# Patient Record
Sex: MALE | Race: WHITE | NOT HISPANIC OR LATINO | Employment: OTHER | URBAN - NONMETROPOLITAN AREA
[De-identification: names, ages, dates, MRNs, and addresses within clinical notes are randomized per-mention and may not be internally consistent; named-entity substitution may affect disease eponyms.]

---

## 2017-01-05 ENCOUNTER — APPOINTMENT (OUTPATIENT)
Dept: GENERAL RADIOLOGY | Facility: HOSPITAL | Age: 72
End: 2017-01-05

## 2017-01-05 ENCOUNTER — APPOINTMENT (OUTPATIENT)
Dept: CARDIOLOGY | Facility: HOSPITAL | Age: 72
End: 2017-01-05
Attending: FAMILY MEDICINE

## 2017-01-05 ENCOUNTER — HOSPITAL ENCOUNTER (INPATIENT)
Facility: HOSPITAL | Age: 72
LOS: 1 days | Discharge: LEFT BEFORE DISCHARGE | End: 2017-01-06
Attending: EMERGENCY MEDICINE | Admitting: FAMILY MEDICINE

## 2017-01-05 DIAGNOSIS — J96.02 ACUTE RESPIRATORY FAILURE WITH HYPOXIA AND HYPERCAPNIA (HCC): ICD-10-CM

## 2017-01-05 DIAGNOSIS — I50.9 ACUTE ON CHRONIC CONGESTIVE HEART FAILURE, UNSPECIFIED CONGESTIVE HEART FAILURE TYPE: Primary | ICD-10-CM

## 2017-01-05 DIAGNOSIS — I48.91 ATRIAL FIBRILLATION WITH RAPID VENTRICULAR RESPONSE (HCC): ICD-10-CM

## 2017-01-05 DIAGNOSIS — J96.01 ACUTE RESPIRATORY FAILURE WITH HYPOXIA AND HYPERCAPNIA (HCC): ICD-10-CM

## 2017-01-05 LAB
A-A DO2: 14.6 MMHG (ref 0–300)
A-A DO2: >300 MMHG (ref 0–300)
A-A DO2: >300 MMHG (ref 0–300)
ALBUMIN SERPL-MCNC: 4.4 G/DL (ref 3.4–4.8)
ALBUMIN/GLOB SERPL: 1.3 G/DL (ref 1.5–2.5)
ALP SERPL-CCNC: 102 U/L (ref 46–116)
ALT SERPL W P-5'-P-CCNC: 25 U/L (ref 10–44)
ANION GAP SERPL CALCULATED.3IONS-SCNC: 10.9 MMOL/L (ref 3.6–11.2)
ARTERIAL PATENCY WRIST A: ABNORMAL
ARTERIAL PATENCY WRIST A: ABNORMAL
ARTERIAL PATENCY WRIST A: NORMAL
AST SERPL-CCNC: 25 U/L (ref 10–34)
ATMOSPHERIC PRESS: 723 MMHG
BACTERIA UR QL AUTO: ABNORMAL /HPF
BASE EXCESS BLDA CALC-SCNC: -2.1 MMOL/L
BASE EXCESS BLDA CALC-SCNC: -5.6 MMOL/L
BASE EXCESS BLDA CALC-SCNC: -8.8 MMOL/L
BASOPHILS # BLD AUTO: 0.11 10*3/MM3 (ref 0–0.3)
BASOPHILS NFR BLD AUTO: 0.5 % (ref 0–2)
BDY SITE: ABNORMAL
BDY SITE: ABNORMAL
BDY SITE: NORMAL
BH CV ECHO MEAS - ACS: 1.7 CM
BH CV ECHO MEAS - AO ROOT AREA (BSA CORRECTED): 1.2
BH CV ECHO MEAS - AO ROOT AREA: 6.9 CM^2
BH CV ECHO MEAS - AO ROOT DIAM: 3 CM
BH CV ECHO MEAS - BSA(HAYCOCK): 2.5 M^2
BH CV ECHO MEAS - BSA: 2.4 M^2
BH CV ECHO MEAS - BZI_BMI: 39.3 KILOGRAMS/M^2
BH CV ECHO MEAS - BZI_METRIC_HEIGHT: 177.8 CM
BH CV ECHO MEAS - BZI_METRIC_WEIGHT: 124.3 KG
BH CV ECHO MEAS - CONTRAST EF 4CH: 27.6 ML/M^2
BH CV ECHO MEAS - EDV(CUBED): 319.2 ML
BH CV ECHO MEAS - EDV(MOD-SP4): 163 ML
BH CV ECHO MEAS - EDV(TEICH): 241.9 ML
BH CV ECHO MEAS - EF(CUBED): 32.9 %
BH CV ECHO MEAS - EF(MOD-SP4): 27.6 %
BH CV ECHO MEAS - EF(TEICH): 26.1 %
BH CV ECHO MEAS - ESV(CUBED): 214.1 ML
BH CV ECHO MEAS - ESV(MOD-SP4): 118 ML
BH CV ECHO MEAS - ESV(TEICH): 178.8 ML
BH CV ECHO MEAS - FS: 12.5 %
BH CV ECHO MEAS - IVS/LVPW: 0.8
BH CV ECHO MEAS - IVSD: 0.9 CM
BH CV ECHO MEAS - LA DIMENSION: 4 CM
BH CV ECHO MEAS - LA/AO: 1.3
BH CV ECHO MEAS - LV DIASTOLIC VOL/BSA (35-75): 68.3 ML/M^2
BH CV ECHO MEAS - LV MASS(C)D: 311.9 GRAMS
BH CV ECHO MEAS - LV MASS(C)DI: 130.7 GRAMS/M^2
BH CV ECHO MEAS - LV SYSTOLIC VOL/BSA (12-30): 49.5 ML/M^2
BH CV ECHO MEAS - LVIDD: 6.8 CM
BH CV ECHO MEAS - LVIDS: 6 CM
BH CV ECHO MEAS - LVLD AP4: 8.2 CM
BH CV ECHO MEAS - LVLS AP4: 7.5 CM
BH CV ECHO MEAS - LVOT AREA (M): 2.8 CM^2
BH CV ECHO MEAS - LVOT AREA: 2.7 CM^2
BH CV ECHO MEAS - LVOT DIAM: 1.9 CM
BH CV ECHO MEAS - LVPWD: 1.1 CM
BH CV ECHO MEAS - PA ACC SLOPE: 792.8 CM/SEC^2
BH CV ECHO MEAS - PA ACC TIME: 0.1 SEC
BH CV ECHO MEAS - PA PR(ACCEL): 33.1 MMHG
BH CV ECHO MEAS - SI(CUBED): 44 ML/M^2
BH CV ECHO MEAS - SI(MOD-SP4): 18.9 ML/M^2
BH CV ECHO MEAS - SI(TEICH): 26.5 ML/M^2
BH CV ECHO MEAS - SV(CUBED): 105 ML
BH CV ECHO MEAS - SV(MOD-SP4): 45 ML
BH CV ECHO MEAS - SV(TEICH): 63.1 ML
BILIRUB SERPL-MCNC: 1 MG/DL (ref 0.2–1.8)
BILIRUB UR QL STRIP: NEGATIVE
BNP SERPL-MCNC: 582 PG/ML (ref 0–100)
BODY TEMPERATURE: 98.6 C
BODY TEMPERATURE: 98.6 C
BODY TEMPERATURE: 98.9 C
BUN BLD-MCNC: 14 MG/DL (ref 7–21)
BUN/CREAT SERPL: 14.4 (ref 7–25)
CALCIUM SPEC-SCNC: 9.6 MG/DL (ref 7.7–10)
CHLORIDE SERPL-SCNC: 101 MMOL/L (ref 99–112)
CK MB SERPL-CCNC: 1.95 NG/ML (ref 0–5)
CK MB SERPL-CCNC: 2.05 NG/ML (ref 0–5)
CK MB SERPL-RTO: 4.1 % (ref 0–3)
CK MB SERPL-RTO: 4.1 % (ref 0–3)
CK SERPL-CCNC: 48 U/L (ref 24–204)
CK SERPL-CCNC: 50 U/L (ref 24–204)
CLARITY UR: CLEAR
CO2 SERPL-SCNC: 23.1 MMOL/L (ref 24.3–31.9)
COHGB MFR BLD: 2.4 % (ref 0–5)
COHGB MFR BLD: 3.7 % (ref 0–5)
COHGB MFR BLD: 5 % (ref 0–5)
COLOR UR: YELLOW
CREAT BLD-MCNC: 0.97 MG/DL (ref 0.43–1.29)
D-LACTATE SERPL-SCNC: 2.1 MMOL/L (ref 0.5–2)
D-LACTATE SERPL-SCNC: 4.8 MMOL/L (ref 0.5–2)
DEPRECATED RDW RBC AUTO: 50 FL (ref 37–54)
EOSINOPHIL # BLD AUTO: 0.15 10*3/MM3 (ref 0–0.7)
EOSINOPHIL NFR BLD AUTO: 0.7 % (ref 0–7)
EPAP: 8
EPAP: 8
ERYTHROCYTE [DISTWIDTH] IN BLOOD BY AUTOMATED COUNT: 14.9 % (ref 11.5–14.5)
FLUAV AG NPH QL: NEGATIVE
FLUBV AG NPH QL IA: NEGATIVE
GFR SERPL CREATININE-BSD FRML MDRD: 76 ML/MIN/1.73
GLOBULIN UR ELPH-MCNC: 3.3 GM/DL
GLUCOSE BLD-MCNC: 345 MG/DL (ref 70–110)
GLUCOSE BLDC GLUCOMTR-MCNC: 124 MG/DL (ref 70–130)
GLUCOSE UR STRIP-MCNC: NEGATIVE MG/DL
HCO3 BLDA-SCNC: 19.9 MMOL/L (ref 22–26)
HCO3 BLDA-SCNC: 21.6 MMOL/L (ref 22–26)
HCO3 BLDA-SCNC: 22.4 MMOL/L (ref 22–26)
HCT VFR BLD AUTO: 51.3 % (ref 42–52)
HCT VFR BLD CALC: 46 % (ref 42–52)
HCT VFR BLD CALC: 46 % (ref 42–52)
HCT VFR BLD CALC: 50 % (ref 42–52)
HGB BLD-MCNC: 16.2 G/DL (ref 14–18)
HGB BLDA-MCNC: 15.5 G/DL (ref 12–16)
HGB BLDA-MCNC: 15.8 G/DL (ref 12–16)
HGB BLDA-MCNC: 16.9 G/DL (ref 12–16)
HGB UR QL STRIP.AUTO: ABNORMAL
HOROWITZ INDEX BLD+IHG-RTO: 100 %
HOROWITZ INDEX BLD+IHG-RTO: 100 %
HOROWITZ INDEX BLD+IHG-RTO: 50 %
HYALINE CASTS UR QL AUTO: ABNORMAL /LPF
IMM GRANULOCYTES # BLD: 0.12 10*3/MM3 (ref 0–0.03)
IMM GRANULOCYTES NFR BLD: 0.5 % (ref 0–0.5)
IPAP: 20
IPAP: 20
KETONES UR QL STRIP: NEGATIVE
L PNEUMO1 AG UR QL IA: NEGATIVE
LEUKOCYTE ESTERASE UR QL STRIP.AUTO: NEGATIVE
LV EF 2D ECHO EST: 35 %
LYMPHOCYTES # BLD AUTO: 5.39 10*3/MM3 (ref 1–3)
LYMPHOCYTES NFR BLD AUTO: 23.5 % (ref 16–46)
MAGNESIUM SERPL-MCNC: 1.9 MG/DL (ref 1.7–2.6)
MCH RBC QN AUTO: 30 PG (ref 27–33)
MCHC RBC AUTO-ENTMCNC: 31.6 G/DL (ref 33–37)
MCV RBC AUTO: 95 FL (ref 80–94)
METHGB BLD QL: 0.3 % (ref 0–3)
METHGB BLD QL: 0.4 % (ref 0–3)
METHGB BLD QL: 0.4 % (ref 0–3)
MODALITY: ABNORMAL
MODALITY: ABNORMAL
MODALITY: NORMAL
MONOCYTES # BLD AUTO: 1.08 10*3/MM3 (ref 0.1–0.9)
MONOCYTES NFR BLD AUTO: 4.7 % (ref 0–12)
MYOGLOBIN SERPL-MCNC: 34 NG/ML (ref 0–109)
MYOGLOBIN SERPL-MCNC: 72 NG/ML (ref 0–109)
NEUTROPHILS # BLD AUTO: 16.04 10*3/MM3 (ref 1.4–6.5)
NEUTROPHILS NFR BLD AUTO: 70.1 % (ref 40–75)
NITRITE UR QL STRIP: NEGATIVE
OSMOLALITY SERPL CALC.SUM OF ELEC: 284.3 MOSM/KG (ref 273–305)
OXYHGB MFR BLDV: 87.2 % (ref 85–100)
OXYHGB MFR BLDV: 93.5 % (ref 85–100)
OXYHGB MFR BLDV: 95.6 % (ref 85–100)
PCO2 BLDA: 38.1 MM HG (ref 35–45)
PCO2 BLDA: 39.3 MM HG (ref 35–45)
PCO2 BLDA: 65.1 MM HG (ref 35–45)
PH BLDA: 7.14 PH UNITS (ref 7.35–7.45)
PH BLDA: 7.32 PH UNITS (ref 7.35–7.45)
PH BLDA: 7.39 PH UNITS (ref 7.35–7.45)
PH UR STRIP.AUTO: <=5 [PH] (ref 5–8)
PHOSPHATE SERPL-MCNC: 3.6 MG/DL (ref 2.7–4.5)
PLATELET # BLD AUTO: 338 10*3/MM3 (ref 130–400)
PMV BLD AUTO: 11.1 FL (ref 6–10)
PO2 BLDA: 293.1 MM HG (ref 80–100)
PO2 BLDA: 77.7 MM HG (ref 80–100)
PO2 BLDA: 80.2 MM HG (ref 80–100)
POTASSIUM BLD-SCNC: 3.9 MMOL/L (ref 3.5–5.3)
PROT SERPL-MCNC: 7.7 G/DL (ref 6–8)
PROT UR QL STRIP: ABNORMAL
RBC # BLD AUTO: 5.4 10*6/MM3 (ref 4.7–6.1)
RBC # UR: ABNORMAL /HPF
REF LAB TEST METHOD: ABNORMAL
SAO2 % BLDCOA: 92.2 % (ref 90–100)
SAO2 % BLDCOA: 96.1 % (ref 90–100)
SAO2 % BLDCOA: 99.7 % (ref 90–100)
SET MECH RESP RATE: 20
SET MECH RESP RATE: 20
SODIUM BLD-SCNC: 135 MMOL/L (ref 135–153)
SP GR UR STRIP: 1.01 (ref 1–1.03)
SQUAMOUS #/AREA URNS HPF: ABNORMAL /HPF
TROPONIN I SERPL-MCNC: 0.04 NG/ML
TROPONIN I SERPL-MCNC: 0.09 NG/ML
UROBILINOGEN UR QL STRIP: ABNORMAL
WBC NRBC COR # BLD: 22.89 10*3/MM3 (ref 4.5–12.5)
WBC UR QL AUTO: ABNORMAL /HPF

## 2017-01-05 PROCEDURE — 94799 UNLISTED PULMONARY SVC/PX: CPT

## 2017-01-05 PROCEDURE — 82805 BLOOD GASES W/O2 SATURATION: CPT | Performed by: EMERGENCY MEDICINE

## 2017-01-05 PROCEDURE — 83735 ASSAY OF MAGNESIUM: CPT | Performed by: INTERNAL MEDICINE

## 2017-01-05 PROCEDURE — 71010 XR CHEST 1 VW: CPT | Performed by: RADIOLOGY

## 2017-01-05 PROCEDURE — 94660 CPAP INITIATION&MGMT: CPT

## 2017-01-05 PROCEDURE — 80053 COMPREHEN METABOLIC PANEL: CPT | Performed by: EMERGENCY MEDICINE

## 2017-01-05 PROCEDURE — 83874 ASSAY OF MYOGLOBIN: CPT | Performed by: EMERGENCY MEDICINE

## 2017-01-05 PROCEDURE — 93010 ELECTROCARDIOGRAM REPORT: CPT | Performed by: INTERNAL MEDICINE

## 2017-01-05 PROCEDURE — 81001 URINALYSIS AUTO W/SCOPE: CPT | Performed by: EMERGENCY MEDICINE

## 2017-01-05 PROCEDURE — 83050 HGB METHEMOGLOBIN QUAN: CPT | Performed by: EMERGENCY MEDICINE

## 2017-01-05 PROCEDURE — 83605 ASSAY OF LACTIC ACID: CPT | Performed by: EMERGENCY MEDICINE

## 2017-01-05 PROCEDURE — 82375 ASSAY CARBOXYHB QUANT: CPT | Performed by: EMERGENCY MEDICINE

## 2017-01-05 PROCEDURE — 82962 GLUCOSE BLOOD TEST: CPT

## 2017-01-05 PROCEDURE — 94640 AIRWAY INHALATION TREATMENT: CPT

## 2017-01-05 PROCEDURE — 99222 1ST HOSP IP/OBS MODERATE 55: CPT | Performed by: INTERNAL MEDICINE

## 2017-01-05 PROCEDURE — 25010000002 VANCOMYCIN PER 500 MG

## 2017-01-05 PROCEDURE — 36415 COLL VENOUS BLD VENIPUNCTURE: CPT

## 2017-01-05 PROCEDURE — 93306 TTE W/DOPPLER COMPLETE: CPT

## 2017-01-05 PROCEDURE — 93005 ELECTROCARDIOGRAM TRACING: CPT | Performed by: EMERGENCY MEDICINE

## 2017-01-05 PROCEDURE — 25010000002 CEFTRIAXONE: Performed by: EMERGENCY MEDICINE

## 2017-01-05 PROCEDURE — 87040 BLOOD CULTURE FOR BACTERIA: CPT | Performed by: EMERGENCY MEDICINE

## 2017-01-05 PROCEDURE — 93306 TTE W/DOPPLER COMPLETE: CPT | Performed by: INTERNAL MEDICINE

## 2017-01-05 PROCEDURE — 87449 NOS EACH ORGANISM AG IA: CPT | Performed by: INTERNAL MEDICINE

## 2017-01-05 PROCEDURE — 87804 INFLUENZA ASSAY W/OPTIC: CPT | Performed by: INTERNAL MEDICINE

## 2017-01-05 PROCEDURE — 83880 ASSAY OF NATRIURETIC PEPTIDE: CPT | Performed by: EMERGENCY MEDICINE

## 2017-01-05 PROCEDURE — 36600 WITHDRAWAL OF ARTERIAL BLOOD: CPT | Performed by: EMERGENCY MEDICINE

## 2017-01-05 PROCEDURE — 99285 EMERGENCY DEPT VISIT HI MDM: CPT

## 2017-01-05 PROCEDURE — 25010000002 FUROSEMIDE PER 20 MG

## 2017-01-05 PROCEDURE — 84100 ASSAY OF PHOSPHORUS: CPT | Performed by: INTERNAL MEDICINE

## 2017-01-05 PROCEDURE — 82550 ASSAY OF CK (CPK): CPT | Performed by: EMERGENCY MEDICINE

## 2017-01-05 PROCEDURE — 71010 HC CHEST PA OR AP: CPT

## 2017-01-05 PROCEDURE — 25010000002 FUROSEMIDE PER 20 MG: Performed by: INTERNAL MEDICINE

## 2017-01-05 PROCEDURE — 82553 CREATINE MB FRACTION: CPT | Performed by: EMERGENCY MEDICINE

## 2017-01-05 PROCEDURE — 84484 ASSAY OF TROPONIN QUANT: CPT | Performed by: EMERGENCY MEDICINE

## 2017-01-05 PROCEDURE — 85025 COMPLETE CBC W/AUTO DIFF WBC: CPT | Performed by: EMERGENCY MEDICINE

## 2017-01-05 RX ORDER — LISINOPRIL 10 MG/1
20 TABLET ORAL DAILY
Status: DISCONTINUED | OUTPATIENT
Start: 2017-01-05 | End: 2017-01-06 | Stop reason: HOSPADM

## 2017-01-05 RX ORDER — IPRATROPIUM BROMIDE AND ALBUTEROL SULFATE 2.5; .5 MG/3ML; MG/3ML
3 SOLUTION RESPIRATORY (INHALATION) ONCE
Status: COMPLETED | OUTPATIENT
Start: 2017-01-05 | End: 2017-01-05

## 2017-01-05 RX ORDER — LISINOPRIL 20 MG/1
20 TABLET ORAL DAILY
COMMUNITY
End: 2017-07-10

## 2017-01-05 RX ORDER — POTASSIUM CHLORIDE 20 MEQ/1
20 TABLET, EXTENDED RELEASE ORAL DAILY
Status: DISCONTINUED | OUTPATIENT
Start: 2017-01-06 | End: 2017-01-06 | Stop reason: HOSPADM

## 2017-01-05 RX ORDER — BUMETANIDE 0.25 MG/ML
INJECTION INTRAMUSCULAR; INTRAVENOUS
Status: COMPLETED
Start: 2017-01-05 | End: 2017-01-05

## 2017-01-05 RX ORDER — FUROSEMIDE 20 MG/1
20 TABLET ORAL DAILY
COMMUNITY
End: 2017-03-02 | Stop reason: SDUPTHER

## 2017-01-05 RX ORDER — NITROGLYCERIN 20 MG/100ML
INJECTION INTRAVENOUS
Status: DISPENSED
Start: 2017-01-05 | End: 2017-01-05

## 2017-01-05 RX ORDER — FUROSEMIDE 10 MG/ML
80 INJECTION INTRAMUSCULAR; INTRAVENOUS ONCE
Status: COMPLETED | OUTPATIENT
Start: 2017-01-05 | End: 2017-01-05

## 2017-01-05 RX ORDER — FUROSEMIDE 10 MG/ML
INJECTION INTRAMUSCULAR; INTRAVENOUS
Status: COMPLETED
Start: 2017-01-05 | End: 2017-01-05

## 2017-01-05 RX ORDER — NITROGLYCERIN 20 MG/100ML
5-200 INJECTION INTRAVENOUS
Status: DISCONTINUED | OUTPATIENT
Start: 2017-01-05 | End: 2017-01-05

## 2017-01-05 RX ORDER — LISINOPRIL 10 MG/1
20 TABLET ORAL DAILY
Status: CANCELLED | OUTPATIENT
Start: 2017-01-05

## 2017-01-05 RX ORDER — AMIODARONE HYDROCHLORIDE 200 MG/1
200 TABLET ORAL 3 TIMES DAILY
Status: DISCONTINUED | OUTPATIENT
Start: 2017-01-05 | End: 2017-01-06 | Stop reason: HOSPADM

## 2017-01-05 RX ORDER — FUROSEMIDE 20 MG/1
20 TABLET ORAL DAILY
Status: CANCELLED | OUTPATIENT
Start: 2017-01-06

## 2017-01-05 RX ORDER — FUROSEMIDE 10 MG/ML
40 INJECTION INTRAMUSCULAR; INTRAVENOUS DAILY
Status: DISCONTINUED | OUTPATIENT
Start: 2017-01-05 | End: 2017-01-06 | Stop reason: HOSPADM

## 2017-01-05 RX ORDER — NYSTATIN 100000 U/G
CREAM TOPICAL EVERY 12 HOURS SCHEDULED
Status: DISCONTINUED | OUTPATIENT
Start: 2017-01-05 | End: 2017-01-06 | Stop reason: HOSPADM

## 2017-01-05 RX ORDER — FUROSEMIDE 10 MG/ML
40 INJECTION INTRAMUSCULAR; INTRAVENOUS ONCE
Status: COMPLETED | OUTPATIENT
Start: 2017-01-05 | End: 2017-01-05

## 2017-01-05 RX ORDER — BUMETANIDE 0.25 MG/ML
2 INJECTION INTRAMUSCULAR; INTRAVENOUS ONCE
Status: COMPLETED | OUTPATIENT
Start: 2017-01-05 | End: 2017-01-05

## 2017-01-05 RX ORDER — FUROSEMIDE 20 MG/1
20 TABLET ORAL DAILY
Status: CANCELLED | OUTPATIENT
Start: 2017-01-05

## 2017-01-05 RX ORDER — POTASSIUM CHLORIDE 1.5 G/1.77G
40 POWDER, FOR SOLUTION ORAL ONCE
Status: COMPLETED | OUTPATIENT
Start: 2017-01-05 | End: 2017-01-05

## 2017-01-05 RX ORDER — DOXYCYCLINE 100 MG/1
100 CAPSULE ORAL EVERY 12 HOURS SCHEDULED
Status: DISCONTINUED | OUTPATIENT
Start: 2017-01-05 | End: 2017-01-06 | Stop reason: HOSPADM

## 2017-01-05 RX ADMIN — NYSTATIN: 100000 CREAM TOPICAL at 13:56

## 2017-01-05 RX ADMIN — BUMETANIDE 2 MG: 0.25 INJECTION, SOLUTION INTRAMUSCULAR; INTRAVENOUS at 01:06

## 2017-01-05 RX ADMIN — AMIODARONE HYDROCHLORIDE 200 MG: 200 TABLET ORAL at 21:33

## 2017-01-05 RX ADMIN — BUMETANIDE 2 MG: 0.25 INJECTION INTRAMUSCULAR; INTRAVENOUS at 01:06

## 2017-01-05 RX ADMIN — METOPROLOL TARTRATE 25 MG: 25 TABLET, FILM COATED ORAL at 21:33

## 2017-01-05 RX ADMIN — DOXYCYCLINE 100 MG: 100 CAPSULE ORAL at 14:44

## 2017-01-05 RX ADMIN — FUROSEMIDE 40 MG: 10 INJECTION, SOLUTION INTRAMUSCULAR; INTRAVENOUS at 14:44

## 2017-01-05 RX ADMIN — DOXYCYCLINE 100 MG: 100 CAPSULE ORAL at 21:33

## 2017-01-05 RX ADMIN — RIVAROXABAN 20 MG: 20 TABLET, FILM COATED ORAL at 18:50

## 2017-01-05 RX ADMIN — CEFEPIME HYDROCHLORIDE 1 G: 1 INJECTION, POWDER, FOR SOLUTION INTRAMUSCULAR; INTRAVENOUS at 14:44

## 2017-01-05 RX ADMIN — IPRATROPIUM BROMIDE AND ALBUTEROL SULFATE 3 ML: .5; 3 SOLUTION RESPIRATORY (INHALATION) at 04:57

## 2017-01-05 RX ADMIN — NITROGLYCERIN 5 MCG/MIN: 20 INJECTION INTRAVENOUS at 00:43

## 2017-01-05 RX ADMIN — CEFTRIAXONE 1 G: 1 INJECTION, POWDER, FOR SOLUTION INTRAMUSCULAR; INTRAVENOUS at 05:15

## 2017-01-05 RX ADMIN — VANCOMYCIN HYDROCHLORIDE 2000 MG: 5 INJECTION, POWDER, LYOPHILIZED, FOR SOLUTION INTRAVENOUS at 15:30

## 2017-01-05 RX ADMIN — FUROSEMIDE 80 MG: 10 INJECTION, SOLUTION INTRAMUSCULAR; INTRAVENOUS at 00:40

## 2017-01-05 RX ADMIN — LISINOPRIL 20 MG: 10 TABLET ORAL at 18:50

## 2017-01-05 RX ADMIN — IPRATROPIUM BROMIDE AND ALBUTEROL SULFATE 3 ML: .5; 3 SOLUTION RESPIRATORY (INHALATION) at 00:55

## 2017-01-05 RX ADMIN — DILTIAZEM HYDROCHLORIDE 5 MG/HR: 100 INJECTION, POWDER, LYOPHILIZED, FOR SOLUTION INTRAVENOUS at 00:48

## 2017-01-05 RX ADMIN — DILTIAZEM HYDROCHLORIDE 5 MG/HR: 100 INJECTION, POWDER, LYOPHILIZED, FOR SOLUTION INTRAVENOUS at 13:21

## 2017-01-05 RX ADMIN — DOXYCYCLINE 100 MG: 100 INJECTION, POWDER, LYOPHILIZED, FOR SOLUTION INTRAVENOUS at 05:15

## 2017-01-05 RX ADMIN — NYSTATIN: 100000 CREAM TOPICAL at 21:00

## 2017-01-05 RX ADMIN — POTASSIUM CHLORIDE 40 MEQ: 1.5 POWDER, FOR SOLUTION ORAL at 15:30

## 2017-01-05 RX ADMIN — FUROSEMIDE 80 MG: 10 INJECTION INTRAMUSCULAR; INTRAVENOUS at 00:40

## 2017-01-05 NOTE — ED NOTES
Attempted to give report, receiving nurse verifying that they can take patient with a nitroglycerin drip. States she will call back     Harriet Galvan RN  01/05/17 0828

## 2017-01-05 NOTE — ED PROVIDER NOTES
Subjective   HPI Comments: Patient is a 71-year-old white male who states he has a history of chronic atrial fibrillation and congestive heart failure.  He denies any history of COPD.  He presents here 2 hours after the onset of rapidly progressively worsening shortness of breath.  He denies any chest pain or discomfort.  He had no preceding symptoms... No cough, cold, congestion, fever, chills, etc.  He denies any other associated symptoms or complaints.  Initially he was somewhat mild but it progressed rapidly.    Patient is a 71 y.o. male presenting with shortness of breath.   History provided by:  Patient  Shortness of Breath   Severity:  Severe  Onset quality:  Sudden  Duration:  2 hours  Timing:  Constant  Progression:  Worsening  Chronicity:  Recurrent  Context: not URI    Relieved by:  Nothing  Worsened by:  Nothing  Ineffective treatments:  Sitting up  Associated symptoms: no abdominal pain, no chest pain, no claudication, no cough, no diaphoresis, no fever, no headaches, no hemoptysis, no neck pain, no rash, no sore throat, no sputum production, no syncope, no vomiting and no wheezing    Risk factors: no hx of PE/DVT, no prolonged immobilization and no tobacco use        Review of Systems   Constitutional: Negative for chills, diaphoresis and fever.   HENT: Negative for congestion, sinus pressure and sore throat.    Eyes: Negative for pain.   Respiratory: Positive for shortness of breath. Negative for cough, hemoptysis, sputum production, wheezing and stridor.    Cardiovascular: Negative for chest pain, palpitations, claudication and syncope.   Gastrointestinal: Negative for abdominal distention, abdominal pain, diarrhea, nausea and vomiting.   Endocrine: Negative for polydipsia and polyphagia.   Genitourinary: Negative for difficulty urinating and flank pain.   Musculoskeletal: Negative for neck pain and neck stiffness.   Skin: Negative for color change and rash.   Neurological: Negative for dizziness,  seizures, syncope and headaches.   Psychiatric/Behavioral: Negative for confusion.   All other systems reviewed and are negative.      Past Medical History   Diagnosis Date   • Atrial fibrillation    • Cardiomyopathy    • COPD (chronic obstructive pulmonary disease)    • Hypertension        No Known Allergies    History reviewed. No pertinent past surgical history.    Family History   Problem Relation Age of Onset   • No Known Problems Mother    • No Known Problems Father    • No Known Problems Sister    • No Known Problems Brother    • No Known Problems Son    • No Known Problems Daughter    • No Known Problems Maternal Grandmother    • No Known Problems Maternal Grandfather    • No Known Problems Paternal Grandmother    • No Known Problems Paternal Grandfather    • No Known Problems Cousin    • Rheum arthritis Neg Hx    • Osteoarthritis Neg Hx    • Asthma Neg Hx    • Diabetes Neg Hx    • Heart failure Neg Hx    • Hyperlipidemia Neg Hx    • Hypertension Neg Hx    • Migraines Neg Hx    • Rashes / Skin problems Neg Hx    • Seizures Neg Hx    • Stroke Neg Hx    • Thyroid disease Neg Hx        Social History     Social History   • Marital status:      Spouse name: N/A   • Number of children: N/A   • Years of education: N/A     Social History Main Topics   • Smoking status: Current Every Day Smoker     Packs/day: 1.00     Years: 18.00   • Smokeless tobacco: None   • Alcohol use No   • Drug use: No   • Sexual activity: Defer     Other Topics Concern   • None     Social History Narrative   • None           Objective   Physical Exam   Constitutional: He is oriented to person, place, and time. He appears well-developed and well-nourished.   Patient is in severe respiratory distress.   HENT:   Head: Normocephalic and atraumatic.   Eyes: EOM are normal. Pupils are equal, round, and reactive to light. No scleral icterus.   Neck: Normal range of motion. Neck supple. JVD present. No rigidity. No tracheal deviation  present.   Cardiovascular: Intact distal pulses.    Tachycardic, irregularly irregular   Pulmonary/Chest:   Patient is in severe respiratory distress.  Diminished air movement throughout, bibasilar rales.   Abdominal: Soft. Bowel sounds are normal. There is no tenderness. There is no rebound and no guarding.   Musculoskeletal: Normal range of motion. He exhibits edema. He exhibits no tenderness or deformity.   Neurological: He is alert and oriented to person, place, and time. He has normal strength. No sensory deficit. He exhibits normal muscle tone. Coordination normal. GCS eye subscore is 4. GCS verbal subscore is 5. GCS motor subscore is 6.   Skin: Skin is warm and dry. No cyanosis. No pallor.   Psychiatric: He has a normal mood and affect. His behavior is normal.   Vitals reviewed.      Procedures  EKG shows atrial fibrillation with a rate of 151.  Nonspecific ST T abnormalities.  XR Chest 1 View   ED Interpretation   Diffuse pulmonary vascular congestion consistent with acute   congestive heart failure pending radiologist review.  There is   some density in the periphery of the right mid to lower lung that   I feel is most likely secondary to an overlying soft tissue   shadow rather than acute pulmonary process.               ED Course  ED Course   Comment By Time   Patient thus far as improving throughout his emergency department stay.  He is now warm, pink, dry.  He voices that he is beginning to feel better.  Heart rate currently is 109, pulse ox on BiPAP is 94%.  Blood pressure currently is 95/69; this is on 10 mg per hour of intravenous Cardizem and 10 mics per minute of intravenous nitroglycerin.  He is just now beginning to have some urine output.  He looks much more comfortable now.  I'm endorsing further care of Mr. Kehr over to Dr. Tovar. Abhi Rios MD 01/05 0144                  MDM  Number of Diagnoses or Management Options  Acute on chronic congestive heart failure, unspecified  congestive heart failure type:   Acute respiratory failure with hypoxia and hypercapnia:   Atrial fibrillation with rapid ventricular response:   Critical Care  Total time providing critical care: 30-74 minutes (45)    Patient Progress  Patient progress: (Condition is improved, but serious.)      Final diagnoses:   Acute on chronic congestive heart failure, unspecified congestive heart failure type   Acute respiratory failure with hypoxia and hypercapnia   Atrial fibrillation with rapid ventricular response           Please note that portions of this note were completed with a voice recognition program. Efforts were made to edit the dictations, but occasionally words are mistranscribed.       Abhi Rios MD  01/05/17 0147

## 2017-01-05 NOTE — PROGRESS NOTES
Pharmacokinetics Service Note:    Mr. Kehr has been evaluated for vancomycin dosing to treat his B PNA.  Based on his estimated ClCr of 79 ml/min and demographics, will load with 2 gm followed by 1.5 gm q12hrs to target troughs = 15-20 mg/L.  Will check a trough level at steady state to determine if any adjustments are needed.    Thank you.  Lynne Kirkland, Pharm.D.  1/5/2017  1:27 PM

## 2017-01-05 NOTE — H&P
History and Physical  01/05/17    Chief complaint Dyspnea    Subjective     Patient is a 71 y.o. male presents with increased shortness of breath. WHile in the ER found to be in afib with rvr.  Pt was started on cardizem drip and bipap.  Was given iv lasix due to some volume overload.  After diuresis and better rate control pt improved from respiratory standpoint.  Admit on cardizem will consult  and . ?cardioversion.    Review of Systems   Pertinent items are noted in HPI, all other systems reviewed and negative    History  Past Medical History   Diagnosis Date   • Atrial fibrillation    • Cardiomyopathy    • COPD (chronic obstructive pulmonary disease)    • Hypertension      History reviewed. No pertinent past surgical history.  Family History   Problem Relation Age of Onset   • No Known Problems Mother    • No Known Problems Father    • No Known Problems Sister    • No Known Problems Brother    • No Known Problems Son    • No Known Problems Daughter    • No Known Problems Maternal Grandmother    • No Known Problems Maternal Grandfather    • No Known Problems Paternal Grandmother    • No Known Problems Paternal Grandfather    • No Known Problems Cousin    • Rheum arthritis Neg Hx    • Osteoarthritis Neg Hx    • Asthma Neg Hx    • Diabetes Neg Hx    • Heart failure Neg Hx    • Hyperlipidemia Neg Hx    • Hypertension Neg Hx    • Migraines Neg Hx    • Rashes / Skin problems Neg Hx    • Seizures Neg Hx    • Stroke Neg Hx    • Thyroid disease Neg Hx      Social History   Substance Use Topics   • Smoking status: Current Every Day Smoker     Packs/day: 1.00     Years: 18.00   • Smokeless tobacco: None   • Alcohol use No     Prescriptions Prior to Admission   Medication Sig Dispense Refill Last Dose   • furosemide (LASIX) 20 MG tablet Take 20 mg by mouth Daily.   01/03/2017 at Unknown time   • lisinopril (PRINIVIL,ZESTRIL) 20 MG tablet Take 20 mg by mouth Daily.   01/03/2017 at Unknown time   •  metoprolol tartrate (LOPRESSOR) 25 MG tablet Take 1 tablet by mouth 2 (Two) Times a Day. 60 tablet 3 01/03/2017 at Unknown time   • rivaroxaban (XARELTO) 20 MG tablet Take 20 mg by mouth Daily.   01/03/2017 at 1700      Allergies:  Review of patient's allergies indicates no known allergies.      Objective     Vital Signs  Temp:  [97 °F (36.1 °C)-97.7 °F (36.5 °C)] 97.7 °F (36.5 °C)  Heart Rate:  [] 114  Resp:  [20-26] 20  BP: ()/() 129/86    Physical Exam:   General Appearance alert, appears stated age and cooperative   Head normocephalic, without obvious abnormality and atraumatic   Eyes conjunctivae and sclerae normal, no icterus and PERRLA   Neck no adenopathy, suppple, no thyromegaly, no carotid bruit and no JVD   Lungs clear to auscultation, respirations regular and respirations unlabored   Heart regular rhythm & normal rate, normal S1, S2 and no murmur, no elen   Abdomen normal bowel sounds, no masses, no hepatomegaly, soft non-tender   Extremities moves extremities well, no edema, no cyanosis and no redness    Labs:  Lab Results (last 72 hours)     Procedure Component Value Units Date/Time    Blood Gas, Arterial With Co-Ox [[id number]]  (Abnormal) Collected:  01/05/17 0048    Specimen:  Arterial Blood Updated:  01/05/17 0051     Site Arterial: right radial      Ludwin's Test N/A      pH, Arterial 7.139 (C) pH units      pCO2, Arterial 65.1 (C) mm Hg      pO2, Arterial 77.7 (L) mm Hg      HCO3, Arterial 21.6 (L) mmol/L      Base Excess, Arterial -8.8 mmol/L      O2 Saturation, Arterial 92.2 %      Hemoglobin, Blood Gas 16.9 (H) g/dL      Hematocrit, Blood Gas 50.0 %      Oxyhemoglobin 87.2 %      Methemoglobin 0.4 %      Carboxyhemoglobin 5.0 %      A-a Gradiant >300.0 (H) mmHg      Temperature 98.6 C      Barometric Pressure for Blood Gas 723 mmHg      Modality Non Rebreather      FIO2 100 %     CBC & Differential [[id number]] Collected:  01/05/17 0048    Specimen:  Blood Updated:  01/05/17  0055    Narrative:       The following orders were created for panel order CBC & Differential.  Procedure                               Abnormality         Status                     ---------                               -----------         ------                     CBC Auto Differential[[id number]]         Abnormal            Final result                 Please view results for these tests on the individual orders.    CBC Auto Differential [[id number]]  (Abnormal) Collected:  01/05/17 0048    Specimen:  Blood Updated:  01/05/17 0055     WBC 22.89 (H) 10*3/mm3      RBC 5.40 10*6/mm3      Hemoglobin 16.2 g/dL      Hematocrit 51.3 %      MCV 95.0 (H) fL      MCH 30.0 pg      MCHC 31.6 (L) g/dL      RDW 14.9 (H) %      RDW-SD 50.0 fl      MPV 11.1 (H) fL      Platelets 338 10*3/mm3      Neutrophil % 70.1 %      Lymphocyte % 23.5 %      Monocyte % 4.7 %      Eosinophil % 0.7 %      Basophil % 0.5 %      Immature Grans % 0.5 %      Neutrophils, Absolute 16.04 (H) 10*3/mm3      Lymphocytes, Absolute 5.39 (H) 10*3/mm3      Monocytes, Absolute 1.08 (H) 10*3/mm3      Eosinophils, Absolute 0.15 10*3/mm3      Basophils, Absolute 0.11 10*3/mm3      Immature Grans, Absolute 0.12 (H) 10*3/mm3     CK [[id number]]  (Normal) Collected:  01/05/17 0048    Specimen:  Blood from Arm, Left Updated:  01/05/17 0129     Creatine Kinase 48 U/L     Myoglobin, Serum [[id number]]  (Normal) Collected:  01/05/17 0048    Specimen:  Blood from Arm, Left Updated:  01/05/17 0129     Myoglobin 34.0 ng/mL     CK-MB [[id number]]  (Normal) Collected:  01/05/17 0048    Specimen:  Blood from Arm, Left Updated:  01/05/17 0129     CKMB 1.95 ng/mL     Troponin [[id number]]  (Normal) Collected:  01/05/17 0048    Specimen:  Blood from Arm, Left Updated:  01/05/17 0129     Troponin I 0.036 ng/mL     Narrative:       Ultra Troponin I Reference Range:         <=0.039 ng/mL: Negative    0.04-0.779 ng/mL: Indeterminate Range. Suspicious of MI.  Clinical correlation  required.       >=0.78  ng/mL: Consistent with myocardial injury.  Clinical correlation required.    CK-MB Index [[id number]]  (Abnormal) Collected:  01/05/17 0048    Specimen:  Blood from Arm, Left Updated:  01/05/17 0129     CK-MB Index 4.1 (H) %     BNP [[id number]]  (Abnormal) Collected:  01/05/17 0048    Specimen:  Blood Updated:  01/05/17 0129     .0 (H) pg/mL     Comprehensive Metabolic Panel [[id number]]  (Abnormal) Collected:  01/05/17 0048    Specimen:  Blood from Arm, Left Updated:  01/05/17 0148     Glucose 345 (H) mg/dL      BUN 14 mg/dL      Creatinine 0.97 mg/dL      Sodium 135 mmol/L      Potassium 3.9 mmol/L      Chloride 101 mmol/L      CO2 23.1 (L) mmol/L      Calcium 9.6 mg/dL      Total Protein 7.7 g/dL      Albumin 4.40 g/dL      ALT (SGPT) 25 U/L      AST (SGOT) 25 U/L      Alkaline Phosphatase 102 U/L      Total Bilirubin 1.0 mg/dL      eGFR Non African Amer 76 mL/min/1.73      Globulin 3.3 gm/dL      A/G Ratio 1.3 (L) g/dL      BUN/Creatinine Ratio 14.4      Anion Gap 10.9 mmol/L     Narrative:       The MDRD GFR formula is only valid for adults with stable renal function between ages 18 and 70.    Osmolality, Calculated [[id number]]  (Normal) Collected:  01/05/17 0048    Specimen:  Blood from Arm, Left Updated:  01/05/17 0149     Osmolality Calc 284.3 mOsm/kg     Blood Gas, Arterial With Co-Ox [[id number]]  (Abnormal) Collected:  01/05/17 0157    Specimen:  Arterial Blood Updated:  01/05/17 0201     Site Arterial: left radial      Ludwin's Test N/A      pH, Arterial 7.323 (L) pH units      pCO2, Arterial 39.3 mm Hg      pO2, Arterial 293.1 (H) mm Hg      HCO3, Arterial 19.9 (C) mmol/L      Base Excess, Arterial -5.6 mmol/L      O2 Saturation, Arterial 99.7 %      Hemoglobin, Blood Gas 15.5 g/dL      Hematocrit, Blood Gas 46.0 %      Oxyhemoglobin 95.6 %      Methemoglobin 0.4 %      Carboxyhemoglobin 3.7 %      A-a Gradiant >300.0 (H) mmHg      Temperature 98.6 C      Barometric Pressure for  Blood Gas 723 mmHg      Modality BiPAP      FIO2 100 %      Set Mech Resp Rate 20      IPAP 20      EPAP 8     Lactic Acid, Plasma [[id number]]  (Abnormal) Collected:  01/05/17 0252    Specimen:  Blood from Arm, Left Updated:  01/05/17 0322     Lactate 4.8 (C) mmol/L     Troponin [[id number]]  (Abnormal) Collected:  01/05/17 0253    Specimen:  Blood from Arm, Left Updated:  01/05/17 0335     Troponin I 0.089 (H) ng/mL     Narrative:       Ultra Troponin I Reference Range:         <=0.039 ng/mL: Negative    0.04-0.779 ng/mL: Indeterminate Range. Suspicious of MI.  Clinical correlation required.       >=0.78  ng/mL: Consistent with myocardial injury.  Clinical correlation required.    CK [[id number]]  (Normal) Collected:  01/05/17 0253    Specimen:  Blood from Arm, Left Updated:  01/05/17 0338     Creatine Kinase 50 U/L     Myoglobin, Serum [[id number]]  (Normal) Collected:  01/05/17 0253    Specimen:  Blood from Arm, Left Updated:  01/05/17 0338     Myoglobin 72.0 ng/mL     CK-MB [[id number]]  (Normal) Collected:  01/05/17 0253    Specimen:  Blood from Arm, Left Updated:  01/05/17 0338     CKMB 2.05 ng/mL     CK-MB Index [[id number]]  (Abnormal) Collected:  01/05/17 0253    Specimen:  Blood from Arm, Left Updated:  01/05/17 0338     CK-MB Index 4.1 (H) %     Blood Culture [[id number]] Collected:  01/05/17 0414    Specimen:  Blood from Arm, Left Updated:  01/05/17 0415    Blood Gas, Arterial With Co-Ox [[id number]] Collected:  01/05/17 0514    Specimen:  Arterial Blood Updated:  01/05/17 0524     Site Arterial: left radial      Ludwin's Test N/A      pH, Arterial 7.388 pH units      pCO2, Arterial 38.1 mm Hg      pO2, Arterial 80.2 mm Hg      HCO3, Arterial 22.4 mmol/L      Base Excess, Arterial -2.1 mmol/L      O2 Saturation, Arterial 96.1 %      Hemoglobin, Blood Gas 15.8 g/dL      Hematocrit, Blood Gas 46.0 %      Oxyhemoglobin 93.5 %      Methemoglobin 0.3 %      Carboxyhemoglobin 2.4 %      A-a Gradiant 14.6 mmHg      Temperature  98.9 C      Barometric Pressure for Blood Gas 723 mmHg      Modality BiPAP      FIO2 50 %      Set Mech Resp Rate 20      IPAP 20      EPAP 8     Urinalysis With / Culture If Indicated [[id number]]  (Abnormal) Collected:  01/05/17 0604    Specimen:  Urine from Urine, Catheter Updated:  01/05/17 0639     Color, UA Yellow      Appearance, UA Clear      pH, UA <=5.0      Specific Gravity, UA 1.008      Glucose, UA Negative      Ketones, UA Negative      Bilirubin, UA Negative      Blood, UA Small (1+) (A)      Protein,  mg/dL (2+) (A)      Leuk Esterase, UA Negative      Nitrite, UA Negative      Urobilinogen, UA 0.2 E.U./dL     Urinalysis, Microscopic Only [[id number]]  (Abnormal) Collected:  01/05/17 0604    Specimen:  Urine from Urine, Catheter Updated:  01/05/17 0642     RBC, UA 3-5 (A) /HPF      WBC, UA 0-2 (A) /HPF      Bacteria, UA None Seen /HPF      Squamous Epithelial Cells, UA 0-2 /HPF      Hyaline Casts, UA 3-6 /LPF      Methodology Automated Microscopy     Lactate Acid, Reflex [[id number]]  (Abnormal) Collected:  01/05/17 0713    Specimen:  Blood Updated:  01/05/17 0749     Lactate 2.1 (C) mmol/L     Mycoplasma Pneu. IgG / IgM Antibodies [[id number]] Collected:  01/05/17 1333    Specimen:  Blood Updated:  01/05/17 1337    Magnesium [[id number]]  (Normal) Collected:  01/05/17 1333    Specimen:  Blood Updated:  01/05/17 1410     Magnesium 1.9 mg/dL     Phosphorus [[id number]]  (Normal) Collected:  01/05/17 1333    Specimen:  Blood Updated:  01/05/17 1410     Phosphorus 3.6 mg/dL     Influenza Antigen [[id number]]  (Normal) Collected:  01/05/17 1454    Specimen:  Swab from Nasopharynx Updated:  01/05/17 1520     Influenza A Ag, EIA Negative      Influenza B Ag, EIA Negative     Legionella Antigen, Urine [[id number]] Collected:  01/05/17 1455    Specimen:  Urine from Urine, Clean Catch Updated:  01/05/17 1544    Blood Culture [[id number]]  (Normal) Collected:  01/05/17 0253    Specimen:  Blood from Arm, Left Updated:   01/05/17 1601     Blood Culture No growth at less than 24 hours     POC Glucose Fingerstick [[id number]]  (Normal) Collected:  01/05/17 1632    Specimen:  Blood Updated:  01/05/17 1636     Glucose 124 mg/dL     Narrative:       Meter: [id number] : [id number] Lenore Shanks          Xray:  Imaging Results (last 24 hours)     Procedure Component Value Units Date/Time    XR Chest 1 View [[id number]] Collected:  01/05/17 0741     Updated:  01/05/17 0744    Narrative:       Technique: Frontal view the chest.     COMPARISON:  12/10/2016     INDICATION:     soa      FINDINGS:    Bilateral airspace disease is noted in the appears slightly  worse than on 12/10/2016 study. Cardiomegaly is noted. Prominent  interstitial markings are noted. No definite pleural effusions.        Impression:       Bilateral airspace disease is noted in the appears  slightly worse than on 12/10/2016 study.     This report was finalized on 1/5/2017 7:42 AM by Dr. Serg Ríos MD.             Results Review:    I reviewed the patient's new clinical results.    Assessment/Plan    1.Respiratory Failure due to afib with rvr: admit cardizem iv, bipap for now, diuresis. Pt  With component systolic vs diastolic CHF will get ECHO tx acc ?[facility] for cardioversion.  Continue xarelto  2.HTN: monitor  3.COPD    Active Problems:    Acute on chronic congestive heart failure        Buster Redd MD  01/05/17  4:57 PM

## 2017-01-05 NOTE — PLAN OF CARE
Problem: Patient Care Overview (Adult)  Goal: Plan of Care Review  Outcome: Ongoing (interventions implemented as appropriate)    01/05/17 1414   Coping/Psychosocial Response Interventions   Plan Of Care Reviewed With patient   Patient Care Overview   Progress improving       Goal: Discharge Needs Assessment  Outcome: Ongoing (interventions implemented as appropriate)    01/05/17 1004 01/05/17 1414   Discharge Needs Assessment   Concerns To Be Addressed --  no discharge needs identified   Readmission Within The Last 30 Days --  no previous admission in last 30 days   Current Health   Anticipated Changes Related to Illness --  none   Self-Care   Equipment Currently Used at Home --  respiratory supplies;oxygen   Living Environment   Transportation Available car;family or friend will provide --          Problem: Cardiac: Heart Failure (Adult)  Goal: Signs and Symptoms of Listed Potential Problems Will be Absent or Manageable (Cardiac: Heart Failure)  Outcome: Ongoing (interventions implemented as appropriate)    01/05/17 1414   Cardiac: Heart Failure   Problems Assessed (Heart Failure) all   Problems Present (Heart Failure) dysrhythmia/arrhythmia;respiratory compromise

## 2017-01-05 NOTE — CONSULTS
Chief complaint:  Dyspnea and paroxysmal atrial fibrillation.     History of present illness:  Arsenio is a very interesting 71-year-old gentleman who presents with a history of paroxysmal atrial fibrillation. He initially presented in July 2014 and was scheduled for RAMESH and DC cardioversion when he spontaneously converted back to sinus rhythm. He had been started on sotalol at that time but had QT prolongation. He has since had routine follow-up with cardiology not been compliant with it.  He had been maintained on metoprolol and Xarelto.  He presented to the hospital in December 2016 with recurrent atrial fibrillation and underwent diuresis and rate control with improvement in his symptoms.  He was to undergo further therapy however subsequently signed out AMA.  He states that he did well until the day of admission at which point he developed recurrent symptoms. He presented to the hospital and was noted to be in atrial fibrillation with a rapid ventricular rate. He denies any angina or anginal-like symptoms but again notes that he has significant dyspnea on exertion. He denies any orthopnea. He denies any worsening lower extremity edema. He has had several stress Cardiolite test last year which demonstrated a small fixed defect in the base of the anterior wall.     His 14 point review of systems is negative as was otherwise mentioned in the history of present illness.     Past medical history:  Morbid obesity  Paroxysmal atrial fibrillation  COPD  Hypertension.     Current medications:  IV diltiazem  IV NTG  IV Lasix  Rocephin  Maxioime  Doxy  Vanc  DuoNeb inhaler     He has no known drug allergies.     Social history is notable for a 50-pack-year history of tobacco consumption which is active he does not consume excessive alcohol or use illicit drugs.     Family history is notable for no premature coronary disease. Have current physical examination:     Current physical examination:  Blood pressure is 133/84 with  a heart rate between 80 and 160  In general he is obese gentleman in no apparent distress, alert and oriented ×3.  His HEENT exam reveals his oral mucosa to be normal. He has 12 cm of JVD. There are no carotid bruits noted.  Chest is symmetric  Lungs are clear to auscultation without crackles or wheezes.  Cardiac exam reveals an intact PMI with an irregularly irregular rhythm. He has a normal S1 and S2. He has no S3 or S4.  His abdominal exam reveals a soft belly which is nontender with normal bowel sounds and no hepatosplenomegaly.  His extremities reveal no clubbing or cyanosis or edema  Musculoskeletal exam is normal  Neurologic exam is normal     Current laboratory data:  Blood gas is currently normal  Serial cardiac enzymes are notable for a low CK and a low CK-MB with a gray zone troponin  BNP is elevated at 582  CMP is normal with exception of a glucose of 345  CBC is notable for white count of 22 but otherwise unremarkable  Chest x-ray suggestive of heart failure  EKG reveals atrial fibrillation with rapid ventricular rate.  There are no acute or chronic ischemic changes noted.     Final impression and plan:  Paroxysmal atrial fibrillation:  In regard to his history of atrial fibrillation I will continue him on Xarelto. Clearly he is poorly rate controlled at this time and I will restart his metoprolol and add diltiazem. Given his age and presence of hypertension and diabetes he does have a relatively high BERTRAND score and will require long-term anticoagulation.  I suspect that his heart failure will be better controlled if he maintains normal sinus rhythm.  Given that sotalol increased his QT interval I do not feel that Tikosyn would be a reasonable choice.  Additionally given his presence of congestive heart failure I would be hesitant to start him on Multaq.  Lastly, given his history of poor compliance I do feel that amiodarone would be a reasonable choice.     Acute on chronic diastolic Vs Systolic heart  failure  Most likely this is due to his atrial fibrillation with rapid ventricular rate.  At this point he may have a component of tachycardia mediated cardiomyopathy.  Again, for now I would continue diuretics as is and work on rate control.  Again, converting him to sinus rhythm would likely be beneficial.     Gray zone troponin  This is almost certainly not a specific finding given his complete lack of symptoms and his completely normal ST segments despite a rapid ventricular rate as well as his multiple recent relatively unremarkable stress test.  However, if his LV systolic function is decreased it might be reasonable to consider cardiac catheterization.  This will need to be balanced against his likelihood of complying with her medical regimen.     In short, it is been a pleasure to participate in his care, thank you for allowing me to do so

## 2017-01-05 NOTE — ED NOTES
Spoke with BRITTANY Graham on [address] to inform that the patient is now off of the nitroglycerin drip and vital signs are stable. Pt tolerating it well     Harriet Galvan RN  01/05/17 0967

## 2017-01-05 NOTE — ED NOTES
Spoke with Dr. Hager. Will attempt to wean pt off of ntg drip before sending to the floor due to the floors inability to take patient on the drip. Report Given to BRITTANY Graham on [address].     Harriet Galvan RN  01/05/17 0800

## 2017-01-05 NOTE — Clinical Note
Level of Care: Telemetry [5]   Admitting Physician: LASHAWN CAVAZOS [[id number]   Attending Physician: LASHAWN CAVAZOS [[id number]]   Patient Class: Inpatient [101]

## 2017-01-05 NOTE — CONSULTS
Referring Provider: Dr. Redd  Reason for Consultation: Respiratory failure      Chief complaint Shortness of Breath      History of present illness:  71 year old male presented to Emergency Department with a complaint of shortness of breath.  He reports that was sudden in onset and striated 2 hours before his arrival at the ED.  He denied any chest pain, cough, congestion or fever.  HE has a history of COPD, congestive heart failure and atrial fibrillation as well as advanced cardiomyopathy.    Upon arrival in the ED he was found to be in atrial fibrillation.  Patient is currently on xarelto for his atrial fibrillation.  In the ED an ABG was obtained and revealed  PH 7.139, pCO2 41.7, pO2 48.8 and bicarbonate 22.2.  Patient was placed on BIPAP and started on Cardizem for the atrial fibrillation.  Patient was admitted for further treatment and evaluation.    Pulmonology was consulted for respiratory failure.  Patient is a current everyday smoker.  He admits to smoking a pack per day for 18 years.  Patient is feeling much better since he first arrived in the ED.      Review Of Systems:  Review of Systems   Review of Systems - General ROS: negative for - chills, fatigue or fever  Psychological ROS: negative for - anxiety or depression  Respiratory ROS: positive for - shortness of breath and tachypnea  negative for - cough or hemoptysis  Cardiovascular ROS: no chest pain or dyspnea on exertion  Gastrointestinal ROS: no abdominal pain, change in bowel habits, or black or bloody stools  Neurological ROS: negative  negative for - confusion, dizziness, headaches or numbness/tingling      History  Past Medical History   Diagnosis Date   • Atrial fibrillation    • Cardiomyopathy    • COPD (chronic obstructive pulmonary disease)    • Hypertension    , History reviewed. No pertinent past surgical history., Family History   Problem Relation Age of Onset   • No Known Problems Mother    • No Known Problems Father    • No  Known Problems Sister    • No Known Problems Brother    • No Known Problems Son    • No Known Problems Daughter    • No Known Problems Maternal Grandmother    • No Known Problems Maternal Grandfather    • No Known Problems Paternal Grandmother    • No Known Problems Paternal Grandfather    • No Known Problems Cousin    • Rheum arthritis Neg Hx    • Osteoarthritis Neg Hx    • Asthma Neg Hx    • Diabetes Neg Hx    • Heart failure Neg Hx    • Hyperlipidemia Neg Hx    • Hypertension Neg Hx    • Migraines Neg Hx    • Rashes / Skin problems Neg Hx    • Seizures Neg Hx    • Stroke Neg Hx    • Thyroid disease Neg Hx    , Social History   Substance Use Topics   • Smoking status: Current Every Day Smoker     Packs/day: 1.00     Years: 18.00   • Smokeless tobacco: None   • Alcohol use No   , Prescriptions Prior to Admission   Medication Sig Dispense Refill Last Dose   • furosemide (LASIX) 20 MG tablet Take 20 mg by mouth Daily.   01/03/2017 at Unknown time   • lisinopril (PRINIVIL,ZESTRIL) 20 MG tablet Take 20 mg by mouth Daily.   01/03/2017 at Unknown time   • metoprolol tartrate (LOPRESSOR) 25 MG tablet Take 1 tablet by mouth 2 (Two) Times a Day. 60 tablet 3 01/03/2017 at Unknown time   • rivaroxaban (XARELTO) 20 MG tablet Take 20 mg by mouth Daily.   01/03/2017 at 1700    , Scheduled Meds:    cefepime 1 g Intravenous Q12H   doxycycline 100 mg Oral Q12H   furosemide 40 mg Intravenous Once   nystatin  Topical Q12H   potassium chloride 40 mEq Oral Once   [START ON 1/6/2017] vancomycin 1,500 mg Intravenous Q12H   vancomycin 2,000 mg Intravenous Once   , Continuous Infusions:    diltiaZEM 5 mg/hr Last Rate: 5 mg/hr (01/05/17 1321)   nitroglycerin 5-200 mcg/min Last Rate: Stopped (01/05/17 0829)    and Allergies:  Review of patient's allergies indicates no known allergies.    Objective     Vital Signs   Temp:  [97 °F (36.1 °C)-97.6 °F (36.4 °C)] 97 °F (36.1 °C)  Heart Rate:  [] 100  Resp:  [20-26] 22  BP: ()/()  130/83    Physical Exam:               GENERAL APPEARANCE: Well developed, well nourished, alert and cooperative, and appears to be in no acute distress.    HEAD: normocephalic.    EYES: PERRL, EOMI. Fundi normal, vision is grossly intact.    THROAT: Oral cavity and pharynx normal. No inflammation, swelling, exudate, or lesions.     NECK: Neck supple.     CARDIAC: Normal S1 and S2. No S3, S4 or murmurs. Rhythm is irregularly irregular. There is no peripheral edema, cyanosis or pallor. Extremities are warm and well perfused. Capillary refill is less than 2 seconds. No carotid bruits.    LUNGS: decreased breath sounds.    ABDOMEN: Positive bowel sounds. Soft, nondistended, nontender.     EXTREMITIES: No significant deformity or joint abnormality.  Peripheral pulses intact. No varicosities. 1+ edema in lower extremities.      NEUROLOGICAL: Strength and sensation symmetric and intact throughout.     PSYCHIATRIC: The mental examination revealed the patient was oriented to person, place, and time.                Results Review:    LABS:    Lab Results   Component Value Date    GLUCOSE 345 (H) 01/05/2017    BUN 14 01/05/2017    CREATININE 0.97 01/05/2017    EGFRIFNONA 76 01/05/2017    BCR 14.4 01/05/2017    CO2 23.1 (L) 01/05/2017    CALCIUM 9.6 01/05/2017    ALBUMIN 4.40 01/05/2017    LABIL2 1.3 (L) 01/05/2017    AST 25 01/05/2017    ALT 25 01/05/2017    WBC 22.89 (H) 01/05/2017    HGB 16.2 01/05/2017    HCT 51.3 01/05/2017    MCV 95.0 (H) 01/05/2017     01/05/2017     01/05/2017    K 3.9 01/05/2017     01/05/2017    ANIONGAP 10.9 01/05/2017       Lab Results   Component Value Date    INR 1.24 (H) 12/10/2016    PROTIME 14.0 (H) 12/10/2016                           I reviewed the patient's new clinical results.  I reviewed the patient's new imaging results and agree with the interpretation.      Assessment/Plan      Respiratory failure: Initial BIPAP settings were 20/8 FIO2 50%.  Obtained a second ABG  and decreased FIO2 to 40%.  [place]BC [place] 15651    On last admission chest CT showed Interstitial lung disease.         Obtained CXR today. Started on vancomycin, defepime, doxycycline and steroids.    Ordered lasix 40 mg IV with 40 mEq of potassium PO.    Will obtain a speech and swallow evaluation.    Lactic acid is improving- could be high due to respiratory distress and anaerobic metabolism in muscles    Ordered atypical studies: legionella, mycoplasma, and influenza.    Repeat ABG and lactate level in the morning    Morbidly obese-will do a sleep study on the outpatient basis.    We will start on Solu-Medrol 40 mg IV every 12 hourly- 4 interstitial lung disease.  Patient does not give any positive history of any autoimmune diseases.  Will need to repeat CAT scan in 6 weeks' time.  If still has interstitial changes will need to work up accordingly.    Active Problems:    Acute on chronic congestive heart failure          I discussed the patients findings and my recommendations with patient and nursing staff    Denice Cordero. BRITTANY Qiu  01/05/17  1:36 PM      IHerrera M.D. attest that the above note accurately reflects the work and decisions made  by me.  Patient was seen and evaluated by Dr. Solis, including history of present illness, physical exam, assessment, and treatment plan.  The above note was reviewed and edited by Dr. Solis.   Attestation: Scribed for Dr. Solis by Denice Qiu RN

## 2017-01-05 NOTE — ED NOTES
Attempted to give report, receiving nurse in middle of giving medications and will call right back for report.     Harriet Galvan RN  01/05/17 0800

## 2017-01-06 VITALS
HEIGHT: 70 IN | TEMPERATURE: 97.4 F | RESPIRATION RATE: 20 BRPM | WEIGHT: 274.9 LBS | HEART RATE: 71 BPM | SYSTOLIC BLOOD PRESSURE: 133 MMHG | OXYGEN SATURATION: 93 % | DIASTOLIC BLOOD PRESSURE: 74 MMHG | BODY MASS INDEX: 39.35 KG/M2

## 2017-01-06 LAB
ANION GAP SERPL CALCULATED.3IONS-SCNC: 4 MMOL/L (ref 3.6–11.2)
BASOPHILS # BLD AUTO: 0.03 10*3/MM3 (ref 0–0.3)
BASOPHILS NFR BLD AUTO: 0.3 % (ref 0–2)
BUN BLD-MCNC: 13 MG/DL (ref 7–21)
BUN/CREAT SERPL: 14.3 (ref 7–25)
CALCIUM SPEC-SCNC: 9.3 MG/DL (ref 7.7–10)
CHLORIDE SERPL-SCNC: 103 MMOL/L (ref 99–112)
CO2 SERPL-SCNC: 28 MMOL/L (ref 24.3–31.9)
CREAT BLD-MCNC: 0.91 MG/DL (ref 0.43–1.29)
DEPRECATED RDW RBC AUTO: 49.3 FL (ref 37–54)
EOSINOPHIL # BLD AUTO: 0.1 10*3/MM3 (ref 0–0.7)
EOSINOPHIL NFR BLD AUTO: 0.8 % (ref 0–7)
ERYTHROCYTE [DISTWIDTH] IN BLOOD BY AUTOMATED COUNT: 14.9 % (ref 11.5–14.5)
GFR SERPL CREATININE-BSD FRML MDRD: 82 ML/MIN/1.73
GLUCOSE BLD-MCNC: 126 MG/DL (ref 70–110)
GLUCOSE BLDC GLUCOMTR-MCNC: 116 MG/DL (ref 70–130)
GLUCOSE BLDC GLUCOMTR-MCNC: 125 MG/DL (ref 70–130)
HBA1C MFR BLD: 6.5 % (ref 4.5–5.7)
HCT VFR BLD AUTO: 43.3 % (ref 42–52)
HGB BLD-MCNC: 13.7 G/DL (ref 14–18)
IMM GRANULOCYTES # BLD: 0.03 10*3/MM3 (ref 0–0.03)
IMM GRANULOCYTES NFR BLD: 0.3 % (ref 0–0.5)
LYMPHOCYTES # BLD AUTO: 2.17 10*3/MM3 (ref 1–3)
LYMPHOCYTES NFR BLD AUTO: 18.2 % (ref 16–46)
M PNEUMONIAE IGG ABS: 549 U/ML (ref 0–99)
M PNEUMONIAE IGM ABS: <770 U/ML (ref 0–769)
MCH RBC QN AUTO: 29.8 PG (ref 27–33)
MCHC RBC AUTO-ENTMCNC: 31.6 G/DL (ref 33–37)
MCV RBC AUTO: 94.3 FL (ref 80–94)
MONOCYTES # BLD AUTO: 0.95 10*3/MM3 (ref 0.1–0.9)
MONOCYTES NFR BLD AUTO: 8 % (ref 0–12)
NEUTROPHILS # BLD AUTO: 8.64 10*3/MM3 (ref 1.4–6.5)
NEUTROPHILS NFR BLD AUTO: 72.4 % (ref 40–75)
OSMOLALITY SERPL CALC.SUM OF ELEC: 271.7 MOSM/KG (ref 273–305)
PLATELET # BLD AUTO: 205 10*3/MM3 (ref 130–400)
PMV BLD AUTO: 10.9 FL (ref 6–10)
POTASSIUM BLD-SCNC: 4 MMOL/L (ref 3.5–5.3)
RBC # BLD AUTO: 4.59 10*6/MM3 (ref 4.7–6.1)
SODIUM BLD-SCNC: 135 MMOL/L (ref 135–153)
WBC NRBC COR # BLD: 11.92 10*3/MM3 (ref 4.5–12.5)

## 2017-01-06 PROCEDURE — 25010000002 VANCOMYCIN PER 500 MG

## 2017-01-06 PROCEDURE — 94799 UNLISTED PULMONARY SVC/PX: CPT

## 2017-01-06 PROCEDURE — 25010000002 FUROSEMIDE PER 20 MG: Performed by: INTERNAL MEDICINE

## 2017-01-06 PROCEDURE — 99233 SBSQ HOSP IP/OBS HIGH 50: CPT | Performed by: INTERNAL MEDICINE

## 2017-01-06 PROCEDURE — 85025 COMPLETE CBC W/AUTO DIFF WBC: CPT | Performed by: FAMILY MEDICINE

## 2017-01-06 PROCEDURE — 80048 BASIC METABOLIC PNL TOTAL CA: CPT | Performed by: FAMILY MEDICINE

## 2017-01-06 PROCEDURE — 83036 HEMOGLOBIN GLYCOSYLATED A1C: CPT | Performed by: FAMILY MEDICINE

## 2017-01-06 PROCEDURE — 82962 GLUCOSE BLOOD TEST: CPT

## 2017-01-06 RX ORDER — DEXTROSE MONOHYDRATE 25 G/50ML
25 INJECTION, SOLUTION INTRAVENOUS AS NEEDED
Status: DISCONTINUED | OUTPATIENT
Start: 2017-01-06 | End: 2017-01-06 | Stop reason: HOSPADM

## 2017-01-06 RX ORDER — NICOTINE POLACRILEX 4 MG
15 LOZENGE BUCCAL AS NEEDED
Status: DISCONTINUED | OUTPATIENT
Start: 2017-01-06 | End: 2017-01-06 | Stop reason: HOSPADM

## 2017-01-06 RX ADMIN — POTASSIUM CHLORIDE 20 MEQ: 1500 TABLET, EXTENDED RELEASE ORAL at 09:06

## 2017-01-06 RX ADMIN — DILTIAZEM HYDROCHLORIDE 5 MG/HR: 100 INJECTION, POWDER, LYOPHILIZED, FOR SOLUTION INTRAVENOUS at 07:51

## 2017-01-06 RX ADMIN — AMIODARONE HYDROCHLORIDE 200 MG: 200 TABLET ORAL at 09:06

## 2017-01-06 RX ADMIN — CEFEPIME HYDROCHLORIDE 1 G: 1 INJECTION, POWDER, FOR SOLUTION INTRAMUSCULAR; INTRAVENOUS at 03:33

## 2017-01-06 RX ADMIN — FUROSEMIDE 40 MG: 10 INJECTION, SOLUTION INTRAMUSCULAR; INTRAVENOUS at 09:06

## 2017-01-06 RX ADMIN — LISINOPRIL 20 MG: 10 TABLET ORAL at 09:06

## 2017-01-06 RX ADMIN — DOXYCYCLINE 100 MG: 100 CAPSULE ORAL at 09:06

## 2017-01-06 RX ADMIN — NYSTATIN: 100000 CREAM TOPICAL at 09:23

## 2017-01-06 RX ADMIN — VANCOMYCIN HYDROCHLORIDE 1500 MG: 5 INJECTION, POWDER, LYOPHILIZED, FOR SOLUTION INTRAVENOUS at 06:06

## 2017-01-06 RX ADMIN — METOPROLOL TARTRATE 25 MG: 25 TABLET, FILM COATED ORAL at 09:06

## 2017-01-06 NOTE — PLAN OF CARE
Problem: Patient Care Overview (Adult)  Goal: Plan of Care Review  Outcome: Ongoing (interventions implemented as appropriate)  Goal: Adult Individualization and Mutuality  Outcome: Ongoing (interventions implemented as appropriate)  Goal: Discharge Needs Assessment  Outcome: Ongoing (interventions implemented as appropriate)    Problem: Cardiac: Heart Failure (Adult)  Goal: Signs and Symptoms of Listed Potential Problems Will be Absent or Manageable (Cardiac: Heart Failure)  Outcome: Ongoing (interventions implemented as appropriate)

## 2017-01-06 NOTE — PLAN OF CARE
Problem: NPPV/CPAP (Adult)  Goal: Signs and Symptoms of Listed Potential Problems Will be Absent or Manageable (NPPV/CPAP)  Outcome: Ongoing (interventions implemented as appropriate)    01/06/17 0150   NPPV/CPAP   Problems Assessed (NPPV/CPAP) all   Problems Present (NPPV/CPAP) other (see comments)  (refuses)

## 2017-01-06 NOTE — PLAN OF CARE
Problem: Patient Care Overview (Adult)  Goal: Plan of Care Review  Outcome: Ongoing (interventions implemented as appropriate)  Goal: Adult Individualization and Mutuality  Outcome: Ongoing (interventions implemented as appropriate)    Problem: Cardiac: Heart Failure (Adult)  Goal: Signs and Symptoms of Listed Potential Problems Will be Absent or Manageable (Cardiac: Heart Failure)  Outcome: Ongoing (interventions implemented as appropriate)    Problem: NPPV/CPAP (Adult)  Goal: Signs and Symptoms of Listed Potential Problems Will be Absent or Manageable (NPPV/CPAP)  Outcome: Ongoing (interventions implemented as appropriate)

## 2017-01-06 NOTE — PROGRESS NOTES
LOS: 1 day     Chief Complaint:  Pulmonology is following for Respiratory failure    Subjective     Interval History:   Patient is sitting in bed eating breakfast.   Nasal cannula was noted to be around his chin.  Patient's oxygen saturation on room air was 93%.  Patient did not use BIPAP last night. Patient is in no acute distress.        Review of Systems:   Review of Systems  Review of Systems - General ROS: negative for - chills, fatigue or fever  Psychological ROS: negative for - anxiety or depression  Respiratory ROS: no cough, shortness of breath, or wheezing  Cardiovascular ROS: no chest pain or dyspnea on exertion  Gastrointestinal ROS: no abdominal pain, change in bowel habits, or black or bloody stools  Neurological ROS: no TIA or stroke symptoms  negative for - confusion, dizziness, headaches or numbness/tingling                  Objective     Vital Signs  Temp:  [97.4 °F (36.3 °C)-98.2 °F (36.8 °C)] 97.4 °F (36.3 °C)  Heart Rate:  [] 90  Resp:  [20] 20  BP: (129-153)/(72-86) 133/72  Body mass index is 39.44 kg/(m^2).    Intake/Output Summary (Last 24 hours) at 01/06/17 1023  Last data filed at 01/06/17 0812   Gross per 24 hour   Intake   1560 ml   Output   3750 ml   Net  -2190 ml     I/O this shift:  In: 480 [P.O.:480]  Out: -     Physical Exam:  GENERAL APPEARANCE: Well developed, well nourished, alert and cooperative, and appears to be in no acute distress.    HEAD: normocephalic.    EYES: PERRL    NECK: Neck supple.     CARDIAC: Normal S1 and S2. No S3, S4 or murmurs. Rhythm is regular. There is no peripheral edema, cyanosis or pallor. Extremities are warm and well perfused. Capillary refill is less than 2 seconds. No carotid bruits.    LUNGS: decreased breath sounds    ABDOMEN: Positive bowel sounds. Soft, nondistended, nontender.     EXTREMITIES: No significant deformity or joint abnormality. No edema. Peripheral pulses intact.     NEUROLOGICAL: Strength and sensation symmetric and intact  throughout.     PSYCHIATRIC: The mental examination revealed the patient was oriented to person, place, and time.                 Results Review:                I reviewed the patient's new clinical results.  I reviewed the patient's new imaging results and agree with the interpretation.    Results from last 7 days  Lab Units 01/06/17  0415 01/05/17  0048   WBC 10*3/mm3 11.92 22.89*   HEMOGLOBIN g/dL 13.7* 16.2   PLATELETS 10*3/mm3 205 338       Results from last 7 days  Lab Units 01/06/17  0415 01/05/17  1333 01/05/17  0048   SODIUM mmol/L 135  --  135   POTASSIUM mmol/L 4.0  --  3.9   CHLORIDE mmol/L 103  --  101   TOTAL CO2 mmol/L 28.0  --  23.1*   BUN mg/dL 13  --  14   CREATININE mg/dL 0.91  --  0.97   CALCIUM mg/dL 9.3  --  9.6   GLUCOSE mg/dL 126*  --  345*   MAGNESIUM mg/dL  --  1.9  --      Lab Results   Component Value Date    INR 1.24 (H) 12/10/2016    PROTIME 14.0 (H) 12/10/2016       Results from last 7 days  Lab Units 01/05/17  0048   ALK PHOS U/L 102   BILIRUBIN mg/dL 1.0   ALT (SGPT) U/L 25   AST (SGOT) U/L 25       Results from last 7 days  Lab Units 01/05/17  0514   PH, ARTERIAL pH units 7.388   PO2 ART mm Hg 80.2   PCO2, ARTERIAL mm Hg 38.1   HCO3 ART mmol/L 22.4     Imaging Results (last 24 hours)     ** No results found for the last 24 hours. **             Medication Review:   Scheduled Medications:    amiodarone 200 mg Oral TID   cefepime 1 g Intravenous Q12H   doxycycline 100 mg Oral Q12H   furosemide 40 mg Intravenous Daily   insulin aspart 0-7 Units Subcutaneous 4x Daily AC & at Bedtime   lisinopril 20 mg Oral Daily   metoprolol tartrate 25 mg Oral Q12H   nystatin  Topical Q12H   potassium chloride 20 mEq Oral Daily   rivaroxaban 20 mg Oral Daily With Dinner   vancomycin 1,500 mg Intravenous Q12H     Continuous infusions:    diltiaZEM 5 mg/hr Last Rate: 5 mg/hr (01/06/17 0751)       Assessment/Plan      Respiratory failure:  BIPAP discontinued.  Consulted PT and ordered ambulatory pulse ox.   Ordered lasix 40 mg IV with potassium 40 mEq PO.  Ordered CXR.    On patient's last admission Chest CT showed interstitial lung disease.            Continue Vancomycin, cefepime, doxycycline and steroids.      Speech and swallow evaluation ordered.    Patient had a history of CHF. Reviewed last ECHO.          Patient Active Problem List   Diagnosis Code   • Paroxysmal a-fib- on Xarelto for stroke, sinus rhythm.  I48.0   • Hypertension- controlled.  I10   • Acute on chronic congestive heart failure I50.9         Bedside rounds were completed with RN and RRT, discussed case and plan in great detail.    Discussed the case in detail- explained all the labs results and images. Discussed plan for the upcoming days and answered questions to their satistfaction     Denice Cordero. BRITTANY Qiu  01/06/17  10:23 AM      Herrera DIOP M.D. attest that the above note accurately reflects the work and decisions made  by me.  Patient was seen and evaluated by Dr. Solis, including history of present illness, physical exam, assessment, and treatment plan.  The above note was reviewed and edited by Dr. Solis.     Attestation: Scribed for Dr. Solis, by Denice Qiu RN

## 2017-01-06 NOTE — PROGRESS NOTES
Discharge Planning Assessment   [facility]     Patient Name: Larry Kehr  MRN: [id number]  Today's Date: 1/6/2017    Admit Date: 1/5/2017          Discharge Needs Assessment       01/06/17 1111    Living Environment    Lives With child(taco), adult   Lives at home with his son,Dimas.    Quality Of Family Relationships supportive    Able to Return to Prior Living Arrangements yes    Discharge Needs Assessment    Concerns To Be Addressed no discharge needs identified;denies needs/concerns at this time    Readmission Within The Last 30 Days no previous admission in last 30 days    Community Agency Name(S) [facility] not utilize home health services    Anticipated Changes Related to Illness none    Equipment Currently Used at Home --   Pt reported to  that he does not have oxygen at home, when asked.    Equipment Needed After Discharge none    Discharge Disposition home or self-care            Discharge Plan       01/06/17 1337    Final Note    Final Note Pt signed out AMA and did not qualify for home 02 via room air Sat per RN.  No further intervention needed.      01/06/17 1114    Case Management/Social Work Plan    Plan Pt admitted on 1/5/17 with dx: Ac. on Chr. CHF, pt with AFib w/ RVR, requiring cardizem drip. Pt presented with c/o Increased SOA. He was treated with bipap. Pulm & cardiology consulted for evaluation. Pt undergoing workup and monitoring. Pt lives at home with is son, Dimas, and plans to return back home when stable for dc. He currently denies any anticipated dc needs.    Patient/Family In Agreement With Plan yes        Discharge Placement     No information found                Demographic Summary       01/06/17 1106    Referral Information    Admission Type inpatient    Arrived From home or self-care   Pt admitted on 1/5/17 with dx: Ac on Chronic CHF. Pt presented with c/o increased SOA. Pt found in Afib with RVR & treated with cardizem drip. Pt undergoing evaluation and workup.    Referral Source admission  list    Reason For Consult discharge planning    Record Reviewed medical record    Primary Care Physician Information    Name Dr. Lowery -PCP            Functional Status       01/06/17 1108    Functional Status Current    Current Functional Level Comment up with assist    Functional Status Prior    Prior Functional Level Comment Pt reports he has been indep at home.    IADL    IADL Comments Reports indep at home, requiring no assistance, prior to onset of symptoms    Activity Tolerance    Current Activity Limitations other (see comments)   act. limited during onset of symptoms d/t SOA    Usual Activity Tolerance good    Current Activity Tolerance fair    Cognitive/Perceptual/Developmental    Current Mental Status/Cognitive Functioning no deficits noted    Recent Changes in Mental Status/Cognitive Functioning no changes    Employment/Financial    Financial Concerns none   Pt has Medicare and denies any issues with med cost or meeting copays. He utilizes [facility] for prescriptions.            Psychosocial     None            Abuse/Neglect     None            Legal       01/06/17 1121    Legal    Legal Comments No POA or AD            Substance Abuse     None            Patient Forms     None          Sarita Ventura

## 2017-01-06 NOTE — PROGRESS NOTES
Discharge Planning Assessment   [place]     Patient Name: Larry Kehr  MRN: [id number]  Today's Date: 1/6/2017    Admit Date: 1/5/2017          Discharge Needs Assessment       01/06/17 1111    Living Environment    Lives With child(taco), adult   Lives at home with his son,Dimas.    Quality Of Family Relationships supportive    Able to Return to Prior Living Arrangements yes    Discharge Needs Assessment    Concerns To Be Addressed no discharge needs identified;denies needs/concerns at this time    Readmission Within The Last 30 Days no previous admission in last 30 days    Community Agency Name(S) [facility] not utilize home health services    Anticipated Changes Related to Illness none    Equipment Currently Used at Home --   Pt reported to  that he does not have oxygen at home, when asked.    Equipment Needed After Discharge none    Discharge Disposition home or self-care            Discharge Plan       01/06/17 1114    Case Management/Social Work Plan    Plan Pt admitted on 1/5/17 with dx: Ac. on Chr. CHF, pt with AFib w/ RVR, requiring cardizem drip. Pt presented with c/o Increased SOA. He was treated with bipap. Pulm & cardiology consulted for evaluation. Pt undergoing workup and monitoring. Pt lives at home with is son, Dimas, and plans to return back home when stable for dc. He currently denies any anticipated dc needs.    Patient/Family In Agreement With Plan yes        Discharge Placement     No information found                Demographic Summary       01/06/17 1106    Referral Information    Admission Type inpatient    Arrived From home or self-care   Pt admitted on 1/5/17 with dx: Ac on Chronic CHF. Pt presented with c/o increased SOA. Pt found in Afib with RVR & treated with cardizem drip. Pt undergoing evaluation and workup.    Referral Source admission list    Reason For Consult discharge planning    Record Reviewed medical record    Primary Care Physician Information    Name Dr. Lowery -PCP             Functional Status       01/06/17 1108    Functional Status Current    Current Functional Level Comment up with assist    Functional Status Prior    Prior Functional Level Comment Pt reports he has been indep at home.    IADL    IADL Comments Reports indep at home, requiring no assistance, prior to onset of symptoms    Activity Tolerance    Current Activity Limitations other (see comments)   act. limited during onset of symptoms d/t SOA    Usual Activity Tolerance good    Current Activity Tolerance fair    Cognitive/Perceptual/Developmental    Current Mental Status/Cognitive Functioning no deficits noted    Recent Changes in Mental Status/Cognitive Functioning no changes    Employment/Financial    Financial Concerns none   Pt has Medicare and denies any issues with med cost or meeting copays. He utilizes [facility] for prescriptions.            Psychosocial     None            Abuse/Neglect     None            Legal       01/06/17 1121    Legal    Legal Comments No POA or AD            Substance Abuse     None            Patient Forms     None          Ellen Santamaria RN

## 2017-01-06 NOTE — PROGRESS NOTES
Progress Note   01/06/17      Subjective     Interval History:     Complaints: Much improved this am. No distress. Diureses 3750 out. No chest pain, few palpations.        Objective     Intake & Output (last day)       01/05 0701 - 01/06 0700    P.O. 480    IV Piggyback 600    Total Intake(mL/kg) 1080 (8.7)    Urine (mL/kg/hr) 3750 (1.3)    Total Output 3750    Net -2670               Vital Signs  Temp:  [97 °F (36.1 °C)-98.2 °F (36.8 °C)] 98.2 °F (36.8 °C)  Heart Rate:  [] 100  Resp:  [20-25] 20  BP: (113-153)/() 131/81    Physical Exam:   General Appearance alert, appears stated age and cooperative   Head normocephalic, without obvious abnormality and atraumatic   Eyes conjunctivae and sclerae normal, no icterus and PERRLA   Neck no adenopathy, suppple, no thyromegaly, no carotid bruit and no JVD   Lungs clear to auscultation, respirations regular and respirations unlabored   Heart irregular irregular rhythm rate 100   Abdomen normal bowel sounds   Extremities no edema    Labs:  Lab Results (last 72 hours)     Procedure Component Value Units Date/Time    Blood Gas, Arterial With Co-Ox [[id number]]  (Abnormal) Collected:  01/05/17 0048    Specimen:  Arterial Blood Updated:  01/05/17 0051     Site Arterial: right radial      Ludwin's Test N/A      pH, Arterial 7.139 (C) pH units      pCO2, Arterial 65.1 (C) mm Hg      pO2, Arterial 77.7 (L) mm Hg      HCO3, Arterial 21.6 (L) mmol/L      Base Excess, Arterial -8.8 mmol/L      O2 Saturation, Arterial 92.2 %      Hemoglobin, Blood Gas 16.9 (H) g/dL      Hematocrit, Blood Gas 50.0 %      Oxyhemoglobin 87.2 %      Methemoglobin 0.4 %      Carboxyhemoglobin 5.0 %      A-a Gradiant >300.0 (H) mmHg      Temperature 98.6 C      Barometric Pressure for Blood Gas 723 mmHg      Modality Non Rebreather      FIO2 100 %     CBC & Differential [[id number]] Collected:  01/05/17 0048    Specimen:  Blood Updated:  01/05/17 0055    Narrative:       The following orders were  created for panel order CBC & Differential.  Procedure                               Abnormality         Status                     ---------                               -----------         ------                     CBC Auto Differential[[id number]]         Abnormal            Final result                 Please view results for these tests on the individual orders.    CBC Auto Differential [[id number]]  (Abnormal) Collected:  01/05/17 0048    Specimen:  Blood Updated:  01/05/17 0055     WBC 22.89 (H) 10*3/mm3      RBC 5.40 10*6/mm3      Hemoglobin 16.2 g/dL      Hematocrit 51.3 %      MCV 95.0 (H) fL      MCH 30.0 pg      MCHC 31.6 (L) g/dL      RDW 14.9 (H) %      RDW-SD 50.0 fl      MPV 11.1 (H) fL      Platelets 338 10*3/mm3      Neutrophil % 70.1 %      Lymphocyte % 23.5 %      Monocyte % 4.7 %      Eosinophil % 0.7 %      Basophil % 0.5 %      Immature Grans % 0.5 %      Neutrophils, Absolute 16.04 (H) 10*3/mm3      Lymphocytes, Absolute 5.39 (H) 10*3/mm3      Monocytes, Absolute 1.08 (H) 10*3/mm3      Eosinophils, Absolute 0.15 10*3/mm3      Basophils, Absolute 0.11 10*3/mm3      Immature Grans, Absolute 0.12 (H) 10*3/mm3     CK [[id number]]  (Normal) Collected:  01/05/17 0048    Specimen:  Blood from Arm, Left Updated:  01/05/17 0129     Creatine Kinase 48 U/L     Myoglobin, Serum [[id number]]  (Normal) Collected:  01/05/17 0048    Specimen:  Blood from Arm, Left Updated:  01/05/17 0129     Myoglobin 34.0 ng/mL     CK-MB [[id number]]  (Normal) Collected:  01/05/17 0048    Specimen:  Blood from Arm, Left Updated:  01/05/17 0129     CKMB 1.95 ng/mL     Troponin [[id number]]  (Normal) Collected:  01/05/17 0048    Specimen:  Blood from Arm, Left Updated:  01/05/17 0129     Troponin I 0.036 ng/mL     Narrative:       Ultra Troponin I Reference Range:         <=0.039 ng/mL: Negative    0.04-0.779 ng/mL: Indeterminate Range. Suspicious of MI.  Clinical correlation required.       >=0.78  ng/mL: Consistent with myocardial  injury.  Clinical correlation required.    CK-MB Index [[id number]]  (Abnormal) Collected:  01/05/17 0048    Specimen:  Blood from Arm, Left Updated:  01/05/17 0129     CK-MB Index 4.1 (H) %     BNP [[id number]]  (Abnormal) Collected:  01/05/17 0048    Specimen:  Blood Updated:  01/05/17 0129     .0 (H) pg/mL     Comprehensive Metabolic Panel [[id number]]  (Abnormal) Collected:  01/05/17 0048    Specimen:  Blood from Arm, Left Updated:  01/05/17 0148     Glucose 345 (H) mg/dL      BUN 14 mg/dL      Creatinine 0.97 mg/dL      Sodium 135 mmol/L      Potassium 3.9 mmol/L      Chloride 101 mmol/L      CO2 23.1 (L) mmol/L      Calcium 9.6 mg/dL      Total Protein 7.7 g/dL      Albumin 4.40 g/dL      ALT (SGPT) 25 U/L      AST (SGOT) 25 U/L      Alkaline Phosphatase 102 U/L      Total Bilirubin 1.0 mg/dL      eGFR Non African Amer 76 mL/min/1.73      Globulin 3.3 gm/dL      A/G Ratio 1.3 (L) g/dL      BUN/Creatinine Ratio 14.4      Anion Gap 10.9 mmol/L     Narrative:       The MDRD GFR formula is only valid for adults with stable renal function between ages 18 and 70.    Osmolality, Calculated [[id number]]  (Normal) Collected:  01/05/17 0048    Specimen:  Blood from Arm, Left Updated:  01/05/17 0149     Osmolality Calc 284.3 mOsm/kg     Blood Gas, Arterial With Co-Ox [[id number]]  (Abnormal) Collected:  01/05/17 0157    Specimen:  Arterial Blood Updated:  01/05/17 0201     Site Arterial: left radial      Ludwin's Test N/A      pH, Arterial 7.323 (L) pH units      pCO2, Arterial 39.3 mm Hg      pO2, Arterial 293.1 (H) mm Hg      HCO3, Arterial 19.9 (C) mmol/L      Base Excess, Arterial -5.6 mmol/L      O2 Saturation, Arterial 99.7 %      Hemoglobin, Blood Gas 15.5 g/dL      Hematocrit, Blood Gas 46.0 %      Oxyhemoglobin 95.6 %      Methemoglobin 0.4 %      Carboxyhemoglobin 3.7 %      A-a Gradiant >300.0 (H) mmHg      Temperature 98.6 C      Barometric Pressure for Blood Gas 723 mmHg      Modality BiPAP      FIO2 100 %       Set [facility] Resp Rate 20      IPAP 20      EPAP 8     Lactic Acid, Plasma [[id number]]  (Abnormal) Collected:  01/05/17 0252    Specimen:  Blood from Arm, Left Updated:  01/05/17 0322     Lactate 4.8 (C) mmol/L     Troponin [[id number]]  (Abnormal) Collected:  01/05/17 0253    Specimen:  Blood from Arm, Left Updated:  01/05/17 0335     Troponin I 0.089 (H) ng/mL     Narrative:       Ultra Troponin I Reference Range:         <=0.039 ng/mL: Negative    0.04-0.779 ng/mL: Indeterminate Range. Suspicious of MI.  Clinical correlation required.       >=0.78  ng/mL: Consistent with myocardial injury.  Clinical correlation required.    CK [[id number]]  (Normal) Collected:  01/05/17 0253    Specimen:  Blood from Arm, Left Updated:  01/05/17 0338     Creatine Kinase 50 U/L     Myoglobin, Serum [[id number]]  (Normal) Collected:  01/05/17 0253    Specimen:  Blood from Arm, Left Updated:  01/05/17 0338     Myoglobin 72.0 ng/mL     CK-MB [[id number]]  (Normal) Collected:  01/05/17 0253    Specimen:  Blood from Arm, Left Updated:  01/05/17 0338     CKMB 2.05 ng/mL     CK-MB Index [[id number]]  (Abnormal) Collected:  01/05/17 0253    Specimen:  Blood from Arm, Left Updated:  01/05/17 0338     CK-MB Index 4.1 (H) %     Blood Gas, Arterial With Co-Ox [[id number]] Collected:  01/05/17 0514    Specimen:  Arterial Blood Updated:  01/05/17 0524     Site Arterial: left radial      Ludwin's Test N/A      pH, Arterial 7.388 pH units      pCO2, Arterial 38.1 mm Hg      pO2, Arterial 80.2 mm Hg      HCO3, Arterial 22.4 mmol/L      Base Excess, Arterial -2.1 mmol/L      O2 Saturation, Arterial 96.1 %      Hemoglobin, Blood Gas 15.8 g/dL      Hematocrit, Blood Gas 46.0 %      Oxyhemoglobin 93.5 %      Methemoglobin 0.3 %      Carboxyhemoglobin 2.4 %      A-a Gradiant 14.6 mmHg      Temperature 98.9 C      Barometric Pressure for Blood Gas 723 mmHg      Modality BiPAP      FIO2 50 %      Set Mech Resp Rate 20      IPAP 20      EPAP 8     Urinalysis With / Culture  If Indicated [[id number]]  (Abnormal) Collected:  01/05/17 0604    Specimen:  Urine from Urine, Catheter Updated:  01/05/17 0639     Color, UA Yellow      Appearance, UA Clear      pH, UA <=5.0      Specific Gravity, UA 1.008      Glucose, UA Negative      Ketones, UA Negative      Bilirubin, UA Negative      Blood, UA Small (1+) (A)      Protein,  mg/dL (2+) (A)      Leuk Esterase, UA Negative      Nitrite, UA Negative      Urobilinogen, UA 0.2 E.U./dL     Urinalysis, Microscopic Only [[id number]]  (Abnormal) Collected:  01/05/17 0604    Specimen:  Urine from Urine, Catheter Updated:  01/05/17 0642     RBC, UA 3-5 (A) /HPF      WBC, UA 0-2 (A) /HPF      Bacteria, UA None Seen /HPF      Squamous Epithelial Cells, UA 0-2 /HPF      Hyaline Casts, UA 3-6 /LPF      Methodology Automated Microscopy     Lactate Acid, Reflex [[id number]]  (Abnormal) Collected:  01/05/17 0713    Specimen:  Blood Updated:  01/05/17 0749     Lactate 2.1 (C) mmol/L     Mycoplasma Pneu. IgG / IgM Antibodies [[id number]] Collected:  01/05/17 1333    Specimen:  Blood Updated:  01/05/17 1337    Magnesium [[id number]]  (Normal) Collected:  01/05/17 1333    Specimen:  Blood Updated:  01/05/17 1410     Magnesium 1.9 mg/dL     Phosphorus [[id number]]  (Normal) Collected:  01/05/17 1333    Specimen:  Blood Updated:  01/05/17 1410     Phosphorus 3.6 mg/dL     Influenza Antigen [[id number]]  (Normal) Collected:  01/05/17 1454    Specimen:  Swab from Nasopharynx Updated:  01/05/17 1520     Influenza A Ag, EIA Negative      Influenza B Ag, EIA Negative     POC Glucose Fingerstick [[id number]]  (Normal) Collected:  01/05/17 1632    Specimen:  Blood Updated:  01/05/17 1636     Glucose 124 mg/dL     Narrative:       Meter: [id number] : [id number] Lenore Shanks    Legionella Antigen, Urine [[id number]]  (Normal) Collected:  01/05/17 1455    Specimen:  Urine from Urine, Clean Catch Updated:  01/05/17 6022     LEGIONELLA ANTIGEN, URINE Negative     Narrative:          Presumptive negative for L. pneumophilia serogroup 1 antigen, suggesting no recent or current infection.    Blood Culture [[id number]]  (Normal) Collected:  01/05/17 0253    Specimen:  Blood from Arm, Left Updated:  01/06/17 0401     Blood Culture No growth at 24 hours     CBC & Differential [[id number]] Collected:  01/06/17 0415    Specimen:  Blood Updated:  01/06/17 0447    Narrative:       The following orders were created for panel order CBC & Differential.  Procedure                               Abnormality         Status                     ---------                               -----------         ------                     CBC Auto Differential[[id number]]         Abnormal            Final result                 Please view results for these tests on the individual orders.    CBC Auto Differential [[id number]]  (Abnormal) Collected:  01/06/17 0415    Specimen:  Blood Updated:  01/06/17 0447     WBC 11.92 10*3/mm3      RBC 4.59 (L) 10*6/mm3      Hemoglobin 13.7 (L) g/dL      Hematocrit 43.3 %      MCV 94.3 (H) fL      MCH 29.8 pg      MCHC 31.6 (L) g/dL      RDW 14.9 (H) %      RDW-SD 49.3 fl      MPV 10.9 (H) fL      Platelets 205 10*3/mm3      Neutrophil % 72.4 %      Lymphocyte % 18.2 %      Monocyte % 8.0 %      Eosinophil % 0.8 %      Basophil % 0.3 %      Immature Grans % 0.3 %      Neutrophils, Absolute 8.64 (H) 10*3/mm3      Lymphocytes, Absolute 2.17 10*3/mm3      Monocytes, Absolute 0.95 (H) 10*3/mm3      Eosinophils, Absolute 0.10 10*3/mm3      Basophils, Absolute 0.03 10*3/mm3      Immature Grans, Absolute 0.03 10*3/mm3     Blood Culture [[id number]]  (Normal) Collected:  01/05/17 0414    Specimen:  Blood from Arm, Left Updated:  01/06/17 0501     Blood Culture No growth at 24 hours     Basic Metabolic Panel [[id number]]  (Abnormal) Collected:  01/06/17 0415    Specimen:  Blood Updated:  01/06/17 0520     Glucose 126 (H) mg/dL      BUN 13 mg/dL      Creatinine 0.91 mg/dL      Sodium 135 mmol/L       Potassium 4.0 mmol/L      Chloride 103 mmol/L      CO2 28.0 mmol/L      Calcium 9.3 mg/dL      eGFR Non African Amer 82 mL/min/1.73      BUN/Creatinine Ratio 14.3      Anion Gap 4.0 mmol/L     Narrative:       The MDRD GFR formula is only valid for adults with stable renal function between ages 18 and 70.    Osmolality, Calculated [[id number]]  (Abnormal) Collected:  01/06/17 0415    Specimen:  Blood Updated:  01/06/17 0520     Osmolality Calc 271.7 (L) mOsm/kg           Xray:  Imaging Results (last 24 hours)     Procedure Component Value Units Date/Time    XR Chest 1 View [[id number]] Collected:  01/05/17 0741     Updated:  01/05/17 0744    Narrative:       Technique: Frontal view the chest.     COMPARISON:  12/10/2016     INDICATION:     soa      FINDINGS:    Bilateral airspace disease is noted in the appears slightly  worse than on 12/10/2016 study. Cardiomegaly is noted. Prominent  interstitial markings are noted. No definite pleural effusions.        Impression:       Bilateral airspace disease is noted in the appears  slightly worse than on 12/10/2016 study.     This report was finalized on 1/5/2017 7:42 AM by Dr. Serg Ríos MD.                Results Review:     I reviewed the patient's new clinical results.    Medication Review:   Hospital Medications (active)       Dose Frequency Start End    amiodarone (PACERONE) tablet 200 mg 200 mg 3 Times Daily 1/5/2017     Sig - Route: Take 1 tablet by mouth 3 (Three) Times a Day. - Oral    cefepime (MAXIPIME) 1 g/100 mL 0.9% NS IVPB (mbp) 1 g Every 12 Hours 1/5/2017     Sig - Route: Infuse 100 mL into a venous catheter Every 12 (Twelve) Hours. - Intravenous    diltiaZEM (CARDIZEM) 100 mg/100 mL NS infusion (ADV) 5 mg/hr Continuous 1/5/2017     Sig - Route: Infuse 5 mg/hr into a venous catheter Continuous. - Intravenous    doxycycline (MONODOX) capsule 100 mg 100 mg Every 12 Hours Scheduled 1/5/2017     Sig - Route: Take 1 capsule by mouth Every 12 (Twelve) Hours. -  Oral    doxycycline (VIBRAMYCIN) 100 mg/100 mL 0.9% NS  mg Once 1/5/2017 1/5/2017    Sig - Route: Infuse 100 mL into a venous catheter 1 (One) Time. - Intravenous    furosemide (LASIX) injection 40 mg 40 mg Once 1/5/2017 1/5/2017    Sig - Route: Infuse 4 mL into a venous catheter 1 (One) Time. - Intravenous    furosemide (LASIX) injection 40 mg 40 mg Daily 1/5/2017     Sig - Route: Infuse 4 mL into a venous catheter Daily. - Intravenous    lisinopril (PRINIVIL,ZESTRIL) tablet 20 mg 20 mg Daily 1/5/2017     Sig - Route: Take 2 tablets by mouth Daily. - Oral    metoprolol tartrate (LOPRESSOR) tablet 25 mg 25 mg Every 12 Hours Scheduled 1/5/2017     Sig - Route: Take 1 tablet by mouth Every 12 (Twelve) Hours. - Oral    nystatin (MYCOSTATIN) 896122 UNIT/GM cream  Every 12 Hours Scheduled 1/5/2017     Sig - Route: Apply  topically Every 12 (Twelve) Hours. - Topical    potassium chloride (K-DUR,KLOR-CON) CR tablet 20 mEq 20 mEq Daily 1/6/2017     Sig - Route: Take 1 tablet by mouth Daily. - Oral    potassium chloride (KLOR-CON) packet 40 mEq 40 mEq Once 1/5/2017 1/5/2017    Sig - Route: Take 40 mEq by mouth 1 (One) Time. - Oral    rivaroxaban (XARELTO) tablet 20 mg 20 mg Daily With Dinner 1/5/2017     Sig - Route: Take 1 tablet by mouth Daily With Dinner. - Oral    vancomycin (VANCOCIN) 1,500 mg in sodium chloride 0.9 % 500 mL IVPB 1,500 mg Every 12 Hours 1/6/2017     Sig - Route: Infuse 1,500 mg into a venous catheter Every 12 (Twelve) Hours. - Intravenous    vancomycin (VANCOCIN) 2,000 mg in sodium chloride 0.9 % 500 mL IVPB 2,000 mg Once 1/5/2017 1/5/2017    Sig - Route: Infuse 2,000 mg into a venous catheter 1 (One) Time. - Intravenous    nitroglycerin infusion 200 mcg/mL (Discontinued) 5-200 mcg/min Titrated 1/5/2017 1/5/2017    Sig - Route: Infuse 5-200 mcg/min into a venous catheter Dose Adjusted By Provider As Needed. - Intravenous    Pharmacy to dose vancomycin (Discontinued)  Continuous PRN 1/5/2017  1/5/2017    Sig - Route: Continuous As Needed for consult. - Does not apply    vancomycin (VANCOCIN) 750 mg in sodium chloride 0.9 % 250 mL IVPB (Discontinued) 750 mg Once 1/5/2017 1/5/2017    Sig - Route: Infuse 750 mg into a venous catheter 1 (One) Time. - Intravenous    Reason for Discontinue: Alternate therapy          Assessment/Plan    1.Paroxysmal A.fib with systolic CHF(EF 35%) causing Respiratory Failure: multifactorial. Improved with diuresis.  ON cardizem for rate control.  ?Cardioversion. ECHO noted  2.Intersitial lung disease:  following. Placed on iv solumedrol, monitor fluid balance. Antibiotics per   3.HTN: continue meds and monitor  4.Hyperglycemia:  yesterday, will get a1c monitor.  Currently on solumedrol. SSI with ac hs accuchecks    Active Problems:    Acute on chronic congestive heart failure      Buster Redd MD  01/06/17  6:34 AM

## 2017-01-09 ENCOUNTER — TELEPHONE (OUTPATIENT)
Dept: CARDIOLOGY | Facility: CLINIC | Age: 72
End: 2017-01-09

## 2017-01-09 NOTE — TELEPHONE ENCOUNTER
Patient called just needing confirmation on his medications he is taking.  He was recently in the hospital x 2  and wanted to know if we had this information.  I went over his meds with him and answered his questions he had regarding the change in his meds and symptoms spending about 10 minutes via phone with many concerns he had.   I advised him if he had symptoms such as SOB or wt gain of 3lbs or more in a day to go seek medical attention immediately.   Pt voiced understanding. Follow up appt in Feb.

## 2017-01-10 LAB
BACTERIA SPEC AEROBE CULT: NORMAL
BACTERIA SPEC AEROBE CULT: NORMAL

## 2017-01-26 NOTE — DISCHARGE SUMMARY
Date of Discharge:  1/6/2017    Discharge Diagnosis:   1.Acute respiratory failure due to afib with rvr   2.congestive Heart Failure due to #1  3.COPD  4.HTN    Presenting Problem/History of Present Illness  Acute on chronic congestive heart failure, unspecified congestive heart failure type [I50.9]  Acute on chronic congestive heart failure, unspecified congestive heart failure type [I50.9]       Hospital Course  Patient is a 71 y.o. male presented with respiratory failure due to afib with rvr, which resulted in systolic CHF.  Pt admitted on bipap with iv diuresis. Cardiology was consulted to aid in converting him to Sinus rhythm.  Pt improved from respiratory standpoint with diuresis, however he became insistent on leaving AMA.  Despite inadequate workup and management pt subsequently signed out AMA.    Procedures Performed         Consults:   Consults     Date and Time Order Name Status Description    1/5/2017 1114 Inpatient Consult to Pulmonology Completed     1/5/2017 1114 Inpatient Consult to Cardiology Completed     12/10/2016 0849 Inpatient Consult to Cardiology Completed           Vital Signs    Pulse ox 94 % RA    Physical Exam:   General Appearance alert, appears stated age and cooperative   Lungs clear to auscultation and respirations regular   Heart irr irr rhy with tachycardia   Abdomen normal bowel sounds, no masses, soft non-tender   Extremities moves extremities well, no edema and no cyanosis    Labs:  Lab Results (last 72 hours)     Procedure Component Value Units Date/Time    Blood Gas, Arterial With Co-Ox [[id number]]  (Abnormal) Collected:  01/05/17 0048    Specimen:  Arterial Blood Updated:  01/05/17 0051     Site Arterial: right radial      Ludwin's Test N/A      pH, Arterial 7.139 (C) pH units      pCO2, Arterial 65.1 (C) mm Hg      pO2, Arterial 77.7 (L) mm Hg      HCO3, Arterial 21.6 (L) mmol/L      Base Excess, Arterial -8.8 mmol/L      O2 Saturation, Arterial 92.2 %      Hemoglobin,  Blood Gas 16.9 (H) g/dL      Hematocrit, Blood Gas 50.0 %      Oxyhemoglobin 87.2 %      Methemoglobin 0.4 %      Carboxyhemoglobin 5.0 %      A-a Gradiant >300.0 (H) mmHg      Temperature 98.6 C      Barometric Pressure for Blood Gas 723 mmHg      Modality Non Rebreather      FIO2 100 %     CBC & Differential [[id number]] Collected:  01/05/17 0048    Specimen:  Blood Updated:  01/05/17 0055    Narrative:       The following orders were created for panel order CBC & Differential.  Procedure                               Abnormality         Status                     ---------                               -----------         ------                     CBC Auto Differential[[id number]]         Abnormal            Final result                 Please view results for these tests on the individual orders.    CBC Auto Differential [[id number]]  (Abnormal) Collected:  01/05/17 0048    Specimen:  Blood Updated:  01/05/17 0055     WBC 22.89 (H) 10*3/mm3      RBC 5.40 10*6/mm3      Hemoglobin 16.2 g/dL      Hematocrit 51.3 %      MCV 95.0 (H) fL      MCH 30.0 pg      MCHC 31.6 (L) g/dL      RDW 14.9 (H) %      RDW-SD 50.0 fl      MPV 11.1 (H) fL      Platelets 338 10*3/mm3      Neutrophil % 70.1 %      Lymphocyte % 23.5 %      Monocyte % 4.7 %      Eosinophil % 0.7 %      Basophil % 0.5 %      Immature Grans % 0.5 %      Neutrophils, Absolute 16.04 (H) 10*3/mm3      Lymphocytes, Absolute 5.39 (H) 10*3/mm3      Monocytes, Absolute 1.08 (H) 10*3/mm3      Eosinophils, Absolute 0.15 10*3/mm3      Basophils, Absolute 0.11 10*3/mm3      Immature Grans, Absolute 0.12 (H) 10*3/mm3     CK [[id number]]  (Normal) Collected:  01/05/17 0048    Specimen:  Blood from Arm, Left Updated:  01/05/17 0129     Creatine Kinase 48 U/L     Myoglobin, Serum [[id number]]  (Normal) Collected:  01/05/17 0048    Specimen:  Blood from Arm, Left Updated:  01/05/17 0129     Myoglobin 34.0 ng/mL     CK-MB [[id number]]  (Normal) Collected:  01/05/17 0048    Specimen:   Blood from Arm, Left Updated:  01/05/17 0129     CKMB 1.95 ng/mL     Troponin [[id number]]  (Normal) Collected:  01/05/17 0048    Specimen:  Blood from Arm, Left Updated:  01/05/17 0129     Troponin I 0.036 ng/mL     Narrative:       Ultra Troponin I Reference Range:         <=0.039 ng/mL: Negative    0.04-0.779 ng/mL: Indeterminate Range. Suspicious of MI.  Clinical correlation required.       >=0.78  ng/mL: Consistent with myocardial injury.  Clinical correlation required.    CK-MB Index [[id number]]  (Abnormal) Collected:  01/05/17 0048    Specimen:  Blood from Arm, Left Updated:  01/05/17 0129     CK-MB Index 4.1 (H) %     BNP [[id number]]  (Abnormal) Collected:  01/05/17 0048    Specimen:  Blood Updated:  01/05/17 0129     .0 (H) pg/mL     Comprehensive Metabolic Panel [[id number]]  (Abnormal) Collected:  01/05/17 0048    Specimen:  Blood from Arm, Left Updated:  01/05/17 0148     Glucose 345 (H) mg/dL      BUN 14 mg/dL      Creatinine 0.97 mg/dL      Sodium 135 mmol/L      Potassium 3.9 mmol/L      Chloride 101 mmol/L      CO2 23.1 (L) mmol/L      Calcium 9.6 mg/dL      Total Protein 7.7 g/dL      Albumin 4.40 g/dL      ALT (SGPT) 25 U/L      AST (SGOT) 25 U/L      Alkaline Phosphatase 102 U/L      Total Bilirubin 1.0 mg/dL      eGFR Non African Amer 76 mL/min/1.73      Globulin 3.3 gm/dL      A/G Ratio 1.3 (L) g/dL      BUN/Creatinine Ratio 14.4      Anion Gap 10.9 mmol/L     Narrative:       The MDRD GFR formula is only valid for adults with stable renal function between ages 18 and 70.    Osmolality, Calculated [[id number]]  (Normal) Collected:  01/05/17 0048    Specimen:  Blood from Arm, Left Updated:  01/05/17 0149     Osmolality Calc 284.3 mOsm/kg     Blood Gas, Arterial With Co-Ox [[id number]]  (Abnormal) Collected:  01/05/17 0157    Specimen:  Arterial Blood Updated:  01/05/17 0201     Site Arterial: left radial      Ludwin's Test N/A      pH, Arterial 7.323 (L) pH units      pCO2, Arterial 39.3 mm Hg       pO2, Arterial 293.1 (H) mm Hg      HCO3, Arterial 19.9 (C) mmol/L      Base Excess, Arterial -5.6 mmol/L      O2 Saturation, Arterial 99.7 %      Hemoglobin, Blood Gas 15.5 g/dL      Hematocrit, Blood Gas 46.0 %      Oxyhemoglobin 95.6 %      Methemoglobin 0.4 %      Carboxyhemoglobin 3.7 %      A-a Gradiant >300.0 (H) mmHg      Temperature 98.6 C      Barometric Pressure for Blood Gas 723 mmHg      Modality BiPAP      FIO2 100 %      Set [facility] Rate 20      IPAP 20      EPAP 8     Lactic Acid, Plasma [[id number]]  (Abnormal) Collected:  01/05/17 0252    Specimen:  Blood from Arm, Left Updated:  01/05/17 0322     Lactate 4.8 (C) mmol/L     Troponin [[id number]]  (Abnormal) Collected:  01/05/17 0253    Specimen:  Blood from Arm, Left Updated:  01/05/17 0335     Troponin I 0.089 (H) ng/mL     Narrative:       Ultra Troponin I Reference Range:         <=0.039 ng/mL: Negative    0.04-0.779 ng/mL: Indeterminate Range. Suspicious of MI.  Clinical correlation required.       >=0.78  ng/mL: Consistent with myocardial injury.  Clinical correlation required.    CK [[id number]]  (Normal) Collected:  01/05/17 0253    Specimen:  Blood from Arm, Left Updated:  01/05/17 0338     Creatine Kinase 50 U/L     Myoglobin, Serum [[id number]]  (Normal) Collected:  01/05/17 0253    Specimen:  Blood from Arm, Left Updated:  01/05/17 0338     Myoglobin 72.0 ng/mL     CK-MB [[id number]]  (Normal) Collected:  01/05/17 0253    Specimen:  Blood from Arm, Left Updated:  01/05/17 0338     CKMB 2.05 ng/mL     CK-MB Index [[id number]]  (Abnormal) Collected:  01/05/17 0253    Specimen:  Blood from Arm, Left Updated:  01/05/17 0338     CK-MB Index 4.1 (H) %     Blood Gas, Arterial With Co-Ox [[id number]] Collected:  01/05/17 0514    Specimen:  Arterial Blood Updated:  01/05/17 0524     Site Arterial: left radial      Ludwin's Test N/A      pH, Arterial 7.388 pH units      pCO2, Arterial 38.1 mm Hg      pO2, Arterial 80.2 mm Hg      HCO3, Arterial 22.4 mmol/L       Base Excess, Arterial -2.1 mmol/L      O2 Saturation, Arterial 96.1 %      Hemoglobin, Blood Gas 15.8 g/dL      Hematocrit, Blood Gas 46.0 %      Oxyhemoglobin 93.5 %      Methemoglobin 0.3 %      Carboxyhemoglobin 2.4 %      A-a Gradiant 14.6 mmHg      Temperature 98.9 C      Barometric Pressure for Blood Gas 723 mmHg      Modality BiPAP      FIO2 50 %      Set Mech Resp Rate 20      IPAP 20      EPAP 8     Urinalysis With / Culture If Indicated [[id number]]  (Abnormal) Collected:  01/05/17 0604    Specimen:  Urine from Urine, Catheter Updated:  01/05/17 0639     Color, UA Yellow      Appearance, UA Clear      pH, UA <=5.0      Specific Gravity, UA 1.008      Glucose, UA Negative      Ketones, UA Negative      Bilirubin, UA Negative      Blood, UA Small (1+) (A)      Protein,  mg/dL (2+) (A)      Leuk Esterase, UA Negative      Nitrite, UA Negative      Urobilinogen, UA 0.2 E.U./dL     Urinalysis, Microscopic Only [[id number]]  (Abnormal) Collected:  01/05/17 0604    Specimen:  Urine from Urine, Catheter Updated:  01/05/17 0642     RBC, UA 3-5 (A) /HPF      WBC, UA 0-2 (A) /HPF      Bacteria, UA None Seen /HPF      Squamous Epithelial Cells, UA 0-2 /HPF      Hyaline Casts, UA 3-6 /LPF      Methodology Automated Microscopy     Lactate Acid, Reflex [[id number]]  (Abnormal) Collected:  01/05/17 0713    Specimen:  Blood Updated:  01/05/17 0749     Lactate 2.1 (C) mmol/L     Magnesium [[id number]]  (Normal) Collected:  01/05/17 1333    Specimen:  Blood Updated:  01/05/17 1410     Magnesium 1.9 mg/dL     Phosphorus [[id number]]  (Normal) Collected:  01/05/17 1333    Specimen:  Blood Updated:  01/05/17 1410     Phosphorus 3.6 mg/dL     Influenza Antigen [[id number]]  (Normal) Collected:  01/05/17 1454    Specimen:  Swab from Nasopharynx Updated:  01/05/17 1520     Influenza A Ag, EIA Negative      Influenza B Ag, EIA Negative     POC Glucose Fingerstick [[id number]]  (Normal) Collected:  01/05/17 1632    Specimen:  Blood Updated:   01/05/17 1636     Glucose 124 mg/dL     Narrative:       Meter: [id number] : [id number] Lenore Shanks    Legionella Antigen, Urine [[id number]]  (Normal) Collected:  01/05/17 1455    Specimen:  Urine from Urine, Clean Catch Updated:  01/05/17 1852     LEGIONELLA ANTIGEN, URINE Negative     Narrative:         Presumptive negative for L. pneumophilia serogroup 1 antigen, suggesting no recent or current infection.    CBC & Differential [[id number]] Collected:  01/06/17 0415    Specimen:  Blood Updated:  01/06/17 0447    Narrative:       The following orders were created for panel order CBC & Differential.  Procedure                               Abnormality         Status                     ---------                               -----------         ------                     CBC Auto Differential[[id number]]         Abnormal            Final result                 Please view results for these tests on the individual orders.    CBC Auto Differential [[id number]]  (Abnormal) Collected:  01/06/17 0415    Specimen:  Blood Updated:  01/06/17 0447     WBC 11.92 10*3/mm3      RBC 4.59 (L) 10*6/mm3      Hemoglobin 13.7 (L) g/dL      Hematocrit 43.3 %      MCV 94.3 (H) fL      MCH 29.8 pg      MCHC 31.6 (L) g/dL      RDW 14.9 (H) %      RDW-SD 49.3 fl      MPV 10.9 (H) fL      Platelets 205 10*3/mm3      Neutrophil % 72.4 %      Lymphocyte % 18.2 %      Monocyte % 8.0 %      Eosinophil % 0.8 %      Basophil % 0.3 %      Immature Grans % 0.3 %      Neutrophils, Absolute 8.64 (H) 10*3/mm3      Lymphocytes, Absolute 2.17 10*3/mm3      Monocytes, Absolute 0.95 (H) 10*3/mm3      Eosinophils, Absolute 0.10 10*3/mm3      Basophils, Absolute 0.03 10*3/mm3      Immature Grans, Absolute 0.03 10*3/mm3     Basic Metabolic Panel [[id number]]  (Abnormal) Collected:  01/06/17 0415    Specimen:  Blood Updated:  01/06/17 0520     Glucose 126 (H) mg/dL      BUN 13 mg/dL      Creatinine 0.91 mg/dL      Sodium 135 mmol/L      Potassium 4.0 mmol/L       Chloride 103 mmol/L      CO2 28.0 mmol/L      Calcium 9.3 mg/dL      eGFR Non African Amer 82 mL/min/1.73      BUN/Creatinine Ratio 14.3      Anion Gap 4.0 mmol/L     Narrative:       The MDRD GFR formula is only valid for adults with stable renal function between ages 18 and 70.    Osmolality, Calculated [[id number]]  (Abnormal) Collected:  01/06/17 0415    Specimen:  Blood Updated:  01/06/17 0520     Osmolality Calc 271.7 (L) mOsm/kg     POC Glucose Fingerstick [[id number]]  (Normal) Collected:  01/06/17 0745    Specimen:  Blood Updated:  01/06/17 0748     Glucose 116 mg/dL     Narrative:       Meter: [id number] : [id number] [place]    Hemoglobin A1c [[id number]]  (Abnormal) Collected:  01/06/17 0415    Specimen:  Blood Updated:  01/06/17 0806     Hemoglobin A1C 6.50 (H) %     POC Glucose Fingerstick [[id number]]  (Normal) Collected:  01/06/17 1104    Specimen:  Blood Updated:  01/06/17 1111     Glucose 125 mg/dL     Narrative:       Meter: [id number] : [id number] erick chahal          Xray:  Imaging Results (last 24 hours)     ** No results found for the last 24 hours. **          Discharge Disposition  Left AMA    Discharge Medications   Kehr, Larry   Home Medication Instructions JANETH:[id number]    Printed on:01/26/17 6793   Medication Information                      furosemide (LASIX) 20 MG tablet  Take 20 mg by mouth Daily.             lisinopril (PRINIVIL,ZESTRIL) 20 MG tablet  Take 20 mg by mouth Daily.             metoprolol tartrate (LOPRESSOR) 25 MG tablet  Take 1 tablet by mouth 2 (Two) Times a Day.             rivaroxaban (XARELTO) 20 MG tablet  Take 20 mg by mouth Daily.                 Discharge Diet:   reg  Condition on Discharge:  Not stable    Activity at Discharge:   Left AMA    Follow-up Appointments  Future Appointments  Date Time Provider Department Center   2/15/2017 3:20 PM Hemal Mckinney MD [facility] None     Dr.Kelvin Redd one week    Buster Redd MD

## 2017-03-02 ENCOUNTER — TELEPHONE (OUTPATIENT)
Dept: CARDIOLOGY | Facility: CLINIC | Age: 72
End: 2017-03-02

## 2017-03-02 ENCOUNTER — OFFICE VISIT (OUTPATIENT)
Dept: CARDIOLOGY | Facility: CLINIC | Age: 72
End: 2017-03-02

## 2017-03-02 VITALS
BODY MASS INDEX: 40.09 KG/M2 | HEIGHT: 70 IN | SYSTOLIC BLOOD PRESSURE: 127 MMHG | RESPIRATION RATE: 16 BRPM | WEIGHT: 280 LBS | HEART RATE: 115 BPM | DIASTOLIC BLOOD PRESSURE: 93 MMHG

## 2017-03-02 DIAGNOSIS — Z79.899 DRUG THERAPY: ICD-10-CM

## 2017-03-02 DIAGNOSIS — I50.9 ACUTE ON CHRONIC CONGESTIVE HEART FAILURE, UNSPECIFIED CONGESTIVE HEART FAILURE TYPE: ICD-10-CM

## 2017-03-02 DIAGNOSIS — I10 ESSENTIAL HYPERTENSION: ICD-10-CM

## 2017-03-02 DIAGNOSIS — I48.0 PAROXYSMAL A-FIB (HCC): Primary | ICD-10-CM

## 2017-03-02 PROCEDURE — 99213 OFFICE O/P EST LOW 20 MIN: CPT | Performed by: INTERNAL MEDICINE

## 2017-03-02 PROCEDURE — 93000 ELECTROCARDIOGRAM COMPLETE: CPT | Performed by: INTERNAL MEDICINE

## 2017-03-02 RX ORDER — METOPROLOL SUCCINATE 50 MG/1
50 TABLET, EXTENDED RELEASE ORAL DAILY
Qty: 30 TABLET | Refills: 4 | Status: SHIPPED | OUTPATIENT
Start: 2017-03-02 | End: 2017-04-10

## 2017-03-02 RX ORDER — SPIRONOLACTONE 25 MG/1
25 TABLET ORAL DAILY
Qty: 30 TABLET | Refills: 4 | Status: SHIPPED | OUTPATIENT
Start: 2017-03-02 | End: 2017-10-24 | Stop reason: SDUPTHER

## 2017-03-02 RX ORDER — FUROSEMIDE 40 MG/1
40 TABLET ORAL DAILY
Qty: 30 TABLET | Refills: 4 | Status: SHIPPED | OUTPATIENT
Start: 2017-03-02 | End: 2017-08-04 | Stop reason: DRUGHIGH

## 2017-03-02 NOTE — PROGRESS NOTES
Buster Redd MD  Larry Kehr  1945 03/02/2017    Patient Active Problem List   Diagnosis   • Paroxysmal a-fib-, maintaining sinus rhythm patient is on on Xarelto for stroke prevention.   • Hypertension- controlled.    • Chronic systolic heart failure       Dear Buster Redd MD:    Subjective     Larry Kehr is a 71 y.o. male with the problems as listed above, presents for follow up status post recent hospital visit for CHF.      History of Present Illness: Mr.Kehr was recently admitted in January with acute exacerbation of chronic systolic heart failure with atrial fibrillation with rapid ventricular rate.  This has resolved.  He is here today for cardiology follow-up Patient states he has been breathing better since recent [facility] hospital visit for CHF. No orthopnea reported.   He denies chest pain, dizziness or syncope.        No Known Allergies:      Current Outpatient Prescriptions:   •  furosemide (LASIX) 40 MG tablet, Take 1 tablet by mouth Daily., Disp: 30 tablet, Rfl: 4  •  lisinopril (PRINIVIL,ZESTRIL) 20 MG tablet, Take 20 mg by mouth Daily., Disp: , Rfl:   •  rivaroxaban (XARELTO) 20 MG tablet, Take 20 mg by mouth Daily., Disp: , Rfl:   •  metoprolol succinate XL (TOPROL-XL) 50 MG 24 hr tablet, Take 1 tablet by mouth Daily., Disp: 30 tablet, Rfl: 4  •  spironolactone (ALDACTONE) 25 MG tablet, Take 1 tablet by mouth Daily., Disp: 30 tablet, Rfl: 4      The following portions of the patient's history were reviewed and updated as appropriate: allergies, current medications, past family history, past medical history, past social history, past surgical history and problem list.    Social History   Substance Use Topics   • Smoking status: Current Every Day Smoker     Packs/day: 1.00     Years: 18.00   • Smokeless tobacco: Never Used   • Alcohol use No       Review of Systems   Constitution: Positive for chills. Negative for fever.   HENT: Negative for headaches, nosebleeds and sore throat.   "  Cardiovascular: Positive for palpitations.   Respiratory: Positive for cough. Negative for hemoptysis and wheezing.    Skin: Positive for dry skin.   Gastrointestinal: Positive for constipation. Negative for abdominal pain, hematemesis, hematochezia, melena, nausea and vomiting.   Genitourinary: Negative for dysuria and hematuria.       Objective   Vitals:    03/02/17 1051   BP: 127/93   Pulse: 115   Resp: 16   Weight: 280 lb (127 kg)   Height: 70\" (177.8 cm)     Body mass index is 40.18 kg/(m^2).        Physical Exam   Constitutional: He is oriented to person, place, and time. He appears well-developed and well-nourished.   HENT:   Mouth/Throat: Oropharynx is clear and moist.   Eyes: EOM are normal. Pupils are equal, round, and reactive to light.   Neck: Neck supple. No JVD present. No tracheal deviation present. No thyromegaly present.   Cardiovascular: Normal rate, S1 normal and S2 normal.  An irregular rhythm present. Exam reveals no gallop and no friction rub.    No murmur heard.  Pulmonary/Chest: Effort normal and breath sounds normal.   Abdominal: Soft. Bowel sounds are normal. He exhibits no mass. There is no tenderness.   Obese   Musculoskeletal: Normal range of motion. Edema: 2+ bilateral leg edema.   Lymphadenopathy:     He has no cervical adenopathy.   Neurological: He is alert and oriented to person, place, and time.   Skin: Skin is warm and dry. No rash noted.   Psychiatric: He has a normal mood and affect.       Lab Results   Component Value Date     01/06/2017    K 4.0 01/06/2017     01/06/2017    CO2 28.0 01/06/2017    BUN 13 01/06/2017    CREATININE 0.91 01/06/2017    GLUCOSE 126 (H) 01/06/2017    CALCIUM 9.3 01/06/2017    AST 25 01/05/2017    ALT 25 01/05/2017    ALKPHOS 102 01/05/2017    LABIL2 1.3 (L) 01/05/2017     Lab Results   Component Value Date    CKTOTAL 50 01/05/2017     Lab Results   Component Value Date    WBC 11.92 01/06/2017    HGB 13.7 (L) 01/06/2017    HCT 43.3 " 01/06/2017     01/06/2017     Lab Results   Component Value Date    INR 1.24 (H) 12/10/2016     Lab Results   Component Value Date    MG 1.9 01/05/2017     Lab Results   Component Value Date    TSH 1.571 07/20/2014      Lab Results   Component Value Date    .0 (H) 01/05/2017     Echo   Lab Results   Component Value Date    ECHOEFEST 35 01/05/2017   · Left ventricular wall segments contract abnormally. Refer to wall scoring diagram for more information.  · Left ventricular function is moderately decreased. Estimated EF = 35%.  · Left atrial cavity size is mildly dilated.  · There are no hemodynamically significant valvular lesions          ECG 12 Lead  Date/Time: 3/2/2017 10:58 AM  Performed by: HEMAL MICHAUD  Authorized by: HEMAL MICHAUD   Rhythm: atrial fibrillation  BPM: 114  Comments: QTc 460            Assessment/Plan      1. Paroxysmal a-fib- on Xarelto for stroke, sinus rhythm.    continued Xarelto    2. Acute on chronic congestive heart failure, unspecified congestive heart failure type  metoprolol succinate XL (TOPROL-XL) 50 MG 24 hr tablet    furosemide (LASIX) 40 MG tablet    spironolactone (ALDACTONE) 25 MG tablet   3. Essential hypertension            Recommendations:    Change Metoprolol Tartrate to Metoprolol Succinate 50 mg po daily.  Increase Lasix to 40 mg po daily.  Add Spironolactone 25 mg po daily.  Check BMP in 1 and 2 weeks.  Find cost of Entresto for pt. and will try to switch him heart from the lisinopril to this if affordable.  Advised pt on salt restricted diet..     Return in about 3 weeks (around 3/23/2017).    As always, I appreciate very much the opportunity to participate in the cardiovascular care of your patients.      With Best Regards,    Hemal Michaud MD, [facility]    Dragon disclaimer:  Much of this encounter note is an electronic transcription/translation of spoken language to printed text. The electronic translation of spoken language may permit erroneous, or at  times, nonsensical words or phrases to be inadvertently transcribed; Although I have reviewed the note for such errors, some may still exist.

## 2017-03-02 NOTE — TELEPHONE ENCOUNTER
Called pharmacy back and they stated that Arsenio didn't have any insurance for his medicine just discount cards.       QUAN Jensen MA                   [facility] pharmacy in [place] is calling you back.         ----- Message from Hemal Mckinney MD sent at 3/2/2017 11:25 AM EST -----  Please find cost of Entresto for pt.

## 2017-03-03 ENCOUNTER — TELEPHONE (OUTPATIENT)
Dept: CARDIOLOGY | Facility: CLINIC | Age: 72
End: 2017-03-03

## 2017-03-03 NOTE — TELEPHONE ENCOUNTER
Called patient to advise him that the pharmacy does not have his insurance on file and we can not find the cost the Entresto until he takes his insurance down to [facility] pharmacy.

## 2017-03-06 ENCOUNTER — TELEPHONE (OUTPATIENT)
Dept: CARDIOLOGY | Facility: CLINIC | Age: 72
End: 2017-03-06

## 2017-03-06 NOTE — TELEPHONE ENCOUNTER
Patient states that the Metoprolol is giving him major side effects on him.   N&V, very woozy, dizzy, staggery,.    Took only 1/2 the tablet yesterday and today.  Is feeling better.  Can he stay on this dose. ?  Pulse is 57.  B/p is  113/71.

## 2017-03-06 NOTE — TELEPHONE ENCOUNTER
Mirna SHULTZ [facility]        Phone Number: [phone]                     SOME OF HIS MEDICATIONS WAS CHANGED AND HE SAYS HE IS HAVING TROUBLE WITH THE METOPROLOL ER 50MG. HE TOOK 50MG FOR TWO DAYS. HE SAYS HE CUT THE PILL IN HALF AND HAS BEEN TAKING IT THAT WAY FOR TWO DAYS AND HAS BEEN DOING GOOD TAKING HALF OF THE PILL. HE WAS NEEDING TO TALK TO SOMEBODY ABOUT HIS MEDICATION. HE ALSO HAS QUESTIONS ABOUT HIS LABS HE HAS TO HAVE DONE.

## 2017-03-07 ENCOUNTER — TELEPHONE (OUTPATIENT)
Dept: CARDIOLOGY | Facility: CLINIC | Age: 72
End: 2017-03-07

## 2017-03-07 NOTE — TELEPHONE ENCOUNTER
Yes, ok to stay on the Metoprolol succinate 25mg QD instead of the 50mg if he is doing better on the lower dose. Let us know if he has any further problems.

## 2017-03-07 NOTE — TELEPHONE ENCOUNTER
Called patient to let him know he could stay on the Metoprolol 25mg daily as he can tolerate this better than the 50mg.  His B/P is good at this time.

## 2017-03-22 ENCOUNTER — TELEPHONE (OUTPATIENT)
Dept: CARDIOLOGY | Facility: CLINIC | Age: 72
End: 2017-03-22

## 2017-03-22 NOTE — TELEPHONE ENCOUNTER
Spoke with the patient and the pharmacy, Patient does not have Rx coverage. I spoke with the drug reps they said we could start him on samples and then get him on the assistance 's program. Let me know the instructions on how you want to go about this so I can get him started on the Entresto.

## 2017-03-23 NOTE — TELEPHONE ENCOUNTER
Patient on Lisinopril 20mg so will need to start on the medium dose Entresto. Stop lisinopril for 36 hours before starting Entresto. Then start Entresto 49/51mg PO BID. BMP in 1 week.

## 2017-03-24 NOTE — TELEPHONE ENCOUNTER
Patient was advised and agreeable to start this new medication. I advised him to come by the office on Monday I would give him samples and explain everything to him and give him instructions.

## 2017-03-27 NOTE — TELEPHONE ENCOUNTER
Patient came to the office today 03/27/17 9:00am wrote the instructions out for him pulled out the lisinopril for him to stop. I gave him 3 boxes of samples of Entresto 49/51 mg. Patient was agreeable

## 2017-04-10 ENCOUNTER — LAB (OUTPATIENT)
Dept: LAB | Facility: HOSPITAL | Age: 72
End: 2017-04-10
Attending: INTERNAL MEDICINE

## 2017-04-10 ENCOUNTER — OFFICE VISIT (OUTPATIENT)
Dept: CARDIOLOGY | Facility: CLINIC | Age: 72
End: 2017-04-10

## 2017-04-10 VITALS
WEIGHT: 266 LBS | HEART RATE: 94 BPM | BODY MASS INDEX: 38.08 KG/M2 | RESPIRATION RATE: 16 BRPM | HEIGHT: 70 IN | DIASTOLIC BLOOD PRESSURE: 64 MMHG | SYSTOLIC BLOOD PRESSURE: 98 MMHG

## 2017-04-10 DIAGNOSIS — E66.01 MORBID OBESITY, UNSPECIFIED OBESITY TYPE (HCC): ICD-10-CM

## 2017-04-10 DIAGNOSIS — Z79.899 DRUG THERAPY: ICD-10-CM

## 2017-04-10 DIAGNOSIS — Z72.0 TOBACCO USE: ICD-10-CM

## 2017-04-10 DIAGNOSIS — I48.0 PAROXYSMAL A-FIB (HCC): Primary | ICD-10-CM

## 2017-04-10 DIAGNOSIS — I50.9 ACUTE ON CHRONIC CONGESTIVE HEART FAILURE, UNSPECIFIED CONGESTIVE HEART FAILURE TYPE: ICD-10-CM

## 2017-04-10 DIAGNOSIS — I10 ESSENTIAL HYPERTENSION: ICD-10-CM

## 2017-04-10 LAB
ANION GAP SERPL CALCULATED.3IONS-SCNC: 5.5 MMOL/L (ref 3.6–11.2)
BUN BLD-MCNC: 14 MG/DL (ref 7–21)
BUN/CREAT SERPL: 17.9 (ref 7–25)
CALCIUM SPEC-SCNC: 9.6 MG/DL (ref 7.7–10)
CHLORIDE SERPL-SCNC: 105 MMOL/L (ref 99–112)
CO2 SERPL-SCNC: 26.5 MMOL/L (ref 24.3–31.9)
CREAT BLD-MCNC: 0.78 MG/DL (ref 0.43–1.29)
GFR SERPL CREATININE-BSD FRML MDRD: 98 ML/MIN/1.73
GLUCOSE BLD-MCNC: 84 MG/DL (ref 70–110)
OSMOLALITY SERPL CALC.SUM OF ELEC: 273.5 MOSM/KG (ref 273–305)
POTASSIUM BLD-SCNC: 4 MMOL/L (ref 3.5–5.3)
SODIUM BLD-SCNC: 137 MMOL/L (ref 135–153)

## 2017-04-10 PROCEDURE — 80048 BASIC METABOLIC PNL TOTAL CA: CPT

## 2017-04-10 PROCEDURE — 99213 OFFICE O/P EST LOW 20 MIN: CPT | Performed by: INTERNAL MEDICINE

## 2017-04-10 PROCEDURE — 36415 COLL VENOUS BLD VENIPUNCTURE: CPT

## 2017-04-10 NOTE — PROGRESS NOTES
"Larry Kehr  1945  04/10/2017   Ref:No referring provider defined for this encounter.  Pcp: Buster Redd MD  [address] [place], [place] [id number]    Follow up for:  Chief Complaint   Patient presents with   • Atrial Fibrillation     follow up, 1 mo        Patient Active Problem List   Diagnosis   • Paroxysmal a-fib- on Xarelto for stroke, sinus rhythm.    • Hypertension- controlled.    • Acute on chronic congestive heart failure   • Tobacco use   • Morbid obesity       HPI     Larry Kehr is a 70 yo male who presents to our office today for a follow-up of medication change. Patient notes with starting the new medication \" Entresto 49/51 mg\" he has noticed his breathing has gotten somewhat better. Patient notes he walks 2 city blocks without shortness of breath. Patient denies chest pain, edema, palpitations, dizziness and no syncope. He is smoking 5-6 cigs a day. His energy has improved.     Review of Systems   Constitution: Positive for chills. Negative for fever.   HENT: Negative for headaches, nosebleeds and sore throat.    Respiratory: Positive for cough. Negative for hemoptysis and wheezing.    Hematologic/Lymphatic: Bruises/bleeds easily.   Gastrointestinal: Negative for abdominal pain, hematemesis, hematochezia, melena, nausea and vomiting.   Genitourinary: Negative for dysuria and hematuria.   Neurological: Positive for dizziness.         Current Outpatient Prescriptions:   •  furosemide (LASIX) 40 MG tablet, Take 1 tablet by mouth Daily. (Patient taking differently: Take 20 mg by mouth Daily.), Disp: 30 tablet, Rfl: 4  •  metoprolol tartrate (LOPRESSOR) 25 MG tablet, Take 25 mg by mouth 2 (Two) Times a Day., Disp: , Rfl:   •  rivaroxaban (XARELTO) 20 MG tablet, Take 20 mg by mouth Daily., Disp: , Rfl:   •  sacubitril-valsartan (ENTRESTO) 49-51 MG tablet, Take 1 tablet by mouth 2 (Two) Times a Day., Disp: , Rfl:   •  spironolactone (ALDACTONE) 25 MG tablet, Take 1 tablet by mouth Daily., Disp: 30 tablet, " "Rfl: 4  •  lisinopril (PRINIVIL,ZESTRIL) 20 MG tablet, Take 20 mg by mouth Daily., Disp: , Rfl:      No Known Allergies    BP 98/64  Pulse 94  Resp 16  Ht 70\" (177.8 cm)  Wt 266 lb (121 kg)  BMI 38.17 kg/m2       Physical Exam   Constitutional: He is oriented to person, place, and time. He appears well-developed and well-nourished. No distress.   Morbid obesity    Neck: No JVD present. No tracheal deviation present.   Cardiovascular: Normal rate, regular rhythm, normal heart sounds and intact distal pulses.  Exam reveals no gallop and no friction rub.    No murmur heard.  Pulses:       Carotid pulses are 2+ on the right side, and 2+ on the left side.       Dorsalis pedis pulses are 2+ on the right side, and 2+ on the left side.        Posterior tibial pulses are 2+ on the right side, and 2+ on the left side.   Pulmonary/Chest: No respiratory distress. He has decreased breath sounds (Midly decreased breath sounds. ). He has no wheezes. He has no rales. He exhibits no tenderness.   Abdominal: Soft. Bowel sounds are normal. He exhibits no distension and no mass. There is no tenderness. There is no rebound and no guarding.   Morbid obesity    Musculoskeletal: He exhibits no edema or tenderness.   Neurological: He is alert and oriented to person, place, and time. He has normal reflexes. He displays normal reflexes. No cranial nerve deficit. He exhibits normal muscle tone. Coordination normal.   Skin: Skin is warm and dry. No rash noted. He is not diaphoretic. No erythema. No pallor.   Psychiatric: He has a normal mood and affect. His behavior is normal. Judgment and thought content normal.   :    Lab Review:    Procedures    Lab Results   Component Value Date     04/10/2017    K 4.0 04/10/2017     04/10/2017    CO2 26.5 04/10/2017    BUN 14 04/10/2017    CREATININE 0.78 04/10/2017    GLUCOSE 84 04/10/2017    CALCIUM 9.6 04/10/2017    AST 25 01/05/2017    ALT 25 01/05/2017    ALKPHOS 102 01/05/2017    " LABIL2 1.3 (L) 01/05/2017     Lab Results   Component Value Date    CKTOTAL 50 01/05/2017     Lab Results   Component Value Date    WBC 11.92 01/06/2017    HGB 13.7 (L) 01/06/2017    HCT 43.3 01/06/2017     01/06/2017     Lab Results   Component Value Date    INR 1.24 (H) 12/10/2016     Lab Results   Component Value Date    MG 1.9 01/05/2017     Lab Results   Component Value Date    TSH 1.571 07/20/2014      Lab Results   Component Value Date    .0 (H) 01/05/2017       Chemistry        Component Value Date/Time     04/10/2017 1411    K 4.0 04/10/2017 1411     04/10/2017 1411    CO2 26.5 04/10/2017 1411    BUN 14 04/10/2017 1411    CREATININE 0.78 04/10/2017 1411        Component Value Date/Time    CALCIUM 9.6 04/10/2017 1411    ALKPHOS 102 01/05/2017 0048    AST 25 01/05/2017 0048    ALT 25 01/05/2017 0048    BILITOT 1.0 01/05/2017 0048            Impression:   Diagnosis Plan   1. Paroxysmal a-fib- on Xarelto for stroke, sinus rhythm.   Basic Metabolic Panel   2. Essential hypertension  Basic Metabolic Panel   3. Acute on chronic congestive heart failure, EF = 35%  proBNP    Basic Metabolic Panel   4. Tobacco use     5. Morbid obesity, unspecified obesity type         Plan:    1. BNP  2. BMP  3. Follow up in 2 months.    Orders Placed This Encounter   Procedures   • proBNP     Standing Status:   Future     Standing Expiration Date:   4/10/2018   • Basic Metabolic Panel     Standing Status:   Future     Standing Expiration Date:   4/10/2018         Return in about 2 months (around 6/10/2017) for Recheck, Or sooner if needed.     This document signed by Edenilson Moore MD April 10, 2017 5:29 PM     Scribed for Edenilson Moore MD by Denice Penny CMA. 4/10/2017  5:29 PM    I, Edenilson Moore MD, personally performed the services described in this documentation as scribed by the above named individual in my presence, and it is both accurate and complete.  4/10/2017  5:29 PM

## 2017-04-11 ENCOUNTER — TELEPHONE (OUTPATIENT)
Dept: CARDIOLOGY | Facility: CLINIC | Age: 72
End: 2017-04-11

## 2017-04-11 NOTE — TELEPHONE ENCOUNTER
----- Message from Shoshana Paulino sent at 4/11/2017 12:45 PM EDT -----  PLEASE CALL PATIENT WITH RESULTS OF RECENT BLOODWORK.

## 2017-04-12 NOTE — TELEPHONE ENCOUNTER
BMP is completely normal. For some reason, the lab didn't do his BNP and it's too late to add it on. At last visit, stated his breathing had actually improved. As long as he's not having increasing SOA or edema, can cancel the order for now as I don't want to send him back and get stuck again if we don't have to.

## 2017-04-12 NOTE — TELEPHONE ENCOUNTER
----- Message from Vianey Davis sent at 4/12/2017 10:14 AM EDT -----  Contact: [phone]   Pt wants to know if his lab results are back in from his lab work on Monday. i told him someone would give him a call and let him know.

## 2017-04-12 NOTE — TELEPHONE ENCOUNTER
Called pt to let him know lab orders nl.  Denied SOA, no other symptoms. Will cancel other order.

## 2017-04-20 ENCOUNTER — LAB (OUTPATIENT)
Dept: LAB | Facility: HOSPITAL | Age: 72
End: 2017-04-20
Attending: INTERNAL MEDICINE

## 2017-04-20 DIAGNOSIS — I48.0 PAROXYSMAL A-FIB (HCC): ICD-10-CM

## 2017-04-20 DIAGNOSIS — I50.9 ACUTE ON CHRONIC CONGESTIVE HEART FAILURE, UNSPECIFIED CONGESTIVE HEART FAILURE TYPE: ICD-10-CM

## 2017-04-20 DIAGNOSIS — I10 ESSENTIAL HYPERTENSION: ICD-10-CM

## 2017-04-20 LAB
ANION GAP SERPL CALCULATED.3IONS-SCNC: 6.8 MMOL/L (ref 3.6–11.2)
BUN BLD-MCNC: 11 MG/DL (ref 7–21)
BUN/CREAT SERPL: 11.8 (ref 7–25)
CALCIUM SPEC-SCNC: 9.7 MG/DL (ref 7.7–10)
CHLORIDE SERPL-SCNC: 103 MMOL/L (ref 99–112)
CO2 SERPL-SCNC: 29.2 MMOL/L (ref 24.3–31.9)
CREAT BLD-MCNC: 0.93 MG/DL (ref 0.43–1.29)
GFR SERPL CREATININE-BSD FRML MDRD: 80 ML/MIN/1.73
GLUCOSE BLD-MCNC: 193 MG/DL (ref 70–110)
OSMOLALITY SERPL CALC.SUM OF ELEC: 282.2 MOSM/KG (ref 273–305)
POTASSIUM BLD-SCNC: 3.9 MMOL/L (ref 3.5–5.3)
SODIUM BLD-SCNC: 139 MMOL/L (ref 135–153)

## 2017-04-20 PROCEDURE — 36415 COLL VENOUS BLD VENIPUNCTURE: CPT

## 2017-04-20 PROCEDURE — 80048 BASIC METABOLIC PNL TOTAL CA: CPT

## 2017-04-20 PROCEDURE — 83880 ASSAY OF NATRIURETIC PEPTIDE: CPT

## 2017-04-21 LAB — NT-PROBNP SERPL-MCNC: 1317 PG/ML (ref 0–376)

## 2017-07-10 ENCOUNTER — OFFICE VISIT (OUTPATIENT)
Dept: CARDIOLOGY | Facility: CLINIC | Age: 72
End: 2017-07-10

## 2017-07-10 VITALS
BODY MASS INDEX: 38.08 KG/M2 | RESPIRATION RATE: 20 BRPM | OXYGEN SATURATION: 95 % | WEIGHT: 266 LBS | SYSTOLIC BLOOD PRESSURE: 105 MMHG | HEIGHT: 70 IN | DIASTOLIC BLOOD PRESSURE: 73 MMHG | HEART RATE: 76 BPM

## 2017-07-10 DIAGNOSIS — E66.01 MORBID OBESITY, UNSPECIFIED OBESITY TYPE (HCC): ICD-10-CM

## 2017-07-10 DIAGNOSIS — I10 ESSENTIAL HYPERTENSION: ICD-10-CM

## 2017-07-10 DIAGNOSIS — I42.8 NONISCHEMIC CARDIOMYOPATHY (HCC): ICD-10-CM

## 2017-07-10 DIAGNOSIS — I48.0 PAROXYSMAL A-FIB (HCC): Primary | ICD-10-CM

## 2017-07-10 DIAGNOSIS — I50.22 CHRONIC SYSTOLIC HEART FAILURE (HCC): ICD-10-CM

## 2017-07-10 PROCEDURE — 93000 ELECTROCARDIOGRAM COMPLETE: CPT | Performed by: INTERNAL MEDICINE

## 2017-07-10 PROCEDURE — 99214 OFFICE O/P EST MOD 30 MIN: CPT | Performed by: INTERNAL MEDICINE

## 2017-07-10 NOTE — PROGRESS NOTES
Buster Redd MD  Larry Kehr  1945  07/10/2017    Patient Active Problem List   Diagnosis   • Paroxysmal a-fib- on Xarelto for stroke, sinus rhythm.    • Hypertension- controlled.    • Tobacco use   • Morbid obesity   • Nonischemic cardiomyopathy   • Chronic systolic heart failure       Dear Buster Redd MD:    Subjective     Larry Kehr is a 71 y.o. male with the problems as listed above, presentsFor follow-up of right fibrillation and artery myopathy with systolic congestive heart failure.      History of Present Illness:Mr.Kehr is a pleasant 68-year-old  male with history of paroxysmal/chronic atrial fibrillation and previous history of systolic congestive heart failure, here for a cardiology follow-up.  He denies any complaints of tightness of breath, palpitations, dizziness or syncope.  He denies any bleeding problems from Xarelto.  Overall he's been doing well.  His breathing has gotten better over the last few months.        No Known Allergies:      Current Outpatient Prescriptions:   •  furosemide (LASIX) 40 MG tablet, Take 1 tablet by mouth Daily. (Patient taking differently: Take 20 mg by mouth Daily.), Disp: 30 tablet, Rfl: 4  •  metoprolol tartrate (LOPRESSOR) 25 MG tablet, Take 1 tablet by mouth 2 (Two) Times a Day., Disp: 60 tablet, Rfl: 5  •  rivaroxaban (XARELTO) 20 MG tablet, Take 20 mg by mouth Daily., Disp: , Rfl:   •  sacubitril-valsartan (ENTRESTO) 49-51 MG tablet, Take 1 tablet by mouth 2 (Two) Times a Day., Disp: , Rfl:   •  spironolactone (ALDACTONE) 25 MG tablet, Take 1 tablet by mouth Daily., Disp: 30 tablet, Rfl: 4      The following portions of the patient's history were reviewed and updated as appropriate: allergies, current medications, past family history, past medical history, past social history, past surgical history and problem list.    Social History   Substance Use Topics   • Smoking status: Current Every Day Smoker     Packs/day: 1.00     Years: 18.00   •  "Smokeless tobacco: Never Used   • Alcohol use No       Review of Systems   Constitution: Negative for chills and fever.   HENT: Negative for headaches, nosebleeds and sore throat.    Eyes:        Wears glasses   Cardiovascular: Negative for chest pain, dyspnea on exertion, irregular heartbeat, leg swelling, near-syncope, orthopnea, palpitations and syncope.   Respiratory: Negative for cough, hemoptysis and wheezing.    Gastrointestinal: Negative for abdominal pain, hematemesis, hematochezia, melena, nausea and vomiting.   Genitourinary: Negative for dysuria and hematuria.       Objective   Vitals:    07/10/17 1037   BP: 105/73   BP Location: Right arm   Patient Position: Sitting   Cuff Size: Adult   Pulse: 76   Resp: 20   SpO2: 95%   Weight: 266 lb (121 kg)   Height: 70\" (177.8 cm)     Body mass index is 38.17 kg/(m^2).        Physical Exam   Constitutional: He is oriented to person, place, and time. He appears well-developed and well-nourished.   HENT:   Head: Normocephalic.   Eyes: Conjunctivae and EOM are normal.   Neck: Normal range of motion. Neck supple. No JVD present. No tracheal deviation present. No thyromegaly present.   Cardiovascular: Normal rate and regular rhythm.  Exam reveals no gallop and no friction rub.    No murmur heard.  Pulmonary/Chest: Breath sounds normal. No respiratory distress. He has no wheezes. He has no rales.   Abdominal: Soft. Bowel sounds are normal. He exhibits no mass. There is no tenderness.   Musculoskeletal: He exhibits edema (1+ edema on both legs.).   Neurological: He is alert and oriented to person, place, and time. No cranial nerve deficit.   Skin: Skin is warm and dry.   Psychiatric: He has a normal mood and affect.       Lab Results   Component Value Date     04/20/2017    K 3.9 04/20/2017     04/20/2017    CO2 29.2 04/20/2017    BUN 11 04/20/2017    CREATININE 0.93 04/20/2017    GLUCOSE 193 (H) 04/20/2017    CALCIUM 9.7 04/20/2017    AST 25 01/05/2017    ALT " 25 01/05/2017    ALKPHOS 102 01/05/2017    LABIL2 1.3 (L) 01/05/2017     Lab Results   Component Value Date    CKTOTAL 50 01/05/2017     Lab Results   Component Value Date    WBC 11.92 01/06/2017    HGB 13.7 (L) 01/06/2017    HCT 43.3 01/06/2017     01/06/2017     Lab Results   Component Value Date    INR 1.24 (H) 12/10/2016     Lab Results   Component Value Date    MG 1.9 01/05/2017     Lab Results   Component Value Date    TSH 1.571 07/20/2014      Lab Results   Component Value Date    .0 (H) 01/05/2017     Echo   Lab Results   Component Value Date    ECHOEFEST 35 01/05/2017   · Left ventricular wall segments contract abnormally. Refer to wall scoring diagram for more information.  · Left ventricular function is moderately decreased. Estimated EF = 35%.  · Left atrial cavity size is mildly dilated.  · There are no hemodynamically significant valvular lesions          ECG 12 Lead  Performed by: HEMAL MICHAUD  Authorized by: HEMAL MICHAUD   Rhythm: atrial fibrillation  Comments: Nonspecific ST-T wave changes noted.            Assessment/Plan    Diagnosis Plan   1. Paroxysmal a-fib- on Xarelto for stroke, sinus rhythm.      2. Essential hypertension     3. Morbid obesity, unspecified obesity type     4. Chronic systolic heart failure     5. Nonischemic cardiomyopathy            Recommendations:    1. Continue with metoprolol and Entresto as well as spironolactone.    2. Continue with Xarelto  3. check a BMP and an echo Doppler study to reevaluate the LV systolic function.  4. Follow-up in 4-5 months.       Return in about 5 months (around 12/10/2017).    As always, I appreciate very much the opportunity to participate in the cardiovascular care of your patients.      With Best Regards,    Hemal Michaud MD, [facility]    Dragon disclaimer:  Much of this encounter note is an electronic transcription/translation of spoken language to printed text. The electronic translation of spoken language may permit  erroneous, or at times, nonsensical words or phrases to be inadvertently transcribed; Although I have reviewed the note for such errors, some may still exist.

## 2017-07-13 ENCOUNTER — HOSPITAL ENCOUNTER (OUTPATIENT)
Dept: CARDIOLOGY | Facility: HOSPITAL | Age: 72
Discharge: HOME | End: 2017-07-13
Attending: INTERNAL MEDICINE | Admitting: INTERNAL MEDICINE

## 2017-07-13 DIAGNOSIS — I42.8 NONISCHEMIC CARDIOMYOPATHY (HCC): ICD-10-CM

## 2017-07-13 DIAGNOSIS — I50.22 CHRONIC SYSTOLIC HEART FAILURE (HCC): ICD-10-CM

## 2017-07-13 PROCEDURE — 93306 TTE W/DOPPLER COMPLETE: CPT | Performed by: INTERNAL MEDICINE

## 2017-07-13 PROCEDURE — 93306 TTE W/DOPPLER COMPLETE: CPT

## 2017-07-18 LAB
BH CV ECHO MEAS - ACS: 1.5 CM
BH CV ECHO MEAS - AO MAX PG (FULL): 5.5 MMHG
BH CV ECHO MEAS - AO MAX PG: 8.4 MMHG
BH CV ECHO MEAS - AO MEAN PG (FULL): 2.8 MMHG
BH CV ECHO MEAS - AO MEAN PG: 4.3 MMHG
BH CV ECHO MEAS - AO ROOT AREA (BSA CORRECTED): 1.3
BH CV ECHO MEAS - AO ROOT AREA: 7.8 CM^2
BH CV ECHO MEAS - AO ROOT DIAM: 3.2 CM
BH CV ECHO MEAS - AO V2 MAX: 145 CM/SEC
BH CV ECHO MEAS - AO V2 MEAN: 98.4 CM/SEC
BH CV ECHO MEAS - AO V2 VTI: 25.7 CM
BH CV ECHO MEAS - ASC AORTA: 3.3 CM
BH CV ECHO MEAS - BSA(HAYCOCK): 2.5 M^2
BH CV ECHO MEAS - BSA: 2.4 M^2
BH CV ECHO MEAS - BZI_BMI: 38.2 KILOGRAMS/M^2
BH CV ECHO MEAS - BZI_METRIC_HEIGHT: 178 CM
BH CV ECHO MEAS - BZI_METRIC_WEIGHT: 121 KG
BH CV ECHO MEAS - EDV(CUBED): 73.1 ML
BH CV ECHO MEAS - EDV(MOD-SP4): 109 ML
BH CV ECHO MEAS - EDV(TEICH): 77.7 ML
BH CV ECHO MEAS - EF(CUBED): 15.5 %
BH CV ECHO MEAS - EF(TEICH): 12.5 %
BH CV ECHO MEAS - ESV(CUBED): 61.7 ML
BH CV ECHO MEAS - ESV(TEICH): 68 ML
BH CV ECHO MEAS - FS: 5.5 %
BH CV ECHO MEAS - IVS/LVPW: 1.1
BH CV ECHO MEAS - IVSD: 1.2 CM
BH CV ECHO MEAS - LA DIMENSION: 3.4 CM
BH CV ECHO MEAS - LA/AO: 1.1
BH CV ECHO MEAS - LV DIASTOLIC VOL/BSA (35-75): 46.2 ML/M^2
BH CV ECHO MEAS - LV MASS(C)D: 168.1 GRAMS
BH CV ECHO MEAS - LV MASS(C)DI: 71.2 GRAMS/M^2
BH CV ECHO MEAS - LV MAX PG: 2.9 MMHG
BH CV ECHO MEAS - LV MEAN PG: 1.5 MMHG
BH CV ECHO MEAS - LV V1 MAX: 85.2 CM/SEC
BH CV ECHO MEAS - LV V1 MEAN: 55.9 CM/SEC
BH CV ECHO MEAS - LV V1 VTI: 15.6 CM
BH CV ECHO MEAS - LVIDD: 4.2 CM
BH CV ECHO MEAS - LVIDS: 4 CM
BH CV ECHO MEAS - LVPWD: 1.1 CM
BH CV ECHO MEAS - MV DEC TIME: 0.26 SEC
BH CV ECHO MEAS - MV E MAX VEL: 93 CM/SEC
BH CV ECHO MEAS - MV MAX PG: 3.6 MMHG
BH CV ECHO MEAS - MV MEAN PG: 1.5 MMHG
BH CV ECHO MEAS - MV V2 MAX: 93.6 CM/SEC
BH CV ECHO MEAS - MV V2 MEAN: 54.6 CM/SEC
BH CV ECHO MEAS - MV V2 VTI: 17.2 CM
BH CV ECHO MEAS - PA MAX PG: 4.8 MMHG
BH CV ECHO MEAS - PA MEAN PG: 2.5 MMHG
BH CV ECHO MEAS - PA V2 MAX: 109 CM/SEC
BH CV ECHO MEAS - PA V2 MEAN: 75 CM/SEC
BH CV ECHO MEAS - PA V2 VTI: 24 CM
BH CV ECHO MEAS - PI END-D VEL: 128.2 CM/SEC
BH CV ECHO MEAS - RAP SYSTOLE: 10 MMHG
BH CV ECHO MEAS - RVDD: 3.2 CM
BH CV ECHO MEAS - RVSP: 26 MMHG
BH CV ECHO MEAS - SI(AO): 85.4 ML/M^2
BH CV ECHO MEAS - SI(CUBED): 4.8 ML/M^2
BH CV ECHO MEAS - SI(TEICH): 4.1 ML/M^2
BH CV ECHO MEAS - SV(AO): 201.5 ML
BH CV ECHO MEAS - SV(CUBED): 11.4 ML
BH CV ECHO MEAS - SV(TEICH): 9.7 ML
BH CV ECHO MEAS - TR MAX VEL: 200.2 CM/SEC

## 2017-08-04 ENCOUNTER — TELEPHONE (OUTPATIENT)
Dept: CARDIOLOGY | Facility: CLINIC | Age: 72
End: 2017-08-04

## 2017-08-04 RX ORDER — FUROSEMIDE 20 MG/1
20 TABLET ORAL DAILY
Qty: 30 TABLET | Refills: 4 | Status: SHIPPED | OUTPATIENT
Start: 2017-08-04 | End: 2017-08-07 | Stop reason: SDUPTHER

## 2017-08-04 NOTE — TELEPHONE ENCOUNTER
----- Message from Mirna Starr sent at 8/4/2017  9:40 AM EDT -----  Regarding: MEDICATION REFILL  Contact: [phone]  PATIENT IS OUT OF REFILLS    FUROSEMIDE 20MG

## 2017-08-07 RX ORDER — FUROSEMIDE 20 MG/1
20 TABLET ORAL DAILY
Qty: 30 TABLET | Refills: 4 | Status: SHIPPED | OUTPATIENT
Start: 2017-08-07 | End: 2017-11-30 | Stop reason: SDUPTHER

## 2017-08-22 NOTE — TELEPHONE ENCOUNTER
Pt called requesting RFs on Xarelto 20 mg daily.  Next follow up is 12/2017.  Per Dr. Mckinney's last office note on 7/10/17, pt to continue Xarelto.  Will send in RFs to last until next appt in 12/17.

## 2017-10-24 ENCOUNTER — TELEPHONE (OUTPATIENT)
Dept: CARDIOLOGY | Facility: CLINIC | Age: 72
End: 2017-10-24

## 2017-10-24 DIAGNOSIS — I50.9 ACUTE ON CHRONIC CONGESTIVE HEART FAILURE, UNSPECIFIED CONGESTIVE HEART FAILURE TYPE: ICD-10-CM

## 2017-10-24 RX ORDER — SPIRONOLACTONE 25 MG/1
25 TABLET ORAL DAILY
Qty: 30 TABLET | Refills: 3 | Status: SHIPPED | OUTPATIENT
Start: 2017-10-24 | End: 2018-02-19 | Stop reason: SDUPTHER

## 2017-10-24 NOTE — TELEPHONE ENCOUNTER
Pt called and said he was out of spronolactone 25mg. He no longer uses [facility] he uses the prescription shop in [place].

## 2017-10-24 NOTE — TELEPHONE ENCOUNTER
Per Dr. Mckinney's last office note, pt to continue spironolactone.  Will send refills into [facility].

## 2017-11-08 ENCOUNTER — TELEPHONE (OUTPATIENT)
Dept: CARDIOLOGY | Facility: CLINIC | Age: 72
End: 2017-11-08

## 2017-11-08 NOTE — TELEPHONE ENCOUNTER
Pt called and stated that the company he is going to be going through to get his Entresto ([employer] assistance program)  stated they are waiting on paper from us so they can get him his medicine. He gave there phone number [phone] and fax [phone].     I called the company and they stated the letter we received was a letter they sent to patient for him to call them. We received it so we could put it in his records. She did state the was needing clarification on the instructions for his Entresto. I advised her that I can take care of that over the phone. I spoke with Cari and advised them that he is to take Entresto 49-51 mg BID #60 and she stated they usually give there patients a year supply. I advised her that would be fine.     I called Arsenio and advised him that I got what we needed to do took care of and now they needed him to call them. He stated he would call them.

## 2017-11-30 RX ORDER — FUROSEMIDE 20 MG/1
20 TABLET ORAL DAILY
Qty: 30 TABLET | Refills: 2 | Status: SHIPPED | OUTPATIENT
Start: 2017-11-30 | End: 2018-02-28 | Stop reason: SDUPTHER

## 2017-12-21 ENCOUNTER — OFFICE VISIT (OUTPATIENT)
Dept: CARDIOLOGY | Facility: CLINIC | Age: 72
End: 2017-12-21

## 2017-12-21 VITALS
WEIGHT: 285 LBS | DIASTOLIC BLOOD PRESSURE: 69 MMHG | SYSTOLIC BLOOD PRESSURE: 91 MMHG | BODY MASS INDEX: 40.8 KG/M2 | HEART RATE: 79 BPM | HEIGHT: 70 IN

## 2017-12-21 DIAGNOSIS — I50.22 CHRONIC SYSTOLIC HEART FAILURE (HCC): ICD-10-CM

## 2017-12-21 DIAGNOSIS — I48.0 PAROXYSMAL A-FIB (HCC): ICD-10-CM

## 2017-12-21 DIAGNOSIS — Z72.0 TOBACCO USE: ICD-10-CM

## 2017-12-21 DIAGNOSIS — E66.01 MORBID OBESITY (HCC): ICD-10-CM

## 2017-12-21 DIAGNOSIS — I10 ESSENTIAL HYPERTENSION: Primary | ICD-10-CM

## 2017-12-21 PROCEDURE — 99214 OFFICE O/P EST MOD 30 MIN: CPT | Performed by: INTERNAL MEDICINE

## 2017-12-21 RX ORDER — CARVEDILOL 6.25 MG/1
6.25 TABLET ORAL 2 TIMES DAILY WITH MEALS
Qty: 60 TABLET | Refills: 3 | Status: SHIPPED | OUTPATIENT
Start: 2017-12-21 | End: 2018-03-29 | Stop reason: SDUPTHER

## 2017-12-21 NOTE — PROGRESS NOTES
Buster Redd MD  Larry Kehr  1945 12/21/2017    Patient Active Problem List   Diagnosis   • Paroxysmal a-fib- on Xarelto for stroke, sinus rhythm.    • Hypertension- controlled.    • Tobacco use, states that he has cut back to 3-4 cigarettes a day.   • Morbid obesity   • Nonischemic cardiomyopathy , appears to be compensated.   • Chronic systolic heart failure       Dear Buster Redd MD:    Subjective     Larry Kehr is a 72 y.o. male with the problems as listed above, presents      History of Present Illness:Mr.Kehr is a pleasant 42-year-old  male with history of nonischemic dilated cardiomyopathy with systolic heart failure and history of paroxysmal atrial fibrillation, is here for cardiology follow-up.  Says he's been breathing better the last few months.  Denies any bleeding problems with Xarelto.  His recent echo Doppler study in July 2017 revealed improvement in his LV systolic function with LV ejection fraction improved from 35% before to 45% now.      No Known Allergies:      Current Outpatient Prescriptions:   •  furosemide (LASIX) 20 MG tablet, Take 1 tablet by mouth Daily., Disp: 30 tablet, Rfl: 2  •  rivaroxaban (XARELTO) 20 MG tablet, Take 1 tablet by mouth Daily With Dinner., Disp: 30 tablet, Rfl: 5  •  sacubitril-valsartan (ENTRESTO) 49-51 MG tablet, Take 1 tablet by mouth 2 (Two) Times a Day., Disp: 60 tablet, Rfl: 0  •  spironolactone (ALDACTONE) 25 MG tablet, Take 1 tablet by mouth Daily., Disp: 30 tablet, Rfl: 3  •  carvedilol (COREG) 6.25 MG tablet, Take 1 tablet by mouth 2 (Two) Times a Day With Meals., Disp: 60 tablet, Rfl: 3      The following portions of the patient's history were reviewed and updated as appropriate: allergies, current medications, past family history, past medical history, past social history, past surgical history and problem list.    Social History   Substance Use Topics   • Smoking status: Current Every Day Smoker     Packs/day: 1.00     Years: 18.00  "  • Smokeless tobacco: Never Used   • Alcohol use No       Review of Systems   Constitution: Negative for chills and fever.   HENT: Negative for nosebleeds and sore throat.    Cardiovascular: Negative for chest pain, leg swelling and palpitations.   Respiratory: Negative for cough, hemoptysis, shortness of breath and wheezing.    Gastrointestinal: Negative for abdominal pain, hematemesis, hematochezia, melena, nausea and vomiting.   Genitourinary: Negative for dysuria and hematuria.   Neurological: Positive for dizziness (sometimes). Negative for headaches.       Objective   Vitals:    12/21/17 1306 12/21/17 1314   BP: 98/70 91/69   BP Location: Right arm Left arm   Patient Position: Sitting Sitting   Cuff Size: Adult Adult   Pulse: 74 79   Weight: 129 kg (285 lb)    Height: 177.8 cm (70\")      Body mass index is 40.89 kg/(m^2).    Vitals      12/21/17 7/10/17 4/10/17   BP 91/69 105/73 98/64   Heart Rate 79 76 94   Resp  20 16   SpO2  95 %    Weight 129 kg (285 lb) 121 kg (266 lb) 121 kg (266 lb)   Height 177.8 cm (70\") 177.8 cm (70\") 177.8 cm (70\")   BMI (Calculated) 40.9 38.2 38.2   BSA (Calculated - sq m) 2.43 sq meters 2.36 sq meters 2.36 sq meters         Physical Exam   Constitutional: He is oriented to person, place, and time. He appears well-developed and well-nourished.   HENT:   Head: Normocephalic.   Eyes: Conjunctivae and EOM are normal.   Neck: Normal range of motion. Neck supple. No JVD present. No tracheal deviation present. No thyromegaly present.   Cardiovascular: Normal rate and regular rhythm.  Exam reveals no gallop and no friction rub.    No murmur heard.  Pulmonary/Chest: Breath sounds normal. No respiratory distress. He has no wheezes. He has no rales.   Abdominal: Soft. Bowel sounds are normal. He exhibits no mass. There is no tenderness.   Musculoskeletal: He exhibits edema (  Mild edema on both legs.).   Neurological: He is alert and oriented to person, place, and time. No cranial nerve " deficit.   Skin: Skin is warm and dry.   Psychiatric: He has a normal mood and affect.       Lab Results   Component Value Date     2017    K 3.9 2017     2017    CO2 29.2 2017    BUN 11 2017    CREATININE 0.93 2017    GLUCOSE 193 (H) 2017    CALCIUM 9.7 2017    AST 25 2017    ALT 25 2017    ALKPHOS 102 2017    LABIL2 1.3 (L) 2017     Lab Results   Component Value Date    CKTOTAL 50 2017     Lab Results   Component Value Date    WBC 11.92 2017    HGB 13.7 (L) 2017    HCT 43.3 2017     2017     Lab Results   Component Value Date    INR 1.24 (H) 12/10/2016     Lab Results   Component Value Date    MG 1.9 2017     Lab Results   Component Value Date    TSH 1.571 2014      Lab Results   Component Value Date    .0 (H) 2017     Echo   Larry Kehr   Echocardiogram   Reading physician: Gamal Gutierrez MD Ordering physician: Buster Redd MD Study date: 17   Patient Information   Interpretation Summary   · Left ventricular wall segments contract abnormally. Refer to wall scoring diagram for more information.  · Left ventricular function is moderately decreased. Estimated EF = 35%.  · Left atrial cavity size is mildly dilated.  · There are no hemodynamically significant valvular lesions       Larry Kehr   Echocardiogram     Reading physician: Hemal Mckinney MD Ordering physician: Hemal Mckinney MD Study date: 17     Patient Name MRN Sex  (Age)   Arsenio Kehr [id number] Male 1945 (72 y.o.)   Interpretation Summary   · Normal left ventricular cavity size and wall thickness noted. Global left ventricular wall motion appears abnormal.  · Estimated EF appears to be in the range of 41 - 45%  · The aortic valve is structurally normal. No aortic valve regurgitation is present. No aortic valve stenosis is present.  · The mitral valve is grossly normal in structure. Mild  mitral valve regurgitation is present. No significant mitral valve stenosis is present.  · The tricuspid valve is normal. No tricuspid valve stenosis is present. No tricuspid valve regurgitation is present. Estimated right ventricular systolic pressure from tricuspid regurgitation is normal (<35 mmHg).  · There is no evidence of pericardial effusion.             Procedures    Assessment/Plan    Diagnosis Plan   1. Essential hypertension     2. Chronic systolic heart failure     3. Paroxysmal a-fib- on Xarelto for stroke, sinus rhythm.      4. Morbid obesity     5. Tobacco use, states that he has cut back to 3-4 cigarettes a day.            Recommendations:    1. Continue with an bina, spironolactone and furosemide at the current doses.  2. We'll change the metoprolol to carvedilol 6.25 mg by mouth twice a day for his cardiomyopathy.  3. I've instructed him to cut back on candy or any sugary stuff and to cut back on carbohydrates such as rice, bread, pasta and potatoes to help decrease weight.  4. Regular walking as tolerated.       Return in about 3 months (around 3/21/2018).    As always, I appreciate very much the opportunity to participate in the cardiovascular care of your patients.      With Best Regards,    Hemal Mckinney MD, FACC

## 2018-02-19 RX ORDER — SPIRONOLACTONE 25 MG/1
25 TABLET ORAL DAILY
Qty: 30 TABLET | Refills: 1 | Status: SHIPPED | OUTPATIENT
Start: 2018-02-19 | End: 2018-05-21 | Stop reason: SDUPTHER

## 2018-02-28 RX ORDER — FUROSEMIDE 20 MG/1
20 TABLET ORAL DAILY
Qty: 30 TABLET | Refills: 2 | Status: SHIPPED | OUTPATIENT
Start: 2018-02-28 | End: 2018-03-28 | Stop reason: SDUPTHER

## 2018-03-29 ENCOUNTER — OFFICE VISIT (OUTPATIENT)
Dept: CARDIOLOGY | Facility: CLINIC | Age: 73
End: 2018-03-29

## 2018-03-29 VITALS
WEIGHT: 290 LBS | RESPIRATION RATE: 16 BRPM | BODY MASS INDEX: 41.52 KG/M2 | HEART RATE: 82 BPM | SYSTOLIC BLOOD PRESSURE: 97 MMHG | HEIGHT: 70 IN | DIASTOLIC BLOOD PRESSURE: 66 MMHG

## 2018-03-29 DIAGNOSIS — R94.31 PROLONGED Q-T INTERVAL ON ECG: ICD-10-CM

## 2018-03-29 DIAGNOSIS — Z79.01 LONG TERM CURRENT USE OF ANTICOAGULANT: ICD-10-CM

## 2018-03-29 DIAGNOSIS — I10 ESSENTIAL HYPERTENSION: ICD-10-CM

## 2018-03-29 DIAGNOSIS — I50.22 CHRONIC SYSTOLIC HEART FAILURE (HCC): ICD-10-CM

## 2018-03-29 DIAGNOSIS — I48.19 PERSISTENT ATRIAL FIBRILLATION (HCC): ICD-10-CM

## 2018-03-29 DIAGNOSIS — I42.8 NONISCHEMIC CARDIOMYOPATHY (HCC): Primary | ICD-10-CM

## 2018-03-29 PROCEDURE — 93000 ELECTROCARDIOGRAM COMPLETE: CPT | Performed by: PHYSICIAN ASSISTANT

## 2018-03-29 PROCEDURE — 99406 BEHAV CHNG SMOKING 3-10 MIN: CPT | Performed by: PHYSICIAN ASSISTANT

## 2018-03-29 PROCEDURE — 99214 OFFICE O/P EST MOD 30 MIN: CPT | Performed by: PHYSICIAN ASSISTANT

## 2018-03-29 RX ORDER — CARVEDILOL 6.25 MG/1
6.25 TABLET ORAL 2 TIMES DAILY WITH MEALS
Qty: 60 TABLET | Refills: 5 | Status: SHIPPED | OUTPATIENT
Start: 2018-03-29 | End: 2018-10-24 | Stop reason: SDUPTHER

## 2018-03-29 RX ORDER — FUROSEMIDE 20 MG/1
20 TABLET ORAL DAILY
Qty: 30 TABLET | Refills: 5 | Status: SHIPPED | OUTPATIENT
Start: 2018-03-29 | End: 2018-12-10 | Stop reason: SDUPTHER

## 2018-03-29 NOTE — PROGRESS NOTES
Buster Redd MD  Larry Kehr  1945 03/29/2018    Patient Active Problem List   Diagnosis   • Paroxysmal a-fib- on Xarelto for stroke, sinus rhythm.    • Hypertension- controlled.    • Tobacco use, states that he has cut back to 3-4 cigarettes a day.   • Morbid obesity   • Nonischemic cardiomyopathy , appears to be compensated.   • Chronic systolic heart failure     Dear Buster Redd MD:    Chief Complaint   Patient presents with   • Follow-up     3 mos   • Other     meds, presented   • Atrial Fibrillation   • Cardiomyopathy     Subjective     Larry Kehr is a 72 y.o. male with a past medical history significant for nonischemic/dilated cardiomyopathy with chronic systolic heart failure, paroxysmal atrial fibrillation with chronic anticoagulation with Xarelto, and hypertension.  He presents to the office today for follow-up visit. He has been doing well without any complaints of shortness of breath, chest pains, or pedal edema. He denies any bleeding issues with his Xarelto.  He feels like his heart rate stays pretty well-controlled on current doses of medications. His blood pressure generally runs low, but he has no dizziness or excessive fatigue.  He continues to smoke cigarettes, but states he is working on cutting back.      Current Outpatient Prescriptions:   •  carvedilol (COREG) 6.25 MG tablet, Take 1 tablet by mouth 2 (Two) Times a Day With Meals., Disp: 60 tablet, Rfl: 5  •  furosemide (LASIX) 20 MG tablet, Take 1 tablet by mouth Daily., Disp: 30 tablet, Rfl: 5  •  rivaroxaban (XARELTO) 20 MG tablet, Take 1 tablet by mouth Daily With Dinner., Disp: 30 tablet, Rfl: 5  •  sacubitril-valsartan (ENTRESTO) 49-51 MG tablet, Take 1 tablet by mouth 2 (Two) Times a Day., Disp: 60 tablet, Rfl: 0  •  spironolactone (ALDACTONE) 25 MG tablet, Take 1 tablet by mouth Daily., Disp: 30 tablet, Rfl: 1    The following portions of the patient's history were reviewed and updated as appropriate: allergies, current  "medications, past family history, past medical history, past social history, past surgical history and problem list.    Social History     Social History   • Marital status:      Spouse name: N/A   • Number of children: N/A   • Years of education: N/A     Occupational History   • Not on file.     Social History Main Topics   • Smoking status: Current Every Day Smoker     Packs/day: 1.00     Years: 18.00     Types: Cigarettes   • Smokeless tobacco: Never Used   • Alcohol use No   • Drug use: No   • Sexual activity: Defer     Other Topics Concern   • Not on file     Social History Narrative   • No narrative on file     Review of Systems   Constitution: Negative for weakness and malaise/fatigue.   Cardiovascular: Negative for chest pain, dyspnea on exertion, leg swelling, near-syncope and syncope.   Hematologic/Lymphatic: Negative for bleeding problem. Bruises/bleeds easily.   Neurological: Negative for dizziness.     Objective   Blood pressure 97/66, pulse 82, resp. rate 16, height 177.8 cm (70\"), weight 132 kg (290 lb).  Body mass index is 41.61 kg/m².    Physical Exam   Constitutional: He is oriented to person, place, and time. He appears well-developed and well-nourished. No distress.   HENT:   Head: Normocephalic and atraumatic.   Eyes: Conjunctivae are normal. Right eye exhibits no discharge. Left eye exhibits no discharge.   Neck: Normal range of motion. Neck supple. Carotid bruit is not present.   Cardiovascular: Normal rate and normal heart sounds.  Exam reveals no gallop and no friction rub.    No murmur heard.  Irregularly irregular.    Pulmonary/Chest: Effort normal and breath sounds normal. No respiratory distress. He has no wheezes. He has no rales. He exhibits no tenderness.   Musculoskeletal: Normal range of motion. He exhibits no edema.   Neurological: He is alert and oriented to person, place, and time.   Skin: Skin is warm and dry. No rash noted. He is not diaphoretic. No erythema. No pallor. "   Psychiatric: He has a normal mood and affect. His behavior is normal.   Nursing note and vitals reviewed.      ECG 12 Lead  Date/Time: 3/29/2018 2:23 PM  Performed by: MIKO FRAIRE  Authorized by: MIKO FRAIRE   Comparison: compared with previous ECG   Similar to previous ECG  Rhythm: atrial fibrillation  Rate: normal  BPM: 100  T flattening: I and aVL  QRS axis: normal  Other findings: prolonged QTc interval  Clinical impression: non-specific ECG  Comments: Atrial fibrillation at a rate of 100 bpm with chronic nonspecific ST/T-wave changes with no significant change compared to previous EKG other than a prolonged QTc at 473.          Transthoracic Echocardiogram 07/13/17  · Normal left ventricular cavity size and wall thickness noted. Global left ventricular wall motion appears abnormal.  · Estimated EF appears to be in the range of 41 - 45%  · The aortic valve is structurally normal. No aortic valve regurgitation is present. No aortic valve stenosis is present.  · The mitral valve is grossly normal in structure. Mild mitral valve regurgitation is present. No significant mitral valve stenosis is present.  · The tricuspid valve is normal. No tricuspid valve stenosis is present. No tricuspid valve regurgitation is present. Estimated right ventricular systolic pressure from tricuspid regurgitation is normal (<35 mmHg).  · There is no evidence of pericardial effusion.  Assessment:        Diagnosis Plan   1. Nonischemic cardiomyopathy  Basic Metabolic Panel    Magnesium    ECG 12 Lead    With ejection fraction improved at 41-45% in July 2017.   2. Chronic systolic heart failure  Basic Metabolic Panel    Magnesium    ECG 12 Lead   3. Essential hypertension  Basic Metabolic Panel    Magnesium    ECG 12 Lead   4. Persistent atrial fibrillation      Possibly permanent.    5. Long term current use of anticoagulant     6. Prolonged Q-T interval on ECG      QTc 473ms.        Plan:       1. Discussed the  patient's BMI with him. BMI is above normal parameters. Follow-up plan includes:  educational material.  I advised the patient of the risks in continuing to use tobacco, and I provided this patient with smoking cessation educational materials.During this visit, I spent > 3-10 minutes counseling the patient regarding smoking cessation.  We'll check a basic metabolic panel and magnesium level secondary to prolonged QTc.  Avoid QT prolonging drugs.  I have instructed him that if he becomes dizzy with his low blood pressure, he can take the furosemide every other day.   Avoid sodium diet.  Follow-up in 3-4 months or sooner if needed.  At that time, we'll consider repeating his transthoracic echocardiogram.      No Follow-up on file.    I appreciate the opportunity to participate in this patient's cardiovascular care.    Best Regards,    Chikis Dickinson PA-C

## 2018-03-29 NOTE — PATIENT INSTRUCTIONS
BMI for Adults  Body mass index (BMI) is a number that is calculated from a person's weight and height. In most adults, the number is used to find how much of an adult's weight is made up of fat. BMI is not as accurate as a direct measure of body fat.  HOW IS BMI CALCULATED?  BMI is calculated by dividing weight in kilograms by height in meters squared. It can also be calculated by dividing weight in pounds by height in inches squared, then multiplying the resulting number by 703. Charts are available to help you find your BMI quickly and easily without doing this calculation.   HOW IS BMI INTERPRETED?  Health care professionals use BMI charts to identify whether an adult is underweight, at a normal weight, or overweight based on the following guidelines:  · Underweight: BMI less than 18.5.  · Normal weight: BMI between 18.5 and 24.9.  · Overweight: BMI between 25 and 29.9.  · Obese: BMI of 30 and above.  BMI is usually interpreted the same for males and females.  Weight includes both fat and muscle, so someone with a muscular build, such as an athlete, may have a BMI that is higher than 24.9. In cases like these, BMI may not accurately depict body fat. To determine if excess body fat is the cause of a BMI of 25 or higher, further assessments may need to be done by a health care provider.  WHY IS BMI A USEFUL TOOL?  BMI is used to identify a possible weight problem that may be related to a medical problem or may increase the risk for medical problems. BMI can also be used to promote changes to reach a healthy weight.     This information is not intended to replace advice given to you by your health care provider. Make sure you discuss any questions you have with your health care provider.     Document Released: 08/29/2005 Document Revised: 01/08/2016 Document Reviewed: 05/15/2015  [employer] Interactive Patient Education ©2017 [employer]       Calorie Counting for Weight Loss  Calories are energy you get from the  things you eat and drink. Your body uses this energy to keep you going throughout the day. The number of calories you eat affects your weight. When you eat more calories than your body needs, your body stores the extra calories as fat. When you eat fewer calories than your body needs, your body burns fat to get the energy it needs.  Calorie counting means keeping track of how many calories you eat and drink each day. If you make sure to eat fewer calories than your body needs, you should lose weight. In order for calorie counting to work, you will need to eat the number of calories that are right for you in a day to lose a healthy amount of weight per week. A healthy amount of weight to lose per week is usually 1-2 lb (0.5-0.9 kg). A dietitian can determine how many calories you need in a day and give you suggestions on how to reach your calorie goal.   WHAT IS MY MY PLAN?  My goal is to have __________ calories per day.   If I have this many calories per day, I should lose around __________ pounds per week.  WHAT DO I NEED TO KNOW ABOUT CALORIE COUNTING?  In order to meet your daily calorie goal, you will need to:  · Find out how many calories are in each food you would like to eat. Try to do this before you eat.  · Decide how much of the food you can eat.  · Write down what you ate and how many calories it had. Doing this is called keeping a food log.  WHERE DO I FIND CALORIE INFORMATION?  The number of calories in a food can be found on a Nutrition Facts label. Note that all the information on a label is based on a specific serving of the food. If a food does not have a Nutrition Facts label, try to look up the calories online or ask your dietitian for help.  HOW DO I DECIDE HOW MUCH TO EAT?  To decide how much of the food you can eat, you will need to consider both the number of calories in one serving and the size of one serving. This information can be found on the Nutrition Facts label. If a food does not  have a Nutrition Facts label, look up the information online or ask your dietitian for help.  Remember that calories are listed per serving. If you choose to have more than one serving of a food, you will have to multiply the calories per serving by the amount of servings you plan to eat. For example, the label on a package of bread might say that a serving size is 1 slice and that there are 90 calories in a serving. If you eat 1 slice, you will have eaten 90 calories. If you eat 2 slices, you will have eaten 180 calories.  HOW DO I KEEP A FOOD LOG?  After each meal, record the following information in your food log:  · What you ate.  · How much of it you ate.  · How many calories it had.  · Then, add up your calories.  Keep your food log near you, such as in a small notebook in your pocket. Another option is to use a mobile pastora or website. Some programs will calculate calories for you and show you how many calories you have left each time you add an item to the log.  WHAT ARE SOME CALORIE COUNTING TIPS?  · Use your calories on foods and drinks that will fill you up and not leave you hungry. Some examples of this include foods like nuts and nut butters, vegetables, lean proteins, and high-fiber foods (more than 5 g fiber per serving).  · Eat nutritious foods and avoid empty calories. Empty calories are calories you get from foods or beverages that do not have many nutrients, such as candy and soda. It is better to have a nutritious high-calorie food (such as an avocado) than a food with few nutrients (such as a bag of chips).  · Know how many calories are in the foods you eat most often. This way, you do not have to look up how many calories they have each time you eat them.  · Look out for foods that may seem like low-calorie foods but are really high-calorie foods, such as baked goods, soda, and fat-free candy.  · Pay attention to calories in drinks. Drinks such as sodas, specialty coffee drinks, alcohol, and  juices have a lot of calories yet do not fill you up. Choose low-calorie drinks like water and diet drinks.  · Focus your calorie counting efforts on higher calorie items. Logging the calories in a garden salad that contains only vegetables is less important than calculating the calories in a milk shake.  · Find a way of tracking calories that works for you. Get creative. Most people who are successful find ways to keep track of how much they eat in a day, even if they do not count every calorie.  WHAT ARE SOME PORTION CONTROL TIPS?  · Know how many calories are in a serving. This will help you know how many servings of a certain food you can have.  · Use a measuring cup to measure serving sizes. This is helpful when you start out. With time, you will be able to estimate serving sizes for some foods.  · Take some time to put servings of different foods on your favorite plates, bowls, and cups so you know what a serving looks like.  · Try not to eat straight from a bag or box. Doing this can lead to overeating. Put the amount you would like to eat in a cup or on a plate to make sure you are eating the right portion.  · Use smaller plates, glasses, and bowls to prevent overeating. This is a quick and easy way to practice portion control. If your plate is smaller, less food can fit on it.  · Try not to multitask while eating, such as watching TV or using your computer. If it is time to eat, sit down at a table and enjoy your food. Doing this will help you to start recognizing when you are full. It will also make you more aware of what and how much you are eating.  HOW CAN I CALORIE COUNT WHEN EATING OUT?  · Ask for smaller portion sizes or child-sized portions.  · Consider sharing an entree and sides instead of getting your own entree.  · If you get your own entree, eat only half. Ask for a box at the beginning of your meal and put the rest of your entree in it so you are not tempted to eat it.  · Look for the calories  "on the menu. If calories are listed, choose the lower calorie options.  · Choose dishes that include vegetables, fruits, whole grains, low-fat dairy products, and lean protein. Focusing on smart food choices from each of the 5 food groups can help you stay on track at restaurants.  · Choose items that are boiled, broiled, grilled, or steamed.  · Choose water, milk, unsweetened iced tea, or other drinks without added sugars. If you want an alcoholic beverage, choose a lower calorie option. For example, a regular re can have up to 700 calories and a glass of wine has around 150.  · Stay away from items that are buttered, battered, fried, or served with cream sauce. Items labeled \"crispy\" are usually fried, unless stated otherwise.  · Ask for dressings, sauces, and syrups on the side. These are usually very high in calories, so do not eat much of them.  · Watch out for salads. Many people think salads are a healthy option, but this is often not the case. Many salads come with huang, fried chicken, lots of cheese, fried chips, and dressing. All of these items have a lot of calories. If you want a salad, choose a garden salad and ask for grilled meats or steak. Ask for the dressing on the side, or ask for olive oil and vinegar or lemon to use as dressing.  · Estimate how many servings of a food you are given. For example, a serving of cooked rice is ½ cup or about the size of half a tennis ball or one cupcake wrapper. Knowing serving sizes will help you be aware of how much food you are eating at restaurants. The list below tells you how big or small some common portion sizes are based on everyday objects.  ¨ 1 oz--4 stacked dice.  ¨ 3 oz--1 deck of cards.  ¨ 1 tsp--1 dice.  ¨ 1 Tbsp--½ a Ping-Pong ball.  ¨ 2 Tbsp--1 Ping-Pong ball.  ¨ ½ cup--1 tennis ball or 1 cupcake wrapper.  ¨ 1 cup--1 baseball.     This information is not intended to replace advice given to you by your health care provider. Make sure you " discuss any questions you have with your health care provider.     Document Released: 2006 Document Revised: 2016 Document Reviewed: 10/23/2014  [employer] Interactive Patient Education © [employer]       Smoking Hazards    Smoking cigarettes is extremely bad for your health. Tobacco smoke has over 200 known poisons in it. It contains the poisonous gases nitrogen oxide and carbon monoxide. There are over 60 chemicals in tobacco smoke that cause cancer. Some of the chemicals found in cigarette smoke include:   · Cyanide.    · Benzene.    · Formaldehyde.    · Methanol (wood alcohol).    · Acetylene (fuel used in welding torches).    · Ammonia.    Even smoking lightly shortens your life expectancy by several years. You can greatly reduce the risk of medical problems for you and your family by stopping now. Smoking is the most preventable cause of death and disease in our society. Within days of quitting smoking, your circulation improves, you decrease the risk of having a heart attack, and your lung capacity improves. There may be some increased phlegm in the first few days after quitting, and it may take months for your lungs to clear up completely. Quitting for 10 years reduces your risk of developing lung cancer to almost that of a nonsmoker.   WHAT ARE THE RISKS OF SMOKING?  Cigarette smokers have an increased risk of many serious medical problems, including:  · Lung cancer.    · Lung disease (such as pneumonia, bronchitis, and emphysema).    · Heart attack and chest pain due to the heart not getting enough oxygen (angina).    · Heart disease and peripheral blood vessel disease.    · Hypertension.    · Stroke.    · Oral cancer (cancer of the lip, mouth, or voice box).    · Bladder cancer.    · Pancreatic cancer.    · Cervical cancer.    · Pregnancy complications, including premature birth.    · Stillbirths and smaller  babies, birth defects, and genetic damage to sperm.    · Early menopause.     · Lower estrogen level for women.    · Infertility.    · Facial wrinkles.    · Blindness.    · Increased risk of broken bones (fractures).    · Senile dementia.    · Stomach ulcers and internal bleeding.    · Delayed wound healing and increased risk of complications during surgery.  Because of secondhand smoke exposure, children of smokers have an increased risk of the following:   · Sudden infant death syndrome (SIDS).    · Respiratory infections.    · Lung cancer.    · Heart disease.    · Ear infections.    WHY IS SMOKING ADDICTIVE?  Nicotine is the chemical agent in tobacco that is capable of causing addiction or dependence. When you smoke and inhale, nicotine is absorbed rapidly into the bloodstream through your lungs. Both inhaled and noninhaled nicotine may be addictive.   WHAT ARE THE BENEFITS OF QUITTING?   There are many health benefits to quitting smoking. Some are:   · The likelihood of developing cancer and heart disease decreases. Health improvements are seen almost immediately.    · Blood pressure, pulse rate, and breathing patterns start returning to normal soon after quitting.    · People who quit may see an improvement in their overall quality of life.    HOW DO YOU QUIT SMOKING?  Smoking is an addiction with both physical and psychological effects, and longtime habits can be hard to change. Your health care provider can recommend:  · Programs and community resources, which may include group support, education, or therapy.  · Replacement products, such as patches, gum, and nasal sprays. Use these products only as directed. Do not replace cigarette smoking with electronic cigarettes (commonly called e-cigarettes). The safety of e-cigarettes is unknown, and some may contain harmful chemicals.  FOR MORE INFORMATION  · American Lung Association: www.lung.org  · American Cancer Society: www.cancer.org     This information is not intended to replace advice given to you by your health care provider.  Make sure you discuss any questions you have with your health care provider.     Document Released: 01/25/2006 Document Revised: 04/10/2017 Document Reviewed: 06/09/2014  [employer] Interactive Patient Education ©2017 [employer]           Steps to Quit Smoking     Smoking tobacco can be harmful to your health and can affect almost every organ in your body. Smoking puts you, and those around you, at risk for developing many serious chronic diseases. Quitting smoking is difficult, but it is one of the best things that you can do for your health. It is never too late to quit.  WHAT ARE THE BENEFITS OF QUITTING SMOKING?  When you quit smoking, you lower your risk of developing serious diseases and conditions, such as:  · Lung cancer or lung disease, such as COPD.  · Heart disease.  · Stroke.  · Heart attack.  · Infertility.  · Osteoporosis and bone fractures.  Additionally, symptoms such as coughing, wheezing, and shortness of breath may get better when you quit. You may also find that you get sick less often because your body is stronger at fighting off colds and infections. If you are pregnant, quitting smoking can help to reduce your chances of having a baby of low birth weight.  HOW DO I GET READY TO QUIT?  When you decide to quit smoking, create a plan to make sure that you are successful. Before you quit:  · Pick a date to quit. Set a date within the next two weeks to give you time to prepare.  · Write down the reasons why you are quitting. Keep this list in places where you will see it often, such as on your bathroom mirror or in your car or wallet.  · Identify the people, places, things, and activities that make you want to smoke (triggers) and avoid them. Make sure to take these actions:  ¨ Throw away all cigarettes at home, at work, and in your car.  ¨ Throw away smoking accessories, such as ashtrays and lighters.  ¨ Clean your car and make sure to empty the ashtray.  ¨ Clean your home, including curtains and  "carpets.  · Tell your family, friends, and coworkers that you are quitting. Support from your loved ones can make quitting easier.  · Talk with your health care provider about your options for quitting smoking.  · Find out what treatment options are covered by your health insurance.  WHAT STRATEGIES CAN I USE TO QUIT SMOKING?   Talk with your healthcare provider about different strategies to quit smoking. Some strategies include:  · Quitting smoking altogether instead of gradually lessening how much you smoke over a period of time. Research shows that quitting \"cold turkey\" is more successful than gradually quitting.  · Attending in-person counseling to help you build problem-solving skills. You are more likely to have success in quitting if you attend several counseling sessions. Even short sessions of 10 minutes can be effective.  · Finding resources and support systems that can help you to quit smoking and remain smoke-free after you quit. These resources are most helpful when you use them often. They can include:  ¨ Online chats with a counselor.  ¨ Telephone quitlines.  ¨ Printed self-help materials.  ¨ Support groups or group counseling.  ¨ Text messaging programs.  ¨ Mobile phone applications.  · Taking medicines to help you quit smoking. (If you are pregnant or breastfeeding, talk with your health care provider first.) Some medicines contain nicotine and some do not. Both types of medicines help with cravings, but the medicines that include nicotine help to relieve withdrawal symptoms. Your health care provider may recommend:  ¨ Nicotine patches, gum, or lozenges.  ¨ Nicotine inhalers or sprays.  ¨ Non-nicotine medicine that is taken by mouth.  Talk with your health care provider about combining strategies, such as taking medicines while you are also receiving in-person counseling. Using these two strategies together makes you more likely to succeed in quitting than if you used either strategy on its " own.  If you are pregnant or breastfeeding, talk with your health care provider about finding counseling or other support strategies to quit smoking. Do not take medicine to help you quit smoking unless told to do so by your health care provider.  WHAT THINGS CAN I DO TO MAKE IT EASIER TO QUIT?  Quitting smoking might feel overwhelming at first, but there is a lot that you can do to make it easier. Take these important actions:  · Reach out to your family and friends and ask that they support and encourage you during this time. Call telephone quitlines, reach out to support groups, or work with a counselor for support.  · Ask people who smoke to avoid smoking around you.  · Avoid places that trigger you to smoke, such as bars, parties, or smoke-break areas at work.  · Spend time around people who do not smoke.  · Lessen stress in your life, because stress can be a smoking trigger for some people. To lessen stress, try:  ¨ Exercising regularly.  ¨ Deep-breathing exercises.  ¨ Yoga.  ¨ Meditating.  ¨ Performing a body scan. This involves closing your eyes, scanning your body from head to toe, and noticing which parts of your body are particularly tense. Purposefully relax the muscles in those areas.  · Download or purchase mobile phone or tablet apps (applications) that can help you stick to your quit plan by providing reminders, tips, and encouragement. There are many free apps, such as QuitGuide from the CDC (Centers for Disease Control and Prevention). You can find other support for quitting smoking (smoking cessation) through smokefree.gov and other websites.  HOW WILL I FEEL WHEN I QUIT SMOKING?  Within the first 24 hours of quitting smoking, you may start to feel some withdrawal symptoms. These symptoms are usually most noticeable 2-3 days after quitting, but they usually do not last beyond 2-3 weeks. Changes or symptoms that you might experience include:  · Mood swings.  · Restlessness, anxiety, or  irritation.  · Difficulty concentrating.  · Dizziness.  · Strong cravings for sugary foods in addition to nicotine.  · Mild weight gain.  · Constipation.  · Nausea.  · Coughing or a sore throat.  · Changes in how your medicines work in your body.  · A depressed mood.  · Difficulty sleeping (insomnia).  After the first 2-3 weeks of quitting, you may start to notice more positive results, such as:  · Improved sense of smell and taste.  · Decreased coughing and sore throat.  · Slower heart rate.  · Lower blood pressure.  · Clearer skin.  · The ability to breathe more easily.  · Fewer sick days.  Quitting smoking is very challenging for most people. Do not get discouraged if you are not successful the first time. Some people need to make many attempts to quit before they achieve long-term success. Do your best to stick to your quit plan, and talk with your health care provider if you have any questions or concerns.     This information is not intended to replace advice given to you by your health care provider. Make sure you discuss any questions you have with your health care provider.     Document Released: 12/12/2002 Document Revised: 05/03/2016 Document Reviewed: 05/03/2016  [employer] Interactive Patient Education ©2017 [employer]

## 2018-03-29 NOTE — PROGRESS NOTES
"Buster Redd MD  Larry Kehr  1945 03/29/2018    Patient Active Problem List   Diagnosis   • Paroxysmal a-fib- on Xarelto for stroke, sinus rhythm.    • Hypertension- controlled.    • Tobacco use, states that he has cut back to 3-4 cigarettes a day.   • Morbid obesity   • Nonischemic cardiomyopathy , appears to be compensated.   • Chronic systolic heart failure       Dear Buster Redd MD:    Subjective     Larry Kehr is a 72 y.o. male with the problems as listed above, presents      History of Present Illness:        No Known Allergies:      Current Outpatient Prescriptions:   •  carvedilol (COREG) 6.25 MG tablet, Take 1 tablet by mouth 2 (Two) Times a Day With Meals., Disp: 60 tablet, Rfl: 3  •  furosemide (LASIX) 20 MG tablet, TAKE 1 TABLET BY MOUTH DAILY., Disp: 30 tablet, Rfl: 0  •  rivaroxaban (XARELTO) 20 MG tablet, Take 1 tablet by mouth Daily With Dinner., Disp: 30 tablet, Rfl: 5  •  sacubitril-valsartan (ENTRESTO) 49-51 MG tablet, Take 1 tablet by mouth 2 (Two) Times a Day., Disp: 60 tablet, Rfl: 0  •  spironolactone (ALDACTONE) 25 MG tablet, Take 1 tablet by mouth Daily., Disp: 30 tablet, Rfl: 1      The following portions of the patient's history were reviewed and updated as appropriate: allergies, current medications, past family history, past medical history, past social history, past surgical history and problem list.    Social History   Substance Use Topics   • Smoking status: Current Every Day Smoker     Packs/day: 1.00     Years: 18.00     Types: Cigarettes   • Smokeless tobacco: Never Used   • Alcohol use No       Review of Systems   Cardiovascular: Negative for chest pain, leg swelling and palpitations.   Respiratory: Negative for shortness of breath.        Objective   Vitals:    03/29/18 1347   Resp: 16   Weight: 132 kg (290 lb)   Height: 177.8 cm (70\")     Body mass index is 41.61 kg/m².        Physical Exam    Lab Results   Component Value Date     04/20/2017    K 3.9 " 04/20/2017     04/20/2017    CO2 29.2 04/20/2017    BUN 11 04/20/2017    CREATININE 0.93 04/20/2017    GLUCOSE 193 (H) 04/20/2017    CALCIUM 9.7 04/20/2017    AST 25 01/05/2017    ALT 25 01/05/2017    ALKPHOS 102 01/05/2017    LABIL2 1.3 (L) 01/05/2017     Lab Results   Component Value Date    CKTOTAL 50 01/05/2017     Lab Results   Component Value Date    WBC 11.92 01/06/2017    HGB 13.7 (L) 01/06/2017    HCT 43.3 01/06/2017     01/06/2017     Lab Results   Component Value Date    INR 1.24 (H) 12/10/2016     Lab Results   Component Value Date    MG 1.9 01/05/2017     Lab Results   Component Value Date    TSH 1.571 07/20/2014      Lab Results   Component Value Date    .0 (H) 01/05/2017     Echo   Lab Results   Component Value Date    ECHOEFEST 35 01/05/2017       ECG 12 Lead  Date/Time: 3/29/2018 1:50 PM  Performed by: HEMAL MICHAUD  Authorized by: HEMAL MICHAUD               Assessment/Plan   No diagnosis found.             Recommendations:       No Follow-up on file.    As always, I appreciate very much the opportunity to participate in the cardiovascular care of your patients.      With Best Regards,    Hemal Michaud MD, [facility]    Dragon disclaimer:  Much of this encounter note is an electronic transcription/translation of spoken language to printed text. The electronic translation of spoken language may permit erroneous, or at times, nonsensical words or phrases to be inadvertently transcribed; Although I have reviewed the note for such errors, some may still exist.

## 2018-05-07 ENCOUNTER — TELEPHONE (OUTPATIENT)
Dept: CARDIOLOGY | Facility: CLINIC | Age: 73
End: 2018-05-07

## 2018-05-21 RX ORDER — SPIRONOLACTONE 25 MG/1
TABLET ORAL
Qty: 30 TABLET | Refills: 0 | Status: SHIPPED | OUTPATIENT
Start: 2018-05-21 | End: 2018-07-19 | Stop reason: SDUPTHER

## 2018-06-07 ENCOUNTER — TELEPHONE (OUTPATIENT)
Dept: CARDIOLOGY | Facility: CLINIC | Age: 73
End: 2018-06-07

## 2018-06-07 NOTE — TELEPHONE ENCOUNTER
Pt called and states he has 2 refills left of his Xarelto.  He is not sure if there is paperwork that needs filled out for the pt assistance program again (yet) or not.  He is asking for Denice to call him regarding when the paperwork will need to be filled out again.

## 2018-06-08 NOTE — TELEPHONE ENCOUNTER
Spoke with patient advised him that when he is down to his last refill he will need to contact our office and we will send him in some more refills as we always do.   Patient expressed verbal understanding and was agreeable.

## 2018-06-28 ENCOUNTER — OFFICE VISIT (OUTPATIENT)
Dept: CARDIOLOGY | Facility: CLINIC | Age: 73
End: 2018-06-28

## 2018-06-28 VITALS
SYSTOLIC BLOOD PRESSURE: 90 MMHG | RESPIRATION RATE: 16 BRPM | HEART RATE: 83 BPM | HEIGHT: 70 IN | DIASTOLIC BLOOD PRESSURE: 66 MMHG | WEIGHT: 285 LBS | BODY MASS INDEX: 40.8 KG/M2

## 2018-06-28 DIAGNOSIS — E66.01 MORBID OBESITY (HCC): ICD-10-CM

## 2018-06-28 DIAGNOSIS — Z72.0 TOBACCO USE: ICD-10-CM

## 2018-06-28 DIAGNOSIS — I42.8 NONISCHEMIC CARDIOMYOPATHY (HCC): ICD-10-CM

## 2018-06-28 DIAGNOSIS — I50.22 CHRONIC SYSTOLIC HEART FAILURE (HCC): ICD-10-CM

## 2018-06-28 DIAGNOSIS — I48.0 PAROXYSMAL A-FIB (HCC): Primary | ICD-10-CM

## 2018-06-28 DIAGNOSIS — I10 ESSENTIAL HYPERTENSION: ICD-10-CM

## 2018-06-28 PROCEDURE — 99214 OFFICE O/P EST MOD 30 MIN: CPT | Performed by: NURSE PRACTITIONER

## 2018-06-28 NOTE — PROGRESS NOTES
Buster Redd MD  Larry Kehr  1945 06/28/2018    Patient Active Problem List   Diagnosis   • Paroxysmal a-fib- on Xarelto for stroke, sinus rhythm.    • Hypertension- controlled.    • Tobacco use, states that he has cut back to 3-4 cigarettes a day.   • Morbid obesity   • Nonischemic cardiomyopathy , appears to be compensated.   • Chronic systolic heart failure       Dear Buster Redd MD:    Subjective     Chief Complaint   Patient presents with   • Follow-up     3 mos           History of Present Illness:    Larry Kehr is a 72 y.o. male presents today For routine cardiology follow-up.  He has history of non-ischemic dilated cardiomyopathy with chronic systolic heart failure.  He also has a history of paroxysmal atrial fibrillation.  He is on Xarelto for stroke prevention.  Also has a history of hypertension.  He reports he has been doing well.  He denies chest pain, shortness of breath, peripheral edema, and PND.  He denies palpitations and dizziness.  His blood pressure is low in the office today.  He does monitor often at home and reports most readings are 90s systolic.  However, reports he feels fine and tolerates this blood pressure well.  He is a current every day smoker and has reduced smoking to half pack per day.          No Known Allergies:      Current Outpatient Prescriptions:   •  carvedilol (COREG) 6.25 MG tablet, Take 1 tablet by mouth 2 (Two) Times a Day With Meals., Disp: 60 tablet, Rfl: 5  •  furosemide (LASIX) 20 MG tablet, Take 1 tablet by mouth Daily., Disp: 30 tablet, Rfl: 5  •  rivaroxaban (XARELTO) 20 MG tablet, Take 1 tablet by mouth Daily With Dinner., Disp: 30 tablet, Rfl: 6  •  sacubitril-valsartan (ENTRESTO) 49-51 MG tablet, Take 1 tablet by mouth 2 (Two) Times a Day., Disp: 60 tablet, Rfl: 0  •  spironolactone (ALDACTONE) 25 MG tablet, TAKE ONE TABLET BY MOUTH EVERY DAY, Disp: 30 tablet, Rfl: 0      The following portions of the patient's history were reviewed and updated  "as appropriate: allergies, current medications, past family history, past medical history, past social history, past surgical history and problem list.    Social History   Substance Use Topics   • Smoking status: Current Every Day Smoker     Packs/day: 1.00     Years: 18.00     Types: Cigarettes   • Smokeless tobacco: Never Used   • Alcohol use No       Review of Systems   Constitution: Negative for chills and fever.   HENT: Negative for nosebleeds and sore throat.    Cardiovascular: Negative for chest pain, claudication, dyspnea on exertion, irregular heartbeat, leg swelling, near-syncope, orthopnea, palpitations and syncope.   Respiratory: Negative for cough, shortness of breath and wheezing.    Gastrointestinal: Negative for abdominal pain, melena, nausea and vomiting.   Genitourinary: Negative for dysuria and hematuria.   Neurological: Negative for dizziness and headaches.       Objective   Vitals:    06/28/18 1433 06/28/18 1437   BP: (!) 88/58 90/66   Pulse: 90 83   Resp: 16    Weight: 129 kg (285 lb)    Height: 177.8 cm (70\")      Body mass index is 40.89 kg/m².        Physical Exam   Constitutional: He is oriented to person, place, and time. He appears well-developed and well-nourished.   HENT:   Head: Normocephalic and atraumatic.   Cardiovascular: Normal rate and normal heart sounds.    No murmur heard.  Irregularly irregular rhythm   Pulmonary/Chest: Effort normal and breath sounds normal.   Abdominal: Soft. Bowel sounds are normal.   Musculoskeletal: Normal range of motion.   Neurological: He is alert and oriented to person, place, and time.   Skin: Skin is warm and dry.   Psychiatric: He has a normal mood and affect. His behavior is normal.           Procedures      Assessment/Plan    Diagnosis Plan   1. Paroxysmal a-fib- on Xarelto for stroke, sinus rhythm.   Adult Transthoracic Echo Complete W/ Cont if Necessary Per Protocol   2. Essential hypertension  Adult Transthoracic Echo Complete W/ Cont if " Necessary Per Protocol   3. Nonischemic cardiomyopathy , appears to be compensated.  Adult Transthoracic Echo Complete W/ Cont if Necessary Per Protocol   4. Morbid obesity     5. Tobacco use, states that he has cut back to 3-4 cigarettes a day.     6. Chronic systolic heart failure  Adult Transthoracic Echo Complete W/ Cont if Necessary Per Protocol                Recommendations:    1. Will repeat echocardiogram due to history of nonischemic cardiomyopathy    2. Continue carvedilol, Aldactone, Entresto, and furosemide. If his blood pressure runs any lower at home or if he is symptomatic, I have asked him to call the office so we can make medication changes    3. Continue Xarelto    4. I have advised him to stop smoking.    5. Follow up in 4 months and PRN.          Patient's Body mass index is 40.89 kg/m². BMI is above normal parameters. Recommendations include: educational material.         Return in about 4 months (around 10/28/2018) for Recheck.    As always, I appreciate very much the opportunity to participate in the cardiovascular care of your patients.      With Best Regards,    IDA Poole

## 2018-06-29 ENCOUNTER — LAB (OUTPATIENT)
Dept: LAB | Facility: HOSPITAL | Age: 73
End: 2018-06-29

## 2018-06-29 ENCOUNTER — HOSPITAL ENCOUNTER (OUTPATIENT)
Dept: CARDIOLOGY | Facility: HOSPITAL | Age: 73
Discharge: HOME | End: 2018-06-29
Admitting: NURSE PRACTITIONER

## 2018-06-29 DIAGNOSIS — I50.22 CHRONIC SYSTOLIC HEART FAILURE (HCC): ICD-10-CM

## 2018-06-29 DIAGNOSIS — I10 ESSENTIAL HYPERTENSION: ICD-10-CM

## 2018-06-29 DIAGNOSIS — I48.0 PAROXYSMAL A-FIB (HCC): ICD-10-CM

## 2018-06-29 DIAGNOSIS — I42.8 NONISCHEMIC CARDIOMYOPATHY (HCC): ICD-10-CM

## 2018-06-29 LAB
ANION GAP SERPL CALCULATED.3IONS-SCNC: 4.1 MMOL/L (ref 3.6–11.2)
BUN BLD-MCNC: 14 MG/DL (ref 7–21)
BUN/CREAT SERPL: 12 (ref 7–25)
CALCIUM SPEC-SCNC: 9.1 MG/DL (ref 7.7–10)
CHLORIDE SERPL-SCNC: 111 MMOL/L (ref 99–112)
CO2 SERPL-SCNC: 23.9 MMOL/L (ref 24.3–31.9)
CREAT BLD-MCNC: 1.17 MG/DL (ref 0.43–1.29)
GFR SERPL CREATININE-BSD FRML MDRD: 61 ML/MIN/1.73
GLUCOSE BLD-MCNC: 114 MG/DL (ref 70–110)
MAGNESIUM SERPL-MCNC: 2.3 MG/DL (ref 1.7–2.6)
OSMOLALITY SERPL CALC.SUM OF ELEC: 278.9 MOSM/KG (ref 273–305)
POTASSIUM BLD-SCNC: 4.6 MMOL/L (ref 3.5–5.3)
SODIUM BLD-SCNC: 139 MMOL/L (ref 135–153)

## 2018-06-29 PROCEDURE — 93306 TTE W/DOPPLER COMPLETE: CPT

## 2018-06-29 PROCEDURE — 93306 TTE W/DOPPLER COMPLETE: CPT | Performed by: INTERNAL MEDICINE

## 2018-06-29 PROCEDURE — 80048 BASIC METABOLIC PNL TOTAL CA: CPT

## 2018-06-29 PROCEDURE — 83735 ASSAY OF MAGNESIUM: CPT

## 2018-06-29 PROCEDURE — 36415 COLL VENOUS BLD VENIPUNCTURE: CPT

## 2018-07-01 LAB
BH CV ECHO MEAS - % IVS THICK: 11.7 %
BH CV ECHO MEAS - % LVPW THICK: 30.4 %
BH CV ECHO MEAS - ACS: 1.8 CM
BH CV ECHO MEAS - AO MAX PG: 8.5 MMHG
BH CV ECHO MEAS - AO MEAN PG: 4 MMHG
BH CV ECHO MEAS - AO ROOT AREA (BSA CORRECTED): 1.3
BH CV ECHO MEAS - AO ROOT AREA: 8.2 CM^2
BH CV ECHO MEAS - AO ROOT DIAM: 3.2 CM
BH CV ECHO MEAS - AO V2 MAX: 146 CM/SEC
BH CV ECHO MEAS - AO V2 MEAN: 94.4 CM/SEC
BH CV ECHO MEAS - AO V2 VTI: 27.6 CM
BH CV ECHO MEAS - BSA(HAYCOCK): 2.6 M^2
BH CV ECHO MEAS - BSA: 2.4 M^2
BH CV ECHO MEAS - BZI_BMI: 40.9 KILOGRAMS/M^2
BH CV ECHO MEAS - BZI_METRIC_HEIGHT: 177.8 CM
BH CV ECHO MEAS - BZI_METRIC_WEIGHT: 129.3 KG
BH CV ECHO MEAS - CONTRAST EF 4CH: 59.4 ML/M^2
BH CV ECHO MEAS - EDV(CUBED): 115.8 ML
BH CV ECHO MEAS - EDV(MOD-SP4): 64 ML
BH CV ECHO MEAS - EDV(TEICH): 111.5 ML
BH CV ECHO MEAS - EF(CUBED): 47.1 %
BH CV ECHO MEAS - EF(MOD-SP4): 59.4 %
BH CV ECHO MEAS - EF(TEICH): 39.3 %
BH CV ECHO MEAS - ESV(CUBED): 61.3 ML
BH CV ECHO MEAS - ESV(MOD-SP4): 26 ML
BH CV ECHO MEAS - ESV(TEICH): 67.7 ML
BH CV ECHO MEAS - FS: 19.1 %
BH CV ECHO MEAS - IVS/LVPW: 1.1
BH CV ECHO MEAS - IVSD: 1.2 CM
BH CV ECHO MEAS - IVSS: 1.3 CM
BH CV ECHO MEAS - LA DIMENSION: 2.9 CM
BH CV ECHO MEAS - LA/AO: 0.91
BH CV ECHO MEAS - LV DIASTOLIC VOL/BSA (35-75): 26.4 ML/M^2
BH CV ECHO MEAS - LV MASS(C)D: 200.2 GRAMS
BH CV ECHO MEAS - LV MASS(C)DI: 82.5 GRAMS/M^2
BH CV ECHO MEAS - LV MASS(C)S: 189.6 GRAMS
BH CV ECHO MEAS - LV MASS(C)SI: 78.1 GRAMS/M^2
BH CV ECHO MEAS - LV SYSTOLIC VOL/BSA (12-30): 10.7 ML/M^2
BH CV ECHO MEAS - LVIDD: 4.9 CM
BH CV ECHO MEAS - LVIDS: 3.9 CM
BH CV ECHO MEAS - LVLD AP4: 6.8 CM
BH CV ECHO MEAS - LVLS AP4: 6.3 CM
BH CV ECHO MEAS - LVOT AREA (M): 4.2 CM^2
BH CV ECHO MEAS - LVOT AREA: 4 CM^2
BH CV ECHO MEAS - LVOT DIAM: 2.3 CM
BH CV ECHO MEAS - LVPWD: 1 CM
BH CV ECHO MEAS - LVPWS: 1.4 CM
BH CV ECHO MEAS - MV E MAX VEL: 122.8 CM/SEC
BH CV ECHO MEAS - PA ACC SLOPE: 2227 CM/SEC^2
BH CV ECHO MEAS - PA ACC TIME: 0.05 SEC
BH CV ECHO MEAS - PA PR(ACCEL): 56.8 MMHG
BH CV ECHO MEAS - RAP SYSTOLE: 10 MMHG
BH CV ECHO MEAS - RVSP: 29.3 MMHG
BH CV ECHO MEAS - SI(AO): 93.5 ML/M^2
BH CV ECHO MEAS - SI(CUBED): 22.5 ML/M^2
BH CV ECHO MEAS - SI(MOD-SP4): 15.7 ML/M^2
BH CV ECHO MEAS - SI(TEICH): 18.1 ML/M^2
BH CV ECHO MEAS - SV(AO): 227 ML
BH CV ECHO MEAS - SV(CUBED): 54.5 ML
BH CV ECHO MEAS - SV(MOD-SP4): 38 ML
BH CV ECHO MEAS - SV(TEICH): 43.8 ML
BH CV ECHO MEAS - TR MAX VEL: 219.6 CM/SEC
MAXIMAL PREDICTED HEART RATE: 146 BPM
STRESS TARGET HR: 124 BPM

## 2018-07-09 ENCOUNTER — TELEPHONE (OUTPATIENT)
Dept: CARDIOLOGY | Facility: CLINIC | Age: 73
End: 2018-07-09

## 2018-07-09 ENCOUNTER — EPISODE CHANGES (OUTPATIENT)
Dept: CASE MANAGEMENT | Facility: OTHER | Age: 73
End: 2018-07-09

## 2018-07-09 NOTE — TELEPHONE ENCOUNTER
----- Message from KASH Harris sent at 7/6/2018  9:38 PM EDT -----  Renal function has decreased some compared to 1 year ago. Would increase water intake by about 2-3 glasses a day and recheck in BMP 2 weeks.

## 2018-07-11 NOTE — TELEPHONE ENCOUNTER
Left a VM for pt to return call regarding message per Chikis Dickinson PA-C.  He needs to repeat labs .

## 2018-07-19 RX ORDER — SPIRONOLACTONE 25 MG/1
25 TABLET ORAL DAILY
Qty: 30 TABLET | Refills: 4 | Status: SHIPPED | OUTPATIENT
Start: 2018-07-19 | End: 2018-12-18 | Stop reason: SDUPTHER

## 2018-10-24 RX ORDER — CARVEDILOL 6.25 MG/1
6.25 TABLET ORAL 2 TIMES DAILY WITH MEALS
Qty: 60 TABLET | Refills: 3 | Status: SHIPPED | OUTPATIENT
Start: 2018-10-24 | End: 2019-02-25 | Stop reason: SDUPTHER

## 2018-11-01 ENCOUNTER — OFFICE VISIT (OUTPATIENT)
Dept: CARDIOLOGY | Facility: CLINIC | Age: 73
End: 2018-11-01

## 2018-11-01 VITALS
HEART RATE: 88 BPM | BODY MASS INDEX: 39.86 KG/M2 | HEIGHT: 70 IN | SYSTOLIC BLOOD PRESSURE: 96 MMHG | DIASTOLIC BLOOD PRESSURE: 67 MMHG | OXYGEN SATURATION: 95 % | WEIGHT: 278.4 LBS

## 2018-11-01 DIAGNOSIS — I48.0 PAROXYSMAL A-FIB (HCC): ICD-10-CM

## 2018-11-01 DIAGNOSIS — I10 ESSENTIAL HYPERTENSION: ICD-10-CM

## 2018-11-01 DIAGNOSIS — I42.8 NONISCHEMIC CARDIOMYOPATHY (HCC): Primary | ICD-10-CM

## 2018-11-01 DIAGNOSIS — I50.22 CHRONIC SYSTOLIC HEART FAILURE (HCC): ICD-10-CM

## 2018-11-01 DIAGNOSIS — Z72.0 TOBACCO USE: ICD-10-CM

## 2018-11-01 PROCEDURE — 99214 OFFICE O/P EST MOD 30 MIN: CPT | Performed by: PHYSICIAN ASSISTANT

## 2018-11-01 PROCEDURE — 93000 ELECTROCARDIOGRAM COMPLETE: CPT | Performed by: PHYSICIAN ASSISTANT

## 2018-11-01 NOTE — PATIENT INSTRUCTIONS
BMI for Adults  Body mass index (BMI) is a number that is calculated from a person's weight and height. In most adults, the number is used to find how much of an adult's weight is made up of fat. BMI is not as accurate as a direct measure of body fat.  HOW IS BMI CALCULATED?  BMI is calculated by dividing weight in kilograms by height in meters squared. It can also be calculated by dividing weight in pounds by height in inches squared, then multiplying the resulting number by 703. Charts are available to help you find your BMI quickly and easily without doing this calculation.   HOW IS BMI INTERPRETED?  Health care professionals use BMI charts to identify whether an adult is underweight, at a normal weight, or overweight based on the following guidelines:  · Underweight: BMI less than 18.5.  · Normal weight: BMI between 18.5 and 24.9.  · Overweight: BMI between 25 and 29.9.  · Obese: BMI of 30 and above.  BMI is usually interpreted the same for males and females.  Weight includes both fat and muscle, so someone with a muscular build, such as an athlete, may have a BMI that is higher than 24.9. In cases like these, BMI may not accurately depict body fat. To determine if excess body fat is the cause of a BMI of 25 or higher, further assessments may need to be done by a health care provider.  WHY IS BMI A USEFUL TOOL?  BMI is used to identify a possible weight problem that may be related to a medical problem or may increase the risk for medical problems. BMI can also be used to promote changes to reach a healthy weight.     This information is not intended to replace advice given to you by your health care provider. Make sure you discuss any questions you have with your health care provider.     Document Released: 08/29/2005 Document Revised: 01/08/2016 Document Reviewed: 05/15/2015  [employer] Interactive Patient Education ©2017 [employer]       Calorie Counting for Weight Loss  Calories are energy you get from the  things you eat and drink. Your body uses this energy to keep you going throughout the day. The number of calories you eat affects your weight. When you eat more calories than your body needs, your body stores the extra calories as fat. When you eat fewer calories than your body needs, your body burns fat to get the energy it needs.  Calorie counting means keeping track of how many calories you eat and drink each day. If you make sure to eat fewer calories than your body needs, you should lose weight. In order for calorie counting to work, you will need to eat the number of calories that are right for you in a day to lose a healthy amount of weight per week. A healthy amount of weight to lose per week is usually 1-2 lb (0.5-0.9 kg). A dietitian can determine how many calories you need in a day and give you suggestions on how to reach your calorie goal.   WHAT IS MY MY PLAN?  My goal is to have __________ calories per day.   If I have this many calories per day, I should lose around __________ pounds per week.  WHAT DO I NEED TO KNOW ABOUT CALORIE COUNTING?  In order to meet your daily calorie goal, you will need to:  · Find out how many calories are in each food you would like to eat. Try to do this before you eat.  · Decide how much of the food you can eat.  · Write down what you ate and how many calories it had. Doing this is called keeping a food log.  WHERE DO I FIND CALORIE INFORMATION?  The number of calories in a food can be found on a Nutrition Facts label. Note that all the information on a label is based on a specific serving of the food. If a food does not have a Nutrition Facts label, try to look up the calories online or ask your dietitian for help.  HOW DO I DECIDE HOW MUCH TO EAT?  To decide how much of the food you can eat, you will need to consider both the number of calories in one serving and the size of one serving. This information can be found on the Nutrition Facts label. If a food does not  have a Nutrition Facts label, look up the information online or ask your dietitian for help.  Remember that calories are listed per serving. If you choose to have more than one serving of a food, you will have to multiply the calories per serving by the amount of servings you plan to eat. For example, the label on a package of bread might say that a serving size is 1 slice and that there are 90 calories in a serving. If you eat 1 slice, you will have eaten 90 calories. If you eat 2 slices, you will have eaten 180 calories.  HOW DO I KEEP A FOOD LOG?  After each meal, record the following information in your food log:  · What you ate.  · How much of it you ate.  · How many calories it had.  · Then, add up your calories.  Keep your food log near you, such as in a small notebook in your pocket. Another option is to use a mobile pastora or website. Some programs will calculate calories for you and show you how many calories you have left each time you add an item to the log.  WHAT ARE SOME CALORIE COUNTING TIPS?  · Use your calories on foods and drinks that will fill you up and not leave you hungry. Some examples of this include foods like nuts and nut butters, vegetables, lean proteins, and high-fiber foods (more than 5 g fiber per serving).  · Eat nutritious foods and avoid empty calories. Empty calories are calories you get from foods or beverages that do not have many nutrients, such as candy and soda. It is better to have a nutritious high-calorie food (such as an avocado) than a food with few nutrients (such as a bag of chips).  · Know how many calories are in the foods you eat most often. This way, you do not have to look up how many calories they have each time you eat them.  · Look out for foods that may seem like low-calorie foods but are really high-calorie foods, such as baked goods, soda, and fat-free candy.  · Pay attention to calories in drinks. Drinks such as sodas, specialty coffee drinks, alcohol, and  juices have a lot of calories yet do not fill you up. Choose low-calorie drinks like water and diet drinks.  · Focus your calorie counting efforts on higher calorie items. Logging the calories in a garden salad that contains only vegetables is less important than calculating the calories in a milk shake.  · Find a way of tracking calories that works for you. Get creative. Most people who are successful find ways to keep track of how much they eat in a day, even if they do not count every calorie.  WHAT ARE SOME PORTION CONTROL TIPS?  · Know how many calories are in a serving. This will help you know how many servings of a certain food you can have.  · Use a measuring cup to measure serving sizes. This is helpful when you start out. With time, you will be able to estimate serving sizes for some foods.  · Take some time to put servings of different foods on your favorite plates, bowls, and cups so you know what a serving looks like.  · Try not to eat straight from a bag or box. Doing this can lead to overeating. Put the amount you would like to eat in a cup or on a plate to make sure you are eating the right portion.  · Use smaller plates, glasses, and bowls to prevent overeating. This is a quick and easy way to practice portion control. If your plate is smaller, less food can fit on it.  · Try not to multitask while eating, such as watching TV or using your computer. If it is time to eat, sit down at a table and enjoy your food. Doing this will help you to start recognizing when you are full. It will also make you more aware of what and how much you are eating.  HOW CAN I CALORIE COUNT WHEN EATING OUT?  · Ask for smaller portion sizes or child-sized portions.  · Consider sharing an entree and sides instead of getting your own entree.  · If you get your own entree, eat only half. Ask for a box at the beginning of your meal and put the rest of your entree in it so you are not tempted to eat it.  · Look for the calories  "on the menu. If calories are listed, choose the lower calorie options.  · Choose dishes that include vegetables, fruits, whole grains, low-fat dairy products, and lean protein. Focusing on smart food choices from each of the 5 food groups can help you stay on track at restaurants.  · Choose items that are boiled, broiled, grilled, or steamed.  · Choose water, milk, unsweetened iced tea, or other drinks without added sugars. If you want an alcoholic beverage, choose a lower calorie option. For example, a regular re can have up to 700 calories and a glass of wine has around 150.  · Stay away from items that are buttered, battered, fried, or served with cream sauce. Items labeled \"crispy\" are usually fried, unless stated otherwise.  · Ask for dressings, sauces, and syrups on the side. These are usually very high in calories, so do not eat much of them.  · Watch out for salads. Many people think salads are a healthy option, but this is often not the case. Many salads come with huang, fried chicken, lots of cheese, fried chips, and dressing. All of these items have a lot of calories. If you want a salad, choose a garden salad and ask for grilled meats or steak. Ask for the dressing on the side, or ask for olive oil and vinegar or lemon to use as dressing.  · Estimate how many servings of a food you are given. For example, a serving of cooked rice is ½ cup or about the size of half a tennis ball or one cupcake wrapper. Knowing serving sizes will help you be aware of how much food you are eating at restaurants. The list below tells you how big or small some common portion sizes are based on everyday objects.  ¨ 1 oz--4 stacked dice.  ¨ 3 oz--1 deck of cards.  ¨ 1 tsp--1 dice.  ¨ 1 Tbsp--½ a Ping-Pong ball.  ¨ 2 Tbsp--1 Ping-Pong ball.  ¨ ½ cup--1 tennis ball or 1 cupcake wrapper.  ¨ 1 cup--1 baseball.     This information is not intended to replace advice given to you by your health care provider. Make sure you " discuss any questions you have with your health care provider.     Document Released: 12/18/2006 Document Revised: 01/08/2016 Document Reviewed: 10/23/2014  [employer] Interactive Patient Education ©2017 [employer]

## 2018-11-01 NOTE — PROGRESS NOTES
Buster Redd MD  Larry Kehr  1945 11/01/2018    Patient Active Problem List   Diagnosis   • Paroxysmal a-fib- on Xarelto for stroke, sinus rhythm.    • Hypertension- controlled.    • Tobacco use, states that he has cut back to 3-4 cigarettes a day.   • Morbid obesity (CMS/HCC)   • Nonischemic cardiomyopathy , appears to be compensated.   • Chronic systolic heart failure (CMS/HCC)       Dear Buster Redd MD:    Subjective       History of Present Illness:    Chief Complaint   Patient presents with   • Follow-up   • Med Management   • Results     Echo       Larry Kehr is a pleasant 73 y.o. male with a past medical history significant for dilated cardiomyopathy with chronic systolic heart failure, paroxysmal atrial fibrillation anticoagulated with Xarelto, and essential hypertension.  Patient comes in today for routine cardiology follow-up.    Patient's transthoracic echocardiogram revealed ejection fraction of 56-60% with no significant valvular disease.  She is a significant improvement from transthoracic echocardiogram performed on 1/5/2017 which showed an ejection fraction of 35%.    Patient states that he has been doing well.  He denies any chest pain, shortness of breath, weakness, fatigue, dizziness, or syncope.  Patient states he has been noted to little bit more around the house without getting short of breath or weak.  When I informed the patient that his pump function has significantly improved he was very delighted and happy.    No Known Allergies:      Current Outpatient Prescriptions:   •  carvedilol (COREG) 6.25 MG tablet, TAKE 1 TABLET BY MOUTH 2 (TWO) TIMES A DAY WITH MEALS., Disp: 60 tablet, Rfl: 3  •  furosemide (LASIX) 20 MG tablet, Take 1 tablet by mouth Daily., Disp: 30 tablet, Rfl: 5  •  rivaroxaban (XARELTO) 20 MG tablet, Take 1 tablet by mouth Daily With Dinner., Disp: 30 tablet, Rfl: 6  •  sacubitril-valsartan (ENTRESTO) 49-51 MG tablet, Take 1 tablet by mouth 2 (Two) Times a  "Day., Disp: 60 tablet, Rfl: 0  •  spironolactone (ALDACTONE) 25 MG tablet, Take 1 tablet by mouth Daily., Disp: 30 tablet, Rfl: 4      The following portions of the patient's history were reviewed and updated as appropriate: allergies, current medications, past family history, past medical history, past social history, past surgical history and problem list.    Social History   Substance Use Topics   • Smoking status: Current Every Day Smoker     Packs/day: 1.00     Years: 18.00     Types: Cigarettes   • Smokeless tobacco: Never Used   • Alcohol use No       Review of Systems   Constitution: Negative for weakness and malaise/fatigue.   Cardiovascular: Negative for chest pain, dyspnea on exertion and irregular heartbeat.   Respiratory: Negative for cough and shortness of breath.    Hematologic/Lymphatic: Negative for bleeding problem. Does not bruise/bleed easily.   Gastrointestinal: Negative for nausea and vomiting.       Objective   Vitals:    11/01/18 1341   SpO2: 95%   Weight: 126 kg (278 lb 6.4 oz)   Height: 177.8 cm (70\")     Body mass index is 39.95 kg/m².        Physical Exam   Constitutional: He is oriented to person, place, and time. He appears well-developed and well-nourished. No distress.   HENT:   Head: Normocephalic and atraumatic.   Cardiovascular: Normal rate, regular rhythm, normal heart sounds and intact distal pulses.    Pulmonary/Chest: Effort normal and breath sounds normal. No respiratory distress.   Musculoskeletal: He exhibits no edema.   Neurological: He is alert and oriented to person, place, and time.   Skin: He is not diaphoretic.       Lab Results   Component Value Date     06/29/2018    K 4.6 06/29/2018     06/29/2018    CO2 23.9 (L) 06/29/2018    BUN 14 06/29/2018    CREATININE 1.17 06/29/2018    GLUCOSE 114 (H) 06/29/2018    CALCIUM 9.1 06/29/2018    AST 25 01/05/2017    ALT 25 01/05/2017    ALKPHOS 102 01/05/2017     Lab Results   Component Value Date    CKTOTAL 50 " 01/05/2017     Lab Results   Component Value Date    WBC 11.92 01/06/2017    HGB 13.7 (L) 01/06/2017    HCT 43.3 01/06/2017     01/06/2017     Lab Results   Component Value Date    INR 1.24 (H) 12/10/2016     Lab Results   Component Value Date    MG 2.3 06/29/2018     Lab Results   Component Value Date    TSH 1.571 07/20/2014      Lab Results   Component Value Date    .0 (H) 01/05/2017       During this visit the following were done:  Labs Reviewed [x]    Labs Ordered []    Radiology Reports Reviewed [x]    Radiology Ordered []    PCP Records Reviewed []    Referring Provider Records Reviewed []    ER Records Reviewed []    Hospital Records Reviewed []    History Obtained From Family []    Radiology Images Reviewed []    Other Reviewed []    Records Requested []         ECG 12 Lead  Date/Time: 11/1/2018 1:39 PM  Performed by: KANIKA BARRERA  Authorized by: KANIKA BARRERA                 Assessment/Plan    Diagnosis Plan   1. Nonischemic cardiomyopathy , appears to be compensated.     2. Paroxysmal a-fib- on Xarelto for stroke, sinus rhythm.      3. Essential hypertension     4. Chronic systolic heart failure (CMS/[facility])     5. Tobacco use, states that he has cut back to 3-4 cigarettes a day.                  Recommendations:  1. I reviewed the patient's transthoracic echocardiogram with him.  2. Continue carvedilol, Lasix, Xarelto, Entresto, and spironolactone.  3. I request most recent lipid panel from PCP.  4. Follow-up in 3 months.     Patient's Body mass index is 39.95 kg/m². BMI is above normal parameters. Recommendations include: educational material.       No Follow-up on file.    As always, I appreciate very much the opportunity to participate in the cardiovascular care of your patients.      With Best Regards,    MALI Heath disclaimer:  Much of this encounter note is an electronic transcription/translation of spoken language to printed text. The electronic translation of  spoken language may permit erroneous, or at times, nonsensical words or phrases to be inadvertently transcribed; Although I have reviewed the note for such errors, some may still exist.

## 2018-11-02 ENCOUNTER — TELEPHONE (OUTPATIENT)
Dept: CARDIOLOGY | Facility: CLINIC | Age: 73
End: 2018-11-02

## 2018-11-02 DIAGNOSIS — Z72.0 TOBACCO USE: ICD-10-CM

## 2018-11-02 DIAGNOSIS — I42.8 NONISCHEMIC CARDIOMYOPATHY (HCC): Primary | ICD-10-CM

## 2018-11-02 DIAGNOSIS — I10 ESSENTIAL HYPERTENSION: ICD-10-CM

## 2018-11-02 NOTE — TELEPHONE ENCOUNTER
Called PCP and they stated that they don't have any recent labs on him since 2015.    ----- Message from Hemant Law PA-C sent at 11/1/2018  2:35 PM EDT -----  Can we request most recent lipid panel from pcp?

## 2018-12-10 RX ORDER — FUROSEMIDE 20 MG/1
20 TABLET ORAL DAILY
Qty: 30 TABLET | Refills: 3 | Status: SHIPPED | OUTPATIENT
Start: 2018-12-10 | End: 2019-04-08 | Stop reason: SDUPTHER

## 2018-12-19 RX ORDER — SPIRONOLACTONE 25 MG/1
25 TABLET ORAL DAILY
Qty: 30 TABLET | Refills: 4 | Status: SHIPPED | OUTPATIENT
Start: 2018-12-19 | End: 2019-05-16 | Stop reason: SDUPTHER

## 2019-02-07 ENCOUNTER — OFFICE VISIT (OUTPATIENT)
Dept: CARDIOLOGY | Facility: CLINIC | Age: 74
End: 2019-02-07

## 2019-02-07 VITALS
SYSTOLIC BLOOD PRESSURE: 82 MMHG | WEIGHT: 283 LBS | DIASTOLIC BLOOD PRESSURE: 58 MMHG | HEART RATE: 71 BPM | HEIGHT: 70 IN | OXYGEN SATURATION: 96 % | BODY MASS INDEX: 40.52 KG/M2 | RESPIRATION RATE: 16 BRPM

## 2019-02-07 DIAGNOSIS — I50.22 CHRONIC SYSTOLIC HEART FAILURE (HCC): Primary | ICD-10-CM

## 2019-02-07 DIAGNOSIS — I48.0 PAROXYSMAL A-FIB (HCC): ICD-10-CM

## 2019-02-07 DIAGNOSIS — I42.8 NONISCHEMIC CARDIOMYOPATHY (HCC): ICD-10-CM

## 2019-02-07 PROCEDURE — 99214 OFFICE O/P EST MOD 30 MIN: CPT | Performed by: PHYSICIAN ASSISTANT

## 2019-02-07 NOTE — PROGRESS NOTES
Buster Redd MD  Larry Kehr  1945 02/07/2019    Patient Active Problem List   Diagnosis   • Paroxysmal a-fib- on Xarelto for stroke, sinus rhythm.    • Hypertension- controlled.    • Tobacco use, states that he has cut back to 3-4 cigarettes a day.   • Morbid obesity (CMS/HCC)   • Nonischemic cardiomyopathy , appears to be compensated.   • Chronic systolic heart failure (CMS/HCC)       Dear Buster Redd MD:    Subjective     History of Present Illness:    Chief Complaint   Patient presents with   • Follow-up     3 mos   • Med Management     list provided     Larry Kehr is a pleasant 73 y.o. male with a past medical history significant for dilated cardiomyopathy with chronic systolic heart failure for which is ejection fraction has improved to 55-60%, paroxysmal atrial fibrillation anticoagulated with Xarelto, and essential hypertension.  Patient comes in today for routine cardiology follow-up.    Patient states he has been doing well.  He denies any symptoms such as chest pain, shortness of breath, palpitations, weakness, fatigue, dizziness, or syncope.  He states his blood pressure has been running low when he checks it however he denies any problems with blood pressure while its been this low.  He denies any bleeding problems.      No Known Allergies:      Current Outpatient Medications:   •  carvedilol (COREG) 6.25 MG tablet, TAKE 1 TABLET BY MOUTH 2 (TWO) TIMES A DAY WITH MEALS., Disp: 60 tablet, Rfl: 3  •  furosemide (LASIX) 20 MG tablet, TAKE 1 TABLET BY MOUTH DAILY., Disp: 30 tablet, Rfl: 3  •  rivaroxaban (XARELTO) 20 MG tablet, Take 1 tablet by mouth Daily With Dinner., Disp: 30 tablet, Rfl: 6  •  spironolactone (ALDACTONE) 25 MG tablet, TAKE 1 TABLET BY MOUTH DAILY., Disp: 30 tablet, Rfl: 4  •  sacubitril-valsartan (ENTRESTO) 24-26 MG tablet, Take 1 tablet by mouth 2 (Two) Times a Day., Disp: 180 tablet, Rfl: 3      The following portions of the patient's history were reviewed and updated as  "appropriate: allergies, current medications, past family history, past medical history, past social history, past surgical history and problem list.    Social History     Tobacco Use   • Smoking status: Current Every Day Smoker     Packs/day: 1.00     Years: 18.00     Pack years: 18.00     Types: Cigarettes   • Smokeless tobacco: Never Used   Substance Use Topics   • Alcohol use: No   • Drug use: No       Review of Systems   Constitution: Negative for weakness and malaise/fatigue.   Cardiovascular: Negative for chest pain, dyspnea on exertion, irregular heartbeat, leg swelling and palpitations.   Respiratory: Negative for cough and shortness of breath.    Hematologic/Lymphatic: Negative for bleeding problem. Does not bruise/bleed easily.   Gastrointestinal: Negative for nausea and vomiting.       Objective   Vitals:    02/07/19 1303   BP: (!) 82/58   Pulse: 71   Resp: 16   SpO2: 96%   Weight: 128 kg (283 lb)   Height: 177.8 cm (70\")     Body mass index is 40.61 kg/m².        Physical Exam   Constitutional: He is oriented to person, place, and time. He appears well-developed and well-nourished. No distress.   HENT:   Head: Normocephalic and atraumatic.   Cardiovascular: Normal rate, regular rhythm, normal heart sounds and intact distal pulses.   Pulmonary/Chest: Effort normal and breath sounds normal. No respiratory distress.   Musculoskeletal: He exhibits no edema.   Neurological: He is alert and oriented to person, place, and time.   Skin: He is not diaphoretic.       Lab Results   Component Value Date     06/29/2018    K 4.6 06/29/2018     06/29/2018    CO2 23.9 (L) 06/29/2018    BUN 14 06/29/2018    CREATININE 1.17 06/29/2018    GLUCOSE 114 (H) 06/29/2018    CALCIUM 9.1 06/29/2018    AST 25 01/05/2017    ALT 25 01/05/2017    ALKPHOS 102 01/05/2017     Lab Results   Component Value Date    CKTOTAL 50 01/05/2017     Lab Results   Component Value Date    WBC 11.92 01/06/2017    HGB 13.7 (L) 01/06/2017    " HCT 43.3 01/06/2017     01/06/2017     Lab Results   Component Value Date    INR 1.24 (H) 12/10/2016     Lab Results   Component Value Date    MG 2.3 06/29/2018     Lab Results   Component Value Date    TSH 1.571 07/20/2014      Lab Results   Component Value Date    .0 (H) 01/05/2017       During this visit the following were done:  Labs Reviewed [x]    Labs Ordered []    Radiology Reports Reviewed [x]    Radiology Ordered []    PCP Records Reviewed []    Referring Provider Records Reviewed []    ER Records Reviewed []    Hospital Records Reviewed []    History Obtained From Family []    Radiology Images Reviewed []    Other Reviewed []    Records Requested []       Procedures      Assessment/Plan    Diagnosis Plan   1. Chronic systolic heart failure (CMS/HCC)  Basic Metabolic Panel   2. Nonischemic cardiomyopathy , appears to be compensated.     3. Paroxysmal a-fib- on Xarelto for stroke, sinus rhythm.             Recommendations:  1. Will decrease patient's Entresto to 24-26 dosing since he is also on Xarelto for atrial fibrillation and with a systolic blood pressure and 80s he is high risk for falling.  2. Will check a BMP in 1 week.  3. Continue carvedilol, Xarelto, and spironolactone.  4. Also advised him to make his Lasix only as needed for weight gain, pedal edema, or increased shortness of breath.  5. Follow-up in 6 months.    Return in about 6 months (around 8/7/2019).    As always, I appreciate very much the opportunity to participate in the cardiovascular care of your patients.      With Best Regards,    MALI Heath disclaimer:  Much of this encounter note is an electronic transcription/translation of spoken language to printed text. The electronic translation of spoken language may permit erroneous, or at times, nonsensical words or phrases to be inadvertently transcribed; Although I have reviewed the note for such errors, some may still exist.

## 2019-02-08 ENCOUNTER — LAB (OUTPATIENT)
Dept: LAB | Facility: HOSPITAL | Age: 74
End: 2019-02-08

## 2019-02-08 DIAGNOSIS — I50.22 CHRONIC SYSTOLIC HEART FAILURE (HCC): ICD-10-CM

## 2019-02-08 LAB
ANION GAP SERPL CALCULATED.3IONS-SCNC: 2.9 MMOL/L (ref 3.6–11.2)
BUN BLD-MCNC: 14 MG/DL (ref 7–21)
BUN/CREAT SERPL: 13.1 (ref 7–25)
CALCIUM SPEC-SCNC: 9.2 MG/DL (ref 7.7–10)
CHLORIDE SERPL-SCNC: 108 MMOL/L (ref 99–112)
CO2 SERPL-SCNC: 25.1 MMOL/L (ref 24.3–31.9)
CREAT BLD-MCNC: 1.07 MG/DL (ref 0.43–1.29)
GFR SERPL CREATININE-BSD FRML MDRD: 67 ML/MIN/1.73
GLUCOSE BLD-MCNC: 145 MG/DL (ref 70–110)
OSMOLALITY SERPL CALC.SUM OF ELEC: 275 MOSM/KG (ref 273–305)
POTASSIUM BLD-SCNC: 4.2 MMOL/L (ref 3.5–5.3)
SODIUM BLD-SCNC: 136 MMOL/L (ref 135–153)

## 2019-02-08 PROCEDURE — 80048 BASIC METABOLIC PNL TOTAL CA: CPT

## 2019-02-08 PROCEDURE — 36415 COLL VENOUS BLD VENIPUNCTURE: CPT

## 2019-02-12 ENCOUNTER — TELEPHONE (OUTPATIENT)
Dept: CARDIOLOGY | Facility: CLINIC | Age: 74
End: 2019-02-12

## 2019-02-14 NOTE — TELEPHONE ENCOUNTER
I called Ellen they will need a new script sent over via fax. I have filed this out and placed on Dr. Mckinney's desk for him to sign. It will need to be faxed to [phone].

## 2019-02-15 ENCOUNTER — DOCUMENTATION (OUTPATIENT)
Dept: CARDIOLOGY | Facility: CLINIC | Age: 74
End: 2019-02-15

## 2019-02-25 RX ORDER — CARVEDILOL 6.25 MG/1
6.25 TABLET ORAL 2 TIMES DAILY WITH MEALS
Qty: 60 TABLET | Refills: 3 | Status: SHIPPED | OUTPATIENT
Start: 2019-02-25 | End: 2019-07-02 | Stop reason: SDUPTHER

## 2019-03-05 ENCOUNTER — TELEPHONE (OUTPATIENT)
Dept: CARDIOLOGY | Facility: CLINIC | Age: 74
End: 2019-03-05

## 2019-03-05 NOTE — TELEPHONE ENCOUNTER
Called Arsenio I got his [facility] patient assistance foundation paper work that I need him to fill out.

## 2019-04-08 RX ORDER — FUROSEMIDE 20 MG/1
TABLET ORAL
Qty: 30 TABLET | Refills: 3 | Status: SHIPPED | OUTPATIENT
Start: 2019-04-08 | End: 2019-12-11 | Stop reason: SDUPTHER

## 2019-04-26 ENCOUNTER — TELEPHONE (OUTPATIENT)
Dept: CARDIOLOGY | Facility: CLINIC | Age: 74
End: 2019-04-26

## 2019-04-26 NOTE — TELEPHONE ENCOUNTER
Called patient he is on the pt assistance foundation for his xarelto so im not for sure what this is meaning.

## 2019-04-29 NOTE — TELEPHONE ENCOUNTER
Called to check on status of pt's application w/[employer] pt assistance program [phone].  They needed to know if pt files income taxes yearly. Per pt no, he does not. They recorded this w/pt's application and sent it through.  Will take 5-7 business days for processing. When determination is made, pt will receive a letter via mail and us a letter via fax.  Pt is aware we have him some sample bottles of Xarelto 20 mg he may take while the application is processing.      Xarelto 20 mg po qd. x3 bottles with #7 tabs (21 tabs total). Lot:[id number]  Exp: 3/21

## 2019-05-16 RX ORDER — SPIRONOLACTONE 25 MG/1
25 TABLET ORAL DAILY
Qty: 30 TABLET | Refills: 4 | Status: SHIPPED | OUTPATIENT
Start: 2019-05-16 | End: 2019-09-30 | Stop reason: SDUPTHER

## 2019-06-17 RX ORDER — RIVAROXABAN 20 MG/1
TABLET, FILM COATED ORAL
Qty: 30 TABLET | Refills: 3 | Status: SHIPPED | OUTPATIENT
Start: 2019-06-17 | End: 2019-12-03 | Stop reason: SDUPTHER

## 2019-07-02 RX ORDER — CARVEDILOL 6.25 MG/1
6.25 TABLET ORAL 2 TIMES DAILY WITH MEALS
Qty: 60 TABLET | Refills: 1 | Status: SHIPPED | OUTPATIENT
Start: 2019-07-02 | End: 2019-07-08 | Stop reason: SDUPTHER

## 2019-07-08 ENCOUNTER — TELEPHONE (OUTPATIENT)
Dept: CARDIOLOGY | Facility: CLINIC | Age: 74
End: 2019-07-08

## 2019-07-08 RX ORDER — CARVEDILOL 6.25 MG/1
6.25 TABLET ORAL 2 TIMES DAILY WITH MEALS
Qty: 60 TABLET | Refills: 1 | Status: SHIPPED | OUTPATIENT
Start: 2019-07-08 | End: 2019-10-07 | Stop reason: SDUPTHER

## 2019-07-08 NOTE — TELEPHONE ENCOUNTER
Patient wants to know if you have heard anything about his blood thinner Xalrto is his insurance going to cover it please him back and let him know.

## 2019-07-08 NOTE — TELEPHONE ENCOUNTER
Spoke with patient advised him that I have sent in his paper work I would call and check on the status of it and let him know something when I find out.

## 2019-08-01 ENCOUNTER — OFFICE VISIT (OUTPATIENT)
Dept: CARDIOLOGY | Facility: CLINIC | Age: 74
End: 2019-08-01

## 2019-08-01 VITALS
HEIGHT: 70 IN | WEIGHT: 281 LBS | HEART RATE: 66 BPM | OXYGEN SATURATION: 96 % | DIASTOLIC BLOOD PRESSURE: 58 MMHG | BODY MASS INDEX: 40.23 KG/M2 | SYSTOLIC BLOOD PRESSURE: 98 MMHG

## 2019-08-01 DIAGNOSIS — I42.8 NONISCHEMIC CARDIOMYOPATHY (HCC): ICD-10-CM

## 2019-08-01 DIAGNOSIS — I48.0 PAROXYSMAL A-FIB (HCC): ICD-10-CM

## 2019-08-01 DIAGNOSIS — I50.22 CHRONIC SYSTOLIC HEART FAILURE (HCC): Primary | ICD-10-CM

## 2019-08-01 PROCEDURE — 93000 ELECTROCARDIOGRAM COMPLETE: CPT | Performed by: PHYSICIAN ASSISTANT

## 2019-08-01 PROCEDURE — 99213 OFFICE O/P EST LOW 20 MIN: CPT | Performed by: PHYSICIAN ASSISTANT

## 2019-08-01 NOTE — PROGRESS NOTES
Buster Redd MD  Larry Kehr  1945 08/01/2019    Patient Active Problem List   Diagnosis   • Paroxysmal a-fib- on Xarelto for stroke, sinus rhythm.    • Hypertension- controlled.    • Tobacco use, states that he has cut back to 3-4 cigarettes a day.   • Morbid obesity (CMS/HCC)   • Nonischemic cardiomyopathy , appears to be compensated.   • Chronic systolic heart failure (CMS/HCC)       Dear Buster Redd MD:    Subjective     History of Present Illness:    Chief Complaint   Patient presents with   • chronic systolic heart failure     Follow up   • Atrial Fibrillation   • Med Management     Verbal list       Larry Kehr is a pleasant 74 y.o. male with a past medical history significant for dilated cardiomyopathy with chronic systolic heart failure for which is ejection fraction has improved to 55-60%, paroxysmal atrial fibrillation anticoagulated with Xarelto, and essential hypertension.  Patient comes in today for routine cardiology follow-up.     Patient reports he is doing well.  He denies any bleeding issues with Xarelto.Patient denies any chest pain, shortness of breath, palpitations, dizziness, syncope or near syncope.     No Known Allergies:      Current Outpatient Medications:   •  carvedilol (COREG) 6.25 MG tablet, Take 1 tablet by mouth 2 (Two) Times a Day With Meals., Disp: 60 tablet, Rfl: 1  •  furosemide (LASIX) 20 MG tablet, TAKE ONE TABLET BY MOUTH EVERY DAY, Disp: 30 tablet, Rfl: 3  •  sacubitril-valsartan (ENTRESTO) 24-26 MG tablet, Take 1 tablet by mouth 2 (Two) Times a Day., Disp: 180 tablet, Rfl: 3  •  spironolactone (ALDACTONE) 25 MG tablet, TAKE 1 TABLET BY MOUTH DAILY., Disp: 30 tablet, Rfl: 4  •  XARELTO 20 MG tablet, TAKE 1 TABLET BY MOUTH DAILY WITH DINNER., Disp: 30 tablet, Rfl: 3    The following portions of the patient's history were reviewed and updated as appropriate: allergies, current medications, past family history, past medical history, past social history, past  "surgical history and problem list.    Social History     Tobacco Use   • Smoking status: Current Every Day Smoker     Packs/day: 1.00     Years: 18.00     Pack years: 18.00     Types: Cigarettes   • Smokeless tobacco: Never Used   Substance Use Topics   • Alcohol use: No   • Drug use: No       Review of Systems   Constitution: Negative for weakness and malaise/fatigue.   Cardiovascular: Negative for chest pain, dyspnea on exertion and irregular heartbeat.   Respiratory: Negative for cough and shortness of breath.    Hematologic/Lymphatic: Negative for bleeding problem. Does not bruise/bleed easily.   Gastrointestinal: Negative for nausea and vomiting.       Objective   Vitals:    08/01/19 1347   BP: 98/58   BP Location: Left arm   Patient Position: Sitting   Cuff Size: Large Adult   Pulse: 66   SpO2: 96%   Weight: 127 kg (281 lb)   Height: 177.8 cm (70\")     Body mass index is 40.32 kg/m².    Physical Exam   Constitutional: He is oriented to person, place, and time. He appears well-developed and well-nourished. No distress.   HENT:   Head: Normocephalic and atraumatic.   Cardiovascular: Normal rate, regular rhythm and normal heart sounds.   Pulmonary/Chest: Effort normal and breath sounds normal. No respiratory distress.   Musculoskeletal: He exhibits no edema.   Neurological: He is alert and oriented to person, place, and time.   Skin: He is not diaphoretic.       Lab Results   Component Value Date     02/08/2019    K 4.2 02/08/2019     02/08/2019    CO2 25.1 02/08/2019    BUN 14 02/08/2019    CREATININE 1.07 02/08/2019    GLUCOSE 145 (H) 02/08/2019    CALCIUM 9.2 02/08/2019    AST 25 01/05/2017    ALT 25 01/05/2017    ALKPHOS 102 01/05/2017     Lab Results   Component Value Date    CKTOTAL 50 01/05/2017     Lab Results   Component Value Date    WBC 11.92 01/06/2017    HGB 13.7 (L) 01/06/2017    HCT 43.3 01/06/2017     01/06/2017     Lab Results   Component Value Date    INR 1.24 (H) 12/10/2016 "     Lab Results   Component Value Date    MG 2.3 06/29/2018     Lab Results   Component Value Date    TSH 1.571 07/20/2014      Lab Results   Component Value Date    .0 (H) 01/05/2017       During this visit the following were done:  Labs Reviewed [x]    Labs Ordered []    Radiology Reports Reviewed [x]    Radiology Ordered []    PCP Records Reviewed []    Referring Provider Records Reviewed []    ER Records Reviewed []    Hospital Records Reviewed []    History Obtained From Family []    Radiology Images Reviewed []    Other Reviewed []    Records Requested []         ECG 12 Lead  Date/Time: 8/1/2019 1:58 PM  Performed by: Hemant Law PA-C  Authorized by: Hemant Law PA-C   Comparison: compared with previous ECG   Similar to previous ECG  Rhythm: atrial fibrillation  Other findings: poor R wave progression    Clinical impression: abnormal EKG            Assessment/Plan    Diagnosis Plan   1. Chronic systolic heart failure (CMS/HCC)     2. Paroxysmal a-fib- on Xarelto for stroke, sinus rhythm.      3. Nonischemic cardiomyopathy , appears to be compensated.              Recommendations:  1. Patient is doing well from cardiac standpoint is asymptomatic with good blood pressure control and has no bleeding issues with Xarelto.  He does report he has been trying to lose some weight. I educated him on a low carbohydrate diet and regular aerobic exercise such as walking even just a couple minutes a day.  He reports he is going to be trying to implement this in his daily life styles.    Return in about 6 months (around 2/1/2020).    As always, I appreciate very much the opportunity to participate in the cardiovascular care of your patients.      With Best Regards,    Hemant Law PA-C

## 2019-10-01 RX ORDER — SPIRONOLACTONE 25 MG/1
25 TABLET ORAL DAILY
Qty: 30 TABLET | Refills: 4 | Status: SHIPPED | OUTPATIENT
Start: 2019-10-01 | End: 2020-01-28

## 2019-10-08 RX ORDER — CARVEDILOL 6.25 MG/1
TABLET ORAL
Qty: 60 TABLET | Refills: 4 | Status: SHIPPED | OUTPATIENT
Start: 2019-10-08 | End: 2020-04-13

## 2019-11-26 ENCOUNTER — TELEPHONE (OUTPATIENT)
Dept: CARDIOLOGY | Facility: CLINIC | Age: 74
End: 2019-11-26

## 2019-12-09 ENCOUNTER — TELEPHONE (OUTPATIENT)
Dept: CARDIOLOGY | Facility: CLINIC | Age: 74
End: 2019-12-09

## 2019-12-09 NOTE — TELEPHONE ENCOUNTER
Pt requested to speak with Denice.  Pt also requested samples of Xarelto and says he would like to ask Denice about the status of his pt assistance application.  Also, pt requesting RF for Xarelto to pharmacy.

## 2019-12-11 RX ORDER — FUROSEMIDE 20 MG/1
20 TABLET ORAL DAILY
Qty: 30 TABLET | Refills: 3 | Status: ON HOLD | OUTPATIENT
Start: 2019-12-11 | End: 2020-06-14

## 2019-12-11 RX ORDER — FUROSEMIDE 20 MG/1
TABLET ORAL
Qty: 30 TABLET | Refills: 3 | COMMUNITY
Start: 2019-12-11 | End: 2019-12-11

## 2019-12-13 ENCOUNTER — DOCUMENTATION (OUTPATIENT)
Dept: CARDIOLOGY | Facility: CLINIC | Age: 74
End: 2019-12-13

## 2019-12-26 ENCOUNTER — DOCUMENTATION (OUTPATIENT)
Dept: CARDIOLOGY | Facility: CLINIC | Age: 74
End: 2019-12-26

## 2020-01-28 RX ORDER — SPIRONOLACTONE 25 MG/1
25 TABLET ORAL DAILY
Qty: 30 TABLET | Refills: 0 | Status: SHIPPED | OUTPATIENT
Start: 2020-01-28 | End: 2020-03-26

## 2020-02-07 ENCOUNTER — OFFICE VISIT (OUTPATIENT)
Dept: CARDIOLOGY | Facility: CLINIC | Age: 75
End: 2020-02-07

## 2020-02-07 VITALS
SYSTOLIC BLOOD PRESSURE: 90 MMHG | DIASTOLIC BLOOD PRESSURE: 53 MMHG | OXYGEN SATURATION: 99 % | BODY MASS INDEX: 40.14 KG/M2 | HEIGHT: 70 IN | HEART RATE: 98 BPM | WEIGHT: 280.4 LBS

## 2020-02-07 DIAGNOSIS — I42.8 NONISCHEMIC CARDIOMYOPATHY (HCC): ICD-10-CM

## 2020-02-07 DIAGNOSIS — I48.20 CHRONIC ATRIAL FIBRILLATION (HCC): ICD-10-CM

## 2020-02-07 DIAGNOSIS — I10 ESSENTIAL HYPERTENSION: Primary | ICD-10-CM

## 2020-02-07 PROCEDURE — 93000 ELECTROCARDIOGRAM COMPLETE: CPT | Performed by: PHYSICIAN ASSISTANT

## 2020-02-07 PROCEDURE — 99214 OFFICE O/P EST MOD 30 MIN: CPT | Performed by: PHYSICIAN ASSISTANT

## 2020-02-07 NOTE — PROGRESS NOTES
Buster Redd MD  Larry Kehr  1945 02/07/2020    Patient Active Problem List   Diagnosis   • Chronic atrial fibrillation   • Hypertension- controlled.    • Tobacco use, states that he has cut back to 3-4 cigarettes a day.   • Morbid obesity (CMS/HCC)   • Nonischemic cardiomyopathy , appears to be compensated.   • Chronic systolic heart failure (CMS/HCC)       Dear Buster Redd MD:    Subjective     History of Present Illness:    Chief Complaint   Patient presents with   • Follow-up   • Med Management     med list   • Palpitations       Larry Kehr is a pleasant 74 y.o. male with a past medical history significant for dilated cardiomyopathy with chronic systolic heart failure for which is ejection fraction has improved to 55-60%, paroxysmal atrial fibrillation anticoagulated with Xarelto, and essential hypertension.  Patient comes in today for routine cardiology follow-up.     Patient reports he has been doing well and has no complaints with the exception of frequent urination throughout the night.  Does report he is able to go to the bathroom several times an hour throughout the night and is hoping that we can adjust his medications to help decrease this.  Otherwise he does deny any chest pains, shortness of breath, orthopnea, worsening pedal edema, or PND.  He also denies any problems with his chronic atrial fibrillation such as rapid heart rates or problems with the Xarelto such as blood in the stool urine or sputum.    No Known Allergies:      Current Outpatient Medications:   •  carvedilol (COREG) 6.25 MG tablet, TAKE ONE TABLET BY MOUTH TWO TIMES A DAY WITH MEALS, Disp: 60 tablet, Rfl: 4  •  furosemide (LASIX) 20 MG tablet, Take 1 tablet by mouth Daily., Disp: 30 tablet, Rfl: 3  •  rivaroxaban (XARELTO) 20 MG tablet, Take 1 tablet by mouth Daily With Dinner., Disp: 30 tablet, Rfl: 5  •  sacubitril-valsartan (ENTRESTO) 24-26 MG tablet, Take 1 tablet by mouth 2 (Two) Times a Day., Disp: 28 tablet,  "Rfl: 0  •  spironolactone (ALDACTONE) 25 MG tablet, TAKE 1 TABLET BY MOUTH DAILY., Disp: 30 tablet, Rfl: 0    The following portions of the patient's history were reviewed and updated as appropriate: allergies, current medications, past family history, past medical history, past social history, past surgical history and problem list.    Social History     Tobacco Use   • Smoking status: Current Every Day Smoker     Packs/day: 1.00     Years: 18.00     Pack years: 18.00     Types: Cigarettes   • Smokeless tobacco: Never Used   Substance Use Topics   • Alcohol use: No   • Drug use: No       Review of Systems   Constitution: Negative for malaise/fatigue.   Cardiovascular: Negative for chest pain, dyspnea on exertion and irregular heartbeat.   Respiratory: Negative for cough and shortness of breath.    Hematologic/Lymphatic: Negative for bleeding problem. Does not bruise/bleed easily.   Gastrointestinal: Negative for nausea and vomiting.   Neurological: Negative for weakness.       Objective   Vitals:    02/07/20 1103   BP: 90/53   BP Location: Right arm   Patient Position: Sitting   Cuff Size: Adult   Pulse: 98   SpO2: 99%   Weight: 127 kg (280 lb 6.4 oz)   Height: 177.8 cm (70\")     Body mass index is 40.23 kg/m².    Physical Exam   Constitutional: He is oriented to person, place, and time. He appears well-developed and well-nourished. No distress.   HENT:   Head: Normocephalic and atraumatic.   Cardiovascular: Normal rate and normal heart sounds. An irregularly irregular rhythm present.   Pulmonary/Chest: Effort normal and breath sounds normal. No respiratory distress.   Musculoskeletal: He exhibits edema ( 1+ pedal edema bilaterally).   Neurological: He is alert and oriented to person, place, and time.   Skin: He is not diaphoretic.     Lab Results   Component Value Date     02/08/2019    K 4.2 02/08/2019     02/08/2019    CO2 25.1 02/08/2019    BUN 14 02/08/2019    CREATININE 1.07 02/08/2019    GLUCOSE " 145 (H) 02/08/2019    CALCIUM 9.2 02/08/2019    AST 25 01/05/2017    ALT 25 01/05/2017    ALKPHOS 102 01/05/2017     Lab Results   Component Value Date    CKTOTAL 50 01/05/2017     Lab Results   Component Value Date    WBC 11.92 01/06/2017    HGB 13.7 (L) 01/06/2017    HCT 43.3 01/06/2017     01/06/2017     Lab Results   Component Value Date    INR 1.24 (H) 12/10/2016     Lab Results   Component Value Date    MG 2.3 06/29/2018     Lab Results   Component Value Date    TSH 1.571 07/20/2014      Lab Results   Component Value Date    .0 (H) 01/05/2017       During this visit the following were done:  Labs Reviewed [x]    Labs Ordered []    Radiology Reports Reviewed [x]    Radiology Ordered []    PCP Records Reviewed []    Referring Provider Records Reviewed []    ER Records Reviewed []    Hospital Records Reviewed []    History Obtained From Family []    Radiology Images Reviewed []    Other Reviewed []    Records Requested []         ECG 12 Lead  Date/Time: 2/7/2020 11:01 AM  Performed by: Hemant Law PA-C  Authorized by: Hemant Law PA-C   Comparison: compared with previous ECG   Rhythm: atrial fibrillation  Conduction: conduction normal    Clinical impression: abnormal EKG            Assessment/Plan    Diagnosis Plan   1. Essential hypertension  Lipid Panel   2. Nonischemic cardiomyopathy , appears to be compensated.     3. Chronic atrial fibrillation  Basic Metabolic Panel            Recommendations:  1. Overall patient does seem stable from cardiac standpoint.  Since he does seem clinically compensated and is urinating frequently and would like for this to be cut down I will make his Lasix as needed for increased shortness of breath, pedal edema, or weight gain greater than 3 to 4 pounds in 24 hours.  I am also going to be ordering a BMP and lipid panel since this has not been done in over a year.  2. Continue Xarelto carvedilol, Entresto, and spironolactone.    Return in about 6 months  (around 8/7/2020).    As always, I appreciate very much the opportunity to participate in the cardiovascular care of your patients.      With Best Regards,    Hemant Law PA-C

## 2020-02-21 ENCOUNTER — LAB (OUTPATIENT)
Dept: LAB | Facility: HOSPITAL | Age: 75
End: 2020-02-21

## 2020-02-21 DIAGNOSIS — I10 ESSENTIAL HYPERTENSION: ICD-10-CM

## 2020-02-21 DIAGNOSIS — I48.20 CHRONIC ATRIAL FIBRILLATION (HCC): ICD-10-CM

## 2020-02-21 LAB
ANION GAP SERPL CALCULATED.3IONS-SCNC: 11.8 MMOL/L (ref 5–15)
BUN BLD-MCNC: 19 MG/DL (ref 8–23)
BUN/CREAT SERPL: 15.6 (ref 7–25)
CALCIUM SPEC-SCNC: 9.4 MG/DL (ref 8.6–10.5)
CHLORIDE SERPL-SCNC: 101 MMOL/L (ref 98–107)
CHOLEST SERPL-MCNC: 152 MG/DL (ref 0–200)
CO2 SERPL-SCNC: 24.2 MMOL/L (ref 22–29)
CREAT BLD-MCNC: 1.22 MG/DL (ref 0.76–1.27)
GFR SERPL CREATININE-BSD FRML MDRD: 58 ML/MIN/1.73
GLUCOSE BLD-MCNC: 132 MG/DL (ref 65–99)
HDLC SERPL-MCNC: 38 MG/DL (ref 40–60)
LDLC SERPL CALC-MCNC: 93 MG/DL (ref 0–100)
LDLC/HDLC SERPL: 2.44 {RATIO}
POTASSIUM BLD-SCNC: 4.7 MMOL/L (ref 3.5–5.2)
SODIUM BLD-SCNC: 137 MMOL/L (ref 136–145)
TRIGL SERPL-MCNC: 107 MG/DL (ref 0–150)
VLDLC SERPL-MCNC: 21.4 MG/DL (ref 5–40)

## 2020-02-21 PROCEDURE — 36415 COLL VENOUS BLD VENIPUNCTURE: CPT

## 2020-02-21 PROCEDURE — 80048 BASIC METABOLIC PNL TOTAL CA: CPT

## 2020-02-21 PROCEDURE — 80061 LIPID PANEL: CPT

## 2020-02-25 ENCOUNTER — TELEPHONE (OUTPATIENT)
Dept: CARDIOLOGY | Facility: CLINIC | Age: 75
End: 2020-02-25

## 2020-02-25 NOTE — TELEPHONE ENCOUNTER
----- Message from Hemant Law PA-C sent at 2/25/2020  8:05 AM EST -----  Cholesterol looks good. Renal function good too

## 2020-02-28 ENCOUNTER — TELEPHONE (OUTPATIENT)
Dept: CARDIOLOGY | Facility: CLINIC | Age: 75
End: 2020-02-28

## 2020-03-26 RX ORDER — SPIRONOLACTONE 25 MG/1
25 TABLET ORAL DAILY
Qty: 30 TABLET | Refills: 0 | Status: SHIPPED | OUTPATIENT
Start: 2020-03-26 | End: 2020-04-28

## 2020-04-13 RX ORDER — CARVEDILOL 6.25 MG/1
6.25 TABLET ORAL 2 TIMES DAILY WITH MEALS
Qty: 180 TABLET | Refills: 0 | Status: ON HOLD | OUTPATIENT
Start: 2020-04-13 | End: 2020-06-14

## 2020-04-28 RX ORDER — SPIRONOLACTONE 25 MG/1
25 TABLET ORAL DAILY
Qty: 90 TABLET | Refills: 0 | Status: SHIPPED | OUTPATIENT
Start: 2020-04-28 | End: 2020-11-13 | Stop reason: ALTCHOICE

## 2020-06-09 ENCOUNTER — TELEPHONE (OUTPATIENT)
Dept: CARDIOLOGY | Facility: CLINIC | Age: 75
End: 2020-06-09

## 2020-06-09 NOTE — TELEPHONE ENCOUNTER
Parent @ bedside spending night. Pt appears weak/ fatigued. A&O x4. On R/A and satting 90's. Denies SOB. Dry cough noted. Afebrile but reports chills. IS provided- pulls only 750 cc- weak effort- will need reinforcement teaching. Reports generalized body aches- medicated w/ PRN's. Reports nausea but no emesis- medicated w/ + results on reasessment. Denies any CP/Pressure/Palpitations/ dizziness/Lightheadedness. POC discussed r/t IV abx/ steroids/use of IS/ telemetry/trending Hg. Questions and concerns addressed- family and pt verbalized an understanding- all needs met @ this time. Hourly and PRN rounding in place.   Patient says he has been drinking at least three glasses of water a day. He says he has been having to go to the restroom a lot. He says in the mornings he wakes up and is genaro going on his self. He wants to know if there could be a reason for that cardiac related? He wants to know if it could be because of the water pill he is taking? Can someone check on this and let patient know something asap? Patient says he has also been putting icy hot on his hip and wants to know if this could have anything to do with it as well?     Thanks!

## 2020-06-11 NOTE — TELEPHONE ENCOUNTER
The lasix can for sure be making it worse but he likely also has prostate issues that need checked out. Tell him he can stop the lasix and take it only as needed for increased shortness of breath or weight gain >3 lbs in a day, or worsening pedal edma.

## 2020-06-12 ENCOUNTER — TELEPHONE (OUTPATIENT)
Dept: CARDIOLOGY | Facility: CLINIC | Age: 75
End: 2020-06-12

## 2020-06-14 ENCOUNTER — APPOINTMENT (OUTPATIENT)
Dept: MRI IMAGING | Facility: HOSPITAL | Age: 75
End: 2020-06-14

## 2020-06-14 ENCOUNTER — APPOINTMENT (OUTPATIENT)
Dept: CT IMAGING | Facility: HOSPITAL | Age: 75
End: 2020-06-14

## 2020-06-14 ENCOUNTER — HOSPITAL ENCOUNTER (INPATIENT)
Facility: HOSPITAL | Age: 75
LOS: 2 days | Discharge: HOME HEALTH | End: 2020-06-16
Attending: FAMILY MEDICINE | Admitting: HOSPITALIST

## 2020-06-14 ENCOUNTER — APPOINTMENT (OUTPATIENT)
Dept: GENERAL RADIOLOGY | Facility: HOSPITAL | Age: 75
End: 2020-06-14

## 2020-06-14 DIAGNOSIS — I48.91 ATRIAL FIBRILLATION WITH RAPID VENTRICULAR RESPONSE (HCC): ICD-10-CM

## 2020-06-14 DIAGNOSIS — Z97.8 FOLEY CATHETER IN PLACE: ICD-10-CM

## 2020-06-14 DIAGNOSIS — N13.9 OBSTRUCTIVE UROPATHY: ICD-10-CM

## 2020-06-14 DIAGNOSIS — R33.9 URINARY RETENTION: ICD-10-CM

## 2020-06-14 DIAGNOSIS — N13.30 HYDRONEPHROSIS, UNSPECIFIED HYDRONEPHROSIS TYPE: ICD-10-CM

## 2020-06-14 DIAGNOSIS — N17.9 ACUTE RENAL FAILURE, UNSPECIFIED ACUTE RENAL FAILURE TYPE (HCC): Primary | ICD-10-CM

## 2020-06-14 PROBLEM — I10 HYPERTENSIVE DISORDER: Status: ACTIVE | Noted: 2017-06-07

## 2020-06-14 PROBLEM — J44.9 CHRONIC OBSTRUCTIVE LUNG DISEASE (HCC): Status: ACTIVE | Noted: 2017-06-07

## 2020-06-14 LAB
ALBUMIN SERPL-MCNC: 3.38 G/DL (ref 3.5–5.2)
ALBUMIN/GLOB SERPL: 1 G/DL
ALP SERPL-CCNC: 91 U/L (ref 39–117)
ALT SERPL W P-5'-P-CCNC: 16 U/L (ref 1–41)
ANION GAP SERPL CALCULATED.3IONS-SCNC: 11.5 MMOL/L (ref 5–15)
ANION GAP SERPL CALCULATED.3IONS-SCNC: 9.9 MMOL/L (ref 5–15)
AST SERPL-CCNC: 8 U/L (ref 1–40)
BACTERIA UR QL AUTO: ABNORMAL /HPF
BASOPHILS # BLD AUTO: 0.11 10*3/MM3 (ref 0–0.2)
BASOPHILS NFR BLD AUTO: 0.8 % (ref 0–1.5)
BILIRUB SERPL-MCNC: 0.3 MG/DL (ref 0.2–1.2)
BILIRUB UR QL STRIP: NEGATIVE
BUN BLD-MCNC: 62 MG/DL (ref 8–23)
BUN BLD-MCNC: 67 MG/DL (ref 8–23)
BUN/CREAT SERPL: 15.5 (ref 7–25)
BUN/CREAT SERPL: 16.6 (ref 7–25)
CALCIUM SPEC-SCNC: 9.4 MG/DL (ref 8.6–10.5)
CALCIUM SPEC-SCNC: 9.6 MG/DL (ref 8.6–10.5)
CHLORIDE SERPL-SCNC: 109 MMOL/L (ref 98–107)
CHLORIDE SERPL-SCNC: 114 MMOL/L (ref 98–107)
CK SERPL-CCNC: 20 U/L (ref 20–200)
CLARITY UR: ABNORMAL
CO2 SERPL-SCNC: 15.5 MMOL/L (ref 22–29)
CO2 SERPL-SCNC: 16.1 MMOL/L (ref 22–29)
COLOR UR: ABNORMAL
CREAT BLD-MCNC: 3.74 MG/DL (ref 0.76–1.27)
CREAT BLD-MCNC: 4.32 MG/DL (ref 0.76–1.27)
CRP SERPL-MCNC: 9.67 MG/DL (ref 0–0.5)
D-LACTATE SERPL-SCNC: 0.6 MMOL/L (ref 0.5–2)
DEPRECATED RDW RBC AUTO: 57.1 FL (ref 37–54)
EOSINOPHIL # BLD AUTO: 0.23 10*3/MM3 (ref 0–0.4)
EOSINOPHIL NFR BLD AUTO: 1.8 % (ref 0.3–6.2)
ERYTHROCYTE [DISTWIDTH] IN BLOOD BY AUTOMATED COUNT: 18.8 % (ref 12.3–15.4)
GFR SERPL CREATININE-BSD FRML MDRD: 13 ML/MIN/1.73
GFR SERPL CREATININE-BSD FRML MDRD: 16 ML/MIN/1.73
GFR SERPL CREATININE-BSD FRML MDRD: ABNORMAL ML/MIN/{1.73_M2}
GLOBULIN UR ELPH-MCNC: 3.3 GM/DL
GLUCOSE BLD-MCNC: 86 MG/DL (ref 65–99)
GLUCOSE BLD-MCNC: 99 MG/DL (ref 65–99)
GLUCOSE UR STRIP-MCNC: NEGATIVE MG/DL
HCT VFR BLD AUTO: 37.4 % (ref 37.5–51)
HGB BLD-MCNC: 11.3 G/DL (ref 13–17.7)
HGB UR QL STRIP.AUTO: ABNORMAL
HYALINE CASTS UR QL AUTO: ABNORMAL /LPF
IMM GRANULOCYTES # BLD AUTO: 0.07 10*3/MM3 (ref 0–0.05)
IMM GRANULOCYTES NFR BLD AUTO: 0.5 % (ref 0–0.5)
KETONES UR QL STRIP: NEGATIVE
LEUKOCYTE ESTERASE UR QL STRIP.AUTO: ABNORMAL
LYMPHOCYTES # BLD AUTO: 0.97 10*3/MM3 (ref 0.7–3.1)
LYMPHOCYTES NFR BLD AUTO: 7.4 % (ref 19.6–45.3)
MAGNESIUM SERPL-MCNC: 2.3 MG/DL (ref 1.6–2.4)
MCH RBC QN AUTO: 25.4 PG (ref 26.6–33)
MCHC RBC AUTO-ENTMCNC: 30.2 G/DL (ref 31.5–35.7)
MCV RBC AUTO: 84 FL (ref 79–97)
MONOCYTES # BLD AUTO: 0.81 10*3/MM3 (ref 0.1–0.9)
MONOCYTES NFR BLD AUTO: 6.2 % (ref 5–12)
NEUTROPHILS # BLD AUTO: 10.88 10*3/MM3 (ref 1.7–7)
NEUTROPHILS NFR BLD AUTO: 83.3 % (ref 42.7–76)
NITRITE UR QL STRIP: NEGATIVE
NRBC BLD AUTO-RTO: 0 /100 WBC (ref 0–0.2)
NT-PROBNP SERPL-MCNC: 2167 PG/ML (ref 5–1800)
PH UR STRIP.AUTO: 5.5 [PH] (ref 5–8)
PLATELET # BLD AUTO: 297 10*3/MM3 (ref 140–450)
PMV BLD AUTO: 10.4 FL (ref 6–12)
POTASSIUM BLD-SCNC: 5.3 MMOL/L (ref 3.5–5.2)
POTASSIUM BLD-SCNC: 5.4 MMOL/L (ref 3.5–5.2)
POTASSIUM BLD-SCNC: 6.3 MMOL/L (ref 3.5–5.2)
PROT SERPL-MCNC: 6.7 G/DL (ref 6–8.5)
PROT UR QL STRIP: ABNORMAL
RBC # BLD AUTO: 4.45 10*6/MM3 (ref 4.14–5.8)
RBC # UR: ABNORMAL /HPF
REF LAB TEST METHOD: ABNORMAL
SODIUM BLD-SCNC: 136 MMOL/L (ref 136–145)
SODIUM BLD-SCNC: 140 MMOL/L (ref 136–145)
SP GR UR STRIP: 1.01 (ref 1–1.03)
SQUAMOUS #/AREA URNS HPF: ABNORMAL /HPF
TROPONIN T SERPL-MCNC: <0.01 NG/ML (ref 0–0.03)
TROPONIN T SERPL-MCNC: <0.01 NG/ML (ref 0–0.03)
TSH SERPL DL<=0.05 MIU/L-ACNC: 2.25 UIU/ML (ref 0.27–4.2)
UROBILINOGEN UR QL STRIP: ABNORMAL
WBC NRBC COR # BLD: 13.07 10*3/MM3 (ref 3.4–10.8)
WBC UR QL AUTO: ABNORMAL /HPF

## 2020-06-14 PROCEDURE — 84132 ASSAY OF SERUM POTASSIUM: CPT | Performed by: NURSE PRACTITIONER

## 2020-06-14 PROCEDURE — 83735 ASSAY OF MAGNESIUM: CPT | Performed by: FAMILY MEDICINE

## 2020-06-14 PROCEDURE — 87086 URINE CULTURE/COLONY COUNT: CPT | Performed by: NURSE PRACTITIONER

## 2020-06-14 PROCEDURE — 81001 URINALYSIS AUTO W/SCOPE: CPT | Performed by: FAMILY MEDICINE

## 2020-06-14 PROCEDURE — 87076 CULTURE ANAEROBE IDENT EACH: CPT | Performed by: FAMILY MEDICINE

## 2020-06-14 PROCEDURE — 94640 AIRWAY INHALATION TREATMENT: CPT

## 2020-06-14 PROCEDURE — 94799 UNLISTED PULMONARY SVC/PX: CPT

## 2020-06-14 PROCEDURE — 82550 ASSAY OF CK (CPK): CPT | Performed by: FAMILY MEDICINE

## 2020-06-14 PROCEDURE — 63710000001 INSULIN REGULAR HUMAN PER 5 UNITS: Performed by: FAMILY MEDICINE

## 2020-06-14 PROCEDURE — 83605 ASSAY OF LACTIC ACID: CPT | Performed by: FAMILY MEDICINE

## 2020-06-14 PROCEDURE — 99223 1ST HOSP IP/OBS HIGH 75: CPT | Performed by: NURSE PRACTITIONER

## 2020-06-14 PROCEDURE — 74176 CT ABD & PELVIS W/O CONTRAST: CPT

## 2020-06-14 PROCEDURE — 25010000002 CALCIUM GLUCONATE-NACL 1-0.675 GM/50ML-% SOLUTION: Performed by: FAMILY MEDICINE

## 2020-06-14 PROCEDURE — 70450 CT HEAD/BRAIN W/O DYE: CPT

## 2020-06-14 PROCEDURE — 99285 EMERGENCY DEPT VISIT HI MDM: CPT

## 2020-06-14 PROCEDURE — 71045 X-RAY EXAM CHEST 1 VIEW: CPT

## 2020-06-14 PROCEDURE — 83880 ASSAY OF NATRIURETIC PEPTIDE: CPT | Performed by: FAMILY MEDICINE

## 2020-06-14 PROCEDURE — 87040 BLOOD CULTURE FOR BACTERIA: CPT | Performed by: FAMILY MEDICINE

## 2020-06-14 PROCEDURE — 71250 CT THORAX DX C-: CPT

## 2020-06-14 PROCEDURE — 93005 ELECTROCARDIOGRAM TRACING: CPT | Performed by: FAMILY MEDICINE

## 2020-06-14 PROCEDURE — 80053 COMPREHEN METABOLIC PANEL: CPT | Performed by: FAMILY MEDICINE

## 2020-06-14 PROCEDURE — 86140 C-REACTIVE PROTEIN: CPT | Performed by: FAMILY MEDICINE

## 2020-06-14 PROCEDURE — 72131 CT LUMBAR SPINE W/O DYE: CPT

## 2020-06-14 PROCEDURE — 51702 INSERT TEMP BLADDER CATH: CPT

## 2020-06-14 PROCEDURE — 85025 COMPLETE CBC W/AUTO DIFF WBC: CPT | Performed by: FAMILY MEDICINE

## 2020-06-14 PROCEDURE — 72148 MRI LUMBAR SPINE W/O DYE: CPT

## 2020-06-14 PROCEDURE — 25010000002 CEFTRIAXONE PER 250 MG: Performed by: NURSE PRACTITIONER

## 2020-06-14 PROCEDURE — 84443 ASSAY THYROID STIM HORMONE: CPT | Performed by: FAMILY MEDICINE

## 2020-06-14 PROCEDURE — 84484 ASSAY OF TROPONIN QUANT: CPT | Performed by: FAMILY MEDICINE

## 2020-06-14 RX ORDER — DEXTROSE MONOHYDRATE 25 G/50ML
25 INJECTION, SOLUTION INTRAVENOUS ONCE
Status: COMPLETED | OUTPATIENT
Start: 2020-06-14 | End: 2020-06-14

## 2020-06-14 RX ORDER — CALCIUM GLUCONATE 20 MG/ML
1 INJECTION, SOLUTION INTRAVENOUS ONCE
Status: COMPLETED | OUTPATIENT
Start: 2020-06-14 | End: 2020-06-14

## 2020-06-14 RX ORDER — SODIUM CHLORIDE 0.9 % (FLUSH) 0.9 %
10 SYRINGE (ML) INJECTION AS NEEDED
Status: DISCONTINUED | OUTPATIENT
Start: 2020-06-14 | End: 2020-06-16 | Stop reason: HOSPADM

## 2020-06-14 RX ORDER — SPIRONOLACTONE 25 MG/1
25 TABLET ORAL DAILY
Status: CANCELLED | OUTPATIENT
Start: 2020-06-15

## 2020-06-14 RX ORDER — HYDROCODONE BITARTRATE AND ACETAMINOPHEN 10; 325 MG/1; MG/1
1 TABLET ORAL ONCE
Status: COMPLETED | OUTPATIENT
Start: 2020-06-14 | End: 2020-06-14

## 2020-06-14 RX ORDER — ALBUTEROL SULFATE 2.5 MG/3ML
2.5 SOLUTION RESPIRATORY (INHALATION) ONCE
Status: COMPLETED | OUTPATIENT
Start: 2020-06-14 | End: 2020-06-14

## 2020-06-14 RX ORDER — SODIUM CHLORIDE 9 MG/ML
75 INJECTION, SOLUTION INTRAVENOUS CONTINUOUS
Status: DISCONTINUED | OUTPATIENT
Start: 2020-06-14 | End: 2020-06-16 | Stop reason: HOSPADM

## 2020-06-14 RX ORDER — DILTIAZEM HYDROCHLORIDE 5 MG/ML
5 INJECTION INTRAVENOUS ONCE
Status: COMPLETED | OUTPATIENT
Start: 2020-06-14 | End: 2020-06-14

## 2020-06-14 RX ORDER — LACTULOSE 10 G/15ML
10 SOLUTION ORAL ONCE
Status: COMPLETED | OUTPATIENT
Start: 2020-06-15 | End: 2020-06-15

## 2020-06-14 RX ORDER — ACETAMINOPHEN 325 MG/1
650 TABLET ORAL ONCE
Status: COMPLETED | OUTPATIENT
Start: 2020-06-14 | End: 2020-06-15

## 2020-06-14 RX ORDER — SODIUM POLYSTYRENE SULFONATE 4.1 MEQ/G
15 POWDER, FOR SUSPENSION ORAL; RECTAL ONCE
Status: COMPLETED | OUTPATIENT
Start: 2020-06-15 | End: 2020-06-15

## 2020-06-14 RX ORDER — NITROGLYCERIN 0.4 MG/1
0.4 TABLET SUBLINGUAL
Status: DISCONTINUED | OUTPATIENT
Start: 2020-06-14 | End: 2020-06-16 | Stop reason: HOSPADM

## 2020-06-14 RX ORDER — SODIUM POLYSTYRENE SULFONATE 4.1 MEQ/G
30 POWDER, FOR SUSPENSION ORAL; RECTAL ONCE
Status: COMPLETED | OUTPATIENT
Start: 2020-06-14 | End: 2020-06-14

## 2020-06-14 RX ORDER — DILTIAZEM HYDROCHLORIDE 5 MG/ML
10 INJECTION INTRAVENOUS ONCE
Status: DISCONTINUED | OUTPATIENT
Start: 2020-06-14 | End: 2020-06-16 | Stop reason: HOSPADM

## 2020-06-14 RX ADMIN — HUMAN INSULIN 10 UNITS: 100 INJECTION, SOLUTION SUBCUTANEOUS at 16:06

## 2020-06-14 RX ADMIN — DILTIAZEM HYDROCHLORIDE 5 MG: 5 INJECTION INTRAVENOUS at 15:41

## 2020-06-14 RX ADMIN — CALCIUM GLUCONATE 1 G: 20 INJECTION, SOLUTION INTRAVENOUS at 16:07

## 2020-06-14 RX ADMIN — DEXTROSE MONOHYDRATE 25 G: 25 INJECTION, SOLUTION INTRAVENOUS at 16:06

## 2020-06-14 RX ADMIN — SODIUM CHLORIDE 2 G: 900 INJECTION INTRAVENOUS at 23:43

## 2020-06-14 RX ADMIN — ALBUTEROL SULFATE 2.5 MG: 2.5 SOLUTION RESPIRATORY (INHALATION) at 15:55

## 2020-06-14 RX ADMIN — NYSTATIN AND TRIAMCINOLONE ACETONIDE 1 APPLICATION: 100000; 1 CREAM TOPICAL at 18:01

## 2020-06-14 RX ADMIN — SODIUM CHLORIDE 100 ML/HR: 9 INJECTION, SOLUTION INTRAVENOUS at 19:20

## 2020-06-14 RX ADMIN — HYDROCODONE BITARTRATE AND ACETAMINOPHEN 1 TABLET: 10; 325 TABLET ORAL at 15:41

## 2020-06-14 RX ADMIN — SODIUM POLYSTYRENE SULFONATE 30 G: 1 POWDER ORAL; RECTAL at 16:06

## 2020-06-14 NOTE — H&P
[facility] Medicine Services  History & Physical    Patient Identification:  Name:  Larry Kehr  Age:  74 y.o.  Sex:  male  :  1945  MRN:  [id number]   Visit Number:  [id number]  Primary Care Physician:  Buster Redd MD    I have seen the patient in conjunction with IDA Isaac and I agree with the following statements:    Subjective     Chief complaint: Weakness    History of presenting illness:      Larry Kehr is a 74 y.o. male with past medical history significant for Atrial fibrillation, cardiomyopathy, CHF, COPD, tobacco abuse, morbid obesity, and hypertension.     Mr. Kehr presented to [facility] emergency department on 2020 with complaints of generalized weakness which has been present for the last 2 to 3 weeks.  Along with generalized weakness he also complains of decreased appetite and urinary incontinence.  He reports that he has had regular bowel movements and denies any bowel incontinence.  He reports that he has been having back pain intermittently for approximately 1 year.  He reports that he was seen 1 to 2 weeks ago in and urgent care clinic and was given a steroid injection to ease back and right leg pain.  Mr. Kehr reports that he has since been experiencing difficulty starting and stopping the flow of urine, dribbling urine post urination and has also been experiencing nocturnal enuresis.  He denies noting any foul odor of his urine, hematuria or dysuria.  He also denies any fever, recent infection, recent antibiotic use, cough, shortness of breath, chest pain, increased edema, abdominal pain, difficulty ambulating, dizziness, headaches, lightheadedness, seizures or syncopal episodes.  He does report smoking approximately 6 to 8 cigarettes/day and denies any alcohol or drug use.    Upon arrival to the ED, vital signs were temperature 98, pulse 105, respirations 18, blood pressure 110/80 and oxygen saturation 99% on room air.  Troponin  T <0.010, proBNP 2167.0.  Glucose 99, sodium 136, potassium 6.3, CO2 15.5, chloride 109, creatinine 4.32, BUN 67, EGFR 13, TSH 2.25, C-RP 9.67, lactate 0.6, magnesium 2.3, WBC 13.07, hemoglobin 11.3, hematocrit 37.4.  Urinalysis shows negative glucose, negative ketones, large blood, negative nitrite, large leukocytes, WBC too numerous to count 2+ bacteria.  Blood and urine cultures pending.  CT of the head without contrast shows [place] changes without acute abnormality, per radiology.  CT of the lumbar spine without contrast shows generally mild degenerative disc and facet disease as described above with multilevel spondylosis secondary to combination of disc bulging and a congenitally narrow canal with no acute findings in the spine, per radiology.  CT of the abdomen pelvis without contrast shows marked distention of the bladder noted with bilateral hydronephrosis consistent with urinary retention.  Also noted is cholelithiasis.  4.6 cm meter abdominal aortic aneurysm, per radiology.  CT of the chest without contrast shows negative CT of the chest, per radiology.  ---------------------------------------------------------------------------------------------------------------------   Review of Systems   Constitutional: Negative for activity change, appetite change, chills, fatigue and fever.   HENT: Negative for congestion, sore throat and trouble swallowing.    Eyes: Negative for photophobia and visual disturbance.   Respiratory: Negative for cough, chest tightness and shortness of breath.    Cardiovascular: Positive for leg swelling (@ baseline and denies exacerbation). Negative for chest pain.   Gastrointestinal: Negative for abdominal distention, abdominal pain, diarrhea, nausea and vomiting.   Endocrine: Negative for polydipsia, polyphagia and polyuria.   Genitourinary: Positive for decreased urine volume, difficulty urinating and penile pain. Negative for discharge, dysuria, flank pain, frequency, hematuria,  testicular pain and urgency.   Musculoskeletal: Positive for back pain. Negative for gait problem.   Skin: Positive for rash (Patient reports excoriation around patsy-area ). Negative for color change, pallor and wound.   Allergic/Immunologic: Negative for immunocompromised state.   Neurological: Negative for dizziness, seizures, syncope, weakness, light-headedness and headaches.   Psychiatric/Behavioral: Negative for agitation, confusion, hallucinations and sleep disturbance.      ---------------------------------------------------------------------------------------------------------------------   Past Medical History:   Diagnosis Date   • Atrial fibrillation (CMS/[facility])    • Cardiomyopathy (CMS/[facility])    • CHF (congestive heart failure) (CMS/[facility])    • COPD (chronic obstructive pulmonary disease) (CMS/[facility])    • Hypertension      No past surgical history on file.  Family History   Problem Relation Age of Onset   • No Known Problems Mother    • Heart disease Father    • No Known Problems Sister    • No Known Problems Brother    • No Known Problems Son    • No Known Problems Daughter    • No Known Problems Maternal Grandmother    • No Known Problems Maternal Grandfather    • No Known Problems Paternal Grandmother    • No Known Problems Paternal Grandfather    • No Known Problems Cousin    • Rheum arthritis Neg Hx    • Osteoarthritis Neg Hx    • Asthma Neg Hx    • Diabetes Neg Hx    • Heart failure Neg Hx    • Hyperlipidemia Neg Hx    • Hypertension Neg Hx    • Migraines Neg Hx    • Rashes / Skin problems Neg Hx    • Seizures Neg Hx    • Stroke Neg Hx    • Thyroid disease Neg Hx      Social History     Socioeconomic History   • Marital status:      Spouse name: Not on file   • Number of children: Not on file   • Years of education: Not on file   • Highest education level: Not on file   Tobacco Use   • Smoking status: Current Every Day Smoker     Packs/day: 1.00     Years: 18.00     Pack years: 18.00     Types:  Cigarettes   • Smokeless tobacco: Never Used   Substance and Sexual Activity   • Alcohol use: No   • Drug use: No   • Sexual activity: Defer     ---------------------------------------------------------------------------------------------------------------------   Allergies:  Patient has no known allergies.  ---------------------------------------------------------------------------------------------------------------------   Home medications:    Medications below are reported home medications pulling from within the system; at this time, these medications have not been reconciled unless otherwise specified and are in the verification process for further verifcation as current home medications.    (Not in a hospital admission)    Hospital Scheduled Meds:    dilTIAZem 10 mg Intravenous Once   nystatin-triamcinolone  Topical Q12H       sodium chloride 100 mL/hr       Current listed hospital scheduled medications may not yet reflect those currently placed in orders that are signed and held awaiting patient's arrival to floor.   ---------------------------------------------------------------------------------------------------------------------     Objective     Vital Signs:  Temp:  [98 °F (36.7 °C)] 98 °F (36.7 °C)  Heart Rate:  [] 110  Resp:  [18-24] 20  BP: (110-139)/(63-86) 111/78      06/14/20  1421   Weight: 127 kg (280 lb)     Body mass index is 40.18 kg/m².  ---------------------------------------------------------------------------------------------------------------------   Physical Exam   Constitutional: He is oriented to person, place, and time and well-developed, well-nourished, and in no distress. No distress.   HENT:   Head: Normocephalic and atraumatic.   Eyes: Pupils are equal, round, and reactive to light. EOM are normal.   Neck: Normal range of motion. No thyromegaly present.   Cardiovascular: An irregularly irregular rhythm present. Tachycardia present.   Pulmonary/Chest: Effort normal and  breath sounds normal. No respiratory distress. He has no wheezes.   Abdominal: Bowel sounds are normal. He exhibits distension. There is no tenderness.   Musculoskeletal: Normal range of motion. He exhibits edema (2+ edema noted to bilateral lower extremities).   Neurological: He is alert and oriented to person, place, and time.   Skin: Skin is warm. Rash noted. He is not diaphoretic. There is erythema.   Penile head is erythematous and edematous.    Psychiatric: Mood, memory, affect and judgment normal.     ---------------------------------------------------------------------------------------------------------------------  EKG:      ---------------------------------------------------------------------------------------------------------------------   Results from last 7 days   Lab Units 06/14/20  1444 06/14/20  1436   CRP mg/dL  --  9.67*   LACTATE mmol/L 0.6  --    WBC 10*3/mm3  --  13.07*   HEMOGLOBIN g/dL  --  11.3*   HEMATOCRIT %  --  37.4*   MCV fL  --  84.0   MCHC g/dL  --  30.2*   PLATELETS 10*3/mm3  --  297         Results from last 7 days   Lab Units 06/14/20  1436   SODIUM mmol/L 136   POTASSIUM mmol/L 6.3*   MAGNESIUM mg/dL 2.3   CHLORIDE mmol/L 109*   CO2 mmol/L 15.5*   BUN mg/dL 67*   CREATININE mg/dL 4.32*   EGFR IF NONAFRICN AM mL/min/1.73 13*   CALCIUM mg/dL 9.4   GLUCOSE mg/dL 99   ALBUMIN g/dL 3.38*   BILIRUBIN mg/dL 0.3   ALK PHOS U/L 91   AST (SGOT) U/L 8   ALT (SGPT) U/L 16   Estimated Creatinine Clearance: 19.5 mL/min (A) (by C-G formula based on SCr of 4.32 mg/dL (H)).  No results found for: AMMONIA  Results from last 7 days   Lab Units 06/14/20  1631 06/14/20  1436   CK TOTAL U/L  --  20   TROPONIN T ng/mL <0.010 <0.010     Results from last 7 days   Lab Units 06/14/20  1436   PROBNP pg/mL 2,167.0*     Lab Results   Component Value Date    HGBA1C 6.50 (H) 01/06/2017     Lab Results   Component Value Date    TSH 2.250 06/14/2020     No results found for: PREGTESTUR, PREGSERUM, HCG,  HCGQUANT  Pain Management Panel     There is no flowsheet data to display.            ---------------------------------------------------------------------------------------------------------------------  Imaging Results (Last 7 Days)     Procedure Component Value Units Date/Time    MRI Lumbar Spine Without Contrast [[id number]] Collected:  06/14/20 1807     Updated:  06/14/20 1809    Narrative:       MRI Spine Lumbar WO    INDICATION:    Lumbar pain radiating to the right leg with urinary incontinence    TECHNIQUE:   MRI of the lumbar spine without IV contrast.    COMPARISON:    None available.    FINDINGS:  Alignment is satisfactory. The lumbar discs show mild degenerative change without significant bulging or herniation. Mild concentric disc bulging is seen all levels. Posterior facet hypertrophy is noted to a moderate degree at L1-2 and L2-3 and to a mild  degree at L3-4. The lower lumbar facets are unremarkable.    The conus is normal. There is no evidence of marrow edema or paraspinous mass. There is an infrarenal abdominal aortic aneurysm that was described on the CT report.      Impression:       Mild degenerative disc disease with mild to moderate multilevel facet arthropathy. No evidence of lesion or compression of the cauda equina.    Signer Name: Merlin Ceballos MD   Signed: 6/14/2020 6:07 PM   Workstation Name: [id number]    [facility]    CT Abdomen Pelvis Without Contrast [[id number]] Collected:  06/14/20 1528     Updated:  06/14/20 1530    Narrative:       CT Abdomen Pelvis WO    INDICATION:   Back pain with urinary incontinence beginning 2-3 weeks ago    TECHNIQUE:   CT of the abdomen and pelvis without IV contrast. Coronal and sagittal reconstructions were obtained.  Radiation dose reduction techniques included automated exposure control or exposure modulation based on body size. Count of known CT and cardiac nuc  med studies performed in previous 12 months: 0.     COMPARISON:    None available.    FINDINGS:  Abdomen: The liver, spleen and pancreas are normal in size. Multiple small gallstones are seen in the gallbladder. No adrenal masses are noted. Both kidneys show severe hydronephrosis. There is perinephric edema. The ureters are dilated down to the  bladder which is markedly dilated. No retroperitoneal adenopathy. No distended bowel loops. There is an infrarenal abdominal aortic aneurysm. It measures 4.3 x 4.6 cm in transverse diameter and extends over a length of approximately 5 cm.    Pelvis: Markedly distended bladder with a smooth contour. No evidence of adenopathy, mass or fluid collection. Normal appendix.      Impression:       Marked distention of the bladder is noted with bilateral hydronephrosis consistent with urinary retention. Also noted is cholelithiasis. 4.6 cm abdominal aortic aneurysm.          Signer Name: Merlin Ceballos MD   Signed: 6/14/2020 3:28 PM   Workstation Name: [facility]    [facility]    CT Chest Without Contrast [[id number]] Collected:  06/14/20 1526     Updated:  06/14/20 1528    Narrative:       CT Chest WO    INDICATION:   Weakness and back pain beginning 2-3 weeks ago    TECHNIQUE:   CT of the thorax without IV contrast. Coronal and sagittal reconstructions were obtained.  Radiation dose reduction techniques included automated exposure control or exposure modulation based on body size. Count of known CT and cardiac nuc med studies  performed in previous 12 months: 0.     COMPARISON:   None available.    FINDINGS:  Chest images at mediastinal window show no adenopathy or effusions.    Chest images at lung window show both lungs to be fully expanded and clear.      Impression:       Negative CT of the chest.    Signer Name: Merlin Ceballos MD   Signed: 6/14/2020 3:26 PM   Workstation [facility]: [facility]    [facility]    CT Lumbar Spine Without Contrast [[id number]] Collected:  06/14/20 1524     Updated:   06/14/20 1526    Narrative:       CT Spine Lumbar WO    INDICATION:    Lumbar pain radiating to the right leg with urinary incontinence beginning 2-3 weeks ago    TECHNIQUE:   CT of the lumbar spine without IV contrast. Coronal and sagittal reconstructions were obtained.  Radiation dose reduction techniques included automated exposure control or exposure modulation based on body size. Count of known CT and cardiac nuc med  studies performed in previous 12 months: 0.     COMPARISON:    None available.    FINDINGS:  Alignment is satisfactory. Disc space heights are preserved.    At L2-3 there is concentric disc bulging with moderately severe central stenosis and mild facet hypertrophy. L3-4 there is moderate central stenosis from concentric disc bulging. L4-5 there is moderate central stenosis from concentric disc bulge.    No fractures or destructive bone lesions are seen. The facets show no erosions. No pars defects are noted.      Impression:       Generally mild degenerative disc and facet disease as described above with multilevel spondylosis secondary to combination of disc bulging and a congenitally narrow canal with no acute findings in the spine.    Signer Name: Merlin Ceballos MD   Signed: 6/14/2020 3:24 PM   Workstation Name: [id number]    [facility]    CT Head Without Contrast [[id number]] Collected:  06/14/20 1521     Updated:  06/14/20 1523    Narrative:       CT Head WO    HISTORY:   Weakness on arrival    TECHNIQUE:   Axial unenhanced head CT. Radiation dose reduction techniques included automated exposure control or exposure modulation based on body size. Count of known CT and cardiac nuc med studies performed in previous 12 months: 0.     Time of scan: 3:13 PM    COMPARISON:   None.    FINDINGS:   No intracranial hemorrhage, mass, or infarct. No hydrocephalus or extra-axial fluid collection. There are senescent changes, including volume loss and nonspecific white matter change,  but no acute abnormality is seen. The skull base, calvarium, and  extracranial soft tissues are normal.      Impression:       Senescent changes without acute abnormality.          Signer Name: Merlin Ceballos MD   Signed: 6/14/2020 3:21 PM   Workstation Name: [id number]    [facility]    XR Chest 1 View [[id number]] Resulted:  06/14/20 1518     Updated:  06/14/20 1519          Cultures:  Blood and urine cultures pending.     Last echocardiogram:  Results for orders placed during the hospital encounter of 06/29/18   Adult Transthoracic Echo Complete W/ Cont if Necessary Per Protocol    Narrative · Normal left ventricular cavity size and wall thickness noted. All left   ventricular wall segments contract normally.  · Estimated EF appears to be in the range of 56 - 60%.  · The aortic valve is structurally normal. No aortic valve regurgitation   is present. No aortic valve stenosis is present.  · The mitral valve is normal in structure. Mild mitral valve regurgitation   is present. No significant mitral valve stenosis is present.  · The tricuspid valve is normal. No tricuspid valve stenosis is present.   No tricuspid valve regurgitation is present.  · There is no evidence of pericardial effusion.  · Comments: LV systolic function is improved significantly since 1/5/2017   with LV ejection fraction improved from 35% then to about 55-60% now.        CHADS-VASc Risk Assessment            4       Total Score        1 CHF    1 Hypertension    2 Age >/= 75        Criteria that do not apply:    DM    PRIOR STROKE/TIA/THROMBO    Vascular Disease    Age 65-74    Sex: Female        I have personally reviewed the radiology images and read the final radiology report.  ---------------------------------------------------------------------------------------------------------------------  Assessment / Plan     Active Hospital Problems    Diagnosis POA   • Obstructive uropathy [N13.9] Yes        ASSESSMENT/PLAN:  · Sepsis present on admission and likely 2/2 UTI: Patient presents with tachycardia and tachypnea.  C-RP 9.67, WBC 13.07 and lactate 0.6.  Urinalysis indicative of UTI with large leukocytes, WBC too numerous to count 2+ bacteria.  Blood and urine cultures pending.  CT of the chest unremarkable.  CT of the abdomen pelvis without contrast shows hydronephrosis, per radiology. IV Rocephin ordered. Will continue to monitor with AM CBC, CMP and C-RP.     · UTI with hydronephrosis: Continue to treat as outlined above. Daily weights and strict I's and O's ordered. CT abdomen notes distention of the bladder with bilateral hydronephrosis consistent with urinary retention. Inpatient urology consult placed.     · Phimosis: Patient presents with complaints of painful and erythematous penile head.  Patient appears to have phimosis.  Inpatient urology consult placed.  Mycolog topical ordered.    · Acute Renal Failure: Creatinine 4.32, BUN 67 and eGFR 13. Baseline creatinine appears to be around 0.9-1.2. Continuous IVF ordered. Inpatient nephrology consult placed.     · Hyperkalemia: Potassium 6.3 Oral Kayexalate and IV Regular insulin given in ED. Patient has not produced a bowel movement. Repeat potassium level ordered. AM CMP ordered. Continuous cardiac monitoring ordered.      · Hx of Atrial Fibrillation: Patient presents with a history of atrial fibrillation.  EKG shows A. fib with RVR rate 101.  IV Cardizem given in ED.  Continuous cardiac monitoring ordered.  We will continue home Xarelto. [id number] at least 4.     · Systolic Heart Failure: Patient presents with a history of systolic heart failure.  Most recent echocardiogram 6/29/2018 shows EF 56 to 60%.  Transthoracic echocardiogram ordered in a.m.  Patient appears euvolemic on examination.  proBNP 2167.0.  Will repeat proBNP in a.m.  Daily weights and strict I's and O's ordered.    · Nonischemic Cardiomyopathy: Appears to be compensated.   Transthoracic echocardiogram ordered in a.m.    · COPD without acute exacerbation: Supplemental oxygen ordered to be titrated for saturations greater than 90%.  Encourage incentive spirometer. Continuous cardiac and pulse ox monitoring ordered.    · Hypertension: Continue to monitor vital signs per protocol.  We will continue home antihypertensive regimen, with holding parameters, once available from pharmacy.    · Degenerative disk disease: Patient presented to emergency department with complaints of low back pain as well as pain radiating down the right leg.  CT of the lumbar spine shows mild degenerative disc and facet disease with multilevel spondylosis secondary to combination of disc bulging and a congenitally narrow canal with no acute findings in the spine, per radiology.  Supportive care provided.    · Tobacco Abuse: Patient reports smoking approximately half pack cigarettes per day.  Nicotine patch ordered.    ----------  -DVT prophylaxis: Home Xarelto   -Diet: Regular cardiac renal  -Activity: Up with assistance  -Expected length of stay: INPATIENT status due to the need for care which can only be reasonably provided in an hospital setting such as aggressive/expedited ancillary services and/or consultation services, the necessity for IV medications, close physician monitoring and/or the possible need for procedures.  In such, I feel patient’s risk for adverse outcomes and need for care warrant INPATIENT evaluation and predict the patient’s care encounter to likely last beyond 2 midnights.    Patient is high risk due to sepsis secondary to UTI with hydronephrosis.     Ghada Sim, APRN  06/14/20  18:57

## 2020-06-14 NOTE — ED PROVIDER NOTES
Subjective   Patient is a 74-year-old male who presents emergency department for generalized weakness is been going on for the past 2 to 3 weeks.  The patient also complains of some low back pain with right-sided leg pain.  He states that he went to a [facility] center a few days ago received a steroid injection for his back and leg pain.  He mentions that he has had some urinary incontinence over the past few days.  He states that he lives at home with his son and does not walk around much.  He has a history of atrial fibrillation and is on Xarelto.  He also has a history of cardiomyopathy and CHF.  The patient states that he has had decreased appetite for the past 2 to 3 weeks.  And he states that his back pain is been going on for about 2 or 3 weeks as well.  He has had normal bowel movements and his last bowel movement was this morning.  He states that he has not had any bowel incontinence.  The patient denies any recent illness including any fever or chills.  He has not had any coughing or shortness of breath.  He denies any additional symptoms at this time.  The patient is a slightly poor historian.          Review of Systems   Constitutional: Positive for activity change, appetite change and fatigue. Negative for chills, diaphoresis and fever.   HENT: Negative for congestion, sinus pressure, sinus pain, sneezing, sore throat and tinnitus.    Eyes: Negative for discharge and itching.   Respiratory: Negative for apnea, cough, choking, chest tightness and shortness of breath.    Cardiovascular: Negative for chest pain, palpitations and leg swelling.   Gastrointestinal: Negative for abdominal distention, abdominal pain, diarrhea, nausea and vomiting.   Genitourinary: Negative for difficulty urinating and dysuria.        Urinary incontinence   Musculoskeletal: Negative for arthralgias and back pain.   Neurological: Negative for dizziness and light-headedness.   Psychiatric/Behavioral: Negative for agitation,  behavioral problems and confusion.       Past Medical History:   Diagnosis Date   • Atrial fibrillation (CMS/HCC)    • Cardiomyopathy (CMS/HCC)    • CHF (congestive heart failure) (CMS/HCC)    • COPD (chronic obstructive pulmonary disease) (CMS/HCC)    • Hypertension        No Known Allergies    No past surgical history on file.    Family History   Problem Relation Age of Onset   • No Known Problems Mother    • Heart disease Father    • No Known Problems Sister    • No Known Problems Brother    • No Known Problems Son    • No Known Problems Daughter    • No Known Problems Maternal Grandmother    • No Known Problems Maternal Grandfather    • No Known Problems Paternal Grandmother    • No Known Problems Paternal Grandfather    • No Known Problems Cousin    • Rheum arthritis Neg Hx    • Osteoarthritis Neg Hx    • Asthma Neg Hx    • Diabetes Neg Hx    • Heart failure Neg Hx    • Hyperlipidemia Neg Hx    • Hypertension Neg Hx    • Migraines Neg Hx    • Rashes / Skin problems Neg Hx    • Seizures Neg Hx    • Stroke Neg Hx    • Thyroid disease Neg Hx        Social History     Socioeconomic History   • Marital status:      Spouse name: Not on file   • Number of children: Not on file   • Years of education: Not on file   • Highest education level: Not on file   Tobacco Use   • Smoking status: Current Every Day Smoker     Packs/day: 1.00     Years: 59.00     Pack years: 59.00     Types: Cigarettes   • Smokeless tobacco: Never Used   Substance and Sexual Activity   • Alcohol use: No   • Drug use: No   • Sexual activity: Defer           Objective   Physical Exam   Constitutional: He is oriented to person, place, and time. He appears well-developed and well-nourished. No distress.   Patient is morbidly obese patient is generally unkempt   HENT:   Head: Normocephalic and atraumatic.   Right Ear: External ear normal.   Left Ear: External ear normal.   Nose: Nose normal.   Mouth/Throat: Oropharynx is clear and moist. No  oropharyngeal exudate.   Eyes: Pupils are equal, round, and reactive to light. Conjunctivae and EOM are normal. Right eye exhibits no discharge. Left eye exhibits no discharge. No scleral icterus.   Neck: Normal range of motion. Neck supple. No JVD present. No tracheal deviation present. No thyromegaly present.   Cardiovascular: Exam reveals no gallop and no friction rub.   No murmur heard.  Tachycardia with irregularly irregular rhythm   Pulmonary/Chest: Effort normal and breath sounds normal. No stridor. No respiratory distress. He has no wheezes. He has no rales.   Decreased breath sounds secondary to obesity   Abdominal: Soft. Bowel sounds are normal. He exhibits distension. He exhibits no mass. There is no tenderness. There is no guarding.   Genitourinary:   Genitourinary Comments: Rectal exam is normal with normal rectal tone.   Musculoskeletal: He exhibits edema. He exhibits no tenderness or deformity.   Lymphadenopathy:     He has no cervical adenopathy.   Neurological: He is alert and oriented to person, place, and time. No cranial nerve deficit. Coordination normal.   Skin: Skin is warm. Capillary refill takes less than 2 seconds. No rash noted. He is not diaphoretic. No erythema. No pallor.   Candidal infection of bilateral groin region, and sacral region   Psychiatric: He has a normal mood and affect. His behavior is normal.   Nursing note and vitals reviewed.      Procedures           ED Course  ED Course as of Jun 22 2138   [place] Jun 14, 2020   1608 Dr. Rios placed sommer cath at bedside 2500 cc urine drained    [EG]   1824 Patient now having BRB from catheter    [EG]   1828 Spoke to Dr. Rosales who recommends 100cc per hour normal saline and repeat BMP    [EG]      ED Course User Index  [EG] Brenda Rangel,                                            MDM  Number of Diagnoses or Management Options  Acute renal failure, unspecified acute renal failure type (CMS/HCC): new and requires workup  Atrial  fibrillation with rapid ventricular response (CMS/HCC): new and requires workup  Hydronephrosis, unspecified hydronephrosis type: new and requires workup  Urinary retention: new and requires workup     Amount and/or Complexity of Data Reviewed  Clinical lab tests: ordered and reviewed  Tests in the radiology section of CPT®: ordered and reviewed  Tests in the medicine section of CPT®: ordered and reviewed  Discuss the patient with other providers: yes  Independent visualization of images, tracings, or specimens: yes    Risk of Complications, Morbidity, and/or Mortality  Presenting problems: high  Diagnostic procedures: high  Management options: high    Patient Progress  Patient progress: stable      Final diagnoses:   Acute renal failure, unspecified acute renal failure type (CMS/HCC)   Hydronephrosis, unspecified hydronephrosis type   Urinary retention   Atrial fibrillation with rapid ventricular response (CMS/HCC)            Brenda Rangel DO  06/14/20 1821       Brenda Rangel DO  06/22/20 2132

## 2020-06-14 NOTE — ED NOTES
Patient was trying to provide a urine sample at this time. He was unable too earlier.      Rohini Paulino  06/14/20 1525

## 2020-06-14 NOTE — ED NOTES
Kerline Sim; patients daughter [phone] to be contacted for anything.      Khushbu Sim RN  06/14/20 5030

## 2020-06-15 ENCOUNTER — APPOINTMENT (OUTPATIENT)
Dept: CARDIOLOGY | Facility: HOSPITAL | Age: 75
End: 2020-06-15

## 2020-06-15 ENCOUNTER — APPOINTMENT (OUTPATIENT)
Dept: ULTRASOUND IMAGING | Facility: HOSPITAL | Age: 75
End: 2020-06-15

## 2020-06-15 LAB
ALBUMIN SERPL-MCNC: 2.61 G/DL (ref 3.5–5.2)
ALBUMIN/GLOB SERPL: 0.7 G/DL
ALP SERPL-CCNC: 77 U/L (ref 39–117)
ALT SERPL W P-5'-P-CCNC: 12 U/L (ref 1–41)
ANION GAP SERPL CALCULATED.3IONS-SCNC: 10.7 MMOL/L (ref 5–15)
AST SERPL-CCNC: 8 U/L (ref 1–40)
BACTERIA SPEC AEROBE CULT: NO GROWTH
BH CV ECHO MEAS - % IVS THICK: 10.3 %
BH CV ECHO MEAS - % LVPW THICK: 23 %
BH CV ECHO MEAS - ACS: 1.9 CM
BH CV ECHO MEAS - AO MAX PG: 14.1 MMHG
BH CV ECHO MEAS - AO MEAN PG: 6 MMHG
BH CV ECHO MEAS - AO ROOT AREA (BSA CORRECTED): 1.6
BH CV ECHO MEAS - AO ROOT AREA: 10.8 CM^2
BH CV ECHO MEAS - AO ROOT DIAM: 3.7 CM
BH CV ECHO MEAS - AO V2 MAX: 188 CM/SEC
BH CV ECHO MEAS - AO V2 MEAN: 108 CM/SEC
BH CV ECHO MEAS - AO V2 VTI: 29.9 CM
BH CV ECHO MEAS - BSA(HAYCOCK): 2.5 M^2
BH CV ECHO MEAS - BSA: 2.4 M^2
BH CV ECHO MEAS - BZI_BMI: 39 KILOGRAMS/M^2
BH CV ECHO MEAS - BZI_METRIC_HEIGHT: 177.8 CM
BH CV ECHO MEAS - BZI_METRIC_WEIGHT: 123.4 KG
BH CV ECHO MEAS - EDV(CUBED): 136.2 ML
BH CV ECHO MEAS - EDV(MOD-SP4): 52.3 ML
BH CV ECHO MEAS - EDV(TEICH): 126.4 ML
BH CV ECHO MEAS - EF(CUBED): 52.7 %
BH CV ECHO MEAS - EF(MOD-SP4): 33.3 %
BH CV ECHO MEAS - EF(TEICH): 44.3 %
BH CV ECHO MEAS - ESV(CUBED): 64.5 ML
BH CV ECHO MEAS - ESV(MOD-SP4): 34.9 ML
BH CV ECHO MEAS - ESV(TEICH): 70.4 ML
BH CV ECHO MEAS - FS: 22.1 %
BH CV ECHO MEAS - IVS/LVPW: 0.98
BH CV ECHO MEAS - IVSD: 1.2 CM
BH CV ECHO MEAS - IVSS: 1.3 CM
BH CV ECHO MEAS - LA DIMENSION: 3.7 CM
BH CV ECHO MEAS - LA/AO: 1
BH CV ECHO MEAS - LV DIASTOLIC VOL/BSA (35-75): 22 ML/M^2
BH CV ECHO MEAS - LV MASS(C)D: 239.7 GRAMS
BH CV ECHO MEAS - LV MASS(C)DI: 100.8 GRAMS/M^2
BH CV ECHO MEAS - LV MASS(C)S: 205.1 GRAMS
BH CV ECHO MEAS - LV MASS(C)SI: 86.2 GRAMS/M^2
BH CV ECHO MEAS - LV SYSTOLIC VOL/BSA (12-30): 14.7 ML/M^2
BH CV ECHO MEAS - LVIDD: 5.1 CM
BH CV ECHO MEAS - LVIDS: 4 CM
BH CV ECHO MEAS - LVLD AP4: 6.3 CM
BH CV ECHO MEAS - LVLS AP4: 6 CM
BH CV ECHO MEAS - LVOT AREA (M): 5.3 CM^2
BH CV ECHO MEAS - LVOT AREA: 5.3 CM^2
BH CV ECHO MEAS - LVOT DIAM: 2.6 CM
BH CV ECHO MEAS - LVPWD: 1.2 CM
BH CV ECHO MEAS - LVPWS: 1.5 CM
BH CV ECHO MEAS - MV E MAX VEL: 136 CM/SEC
BH CV ECHO MEAS - PA ACC TIME: 0.13 SEC
BH CV ECHO MEAS - PA PR(ACCEL): 20.5 MMHG
BH CV ECHO MEAS - SI(AO): 135.2 ML/M^2
BH CV ECHO MEAS - SI(CUBED): 30.1 ML/M^2
BH CV ECHO MEAS - SI(MOD-SP4): 7.3 ML/M^2
BH CV ECHO MEAS - SI(TEICH): 23.5 ML/M^2
BH CV ECHO MEAS - SV(AO): 321.5 ML
BH CV ECHO MEAS - SV(CUBED): 71.7 ML
BH CV ECHO MEAS - SV(MOD-SP4): 17.4 ML
BH CV ECHO MEAS - SV(TEICH): 55.9 ML
BILIRUB SERPL-MCNC: 0.2 MG/DL (ref 0.2–1.2)
BUN BLD-MCNC: 44 MG/DL (ref 8–23)
BUN/CREAT SERPL: 17.7 (ref 7–25)
CALCIUM SPEC-SCNC: 9.2 MG/DL (ref 8.6–10.5)
CHLORIDE SERPL-SCNC: 111 MMOL/L (ref 98–107)
CO2 SERPL-SCNC: 16.3 MMOL/L (ref 22–29)
CREAT BLD-MCNC: 2.49 MG/DL (ref 0.76–1.27)
DEPRECATED RDW RBC AUTO: 57.8 FL (ref 37–54)
ERYTHROCYTE [DISTWIDTH] IN BLOOD BY AUTOMATED COUNT: 18.8 % (ref 12.3–15.4)
GFR SERPL CREATININE-BSD FRML MDRD: 25 ML/MIN/1.73
GLOBULIN UR ELPH-MCNC: 3.9 GM/DL
GLUCOSE BLD-MCNC: 91 MG/DL (ref 65–99)
HCT VFR BLD AUTO: 36.3 % (ref 37.5–51)
HGB BLD-MCNC: 10.8 G/DL (ref 13–17.7)
MAXIMAL PREDICTED HEART RATE: 144 BPM
MCH RBC QN AUTO: 25.2 PG (ref 26.6–33)
MCHC RBC AUTO-ENTMCNC: 29.8 G/DL (ref 31.5–35.7)
MCV RBC AUTO: 84.6 FL (ref 79–97)
PLATELET # BLD AUTO: 208 10*3/MM3 (ref 140–450)
PMV BLD AUTO: 10.2 FL (ref 6–12)
POTASSIUM BLD-SCNC: 4.8 MMOL/L (ref 3.5–5.2)
PROT SERPL-MCNC: 6.5 G/DL (ref 6–8.5)
PSA SERPL-MCNC: 19.1 NG/ML (ref 0–4)
RBC # BLD AUTO: 4.29 10*6/MM3 (ref 4.14–5.8)
SODIUM BLD-SCNC: 138 MMOL/L (ref 136–145)
STRESS TARGET HR: 122 BPM
WBC NRBC COR # BLD: 7.92 10*3/MM3 (ref 3.4–10.8)

## 2020-06-15 PROCEDURE — 93923 UPR/LXTR ART STDY 3+ LVLS: CPT

## 2020-06-15 PROCEDURE — 93306 TTE W/DOPPLER COMPLETE: CPT

## 2020-06-15 PROCEDURE — 99222 1ST HOSP IP/OBS MODERATE 55: CPT | Performed by: UROLOGY

## 2020-06-15 PROCEDURE — 93971 EXTREMITY STUDY: CPT

## 2020-06-15 PROCEDURE — 94799 UNLISTED PULMONARY SVC/PX: CPT

## 2020-06-15 PROCEDURE — 25010000002 CEFTRIAXONE PER 250 MG: Performed by: NURSE PRACTITIONER

## 2020-06-15 PROCEDURE — 93971 EXTREMITY STUDY: CPT | Performed by: RADIOLOGY

## 2020-06-15 PROCEDURE — 93923 UPR/LXTR ART STDY 3+ LVLS: CPT | Performed by: RADIOLOGY

## 2020-06-15 PROCEDURE — 85027 COMPLETE CBC AUTOMATED: CPT | Performed by: NURSE PRACTITIONER

## 2020-06-15 PROCEDURE — 84153 ASSAY OF PSA TOTAL: CPT | Performed by: UROLOGY

## 2020-06-15 PROCEDURE — 80053 COMPREHEN METABOLIC PANEL: CPT | Performed by: NURSE PRACTITIONER

## 2020-06-15 PROCEDURE — 99233 SBSQ HOSP IP/OBS HIGH 50: CPT | Performed by: HOSPITALIST

## 2020-06-15 PROCEDURE — 93306 TTE W/DOPPLER COMPLETE: CPT | Performed by: INTERNAL MEDICINE

## 2020-06-15 RX ORDER — SODIUM CHLORIDE 0.9 % (FLUSH) 0.9 %
10 SYRINGE (ML) INJECTION AS NEEDED
Status: DISCONTINUED | OUTPATIENT
Start: 2020-06-15 | End: 2020-06-16 | Stop reason: HOSPADM

## 2020-06-15 RX ORDER — TAMSULOSIN HYDROCHLORIDE 0.4 MG/1
0.4 CAPSULE ORAL DAILY
Status: DISCONTINUED | OUTPATIENT
Start: 2020-06-15 | End: 2020-06-16 | Stop reason: HOSPADM

## 2020-06-15 RX ORDER — SODIUM CHLORIDE 0.9 % (FLUSH) 0.9 %
10 SYRINGE (ML) INJECTION EVERY 12 HOURS SCHEDULED
Status: DISCONTINUED | OUTPATIENT
Start: 2020-06-15 | End: 2020-06-16 | Stop reason: HOSPADM

## 2020-06-15 RX ADMIN — SODIUM POLYSTYRENE SULFONATE 15 G: 1 POWDER ORAL; RECTAL at 00:34

## 2020-06-15 RX ADMIN — SODIUM CHLORIDE 2 G: 900 INJECTION INTRAVENOUS at 19:56

## 2020-06-15 RX ADMIN — NYSTATIN AND TRIAMCINOLONE ACETONIDE: 100000; 1 CREAM TOPICAL at 19:56

## 2020-06-15 RX ADMIN — SODIUM CHLORIDE 100 ML/HR: 9 INJECTION, SOLUTION INTRAVENOUS at 07:19

## 2020-06-15 RX ADMIN — METOPROLOL TARTRATE 25 MG: 25 TABLET, FILM COATED ORAL at 17:33

## 2020-06-15 RX ADMIN — NYSTATIN AND TRIAMCINOLONE ACETONIDE: 100000; 1 CREAM TOPICAL at 03:43

## 2020-06-15 RX ADMIN — SODIUM CHLORIDE 100 ML/HR: 9 INJECTION, SOLUTION INTRAVENOUS at 18:30

## 2020-06-15 RX ADMIN — TAMSULOSIN HYDROCHLORIDE 0.4 MG: 0.4 CAPSULE ORAL at 12:27

## 2020-06-15 RX ADMIN — NYSTATIN AND TRIAMCINOLONE ACETONIDE: 100000; 1 CREAM TOPICAL at 07:21

## 2020-06-15 RX ADMIN — METOPROLOL TARTRATE 25 MG: 25 TABLET, FILM COATED ORAL at 19:55

## 2020-06-15 RX ADMIN — LACTULOSE 10 G: 10 SOLUTION ORAL at 00:05

## 2020-06-15 RX ADMIN — ACETAMINOPHEN 650 MG: 325 TABLET ORAL at 00:05

## 2020-06-15 NOTE — PLAN OF CARE
"Pt currently describes pain in heis rt calf as like it is a \"knot\". Arterial ultrasound ordered to rule out dvt. Monitor urine for blood. Treat pt pain. Initiate plan of care.   "

## 2020-06-15 NOTE — CONSULTS
Name:  Larry Kehr  :  1945    DATE OF ADMISSION  2020    DATE OF CONSULT  6/15/2020     REFERRING PHYSICIAN  * No referring provider recorded for this case *    PRIMARY CARE PHYSICIAN  Buster Redd MD    REASON FOR CONSULT  Urinary retention    CHIEF COMPLAINT  Chief Complaint   Patient presents with   • Weakness - Generalized       HISTORY OF PRESENT ILLNESS:   Larry Kehr is a 74 y.o. male who presented to [facility] emergency department on 2020 with complaints of generalized weakness which has been present for the last 2 to 3 weeks.  Along with generalized weakness he also complains of decreased appetite and urinary incontinence.  He reports that he has had regular bowel movements and denies any bowel incontinence.  He reports that he has been having back pain intermittently for approximately 1 year.  He reports that he was seen 1 to 2 weeks ago in and urgent care clinic and was given a steroid injection to ease back and right leg pain.  Mr. Kehr reports that he has since been experiencing difficulty starting and stopping the flow of urine, dribbling urine post urination and has also been experiencing nocturnal enuresis.  He denies noting any foul odor of his urine, hematuria or dysuria.  He also denies any fever, recent infection, recent antibiotic use, cough, shortness of breath, chest pain, increased edema, abdominal pain, difficulty ambulating, dizziness, headaches, lightheadedness, seizures or syncopal episodes.  He does report smoking approximately 6 to 8 cigarettes/day and denies any alcohol or drug use.     Upon arrival to the ED, vital signs were temperature 98, pulse 105, respirations 18, blood pressure 110/80 and oxygen saturation 99% on room air.  Troponin T <0.010, proBNP 2167.0.  Glucose 99, sodium 136, potassium 6.3, CO2 15.5, chloride 109, creatinine 4.32, BUN 67, EGFR 13, TSH 2.25, C-RP 9.67, lactate 0.6, magnesium 2.3, WBC 13.07, hemoglobin 11.3, hematocrit  37.4.  Urinalysis shows negative glucose, negative ketones, large blood, negative nitrite, large leukocytes, WBC too numerous to count 2+ bacteria.  Blood and urine cultures pending.  CT of the head without contrast shows [place] changes without acute abnormality, per radiology.  CT of the lumbar spine without contrast shows generally mild degenerative disc and facet disease as described above with multilevel spondylosis secondary to combination of disc bulging and a congenitally narrow canal with no acute findings in the spine, per radiology.  CT of the abdomen pelvis without contrast shows marked distention of the bladder noted with bilateral hydronephrosis consistent with urinary retention.  Also noted is cholelithiasis.  4.6 cm meter abdominal aortic aneurysm, per radiology.  CT of the chest without contrast shows negative CT of the chest, per radiology.  Urology is being consulted because of the radiographic finding of bilateral hydronephrosis and urinary retention.  Patient has not seen a urologist in the past.  He has no prior history of voiding dysfunction.  But given the significant upper tract deterioration a catheter is indicated.  I would leave the catheter in and I will see him in the office for appropriate lower tract investigation thank you very much for the consult.      The above depicted image shows a massively dilated bladder bilateral hydronephrosis, and ectatic aortic aneurysm and small prostate.  Currently the catheters to gravity drainage  ---------------------------------------------------------------------------------------------------------------------       I saw Larry Kehr in their hospital room this morning.    PAST MEDICAL HISTORY  Past Medical History:   Diagnosis Date   • Atrial fibrillation (CMS/HCC)    • Cardiomyopathy (CMS/HCC)    • CHF (congestive heart failure) (CMS/HCC)    • COPD (chronic obstructive pulmonary disease) (CMS/HCC)    • Hypertension        PAST SURGICAL  HISTORY  No past surgical history on file.    SOCIAL HISTORY  Social History     Socioeconomic History   • Marital status:      Spouse name: Not on file   • Number of children: Not on file   • Years of education: Not on file   • Highest education level: Not on file   Tobacco Use   • Smoking status: Current Every Day Smoker     Packs/day: 1.00     Years: 59.00     Pack years: 59.00     Types: Cigarettes   • Smokeless tobacco: Never Used   Substance and Sexual Activity   • Alcohol use: No   • Drug use: No   • Sexual activity: Defer       FAMILY HISTORY  Family History   Problem Relation Age of Onset   • No Known Problems Mother    • Heart disease Father    • No Known Problems Sister    • No Known Problems Brother    • No Known Problems Son    • No Known Problems Daughter    • No Known Problems Maternal Grandmother    • No Known Problems Maternal Grandfather    • No Known Problems Paternal Grandmother    • No Known Problems Paternal Grandfather    • No Known Problems Cousin    • Rheum arthritis Neg Hx    • Osteoarthritis Neg Hx    • Asthma Neg Hx    • Diabetes Neg Hx    • Heart failure Neg Hx    • Hyperlipidemia Neg Hx    • Hypertension Neg Hx    • Migraines Neg Hx    • Rashes / Skin problems Neg Hx    • Seizures Neg Hx    • Stroke Neg Hx    • Thyroid disease Neg Hx        ALLERGIES  No Known Allergies    INPATIENT MEDICATIONS  Current Facility-Administered Medications   Medication Dose Route Frequency Provider Last Rate Last Dose   • cefTRIAXone (ROCEPHIN) 2 g/100 mL 0.9% NS VTB (MARIANO)  2 g Intravenous Q24H Ghada Sim APRN   2 g at 06/14/20 2343   • dilTIAZem (CARDIZEM) injection 10 mg  10 mg Intravenous Once Ghada Sim APRN   Stopped at 06/14/20 1921   • nicotine (NICODERM CQ) 7 MG/24HR patch 1 patch  1 patch Transdermal Q24H Ghada Sim APRN       • nitroglycerin (NITROSTAT) SL tablet 0.4 mg  0.4 mg Sublingual Q5 Min PRN Ghada Sim APRN       • nystatin-triamcinolone (MYCOLOG II)  "071265-1.1 UNIT/GM-% cream   Topical Q12H Lazaro Trujillo MD       • sodium chloride 0.9 % flush 10 mL  10 mL Intravenous PRN Lazaro Trujillo MD       • sodium chloride 0.9 % flush 10 mL  10 mL Intravenous Q12H Lazaro Trujillo MD       • sodium chloride 0.9 % flush 10 mL  10 mL Intravenous PRN Lazaro Trujillo MD       • sodium chloride 0.9 % infusion  100 mL/hr Intravenous Continuous Lazaro Trujillo  mL/hr at 06/15/20 0719 100 mL/hr at 06/15/20 0719       REVIEW OF SYSTEMS  CONSTITUTIONAL:  No fever, chills, night sweats or fatigue.  EYES:  No blurry vision, diplopia or other vision changes.  ENT:  No hearing loss, nosebleeds or sore throat.  CARDIOVASCULAR:  No palpitations, arrhythmia, syncopal episodes or edema.  PULMONARY:  No hemoptysis, wheezing, chronic cough or shortness of breath.  GASTROINTESTINAL:  No nausea or vomiting.  No constipation or diarrhea.  No abdominal pain.  GENITOURINARY:  No hematuria, kidney stones or frequent urination.  MUSCULOSKELETAL:  No joint or back pains.  INTEGUMENTARY: No rashes or pruritus.  ENDOCRINE:  No excessive thirst or hot flashes.  HEMATOLOGIC:  No history of free bleeding, spontaneous bleeding or clotting.  IMMUNOLOGIC:  No allergies or frequent infections.  NEUROLOGIC: No numbness, tingling, seizures or weakness.  PSYCHIATRIC:  No anxiety or depression.    PHYSICAL EXAMINATION    /67 (BP Location: Right arm, Patient Position: Lying)   Pulse 93   Temp 97.5 °F (36.4 °C) (Oral)   Resp 18   Ht 177.8 cm (70\")   Wt 124 kg (272 lb 11.2 oz)   SpO2 94%   BMI 39.13 kg/m²     GENERAL:  A well-developed, well-nourished, white male in no acute distress.  HEENT:  Pupils equally round and reactive to light.  Extraocular muscles intact.  CARDIOVASCULAR:  Regular rate and rhythm.  No murmurs, gallops or rubs.  LUNGS:  Clear to auscultation bilaterally.  ABDOMEN:  Soft, nontender, nondistended with positive bowel sounds.  EXTREMITIES:  No clubbing, cyanosis or edema " bilaterally.  SKIN:  No rashes or petechiae.  NEURO:  Cranial nerves grossly intact.  No focal deficits.  PSYCH:  Alert and oriented x3.    LABORATORY     WBC   Date Value Ref Range Status   06/15/2020 7.92 3.40 - 10.80 10*3/mm3 Final     RBC   Date Value Ref Range Status   06/15/2020 4.29 4.14 - 5.80 10*6/mm3 Final     Hemoglobin   Date Value Ref Range Status   06/15/2020 10.8 (L) 13.0 - 17.7 g/dL Final     Hematocrit   Date Value Ref Range Status   06/15/2020 36.3 (L) 37.5 - 51.0 % Final     MCV   Date Value Ref Range Status   06/15/2020 84.6 79.0 - 97.0 fL Final     MCH   Date Value Ref Range Status   06/15/2020 25.2 (L) 26.6 - 33.0 pg Final     MCHC   Date Value Ref Range Status   06/15/2020 29.8 (L) 31.5 - 35.7 g/dL Final     RDW   Date Value Ref Range Status   06/15/2020 18.8 (H) 12.3 - 15.4 % Final     RDW-SD   Date Value Ref Range Status   06/15/2020 57.8 (H) 37.0 - 54.0 fl Final     MPV   Date Value Ref Range Status   06/15/2020 10.2 6.0 - 12.0 fL Final     Platelets   Date Value Ref Range Status   06/15/2020 208 140 - 450 10*3/mm3 Final     Neutrophil %   Date Value Ref Range Status   06/14/2020 83.3 (H) 42.7 - 76.0 % Final     Lymphocyte %   Date Value Ref Range Status   06/14/2020 7.4 (L) 19.6 - 45.3 % Final     Monocyte %   Date Value Ref Range Status   06/14/2020 6.2 5.0 - 12.0 % Final     Eosinophil %   Date Value Ref Range Status   06/14/2020 1.8 0.3 - 6.2 % Final     Basophil %   Date Value Ref Range Status   06/14/2020 0.8 0.0 - 1.5 % Final     Immature Grans %   Date Value Ref Range Status   06/14/2020 0.5 0.0 - 0.5 % Final     Neutrophils, Absolute   Date Value Ref Range Status   06/14/2020 10.88 (H) 1.70 - 7.00 10*3/mm3 Final     Lymphocytes, Absolute   Date Value Ref Range Status   06/14/2020 0.97 0.70 - 3.10 10*3/mm3 Final     Monocytes, Absolute   Date Value Ref Range Status   06/14/2020 0.81 0.10 - 0.90 10*3/mm3 Final     Eosinophils, Absolute   Date Value Ref Range Status   06/14/2020 0.23  0.00 - 0.40 10*3/mm3 Final     Basophils, Absolute   Date Value Ref Range Status   06/14/2020 0.11 0.00 - 0.20 10*3/mm3 Final     Immature Grans, Absolute   Date Value Ref Range Status   06/14/2020 0.07 (H) 0.00 - 0.05 10*3/mm3 Final     nRBC   Date Value Ref Range Status   06/14/2020 0.0 0.0 - 0.2 /100 WBC Final       Glucose   Date Value Ref Range Status   06/15/2020 91 65 - 99 mg/dL Final     Sodium   Date Value Ref Range Status   06/15/2020 138 136 - 145 mmol/L Final     Potassium   Date Value Ref Range Status   06/15/2020 4.8 3.5 - 5.2 mmol/L Final     CO2   Date Value Ref Range Status   06/15/2020 16.3 (L) 22.0 - 29.0 mmol/L Final     Chloride   Date Value Ref Range Status   06/15/2020 111 (H) 98 - 107 mmol/L Final     Anion Gap   Date Value Ref Range Status   06/15/2020 10.7 5.0 - 15.0 mmol/L Final     Creatinine   Date Value Ref Range Status   06/15/2020 2.49 (H) 0.76 - 1.27 mg/dL Final     BUN   Date Value Ref Range Status   06/15/2020 44 (H) 8 - 23 mg/dL Final     BUN/Creatinine Ratio   Date Value Ref Range Status   06/15/2020 17.7 7.0 - 25.0 Final     Calcium   Date Value Ref Range Status   06/15/2020 9.2 8.6 - 10.5 mg/dL Final     eGFR Non  Amer   Date Value Ref Range Status   06/15/2020 25 (L) >60 mL/min/1.73 Final     Alkaline Phosphatase   Date Value Ref Range Status   06/15/2020 77 39 - 117 U/L Final     Total Protein   Date Value Ref Range Status   06/15/2020 6.5 6.0 - 8.5 g/dL Final     ALT (SGPT)   Date Value Ref Range Status   06/15/2020 12 1 - 41 U/L Final     AST (SGOT)   Date Value Ref Range Status   06/15/2020 8 1 - 40 U/L Final     Total Bilirubin   Date Value Ref Range Status   06/15/2020 0.2 0.2 - 1.2 mg/dL Final     Albumin   Date Value Ref Range Status   06/15/2020 2.61 (L) 3.50 - 5.20 g/dL Final     Globulin   Date Value Ref Range Status   06/15/2020 3.9 gm/dL Final       Magnesium   Date Value Ref Range Status   06/14/2020 2.3 1.6 - 2.4 mg/dL Final     No results found for: LDH,  URICACID     IMAGING  Imaging Results (Last 72 Hours)     Procedure Component Value Units Date/Time    XR Chest 1 View [[id number]] Collected:  06/14/20 1905     Updated:  06/14/20 1907    Narrative:       CR Chest 1 Vw    INDICATION:   74-year-old male with atrial fibrillation.     COMPARISON:    None available.    FINDINGS:  Single portable AP view(s) of the chest.  Cardiac silhouette is borderline in size. Unremarkable vascularity. Lower lung volumes with mild bronchovascular crowding and probable faint perihilar atelectasis. No effusion dense consolidation or pneumothorax.      Impression:         1. Borderline cardiac size. Improved appearance compared to the prior study.    Signer Name: Omar Christian MD   Signed: 6/14/2020 7:05 PM   Workstation Name: [email or web address]    [facility]    MRI Lumbar Spine Without Contrast [[id number]] Collected:  06/14/20 1807     Updated:  06/14/20 1809    Narrative:       MRI Spine Lumbar WO    INDICATION:    Lumbar pain radiating to the right leg with urinary incontinence    TECHNIQUE:   MRI of the lumbar spine without IV contrast.    COMPARISON:    None available.    FINDINGS:  Alignment is satisfactory. The lumbar discs show mild degenerative change without significant bulging or herniation. Mild concentric disc bulging is seen all levels. Posterior facet hypertrophy is noted to a moderate degree at L1-2 and L2-3 and to a mild  degree at L3-4. The lower lumbar facets are unremarkable.    The conus is normal. There is no evidence of marrow edema or paraspinous mass. There is an infrarenal abdominal aortic aneurysm that was described on the CT report.      Impression:       Mild degenerative disc disease with mild to moderate multilevel facet arthropathy. No evidence of lesion or compression of the cauda equina.    Signer Name: Merlin Ceballos MD   Signed: 6/14/2020 6:07 PM   Workstation Name: [id number]    [facility]    CT Abdomen  Pelvis Without Contrast [[id number]] Collected:  06/14/20 1528     Updated:  06/14/20 1530    Narrative:       CT Abdomen Pelvis WO    INDICATION:   Back pain with urinary incontinence beginning 2-3 weeks ago    TECHNIQUE:   CT of the abdomen and pelvis without IV contrast. Coronal and sagittal reconstructions were obtained.  Radiation dose reduction techniques included automated exposure control or exposure modulation based on body size. Count of known CT and cardiac nuc  med studies performed in previous 12 months: 0.     COMPARISON:   None available.    FINDINGS:  Abdomen: The liver, spleen and pancreas are normal in size. Multiple small gallstones are seen in the gallbladder. No adrenal masses are noted. Both kidneys show severe hydronephrosis. There is perinephric edema. The ureters are dilated down to the  bladder which is markedly dilated. No retroperitoneal adenopathy. No distended bowel loops. There is an infrarenal abdominal aortic aneurysm. It measures 4.3 x 4.6 cm in transverse diameter and extends over a length of approximately 5 cm.    Pelvis: Markedly distended bladder with a smooth contour. No evidence of adenopathy, mass or fluid collection. Normal appendix.      Impression:       Marked distention of the bladder is noted with bilateral hydronephrosis consistent with urinary retention. Also noted is cholelithiasis. 4.6 cm abdominal aortic aneurysm.          Signer Name: Merlin Ceballos MD   Signed: 6/14/2020 3:28 PM   Workstation Name: [id number]    [facility]    CT Chest Without Contrast [[id number]] Collected:  06/14/20 1526     Updated:  06/14/20 1528    Narrative:       CT Chest WO    INDICATION:   Weakness and back pain beginning 2-3 weeks ago    TECHNIQUE:   CT of the thorax without IV contrast. Coronal and sagittal reconstructions were obtained.  Radiation dose reduction techniques included automated exposure control or exposure modulation based on body size. Count of  known CT and cardiac nuc med studies  performed in previous 12 months: 0.     COMPARISON:   None available.    FINDINGS:  Chest images at mediastinal window show no adenopathy or effusions.    Chest images at lung window show both lungs to be fully expanded and clear.      Impression:       Negative CT of the chest.    Signer Name: Merlin Ceballos MD   Signed: 6/14/2020 3:26 PM   Workstation Name: [facility]    CT Lumbar Spine Without Contrast [[id number]] Collected:  06/14/20 1524     Updated:  06/14/20 1526    Narrative:       CT Spine Lumbar WO    INDICATION:    Lumbar pain radiating to the right leg with urinary incontinence beginning 2-3 weeks ago    TECHNIQUE:   CT of the lumbar spine without IV contrast. Coronal and sagittal reconstructions were obtained.  Radiation dose reduction techniques included automated exposure control or exposure modulation based on body size. Count of known CT and cardiac nuc med  studies performed in previous 12 months: 0.     COMPARISON:    None available.    FINDINGS:  Alignment is satisfactory. Disc space heights are preserved.    At L2-3 there is concentric disc bulging with moderately severe central stenosis and mild facet hypertrophy. L3-4 there is moderate central stenosis from concentric disc bulging. L4-5 there is moderate central stenosis from concentric disc bulge.    No fractures or destructive bone lesions are seen. The facets show no erosions. No pars defects are noted.      Impression:       Generally mild degenerative disc and facet disease as described above with multilevel spondylosis secondary to combination of disc bulging and a congenitally narrow canal with no acute findings in the spine.    Signer Name: Merlin Ceballos MD   Signed: 6/14/2020 3:24 PM   Workstation Name: [facility]    CT Head Without Contrast [[id number]] Collected:  06/14/20 1521     Updated:  06/14/20 1523    Narrative:        CT Head WO    HISTORY:   Weakness on arrival    TECHNIQUE:   Axial unenhanced head CT. Radiation dose reduction techniques included automated exposure control or exposure modulation based on body size. Count of known CT and cardiac nuc med studies performed in previous 12 months: 0.     Time of scan: 3:13 PM    COMPARISON:   None.    FINDINGS:   No intracranial hemorrhage, mass, or infarct. No hydrocephalus or extra-axial fluid collection. There are senescent changes, including volume loss and nonspecific white matter change, but no acute abnormality is seen. The skull base, calvarium, and  extracranial soft tissues are normal.      Impression:       Senescent changes without acute abnormality.          Signer Name: Merlin Ceballos MD   Signed: 6/14/2020 3:21 PM   Workstation Name: [id number]    [facility]          PATHOLOGY  * Cannot find OR log *    IMPRESSION AND PLAN  Larry Kehr is a 74 y.o., white male with:  1.-Acute urinary retention presumably secondary to BPH presented with significant upper tract deterioration.  The prostate does not appear dramatically enlarged.  I am going to recommend that he have a Malhotra catheter until is released and then be seen in the office for lower tract investigation    The patient was in agreement with these plans.    Thank you for asking us to participate in Larry Kehr's care.  We will continue to follow with you.  Please do not hesitate to call with any questions or concerns that you may have.    A total of 60 minutes were spent coordinating this patient’s care in clinic today; 30 minutes of which were face-to-face with the patient, reviewing medical history and counseling on the current treatment and followup plan.  All questions were answered to patient's satisfaction.       This document was signed by No name on file. Pepper 15, 2020 09:08

## 2020-06-15 NOTE — PROGRESS NOTES
"    [facility] HOSPITALIST PROGRESS NOTE     Patient Identification:  Name:  Larry Kehr  Age:  74 y.o.  Sex:  male  :  1945  MRN:  [id number]  Visit Number:  [id number]  Primary Care Provider:  Buster Redd MD    Length of stay:  1    Subjective: Patient seen and examined assisted by Yudy Pham RN.  Patient is currently having lunch, patient reports he feels much better, renal function improved, nephrology help appreciated.  Urology evaluation noted recommendation noted and appreciated.  Patient reports no constipation, coincidentally he reports his symptoms of difficulty urinating after getting \"injection \"right hip area for back pain.  MRI did not show abscess abnormality of the spine except for the facet joint arthropathy and DJD.  Currently patient has pinkish urine, off Xarelto for acute kidney injury, I did not start him on heparin due to hematuria.    Chief Complaint: Difficulty urinating.  ----------------------------------------------------------------------------------------------------------------------  Current Hospital Meds:    cefTRIAXone 2 g Intravenous Q24H   dilTIAZem 10 mg Intravenous Once   metoprolol tartrate 25 mg Oral Q12H   nicotine 1 patch Transdermal Q24H   nystatin-triamcinolone  Topical Q12H   sodium chloride 10 mL Intravenous Q12H   tamsulosin 0.4 mg Oral Daily       sodium chloride 100 mL/hr Last Rate: 100 mL/hr (06/15/20 0719)     ----------------------------------------------------------------------------------------------------------------------  Vital Signs:  Temp:  [97.5 °F (36.4 °C)-98.4 °F (36.9 °C)] 97.5 °F (36.4 °C)  Heart Rate:  [] 93  Resp:  [18-24] 18  BP: ()/(63-95) 114/67       Tele:       20  1421 06/14/20  2025 06/15/20  0500   Weight: 127 kg (280 lb) 124 kg (272 lb 11.2 oz) 124 kg (272 lb 11.2 oz)     Body mass index is 39.13 kg/m².    Intake/Output Summary (Last 24 hours) at 6/15/2020 1309  Last data filed at 6/15/2020 " 0948  Gross per 24 hour   Intake 1920.5 ml   Output 9400 ml   Net -7479.5 ml     Diet Regular; Renal, Cardiac  ----------------------------------------------------------------------------------------------------------------------  Physical exam:  General: Comfortable,awake, alert, oriented to self, place, and time, well-developed and well-nourished.  No respiratory distress.  Currently eating lunch.   Skin:  Skin is warm and dry. No rash noted. No pallor.    HENT:  Head:  Normocephalic and atraumatic.  Mouth:  Moist mucous membranes.    Eyes:  Conjunctivae and EOM are normal.  Pupils are equal, round, and reactive to light.  No scleral icterus.    Neck:  Neck supple.  No JVD present.  No hepatojugular reflux  Pulmonary/Chest:  No respiratory distress, no wheezes, no crackles, with normal breath sounds and good air movement.  Cardiovascular:  Normal rate, irregularly irregular normal heart sounds with no murmur.  Abdominal:  Soft.  Bowel sounds are normal.  No distension and no tenderness.   Extremities:  No edema, no tenderness, and no deformity.  No red or swollen joints anywhere.  Strong pulses in all 4 extremities with no clubbing, no cyanosis, no edema.  Neurological:  Motor strength equal no obvious deficit, sensory grossly intact.   No cranial nerve deficit.  No tongue deviation.  No facial droop.  No slurred speech.    Genitourinary: Malhotra catheter in place with pinkish urine  Back: No skin break  ----------------------------------------------------------------------------------------------------------------------  ----------------------------------------------------------------------------------------------------------------------  Results from last 7 days   Lab Units 06/14/20  1631 06/14/20  1436   CK TOTAL U/L  --  20   TROPONIN T ng/mL <0.010 <0.010     Results from last 7 days   Lab Units 06/15/20  0603 06/14/20  1444 06/14/20  1436   CRP mg/dL  --   --  9.67*   LACTATE mmol/L  --  0.6  --    WBC  10*3/mm3 7.92  --  13.07*   HEMOGLOBIN g/dL 10.8*  --  11.3*   HEMATOCRIT % 36.3*  --  37.4*   MCV fL 84.6  --  84.0   MCHC g/dL 29.8*  --  30.2*   PLATELETS 10*3/mm3 208  --  297         Results from last 7 days   Lab Units 06/15/20  0603 06/14/20  2130 06/14/20  1854 06/14/20  1436   SODIUM mmol/L 138  --  140 136   POTASSIUM mmol/L 4.8 5.3* 5.4* 6.3*   MAGNESIUM mg/dL  --   --   --  2.3   CHLORIDE mmol/L 111*  --  114* 109*   CO2 mmol/L 16.3*  --  16.1* 15.5*   BUN mg/dL 44*  --  62* 67*   CREATININE mg/dL 2.49*  --  3.74* 4.32*   EGFR IF NONAFRICN AM mL/min/1.73 25*  --  16* 13*   CALCIUM mg/dL 9.2  --  9.6 9.4   GLUCOSE mg/dL 91  --  86 99   ALBUMIN g/dL 2.61*  --   --  3.38*   BILIRUBIN mg/dL 0.2  --   --  0.3   ALK PHOS U/L 77  --   --  91   AST (SGOT) U/L 8  --   --  8   ALT (SGPT) U/L 12  --   --  16   Estimated Creatinine Clearance: 33.3 mL/min (A) (by C-G formula based on SCr of 2.49 mg/dL (H)).    No results found for: AMMONIA      No results found for: BLOODCX  Urine Culture   Date Value Ref Range Status   06/14/2020 No growth  Preliminary     No results found for: WOUNDCX  No results found for: STOOLCX    I have personally looked at the labs and they are summarized above.  ----------------------------------------------------------------------------------------------------------------------  Imaging Results (Last 24 Hours)     Procedure Component Value Units Date/Time    US Arterial Doppler Lower Extremity Bilateral [[id number]] Collected:  06/15/20 1231     Updated:  06/15/20 1234    Narrative:       EXAMINATION: US ARTERIAL DOPPLER LOWER EXTREMITY  BILATERAL-      CLINICAL INDICATION:     cramping/pain right calf, evaluate for PVD;  N17.9-Acute kidney failure, unspecified; N13.30-Unspecified  hydronephrosis; R33.9-Retention of urine, unspecified;  I48.91-Unspecified atrial fibrillation     COMPARISON: None.     Technique:  Multiple real time color Doppler images were obtained.       Stenosis  measurements if obtained, were performed by the NASCET or  similar method.        FINDINGS: Monophasic waveform patterns noted in the left popliteal and  posterior tibial artery. Monophasic waveform patterns noted in the right  posterior tibial artery. Flow was not demonstrated in the left  superficial femoral artery concerning for occlusion.       Impression:       Monophasic waveform patterns noted in the left popliteal  and posterior tibial artery. Monophasic waveform patterns noted in the  right posterior tibial artery. Flow was not demonstrated in the left  superficial femoral artery concerning for occlusion.     This report was finalized on 6/15/2020 12:32 PM by Dr. Serg Ríos MD.       US Venous Doppler Lower Extremity Right (duplex) [[id number]] Collected:  06/15/20 1210     Updated:  06/15/20 1212    Narrative:       US VENOUS DOPPLER LOWER EXTREMITY RIGHT (DUPLEX)-     REASON FOR EXAM:  right calf pain/cramping; N17.9-Acute kidney failure,  unspecified; N13.30-Unspecified hydronephrosis; R33.9-Retention of  urine, unspecified; I48.91-Unspecified atrial fibrillation     Multiple real-time images were obtained. The deep veins were well  demonstrated sonographically. There is good color doppler signal seen  filling the deep veins. They were completely compressed by the  ultrasound transducer. There was good spontaneous venous flow and  augmentation. There are no echoes seen along the deep veins to suggest  clot.          Impression:       No sonographic findings of DVT in the right lower extremity     This report was finalized on 6/15/2020 12:10 PM by Dr. Miguel Prasad II, MD.       XR Chest 1 View [[id number]] Collected:  06/14/20 1905     Updated:  06/14/20 1907    Narrative:       CR Chest 1 Vw    INDICATION:   74-year-old male with atrial fibrillation.     COMPARISON:    None available.    FINDINGS:  Single portable AP view(s) of the chest.  Cardiac silhouette is borderline in size. Unremarkable  vascularity. Lower lung volumes with mild bronchovascular crowding and probable faint perihilar atelectasis. No effusion dense consolidation or pneumothorax.      Impression:         1. Borderline cardiac size. Improved appearance compared to the prior study.    Signer Name: Omar Christian MD   Signed: 6/14/2020 7:05 PM   [facility]: [email or web address]    [facility]    MRI Lumbar Spine Without Contrast [[id number]] Collected:  06/14/20 1807     Updated:  06/14/20 1809    Narrative:       MRI Spine Lumbar WO    INDICATION:    Lumbar pain radiating to the right leg with urinary incontinence    TECHNIQUE:   MRI of the lumbar spine without IV contrast.    COMPARISON:    None available.    FINDINGS:  Alignment is satisfactory. The lumbar discs show mild degenerative change without significant bulging or herniation. Mild concentric disc bulging is seen all levels. Posterior facet hypertrophy is noted to a moderate degree at L1-2 and L2-3 and to a mild  degree at L3-4. The lower lumbar facets are unremarkable.    The conus is normal. There is no evidence of marrow edema or paraspinous mass. There is an infrarenal abdominal aortic aneurysm that was described on the CT report.      Impression:       Mild degenerative disc disease with mild to moderate multilevel facet arthropathy. No evidence of lesion or compression of the cauda equina.    Signer Name: Merlin Ceballos MD   Signed: 6/14/2020 6:07 PM   [facility]station [facility]: [email or web address]    [facility]    CT Abdomen Pelvis Without Contrast [[id number]] Collected:  06/14/20 1528     Updated:  06/14/20 1530    Narrative:       CT Abdomen Pelvis WO    INDICATION:   Back pain with urinary incontinence beginning 2-3 weeks ago    TECHNIQUE:   CT of the abdomen and pelvis without IV contrast. Coronal and sagittal reconstructions were obtained.  Radiation dose reduction techniques included automated exposure control or exposure modulation based  on body size. Count of known CT and cardiac nuc  med studies performed in previous 12 months: 0.     COMPARISON:   None available.    FINDINGS:  Abdomen: The liver, spleen and pancreas are normal in size. Multiple small gallstones are seen in the gallbladder. No adrenal masses are noted. Both kidneys show severe hydronephrosis. There is perinephric edema. The ureters are dilated down to the  bladder which is markedly dilated. No retroperitoneal adenopathy. No distended bowel loops. There is an infrarenal abdominal aortic aneurysm. It measures 4.3 x 4.6 cm in transverse diameter and extends over a length of approximately 5 cm.    Pelvis: Markedly distended bladder with a smooth contour. No evidence of adenopathy, mass or fluid collection. Normal appendix.      Impression:       Marked distention of the bladder is noted with bilateral hydronephrosis consistent with urinary retention. Also noted is cholelithiasis. 4.6 cm abdominal aortic aneurysm.          Signer Name: Merlin Ceballos MD   Signed: 6/14/2020 3:28 PM   Workstation Name: [email or web address]    [facility]iology [facility]    CT Chest Without Contrast [[id number]] Collected:  06/14/20 1526     Updated:  06/14/20 1528    Narrative:       CT Chest WO    INDICATION:   Weakness and back pain beginning 2-3 weeks ago    TECHNIQUE:   CT of the thorax without IV contrast. Coronal and sagittal reconstructions were obtained.  Radiation dose reduction techniques included automated exposure control or exposure modulation based on body size. Count of known CT and cardiac nuc med studies  performed in previous 12 months: 0.     COMPARISON:   None available.    FINDINGS:  Chest images at mediastinal window show no adenopathy or effusions.    Chest images at lung window show both lungs to be fully expanded and clear.      Impression:       Negative CT of the chest.    Signer Name: Merlin Ceballos MD   Signed: 6/14/2020 3:26 PM   Workstation Name: [email or web address]    [facility]    CT Lumbar Spine Without Contrast [[id number]] Collected:  06/14/20 1524     Updated:  06/14/20 1526    Narrative:       CT Spine Lumbar WO    INDICATION:    Lumbar pain radiating to the right leg with urinary incontinence beginning 2-3 weeks ago    TECHNIQUE:   CT of the lumbar spine without IV contrast. Coronal and sagittal reconstructions were obtained.  Radiation dose reduction techniques included automated exposure control or exposure modulation based on body size. Count of known CT and cardiac nuc med  studies performed in previous 12 months: 0.     COMPARISON:    None available.    FINDINGS:  Alignment is satisfactory. Disc space heights are preserved.    At L2-3 there is concentric disc bulging with moderately severe central stenosis and mild facet hypertrophy. L3-4 there is moderate central stenosis from concentric disc bulging. L4-5 there is moderate central stenosis from concentric disc bulge.    No fractures or destructive bone lesions are seen. The facets show no erosions. No pars defects are noted.      Impression:       Generally mild degenerative disc and facet disease as described above with multilevel spondylosis secondary to combination of disc bulging and a congenitally narrow canal with no acute findings in the spine.    Signer Name: Merlin Ceballos MD   Signed: 6/14/2020 3:24 PM   Workstation Name: [email or web address]    [facility]    CT Head Without Contrast [[id number]] Collected:  06/14/20 1521     Updated:  06/14/20 1523    Narrative:       CT Head WO    HISTORY:   Weakness on arrival    TECHNIQUE:   Axial unenhanced head CT. Radiation dose reduction techniques included automated exposure control or exposure modulation based on body size. Count of known CT and cardiac nuc med studies performed in previous 12 months: 0.     Time of scan: 3:13 PM    COMPARISON:   None.    FINDINGS:   No intracranial hemorrhage, mass, or infarct. No hydrocephalus or  extra-axial fluid collection. There are senescent changes, including volume loss and nonspecific white matter change, but no acute abnormality is seen. The skull base, calvarium, and  extracranial soft tissues are normal.      Impression:       Senescent changes without acute abnormality.          Signer Name: Merlin Ceballos MD   Signed: 6/14/2020 3:21 PM   Workstation Name: [email or web address]    [facility]        ----------------------------------------------------------------------------------------------------------------------  Assessment and Plan:  -Acute kidney injury likely secondary to obstructive uropathy  -Acute urinary retention likely secondary to BPH  -Gross hematuria likely posttraumatic and from bladder distention in the setting of anticoagulation  -Chronic persistent atrial fibrillation  -4.3 x 4.6 infrarenal abdominal aortic aneurysm  -Facet joint arthropathy of the spine  -Bilateral hydronephrosis secondary to obstructive uropathy  -Acute hyperkalemia resolved  -Obesity with BMI of 39  -History of systolic CHF  -Sepsis present on admission secondary to UTI      Continue antibiotic, judicious use of IV fluids and monitoring eyes and nose, watch for cardiac decompensation, continue with Flomax also started by urology.  With regards to atrial fibrillation on chronic anticoagulation, case discussed with patient due to hematuria, will hold off on heparin for anticoagulation due to risk of bleeding.  It will be restarted as soon as hematuria resolves.  He will keep his Malhotra catheter when discharged as per urologist recommendation.    Plan discussed with patient together with Yudy Pham and agree with plan.    Total time spent on this encounter is more than 30 minutes.    Lourdes Nicole MD  06/15/20  13:09

## 2020-06-15 NOTE — NURSING NOTE
Was notified by telemetry tech of pt's elevated heart rate in the 130s. Upon assessment pt was sitting up in bed eating breakfast and telemetry box was reading A. Fib heart rate 101. Pt states he had been moving in bed and felt like it had elevated. No s/s of distress noted at this time. Will continue to monitor pt.

## 2020-06-15 NOTE — NURSING NOTE
Was notified by telemetry tech, pt's heart rate elevated in the 130s. Was in the room assessing pt and he had been moving around in the bed. Upon evaluation of telemetry box, pt's heart rate had returned to 98. No s/s of distress noted at this time. Will continue to monitor pt.

## 2020-06-15 NOTE — PROGRESS NOTES
Discharge Planning Assessment   [place]     Patient Name: Larry Kehr  MRN: [id number]  Today's Date: 6/15/2020    Admit Date: 6/14/2020    Discharge Needs Assessment     Row Name 06/15/20 1312       Living Environment    Lives With  child(taco), adult    Name(s) of Who Lives With Patient  Lives at home with son.      Current Living Arrangements  home/apartment/condo    Primary Care Provided by  self    Provides Primary Care For  no one    Family Caregiver if Needed  child(taco), adult    Quality of Family Relationships  helpful;involved;supportive    Able to Return to Prior Arrangements  yes       Transition Planning    Patient/Family Anticipates Transition to  home with family    Transportation Anticipated  family or friend will provide       Discharge Needs Assessment    Equipment Currently Used at Home  none        Discharge Plan     Row Name 06/15/20 1314       Plan    Plan  Pt admitted on 6/14/20.  SS [place] [facility] [place].  SS spoke with pt on this date.  Pt lives at home with son and plans to return home at discharge.  Pt currently does not utilize home health or DME.  Pt's PCP is ANDERSON Redd.  SS will follow.             Demographic Summary     Row Name 06/15/20 1312       General Information    Admission Type  inpatient    Referral Source  nursing    Reason for Consult  discharge planning    Preferred Language  English        Sarita Ventura, BSW

## 2020-06-15 NOTE — CONSULTS
Nephrology Consult Note    Referring Provider: Ghada Sim  Reason for Consultation: ANGEL    Subjective       History of present illness:  Larry Kehr is a 74 y.o. male who presented to [facility] emergency department with chief complaint of generalized body weakness, fatigue that was progressively worsening over past few wks. Pt is known case of cardiomyopathy with CHF, AF, Essential hypertension.   He also mentioned having LUTS including hesitancy, urgency, weak stream and urinary dribbling for past many months. Pt denied any chest pain, shortness of breath, new rash or recent infection  Pt also denied any history of Chronic NSAIDS use. Patient denies hematuria, dysuria, difficulty passing urine. No prior history of renal stones. No family history of renal disease    In the ED, he had elevated Cr, USG showed b/l hydronephrosis, and on folley's cath insertion drained about 2L of urine.     History  Past Medical History:   Diagnosis Date   • Atrial fibrillation (CMS/HCC)    • Cardiomyopathy (CMS/HCC)    • CHF (congestive heart failure) (CMS/HCC)    • COPD (chronic obstructive pulmonary disease) (CMS/HCC)    • Hypertension    , No past surgical history on file., Family History   Problem Relation Age of Onset   • No Known Problems Mother    • Heart disease Father    • No Known Problems Sister    • No Known Problems Brother    • No Known Problems Son    • No Known Problems Daughter    • No Known Problems Maternal Grandmother    • No Known Problems Maternal Grandfather    • No Known Problems Paternal Grandmother    • No Known Problems Paternal Grandfather    • No Known Problems Cousin    • Rheum arthritis Neg Hx    • Osteoarthritis Neg Hx    • Asthma Neg Hx    • Diabetes Neg Hx    • Heart failure Neg Hx    • Hyperlipidemia Neg Hx    • Hypertension Neg Hx    • Migraines Neg Hx    • Rashes / Skin problems Neg Hx    • Seizures Neg Hx    • Stroke Neg Hx    • Thyroid disease Neg Hx    , Social History      Tobacco Use   • Smoking status: Current Every Day Smoker     Packs/day: 1.00     Years: 59.00     Pack years: 59.00     Types: Cigarettes   • Smokeless tobacco: Never Used   Substance Use Topics   • Alcohol use: No   • Drug use: No   , Medications Prior to Admission   Medication Sig Dispense Refill Last Dose   • rivaroxaban (XARELTO) 20 MG tablet Take 1 tablet by mouth Daily With Dinner. 30 tablet 5 6/13/2020 at Unknown time   • sacubitril-valsartan (ENTRESTO) 24-26 MG tablet Take 1 tablet by mouth 2 (Two) Times a Day. 28 tablet 0 6/14/2020 at AM   • spironolactone (ALDACTONE) 25 MG tablet TAKE 1 TABLET BY MOUTH DAILY. 90 tablet 0 6/14/2020 at Unknown time   , Scheduled Meds:    cefTRIAXone 2 g Intravenous Q24H   dilTIAZem 10 mg Intravenous Once   nicotine 1 patch Transdermal Q24H   nystatin-triamcinolone  Topical Q12H   sodium chloride 10 mL Intravenous Q12H   , Continuous Infusions:    sodium chloride 100 mL/hr Last Rate: 100 mL/hr (06/15/20 0719)   , PRN Meds:  nitroglycerin  •  [COMPLETED] Insert peripheral IV **AND** sodium chloride  •  sodium chloride and Allergies:  Patient has no known allergies.    Review of Systems  More than 10 point review of systems was done. Pertinent items are noted in HPI, all other systems reviewed and negative    Objective     Vital Signs  Temp:  [97.5 °F (36.4 °C)-98.4 °F (36.9 °C)] 97.5 °F (36.4 °C)  Heart Rate:  [] 93  Resp:  [18-24] 18  BP: ()/(63-95) 114/67    No intake/output data recorded.  I/O last 3 completed shifts:  In: 1320.5 [P.O.:550; I.V.:720.5; IV Piggyback:50]  Out: 6850 [Urine:6850]    Physical Examination:  General Appearance:not in acute distress  Neck: No adenopathy, suppple, no carotid bruit and no JVD  Lungs: Clear to auscultation, respirations regular and unlabored  Heart: Regular rhythm & normal rate, normal S1, S2, no murmur, no gallop, no rub   Abdomen: Normal bowel sounds, no masses and soft non-tender  Rectal: Deferred  Extremities:  Moves extremities well, no redness and no edema  Pulses: Palpable and equal bilaterally  Neurologic: Orientated to person, place, time, grossly no focal deficitis    Laboratory Data :      WBC WBC   Date Value Ref Range Status   06/15/2020 7.92 3.40 - 10.80 10*3/mm3 Final   06/14/2020 13.07 (H) 3.40 - 10.80 10*3/mm3 Final      HGB Hemoglobin   Date Value Ref Range Status   06/15/2020 10.8 (L) 13.0 - 17.7 g/dL Final   06/14/2020 11.3 (L) 13.0 - 17.7 g/dL Final      HCT Hematocrit   Date Value Ref Range Status   06/15/2020 36.3 (L) 37.5 - 51.0 % Final   06/14/2020 37.4 (L) 37.5 - 51.0 % Final      Platlets No results found for: LABPLAT   MCV MCV   Date Value Ref Range Status   06/15/2020 84.6 79.0 - 97.0 fL Final   06/14/2020 84.0 79.0 - 97.0 fL Final          Sodium Sodium   Date Value Ref Range Status   06/15/2020 138 136 - 145 mmol/L Final   06/14/2020 140 136 - 145 mmol/L Final   06/14/2020 136 136 - 145 mmol/L Final      Potassium Potassium   Date Value Ref Range Status   06/15/2020 4.8 3.5 - 5.2 mmol/L Final   06/14/2020 5.3 (H) 3.5 - 5.2 mmol/L Final   06/14/2020 5.4 (H) 3.5 - 5.2 mmol/L Final   06/14/2020 6.3 (C) 3.5 - 5.2 mmol/L Final      Chloride Chloride   Date Value Ref Range Status   06/15/2020 111 (H) 98 - 107 mmol/L Final   06/14/2020 114 (H) 98 - 107 mmol/L Final   06/14/2020 109 (H) 98 - 107 mmol/L Final      CO2 CO2   Date Value Ref Range Status   06/15/2020 16.3 (L) 22.0 - 29.0 mmol/L Final   06/14/2020 16.1 (L) 22.0 - 29.0 mmol/L Final   06/14/2020 15.5 (L) 22.0 - 29.0 mmol/L Final      BUN BUN   Date Value Ref Range Status   06/15/2020 44 (H) 8 - 23 mg/dL Final   06/14/2020 62 (H) 8 - 23 mg/dL Final   06/14/2020 67 (H) 8 - 23 mg/dL Final      Creatinine Creatinine   Date Value Ref Range Status   06/15/2020 2.49 (H) 0.76 - 1.27 mg/dL Final   06/14/2020 3.74 (H) 0.76 - 1.27 mg/dL Final   06/14/2020 4.32 (H) 0.76 - 1.27 mg/dL Final      Calcium Calcium   Date Value Ref Range Status   06/15/2020 9.2  8.6 - 10.5 mg/dL Final   06/14/2020 9.6 8.6 - 10.5 mg/dL Final   06/14/2020 9.4 8.6 - 10.5 mg/dL Final      PO4 No results found for: CAPO4   Albumin Albumin   Date Value Ref Range Status   06/15/2020 2.61 (L) 3.50 - 5.20 g/dL Final   06/14/2020 3.38 (L) 3.50 - 5.20 g/dL Final      Magnesium Magnesium   Date Value Ref Range Status   06/14/2020 2.3 1.6 - 2.4 mg/dL Final      Uric Acid No results found for: URICACID     Radiology results :     Imaging Results (Last 72 Hours)     Procedure Component Value Units Date/Time    XR Chest 1 View [[id number]] Collected:  06/14/20 1905     Updated:  06/14/20 1907    Narrative:       CR Chest 1 Vw    INDICATION:   74-year-old male with atrial fibrillation.     COMPARISON:    None available.    FINDINGS:  Single portable AP view(s) of the chest.  Cardiac silhouette is borderline in size. Unremarkable vascularity. Lower lung volumes with mild bronchovascular crowding and probable faint perihilar atelectasis. No effusion dense consolidation or pneumothorax.      Impression:         1. Borderline cardiac size. Improved appearance compared to the prior study.    Signer Name: Omar Christian MD   Signed: 6/14/2020 7:05 PM   Workstation Name: [email or web address]    [facility]    MRI Lumbar Spine Without Contrast [[id number]] Collected:  06/14/20 1807     Updated:  06/14/20 1809    Narrative:       MRI Spine Lumbar WO    INDICATION:    Lumbar pain radiating to the right leg with urinary incontinence    TECHNIQUE:   MRI of the lumbar spine without IV contrast.    COMPARISON:    None available.    FINDINGS:  Alignment is satisfactory. The lumbar discs show mild degenerative change without significant bulging or herniation. Mild concentric disc bulging is seen all levels. Posterior facet hypertrophy is noted to a moderate degree at L1-2 and L2-3 and to a mild  degree at L3-4. The lower lumbar facets are unremarkable.    The conus is normal. There is no evidence of marrow  edema or paraspinous mass. There is an infrarenal abdominal aortic aneurysm that was described on the CT report.      Impression:       Mild degenerative disc disease with mild to moderate multilevel facet arthropathy. No evidence of lesion or compression of the cauda equina.    Signer Name: Merlin Ceballos MD   Signed: 6/14/2020 6:07 PM   Workstation Name: [facility]    [facility]    CT Abdomen Pelvis Without Contrast [[id number]] Collected:  06/14/20 1528     Updated:  06/14/20 1530    Narrative:       CT Abdomen Pelvis WO    INDICATION:   Back pain with urinary incontinence beginning 2-3 weeks ago    TECHNIQUE:   CT of the abdomen and pelvis without IV contrast. Coronal and sagittal reconstructions were obtained.  Radiation dose reduction techniques included automated exposure control or exposure modulation based on body size. Count of known CT and cardiac nuc  med studies performed in previous 12 months: 0.     COMPARISON:   None available.    FINDINGS:  Abdomen: The liver, spleen and pancreas are normal in size. Multiple small gallstones are seen in the gallbladder. No adrenal masses are noted. Both kidneys show severe hydronephrosis. There is perinephric edema. The ureters are dilated down to the  bladder which is markedly dilated. No retroperitoneal adenopathy. No distended bowel loops. There is an infrarenal abdominal aortic aneurysm. It measures 4.3 x 4.6 cm in transverse diameter and extends over a length of approximately 5 cm.    Pelvis: Markedly distended bladder with a smooth contour. No evidence of adenopathy, mass or fluid collection. Normal appendix.      Impression:       Marked distention of the bladder is noted with bilateral hydronephrosis consistent with urinary retention. Also noted is cholelithiasis. 4.6 cm abdominal aortic aneurysm.          Signer Name: Merlin Ceballos MD   Signed: 6/14/2020 3:28 PM   Workstation Name: [facility]    [facility]     CT Chest Without Contrast [[id number]] Collected:  06/14/20 1526     Updated:  06/14/20 1528    Narrative:       CT Chest WO    INDICATION:   Weakness and back pain beginning 2-3 weeks ago    TECHNIQUE:   CT of the thorax without IV contrast. Coronal and sagittal reconstructions were obtained.  Radiation dose reduction techniques included automated exposure control or exposure modulation based on body size. Count of known CT and cardiac nuc med studies  performed in previous 12 months: 0.     COMPARISON:   None available.    FINDINGS:  Chest images at mediastinal window show no adenopathy or effusions.    Chest images at lung window show both lungs to be fully expanded and clear.      Impression:       Negative CT of the chest.    Signer Name: Merlin Ceballos MD   Signed: 6/14/2020 3:26 PM   Workstation Name: [id number]    [facility]    CT Lumbar Spine Without Contrast [[id number]] Collected:  06/14/20 1524     Updated:  06/14/20 1526    Narrative:       CT Spine Lumbar WO    INDICATION:    Lumbar pain radiating to the right leg with urinary incontinence beginning 2-3 weeks ago    TECHNIQUE:   CT of the lumbar spine without IV contrast. Coronal and sagittal reconstructions were obtained.  Radiation dose reduction techniques included automated exposure control or exposure modulation based on body size. Count of known CT and cardiac nuc med  studies performed in previous 12 months: 0.     COMPARISON:    None available.    FINDINGS:  Alignment is satisfactory. Disc space heights are preserved.    At L2-3 there is concentric disc bulging with moderately severe central stenosis and mild facet hypertrophy. L3-4 there is moderate central stenosis from concentric disc bulging. L4-5 there is moderate central stenosis from concentric disc bulge.    No fractures or destructive bone lesions are seen. The facets show no erosions. No pars defects are noted.      Impression:       Generally mild  degenerative disc and facet disease as described above with multilevel spondylosis secondary to combination of disc bulging and a congenitally narrow canal with no acute findings in the spine.    Signer Name: Merlin Ceballos MD   Signed: 6/14/2020 3:24 PM   Workstation [facility]: [facility]    [facility]    CT Head Without Contrast [[id number]] Collected:  06/14/20 1521     Updated:  06/14/20 1523    Narrative:       CT Head WO    HISTORY:   Weakness on arrival    TECHNIQUE:   Axial unenhanced head CT. Radiation dose reduction techniques included automated exposure control or exposure modulation based on body size. Count of known CT and cardiac nuc med studies performed in previous 12 months: 0.     Time of scan: 3:13 PM    COMPARISON:   None.    FINDINGS:   No intracranial hemorrhage, mass, or infarct. No hydrocephalus or extra-axial fluid collection. There are senescent changes, including volume loss and nonspecific white matter change, but no acute abnormality is seen. The skull base, calvarium, and  extracranial soft tissues are normal.      Impression:       Senescent changes without acute abnormality.          Signer Name: Merlin Ceballos MD   Signed: 6/14/2020 3:21 PM   Workstation Name: [facility]    [facility]            Medications:        cefTRIAXone 2 g Intravenous Q24H   dilTIAZem 10 mg Intravenous Once   nicotine 1 patch Transdermal Q24H   nystatin-triamcinolone  Topical Q12H   sodium chloride 10 mL Intravenous Q12H       sodium chloride 100 mL/hr Last Rate: 100 mL/hr (06/15/20 0719)       Assessment/Plan       Obstructive uropathy      1. ANGEL  2. Sepsis likely due to UTI  3. B/L hydronephrosis  4. Hyperkalemia  5. Cardiomyopathy with history of CHF  6. Essential hypertension    Baseline Cr 1-1.2, admitted with 4.32  UA suggestive of UTI  B/L hydronephrosis  -continue on NS 100ml/h  -add tamsolusin 0.4mg daily  -f/u urology out patient      Thanks Dr Nicole for  the consult. Nephrology will follow the patient.   I discussed the patient's findings and my recommendations with patient and consulting provider    Kelly Monroe MD  06/15/20  07:45

## 2020-06-15 NOTE — PLAN OF CARE
Problem: Patient Care Overview  Goal: Plan of Care Review  Outcome: Ongoing (interventions implemented as appropriate)  Flowsheets (Taken 6/15/2020 2641)  Progress: no change  Plan of Care Reviewed With: patient; family  Outcome Summary: Pt was A&Ox4 throughout the shift. Pt continues to have bright red urine through the sommer catheter. Pt also had several episodes of elevated heartrate. Pt denies any chest pain or shortness of breath, denies flank pain, or nausea and vomiting. No s/s of distress noted.

## 2020-06-16 VITALS
DIASTOLIC BLOOD PRESSURE: 52 MMHG | TEMPERATURE: 97.9 F | HEIGHT: 70 IN | HEART RATE: 100 BPM | RESPIRATION RATE: 20 BRPM | WEIGHT: 268.8 LBS | BODY MASS INDEX: 38.48 KG/M2 | OXYGEN SATURATION: 96 % | SYSTOLIC BLOOD PRESSURE: 114 MMHG

## 2020-06-16 LAB
ALBUMIN SERPL-MCNC: 2.74 G/DL (ref 3.5–5.2)
ALBUMIN/GLOB SERPL: 0.7 G/DL
ALP SERPL-CCNC: 75 U/L (ref 39–117)
ALT SERPL W P-5'-P-CCNC: 11 U/L (ref 1–41)
ANION GAP SERPL CALCULATED.3IONS-SCNC: 12 MMOL/L (ref 5–15)
ANION GAP SERPL CALCULATED.3IONS-SCNC: 9.9 MMOL/L (ref 5–15)
AST SERPL-CCNC: 10 U/L (ref 1–40)
BACTERIA SPEC AEROBE CULT: ABNORMAL
BASOPHILS # BLD AUTO: 0.09 10*3/MM3 (ref 0–0.2)
BASOPHILS NFR BLD AUTO: 1 % (ref 0–1.5)
BILIRUB SERPL-MCNC: 0.2 MG/DL (ref 0.2–1.2)
BUN BLD-MCNC: 19 MG/DL (ref 8–23)
BUN BLD-MCNC: 21 MG/DL (ref 8–23)
BUN/CREAT SERPL: 13.7 (ref 7–25)
BUN/CREAT SERPL: 15.2 (ref 7–25)
CALCIUM SPEC-SCNC: 8.8 MG/DL (ref 8.6–10.5)
CALCIUM SPEC-SCNC: 8.9 MG/DL (ref 8.6–10.5)
CHLORIDE SERPL-SCNC: 109 MMOL/L (ref 98–107)
CHLORIDE SERPL-SCNC: 110 MMOL/L (ref 98–107)
CO2 SERPL-SCNC: 19 MMOL/L (ref 22–29)
CO2 SERPL-SCNC: 20.1 MMOL/L (ref 22–29)
CREAT BLD-MCNC: 1.38 MG/DL (ref 0.76–1.27)
CREAT BLD-MCNC: 1.39 MG/DL (ref 0.76–1.27)
DEPRECATED RDW RBC AUTO: 57.7 FL (ref 37–54)
EOSINOPHIL # BLD AUTO: 0.19 10*3/MM3 (ref 0–0.4)
EOSINOPHIL NFR BLD AUTO: 2.1 % (ref 0.3–6.2)
ERYTHROCYTE [DISTWIDTH] IN BLOOD BY AUTOMATED COUNT: 18.5 % (ref 12.3–15.4)
GFR SERPL CREATININE-BSD FRML MDRD: 50 ML/MIN/1.73
GFR SERPL CREATININE-BSD FRML MDRD: 50 ML/MIN/1.73
GLOBULIN UR ELPH-MCNC: 3.9 GM/DL
GLUCOSE BLD-MCNC: 112 MG/DL (ref 65–99)
GLUCOSE BLD-MCNC: 130 MG/DL (ref 65–99)
GRAM STN SPEC: ABNORMAL
HCT VFR BLD AUTO: 33.4 % (ref 37.5–51)
HCT VFR BLD AUTO: 33.4 % (ref 37.5–51)
HGB BLD-MCNC: 10 G/DL (ref 13–17.7)
HGB BLD-MCNC: 10 G/DL (ref 13–17.7)
IMM GRANULOCYTES # BLD AUTO: 0.03 10*3/MM3 (ref 0–0.05)
IMM GRANULOCYTES NFR BLD AUTO: 0.3 % (ref 0–0.5)
ISOLATED FROM: ABNORMAL
LYMPHOCYTES # BLD AUTO: 1.14 10*3/MM3 (ref 0.7–3.1)
LYMPHOCYTES NFR BLD AUTO: 12.8 % (ref 19.6–45.3)
MCH RBC QN AUTO: 25.3 PG (ref 26.6–33)
MCHC RBC AUTO-ENTMCNC: 29.9 G/DL (ref 31.5–35.7)
MCV RBC AUTO: 84.6 FL (ref 79–97)
MONOCYTES # BLD AUTO: 0.59 10*3/MM3 (ref 0.1–0.9)
MONOCYTES NFR BLD AUTO: 6.6 % (ref 5–12)
NEUTROPHILS # BLD AUTO: 6.86 10*3/MM3 (ref 1.7–7)
NEUTROPHILS NFR BLD AUTO: 77.2 % (ref 42.7–76)
NRBC BLD AUTO-RTO: 0 /100 WBC (ref 0–0.2)
PLATELET # BLD AUTO: 208 10*3/MM3 (ref 140–450)
PMV BLD AUTO: 10.4 FL (ref 6–12)
POTASSIUM BLD-SCNC: 4.1 MMOL/L (ref 3.5–5.2)
POTASSIUM BLD-SCNC: 4.4 MMOL/L (ref 3.5–5.2)
PROT SERPL-MCNC: 6.6 G/DL (ref 6–8.5)
RBC # BLD AUTO: 3.95 10*6/MM3 (ref 4.14–5.8)
SODIUM BLD-SCNC: 140 MMOL/L (ref 136–145)
SODIUM BLD-SCNC: 140 MMOL/L (ref 136–145)
WBC NRBC COR # BLD: 8.9 10*3/MM3 (ref 3.4–10.8)

## 2020-06-16 PROCEDURE — 80053 COMPREHEN METABOLIC PANEL: CPT | Performed by: INTERNAL MEDICINE

## 2020-06-16 PROCEDURE — 85018 HEMOGLOBIN: CPT | Performed by: NURSE PRACTITIONER

## 2020-06-16 PROCEDURE — 94799 UNLISTED PULMONARY SVC/PX: CPT

## 2020-06-16 PROCEDURE — 85014 HEMATOCRIT: CPT | Performed by: NURSE PRACTITIONER

## 2020-06-16 PROCEDURE — 99239 HOSP IP/OBS DSCHRG MGMT >30: CPT | Performed by: HOSPITALIST

## 2020-06-16 PROCEDURE — 85025 COMPLETE CBC W/AUTO DIFF WBC: CPT | Performed by: HOSPITALIST

## 2020-06-16 RX ORDER — TAMSULOSIN HYDROCHLORIDE 0.4 MG/1
0.4 CAPSULE ORAL DAILY
Qty: 30 CAPSULE | Refills: 3 | Status: SHIPPED | OUTPATIENT
Start: 2020-06-17 | End: 2022-01-10 | Stop reason: SDUPTHER

## 2020-06-16 RX ORDER — METOPROLOL SUCCINATE 25 MG/1
25 TABLET, EXTENDED RELEASE ORAL DAILY
Qty: 30 TABLET | Refills: 3 | Status: SHIPPED | OUTPATIENT
Start: 2020-06-16 | End: 2021-01-04 | Stop reason: ALTCHOICE

## 2020-06-16 RX ORDER — CEFDINIR 300 MG/1
300 CAPSULE ORAL 2 TIMES DAILY
Qty: 42 CAPSULE | Refills: 0 | Status: SHIPPED | OUTPATIENT
Start: 2020-06-16 | End: 2020-07-31

## 2020-06-16 RX ADMIN — TAMSULOSIN HYDROCHLORIDE 0.4 MG: 0.4 CAPSULE ORAL at 08:10

## 2020-06-16 RX ADMIN — SODIUM CHLORIDE 100 ML/HR: 9 INJECTION, SOLUTION INTRAVENOUS at 04:44

## 2020-06-16 RX ADMIN — METOPROLOL TARTRATE 25 MG: 25 TABLET, FILM COATED ORAL at 08:10

## 2020-06-16 NOTE — NURSING NOTE
Spoke with Dr. Nicole regarding pt wishing to sign out AMA. He state a discharge might be possible this afternoon if pt voids, or goes home with a sommer catheter. Pt has attempted to void twice after catheter being removed, however has only dribbled a couple of times. Pt denies any discomfort in the bladder at this time, will continue to monitor pt for spontaneous void.

## 2020-06-16 NOTE — NURSING NOTE
Pt states he wants the catheter removed as soon as possible. Explained to pt the importance of bladder training in order to help him regain the ability to void independently. He is agreeable to leave the catheter in at this time, however states he will only leave it in for a little while longer. Pt was educated on why we are bladder training, as well as what the bladder training does for his body. Pt denies the need to void at this time. Pt A&Ox4, pt sitting up in chair at this time. No obvious s/s of distress noted. Will continue to monitor pt.

## 2020-06-16 NOTE — NURSING NOTE
Pt explained that he wants to be discharged today, I have explained to him that we have to wait and have the physicians sign off on his case before he can be discharged, or if they don't then he will likely have to sign out AMA. Explained to pt the risks of signing out AMA due to possible death, further illness, further injury. Spoke with Dr. Nicole who is up on the unit at this time. Pt A&Ox4 at this time.

## 2020-06-16 NOTE — PLAN OF CARE
Pt's B/P low after admin of scheduled Metoprolol, however heart rate improved. Pt nonsyptomatic and no distress noted w/ hypotension. Pt continues to have blood tinged urine and some discomfort. Will continue to monitor.

## 2020-06-16 NOTE — PROGRESS NOTES
Nephrology Progress Note      Subjective     Patient feeling much better, no chest pain or shortness of breath    Objective       Vital signs :     Temp:  [97.5 °F (36.4 °C)-98.6 °F (37 °C)] 98.6 °F (37 °C)  Heart Rate:  [] 115  Resp:  [18-20] 18  BP: ()/(47-77) 116/77      Intake/Output Summary (Last 24 hours) at 6/16/2020 1018  Last data filed at 6/16/2020 0857  Gross per 24 hour   Intake 3523.47 ml   Output 5300 ml   Net -1776.53 ml       Physical Exam:    General Appearance :not in acute distress  Lungs : clear to auscultation, respirations regular  Heart :  regular rhythm & normal rate, normal S1, S2 and no murmur, no rub  Abdomen : normal bowel sounds, no masses, no hepatomegaly, no splenomegaly, soft non-tender and no guarding  Extremities : moves extremities well, no edema, no cyanosis and no redness  Neurologic :   orientated to person, place, time and situation, Grossly no focal deficits  Acess :       Laboratory Data :     Albumin Albumin   Date Value Ref Range Status   06/16/2020 2.74 (L) 3.50 - 5.20 g/dL Final   06/15/2020 2.61 (L) 3.50 - 5.20 g/dL Final   06/14/2020 3.38 (L) 3.50 - 5.20 g/dL Final      Magnesium Magnesium   Date Value Ref Range Status   06/14/2020 2.3 1.6 - 2.4 mg/dL Final          PTH               No results found for: PTH    CBC and coagulation:  Results from last 7 days   Lab Units 06/16/20  0022 06/15/20  0603 06/14/20  1444 06/14/20  1436   LACTATE mmol/L  --   --  0.6  --    CRP mg/dL  --   --   --  9.67*   WBC 10*3/mm3 8.90 7.92  --  13.07*   HEMOGLOBIN g/dL 10.0*  10.0* 10.8*  --  11.3*   HEMATOCRIT % 33.4*  33.4* 36.3*  --  37.4*   MCV fL 84.6 84.6  --  84.0   MCHC g/dL 29.9* 29.8*  --  30.2*   PLATELETS 10*3/mm3 208 208  --  297     Acid/base balance:      Renal and electrolytes:  Results from last 7 days   Lab Units 06/16/20  0744 06/15/20  0603 06/14/20  2130 06/14/20  1854 06/14/20  1436   SODIUM mmol/L 140 138  --  140 136   POTASSIUM mmol/L 4.4 4.8 5.3*  5.4* 6.3*   MAGNESIUM mg/dL  --   --   --   --  2.3   CHLORIDE mmol/L 109* 111*  --  114* 109*   CO2 mmol/L 19.0* 16.3*  --  16.1* 15.5*   BUN mg/dL 19 44*  --  62* 67*   CREATININE mg/dL 1.39* 2.49*  --  3.74* 4.32*   EGFR IF NONAFRICN AM mL/min/1.73 50* 25*  --  16* 13*   CALCIUM mg/dL 8.9 9.2  --  9.6 9.4     Estimated Creatinine Clearance: 59.2 mL/min (A) (by C-G formula based on SCr of 1.39 mg/dL (H)).    Liver and pancreatic function:  Results from last 7 days   Lab Units 06/16/20  0744 06/15/20  0603 06/14/20  1436   ALBUMIN g/dL 2.74* 2.61* 3.38*   BILIRUBIN mg/dL 0.2 0.2 0.3   ALK PHOS U/L 75 77 91   AST (SGOT) U/L 10 8 8   ALT (SGPT) U/L 11 12 16         Cardiac:  Results from last 7 days   Lab Units 06/14/20  1436   PROBNP pg/mL 2,167.0*     Liver and pancreatic function:  Results from last 7 days   Lab Units 06/16/20  0744 06/15/20  0603 06/14/20  1436   ALBUMIN g/dL 2.74* 2.61* 3.38*   BILIRUBIN mg/dL 0.2 0.2 0.3   ALK PHOS U/L 75 77 91   AST (SGOT) U/L 10 8 8   ALT (SGPT) U/L 11 12 16       Medications :       cefTRIAXone 2 g Intravenous Q24H   dilTIAZem 10 mg Intravenous Once   metoprolol tartrate 25 mg Oral Q12H   nicotine 1 patch Transdermal Q24H   nystatin-triamcinolone  Topical Q12H   sodium chloride 500 mL Intravenous Once   sodium chloride 10 mL Intravenous Q12H   tamsulosin 0.4 mg Oral Daily       sodium chloride 100 mL/hr Last Rate: 100 mL/hr (06/16/20 0743)         Assessment/Plan     1. ANGEL  2. Sepsis likely due to UTI  3. B/L hydronephrosis  4. Hyperkalemia  5. Cardiomyopathy with history of CHF  6. Essential hypertension     Cr improved to 1.3, that is almost baseline. hyperkalemia has resolved. At this point, there is no active intervention from nephrology stands point, will sign off. Please call us if any question. Reduce IVF to 75ml/h. I will see him in clinic in 2 months.     Baseline Cr 1-1.2, admitted with 4.32  UA suggestive of UTI  B/L hydronephrosis  -reduce NS 75ml/h  -continue  tamsolusin 0.4mg daily  -keep the sommer's cath on discharge until seen in urology clinic      Kelly Monroe MD  06/16/20  10:18

## 2020-06-16 NOTE — PROGRESS NOTES
Discharge Planning Assessment  DIMA Lynn     Patient Name: Larry Kehr  MRN: [id number]  Today's Date: 6/16/2020    Admit Date: 6/14/2020      Discharge Plan     Row Name 06/16/20 1630       Plan    Final Discharge Disposition Code  06 - home with home health care    Final Note  Pt to be discharged home on this date with Physician ordered home health.  SS spoke with pt who requested [facility].  SS faxed pt information to [facility] at [phone] and notified [facility] at [phone] per Mirza Barrera.   Pt aware and agreeable.            Sarita Ventura, LENOREW

## 2020-06-16 NOTE — CONSULTS
Heart Failure Education Consult    Type of Heart Failure: Systolic     Length of diagnosis: Previous Diagnosis     Current HF knowledge: fair     Have you had HF education/teaching in the past? Yes  Do you check your weight daily? No    Current weight        06/15/20  0500 06/16/20  0500   Weight: 124 kg (272 lb 11.2 oz) 122 kg (268 lb 12.8 oz)          Most recent EF 36 - 40% Date 6/13/2020       Most recent ProBNP 2167pg/ml Date 6/14/2020      Edema Yes        Shortness of Breath: Yes and Improving     Number of pillows used to sleep at night: 1  Barriers to learning: Other wears glasses but states can read well     Materials Provided:Living with HF Book, HF Action Plan, Daily Weight Monitoring  and 2 Gm Na+ diet             Referral candidate for HF Clinic:Yes      Thank you for this consult. Please let me know if I can be of any assistance with HF education for this patient.  Demetra Rasheed, RN   06/16/20 4:00 PM

## 2020-06-16 NOTE — DISCHARGE PLACEMENT REQUEST
"Kehr, Larry (74 y.o. Male)     Date of Birth Social Security Number Address Home Phone MRN    1945  [address] [address]  [id number]    Oriental orthodox Marital Status          None        Admission Date Admission Type Admitting Provider Attending Provider Department, Room/Bed    6/14/20 Emergency Hilario Dickinson MD [place], Claire Chin, MD [address], 3305/1S    Discharge Date Discharge Disposition Discharge Destination         [place] [facility] [place]ending Provider:  Lourdes Nicole MD    Allergies:  No Known Allergies    Isolation:  None   Infection:  None   Code Status:  CPR    Ht:  177.8 cm (70\")   Wt:  122 kg (268 lb 12.8 oz)    Admission Cmt:  None   Principal Problem:  None                Active Insurance as of 6/14/2020     Primary Coverage     Payor Plan Insurance Group Employer/Plan Group    MEDICARE MEDICARE A & B      Payor Plan Address Payor Plan Phone Number Payor Plan Fax Number Effective Dates    [address] [phone]  7/1/2008 - None Entered    [place] [address]       Subscriber Name Subscriber Birth Date Member ID       KEHR,LARRY 1945 [id number]                 Emergency Contacts      (Rel.) Home Phone Work Phone Mobile Phone    KEHR, MARK (Relative) [phone] -- --    CHONG RICKY (Relative) [phone] -- [phone]            Emergency Contact Information     Name Relation Home Work Mobile    KEHR, MARK Relative [phone]      RICKY SCHWARZ Relative [phone]          Insurance Information                MEDICARE/MEDICARE A & B Phone: [phone]    Subscriber: Kehr, Larry Subscriber#: [id number]    Group#:  Precert#:           Treatment Team     Provider Relationship Specialty Contact    Lourdes Nicole MD Attending, Physician of Record Internal Medicine [phone]    Yudy Pham RN Registered Nurse -- [phone]    Jamaal Salazar MD Consulting Physician " Infectious Diseases [phone]    Angela Guerrero Patient Care Technician --     Mikayla Black Respiratory Therapist --     Lazaro Trujillo MD Attending Provider Internal Medicine [phone]    Kelly Monroe MD Consulting Physician Nephrology [phone]          Problem List           Codes Noted - Resolved       Hospital    Obstructive uropathy ICD-10-CM: N13.9  ICD-9-CM: 599.60 6/14/2020 - Present       Non-Hospital    Nonischemic cardiomyopathy , appears to be compensated. ICD-10-CM: I42.8  ICD-9-CM: 425.4 7/10/2017 - Present    Chronic systolic heart failure (CMS/HCC) ICD-10-CM: I50.22  ICD-9-CM: 428.22 7/10/2017 - Present    Chronic obstructive lung disease (CMS/HCC) ICD-10-CM: J44.9  ICD-9-CM: 496 6/7/2017 - Present    Atrial fibrillation (CMS/[facility]) ICD-10-CM: I48.91  ICD-9-CM: 427.31 6/7/2017 - Present    Hypertensive disorder ICD-10-CM: I10  ICD-9-CM: 401.9 6/7/2017 - Present    Tobacco use, states that he has cut back to 3-4 cigarettes a day. ICD-10-CM: Z72.0  ICD-9-CM: 305.1 4/10/2017 - Present    Morbid obesity (CMS/HCC) ICD-10-CM: E66.01  ICD-9-CM: 278.01 4/10/2017 - Present    Chronic atrial fibrillation ICD-10-CM: I48.20  ICD-9-CM: 427.31 10/12/2016 - Present    Hypertension- controlled.  ICD-10-CM: I10  ICD-9-CM: 401.9 10/12/2016 - Present             History & Physical      Ghada Sim APRN at 06/14/20 9213     Attestation signed by Hilario Dickinson MD at 06/14/20 8960    I have seen and examined the patient independently from IDA Isaac, and  have reviewed Ghada's findings, assessment and plan in the H&P below. Agree with treatment of sepsis secondary to UTI. Follow up on urine and blood culture results. Because of evidence of obstructive uropathy with acute renal failure requiring sommer catheterization, will consult urology for further guidance in management. Repeat labs are back and K+ and creatinine are still elevated but improving. Still has not had bowel movement  from kayexalate, so will give extra dose along with lactulose. Continue IV fluids hydration. Monitor closely for signs/symptoms of developing fluid overload as patient has underlying systolic CHF. Hold home anti-hypertensive medications for now as both listed in med rec (entrestro and spironolactone) are potentially nephrotoxic and BP is normal to low normal thus far anyhow. For afib, pharmacy states home xarelto is currently contraindicated in setting of patient's present creatinine clearance. Hopefully renal function will continue to improve and it can be restarted in the next day or so. He took last dose this morning so should be covered for next 24-48 hours.                      [facility] Medicine Services  History & Physical    Patient Identification:  Name:  Larry Kehr  Age:  74 y.o.  Sex:  male  :  1945  MRN:  [id number]   Visit Number:  [id number]  Primary Care Physician:  Buster Redd MD    I have seen the patient in conjunction with IDA Isaac and I agree with the following statements:    Subjective     Chief complaint: Weakness    History of presenting illness:      Larry Kehr is a 74 y.o. male with past medical history significant for Atrial fibrillation, cardiomyopathy, CHF, COPD, tobacco abuse, morbid obesity, and hypertension.     Mr. Kehr presented to [facility] emergency department on 2020 with complaints of generalized weakness which has been present for the last 2 to 3 weeks.  Along with generalized weakness he also complains of decreased appetite and urinary incontinence.  He reports that he has had regular bowel movements and denies any bowel incontinence.  He reports that he has been having back pain intermittently for approximately 1 year.  He reports that he was seen 1 to 2 weeks ago in and urgent care clinic and was given a steroid injection to ease back and right leg pain.  Mr. Kehr reports that he has since been experiencing  difficulty starting and stopping the flow of urine, dribbling urine post urination and has also been experiencing nocturnal enuresis.  He denies noting any foul odor of his urine, hematuria or dysuria.  He also denies any fever, recent infection, recent antibiotic use, cough, shortness of breath, chest pain, increased edema, abdominal pain, difficulty ambulating, dizziness, headaches, lightheadedness, seizures or syncopal episodes.  He does report smoking approximately 6 to 8 cigarettes/day and denies any alcohol or drug use.    Upon arrival to the ED, vital signs were temperature 98, pulse 105, respirations 18, blood pressure 110/80 and oxygen saturation 99% on room air.  Troponin T <0.010, proBNP 2167.0.  Glucose 99, sodium 136, potassium 6.3, CO2 15.5, chloride 109, creatinine 4.32, BUN 67, EGFR 13, TSH 2.25, C-RP 9.67, lactate 0.6, magnesium 2.3, WBC 13.07, hemoglobin 11.3, hematocrit 37.4.  Urinalysis shows negative glucose, negative ketones, large blood, negative nitrite, large leukocytes, WBC too numerous to count 2+ bacteria.  Blood and urine cultures pending.  CT of the head without contrast shows [place] changes without acute abnormality, per radiology.  CT of the lumbar spine without contrast shows generally mild degenerative disc and facet disease as described above with multilevel spondylosis secondary to combination of disc bulging and a congenitally narrow canal with no acute findings in the spine, per radiology.  CT of the abdomen pelvis without contrast shows marked distention of the bladder noted with bilateral hydronephrosis consistent with urinary retention.  Also noted is cholelithiasis.  4.6 cm meter abdominal aortic aneurysm, per radiology.  CT of the chest without contrast shows negative CT of the chest, per radiology.  ---------------------------------------------------------------------------------------------------------------------   Review of Systems   Constitutional: Negative for  activity change, appetite change, chills, fatigue and fever.   HENT: Negative for congestion, sore throat and trouble swallowing.    Eyes: Negative for photophobia and visual disturbance.   Respiratory: Negative for cough, chest tightness and shortness of breath.    Cardiovascular: Positive for leg swelling (@ baseline and denies exacerbation). Negative for chest pain.   Gastrointestinal: Negative for abdominal distention, abdominal pain, diarrhea, nausea and vomiting.   Endocrine: Negative for polydipsia, polyphagia and polyuria.   Genitourinary: Positive for decreased urine volume, difficulty urinating and penile pain. Negative for discharge, dysuria, flank pain, frequency, hematuria, testicular pain and urgency.   Musculoskeletal: Positive for back pain. Negative for gait problem.   Skin: Positive for rash (Patient reports excoriation around patsy-area ). Negative for color change, pallor and wound.   Allergic/Immunologic: Negative for immunocompromised state.   Neurological: Negative for dizziness, seizures, syncope, weakness, light-headedness and headaches.   Psychiatric/Behavioral: Negative for agitation, confusion, hallucinations and sleep disturbance.      ---------------------------------------------------------------------------------------------------------------------   Past Medical History:   Diagnosis Date   • Atrial fibrillation (CMS/[facility])    • Cardiomyopathy (CMS/[facility])    • CHF (congestive heart failure) (CMS/[facility])    • COPD (chronic obstructive pulmonary disease) (CMS/[facility])    • Hypertension      No past surgical history on file.  Family History   Problem Relation Age of Onset   • No Known Problems Mother    • Heart disease Father    • No Known Problems Sister    • No Known Problems Brother    • No Known Problems Son    • No Known Problems Daughter    • No Known Problems Maternal Grandmother    • No Known Problems Maternal Grandfather    • No Known Problems Paternal Grandmother    • No Known Problems  Paternal Grandfather    • No Known Problems Cousin    • Rheum arthritis Neg Hx    • Osteoarthritis Neg Hx    • Asthma Neg Hx    • Diabetes Neg Hx    • Heart failure Neg Hx    • Hyperlipidemia Neg Hx    • Hypertension Neg Hx    • Migraines Neg Hx    • Rashes / Skin problems Neg Hx    • Seizures Neg Hx    • Stroke Neg Hx    • Thyroid disease Neg Hx      Social History     Socioeconomic History   • Marital status:      Spouse name: Not on file   • Number of children: Not on file   • Years of education: Not on file   • Highest education level: Not on file   Tobacco Use   • Smoking status: Current Every Day Smoker     Packs/day: 1.00     Years: 18.00     Pack years: 18.00     Types: Cigarettes   • Smokeless tobacco: Never Used   Substance and Sexual Activity   • Alcohol use: No   • Drug use: No   • Sexual activity: Defer     ---------------------------------------------------------------------------------------------------------------------   Allergies:  Patient has no known allergies.  ---------------------------------------------------------------------------------------------------------------------   Home medications:    Medications below are reported home medications pulling from within the system; at this time, these medications have not been reconciled unless otherwise specified and are in the verification process for further verifcation as current home medications.    (Not in a hospital admission)    Hospital Scheduled Meds:    dilTIAZem 10 mg Intravenous Once   nystatin-triamcinolone  Topical Q12H       sodium chloride 100 mL/hr       Current listed hospital scheduled medications may not yet reflect those currently placed in orders that are signed and held awaiting patient's arrival to floor.   ---------------------------------------------------------------------------------------------------------------------     Objective     Vital Signs:  Temp:  [98 °F (36.7 °C)] 98 °F (36.7 °C)  Heart Rate:  []  110  Resp:  [18-24] 20  BP: (110-139)/(63-86) 111/78      06/14/20  1421   Weight: 127 kg (280 lb)     Body mass index is 40.18 kg/m².  ---------------------------------------------------------------------------------------------------------------------   Physical Exam   Constitutional: He is oriented to person, place, and time and well-developed, well-nourished, and in no distress. No distress.   HENT:   Head: Normocephalic and atraumatic.   Eyes: Pupils are equal, round, and reactive to light. EOM are normal.   Neck: Normal range of motion. No thyromegaly present.   Cardiovascular: An irregularly irregular rhythm present. Tachycardia present.   Pulmonary/Chest: Effort normal and breath sounds normal. No respiratory distress. He has no wheezes.   Abdominal: Bowel sounds are normal. He exhibits distension. There is no tenderness.   Musculoskeletal: Normal range of motion. He exhibits edema (2+ edema noted to bilateral lower extremities).   Neurological: He is alert and oriented to person, place, and time.   Skin: Skin is warm. Rash noted. He is not diaphoretic. There is erythema.   Penile head is erythematous and edematous.    Psychiatric: Mood, memory, affect and judgment normal.     ---------------------------------------------------------------------------------------------------------------------  EKG:      ---------------------------------------------------------------------------------------------------------------------   Results from last 7 days   Lab Units 06/14/20  1444 06/14/20  1436   CRP mg/dL  --  9.67*   LACTATE mmol/L 0.6  --    WBC 10*3/mm3  --  13.07*   HEMOGLOBIN g/dL  --  11.3*   HEMATOCRIT %  --  37.4*   MCV fL  --  84.0   MCHC g/dL  --  30.2*   PLATELETS 10*3/mm3  --  297         Results from last 7 days   Lab Units 06/14/20  1436   SODIUM mmol/L 136   POTASSIUM mmol/L 6.3*   MAGNESIUM mg/dL 2.3   CHLORIDE mmol/L 109*   CO2 mmol/L 15.5*   BUN mg/dL 67*   CREATININE mg/dL 4.32*   EGFR IF  NONAFRICN AM mL/min/1.73 13*   CALCIUM mg/dL 9.4   GLUCOSE mg/dL 99   ALBUMIN g/dL 3.38*   BILIRUBIN mg/dL 0.3   ALK PHOS U/L 91   AST (SGOT) U/L 8   ALT (SGPT) U/L 16   Estimated Creatinine Clearance: 19.5 mL/min (A) (by C-G formula based on SCr of 4.32 mg/dL (H)).  No results found for: AMMONIA  Results from last 7 days   Lab Units 06/14/20  1631 06/14/20  1436   CK TOTAL U/L  --  20   TROPONIN T ng/mL <0.010 <0.010     Results from last 7 days   Lab Units 06/14/20  1436   PROBNP pg/mL 2,167.0*     Lab Results   Component Value Date    HGBA1C 6.50 (H) 01/06/2017     Lab Results   Component Value Date    TSH 2.250 06/14/2020     No results found for: PREGTESTUR, PREGSERUM, HCG, HCGQUANT  Pain Management Panel     There is no flowsheet data to display.            ---------------------------------------------------------------------------------------------------------------------  Imaging Results (Last 7 Days)     Procedure Component Value Units Date/Time    MRI Lumbar Spine Without Contrast [[id number]] Collected:  06/14/20 1807     Updated:  06/14/20 1809    Narrative:       MRI Spine Lumbar WO    INDICATION:    Lumbar pain radiating to the right leg with urinary incontinence    TECHNIQUE:   MRI of the lumbar spine without IV contrast.    COMPARISON:    None available.    FINDINGS:  Alignment is satisfactory. The lumbar discs show mild degenerative change without significant bulging or herniation. Mild concentric disc bulging is seen all levels. Posterior facet hypertrophy is noted to a moderate degree at L1-2 and L2-3 and to a mild  degree at L3-4. The lower lumbar facets are unremarkable.    The conus is normal. There is no evidence of marrow edema or paraspinous mass. There is an infrarenal abdominal aortic aneurysm that was described on the CT report.      Impression:       Mild degenerative disc disease with mild to moderate multilevel facet arthropathy. No evidence of lesion or compression of the cauda  equina.    Signer Name: Merlin Ceballos MD   Signed: 6/14/2020 6:07 PM   Workstation Name: [facility]    [facility]    CT Abdomen Pelvis Without Contrast [[id number]] Collected:  06/14/20 1528     Updated:  06/14/20 1530    Narrative:       CT Abdomen Pelvis WO    INDICATION:   Back pain with urinary incontinence beginning 2-3 weeks ago    TECHNIQUE:   CT of the abdomen and pelvis without IV contrast. Coronal and sagittal reconstructions were obtained.  Radiation dose reduction techniques included automated exposure control or exposure modulation based on body size. Count of known CT and cardiac nuc  med studies performed in previous 12 months: 0.     COMPARISON:   None available.    FINDINGS:  Abdomen: The liver, spleen and pancreas are normal in size. Multiple small gallstones are seen in the gallbladder. No adrenal masses are noted. Both kidneys show severe hydronephrosis. There is perinephric edema. The ureters are dilated down to the  bladder which is markedly dilated. No retroperitoneal adenopathy. No distended bowel loops. There is an infrarenal abdominal aortic aneurysm. It measures 4.3 x 4.6 cm in transverse diameter and extends over a length of approximately 5 cm.    Pelvis: Markedly distended bladder with a smooth contour. No evidence of adenopathy, mass or fluid collection. Normal appendix.      Impression:       Marked distention of the bladder is noted with bilateral hydronephrosis consistent with urinary retention. Also noted is cholelithiasis. 4.6 cm abdominal aortic aneurysm.          Signer Name: Merlin Ceballos MD   Signed: 6/14/2020 3:28 PM   Workstation [facility]: [facility]    [facility]    CT Chest Without Contrast [[id number]] Collected:  06/14/20 1526     Updated:  06/14/20 1528    Narrative:       CT Chest WO    INDICATION:   Weakness and back pain beginning 2-3 weeks ago    TECHNIQUE:   CT of the thorax without IV contrast. Coronal and sagittal  reconstructions were obtained.  Radiation dose reduction techniques included automated exposure control or exposure modulation based on body size. Count of known CT and cardiac nuc med studies  performed in previous 12 months: 0.     COMPARISON:   None available.    FINDINGS:  Chest images at mediastinal window show no adenopathy or effusions.    Chest images at lung window show both lungs to be fully expanded and clear.      Impression:       Negative CT of the chest.    Signer Name: Merlin Ceballos MD   Signed: 6/14/2020 3:26 PM   Workstation Name: [email or web address]    [facility]    CT Lumbar Spine Without Contrast [[id number]] Collected:  06/14/20 1524     Updated:  06/14/20 1526    Narrative:       CT Spine Lumbar WO    INDICATION:    Lumbar pain radiating to the right leg with urinary incontinence beginning 2-3 weeks ago    TECHNIQUE:   CT of the lumbar spine without IV contrast. Coronal and sagittal reconstructions were obtained.  Radiation dose reduction techniques included automated exposure control or exposure modulation based on body size. Count of known CT and cardiac nuc med  studies performed in previous 12 months: 0.     COMPARISON:    None available.    FINDINGS:  Alignment is satisfactory. Disc space heights are preserved.    At L2-3 there is concentric disc bulging with moderately severe central stenosis and mild facet hypertrophy. L3-4 there is moderate central stenosis from concentric disc bulging. L4-5 there is moderate central stenosis from concentric disc bulge.    No fractures or destructive bone lesions are seen. The facets show no erosions. No pars defects are noted.      Impression:       Generally mild degenerative disc and facet disease as described above with multilevel spondylosis secondary to combination of disc bulging and a congenitally narrow canal with no acute findings in the spine.    Signer Name: Merlin Ceballos MD   Signed: 6/14/2020 3:24 PM   Workstation Name:  [facility]    [facility]    CT Head Without Contrast [[id number]] Collected:  06/14/20 1521     Updated:  06/14/20 1523    Narrative:       CT Head WO    HISTORY:   Weakness on arrival    TECHNIQUE:   Axial unenhanced head CT. Radiation dose reduction techniques included automated exposure control or exposure modulation based on body size. Count of known CT and cardiac nuc med studies performed in previous 12 months: 0.     Time of scan: 3:13 PM    COMPARISON:   None.    FINDINGS:   No intracranial hemorrhage, mass, or infarct. No hydrocephalus or extra-axial fluid collection. There are senescent changes, including volume loss and nonspecific white matter change, but no acute abnormality is seen. The skull base, calvarium, and  extracranial soft tissues are normal.      Impression:       Senescent changes without acute abnormality.          Signer Name: Merlin Ceballos MD   Signed: 6/14/2020 3:21 PM   Workstation Name: [facility]    [facility]    XR Chest 1 View [[id number]] Resulted:  06/14/20 1518     Updated:  06/14/20 1519          Cultures:  Blood and urine cultures pending.     Last echocardiogram:  Results for orders placed during the hospital encounter of 06/29/18   Adult Transthoracic Echo Complete W/ Cont if Necessary Per Protocol    Narrative · Normal left ventricular cavity size and wall thickness noted. All left   ventricular wall segments contract normally.  · Estimated EF appears to be in the range of 56 - 60%.  · The aortic valve is structurally normal. No aortic valve regurgitation   is present. No aortic valve stenosis is present.  · The mitral valve is normal in structure. Mild mitral valve regurgitation   is present. No significant mitral valve stenosis is present.  · The tricuspid valve is normal. No tricuspid valve stenosis is present.   No tricuspid valve regurgitation is present.  · There is no evidence of pericardial effusion.  · Comments: LV  systolic function is improved significantly since 1/5/2017   with LV ejection fraction improved from 35% then to about 55-60% now.        CHADS-VASc Risk Assessment            4       Total Score        1 CHF    1 Hypertension    2 Age >/= 75        Criteria that do not apply:    DM    PRIOR STROKE/TIA/THROMBO    Vascular Disease    Age 65-74    Sex: Female        I have personally reviewed the radiology images and read the final radiology report.  ---------------------------------------------------------------------------------------------------------------------  Assessment / Plan     Active Hospital Problems    Diagnosis POA   • Obstructive uropathy [N13.9] Yes       ASSESSMENT/PLAN:  · Sepsis present on admission and likely 2/2 UTI: Patient presents with tachycardia and tachypnea.  C-RP 9.67, WBC 13.07 and lactate 0.6.  Urinalysis indicative of UTI with large leukocytes, WBC too numerous to count 2+ bacteria.  Blood and urine cultures pending.  CT of the chest unremarkable.  CT of the abdomen pelvis without contrast shows hydronephrosis, per radiology. IV Rocephin ordered. Will continue to monitor with AM CBC, CMP and C-RP.     · UTI with hydronephrosis: Continue to treat as outlined above. Daily weights and strict I's and O's ordered. CT abdomen notes distention of the bladder with bilateral hydronephrosis consistent with urinary retention. Inpatient urology consult placed.     · Phimosis: Patient presents with complaints of painful and erythematous penile head.  Patient appears to have phimosis.  Inpatient urology consult placed.  Mycolog topical ordered.    · Acute Renal Failure: Creatinine 4.32, BUN 67 and eGFR 13. Baseline creatinine appears to be around 0.9-1.2. Continuous IVF ordered. Inpatient nephrology consult placed.     · Hyperkalemia: Potassium 6.3 Oral Kayexalate and IV Regular insulin given in ED. Patient has not produced a bowel movement. Repeat potassium level ordered. AM CMP ordered. Continuous  cardiac monitoring ordered.      · Hx of Atrial Fibrillation: Patient presents with a history of atrial fibrillation.  EKG shows A. fib with RVR rate 101.  IV Cardizem given in ED.  Continuous cardiac monitoring ordered.  We will continue home Xarelto. [id number] at least 4.     · Systolic Heart Failure: Patient presents with a history of systolic heart failure.  Most recent echocardiogram 6/29/2018 shows EF 56 to 60%.  Transthoracic echocardiogram ordered in a.m.  Patient appears euvolemic on examination.  proBNP 2167.0.  Will repeat proBNP in a.m.  Daily weights and strict I's and O's ordered.    · Nonischemic Cardiomyopathy: Appears to be compensated.  Transthoracic echocardiogram ordered in a.m.    · COPD without acute exacerbation: Supplemental oxygen ordered to be titrated for saturations greater than 90%.  Encourage incentive spirometer. Continuous cardiac and pulse ox monitoring ordered.    · Hypertension: Continue to monitor vital signs per protocol.  We will continue home antihypertensive regimen, with holding parameters, once available from pharmacy.    · Degenerative disk disease: Patient presented to emergency department with complaints of low back pain as well as pain radiating down the right leg.  CT of the lumbar spine shows mild degenerative disc and facet disease with multilevel spondylosis secondary to combination of disc bulging and a congenitally narrow canal with no acute findings in the spine, per radiology.  Supportive care provided.    · Tobacco Abuse: Patient reports smoking approximately half pack cigarettes per day.  Nicotine patch ordered.    ----------  -DVT prophylaxis: Home Xarelto   -Diet: Regular cardiac renal  -Activity: Up with assistance  -Expected length of stay: INPATIENT status due to the need for care which can only be reasonably provided in an hospital setting such as aggressive/expedited ancillary services and/or consultation services, the necessity for IV medications,  close physician monitoring and/or the possible need for procedures.  In such, I feel patient’s risk for adverse outcomes and need for care warrant INPATIENT evaluation and predict the patient’s care encounter to likely last beyond 2 midnights.    Patient is high risk due to sepsis secondary to UTI with hydronephrosis.     IDA Josue  06/14/20  18:57      Electronically signed by Hilario Dickinson MD at 06/14/20 2320       Lines, Drains & Airways    Active LDAs     Name:   Placement date:   Placement time:   Site:   Days:    Peripheral IV 06/14/20 1507 Left Antecubital   06/14/20    1507    Antecubital   2    Peripheral IV 06/16/20 0830 Posterior;Right Hand   06/16/20    0830    Hand   less than 1                  Current Facility-Administered Medications   Medication Dose Route Frequency Provider Last Rate Last Dose   • cefTRIAXone (ROCEPHIN) 2 g/100 mL 0.9% NS VTB (MARIANO)  2 g Intravenous Q24H Ghada Sim APRN   2 g at 06/15/20 1956   • dilTIAZem (CARDIZEM) injection 10 mg  10 mg Intravenous Once Ghada Sim APRN   Stopped at 06/14/20 1921   • metoprolol tartrate (LOPRESSOR) tablet 25 mg  25 mg Oral Q12H Lourdes Nicole MD   25 mg at 06/16/20 0810   • nicotine (NICODERM CQ) 7 MG/24HR patch 1 patch  1 patch Transdermal Q24H Ghada Sim APRN   Stopped at 06/16/20 0900   • nitroglycerin (NITROSTAT) SL tablet 0.4 mg  0.4 mg Sublingual Q5 Min PRN Ghada Sim APRN       • nystatin-triamcinolone (MYCOLOG II) 679406-8.1 UNIT/GM-% cream   Topical Q12H Lazaro Trujillo MD       • sodium chloride 0.9 % bolus 500 mL  500 mL Intravenous Once Ghada Sim APRN       • sodium chloride 0.9 % flush 10 mL  10 mL Intravenous PRN Lazaro Trujillo MD       • sodium chloride 0.9 % flush 10 mL  10 mL Intravenous Q12H Lazaro Trujillo MD       • sodium chloride 0.9 % flush 10 mL  10 mL Intravenous PRN Lazaro Trujillo MD       • sodium chloride 0.9 % infusion  75 mL/hr Intravenous Continuous  Kelly Monroe MD 75 mL/hr at 06/16/20 1310 75 mL/hr at 06/16/20 1310   • tamsulosin (FLOMAX) 24 hr capsule 0.4 mg  0.4 mg Oral Daily Kelly Monroe MD   0.4 mg at 06/16/20 0810       Lab Results (last 24 hours)     Procedure Component Value Units Date/Time    Blood Culture - Blood, Arm, Right [[id number]] Collected:  06/14/20 1420    Specimen:  Blood from Arm, Right Updated:  06/16/20 1500     Blood Culture No growth at 2 days    Basic Metabolic Panel [[id number]]  (Abnormal) Collected:  06/16/20 1043    Specimen:  Blood Updated:  06/16/20 1130     Glucose 130 mg/dL      BUN 21 mg/dL      Creatinine 1.38 mg/dL      Sodium 140 mmol/L      Potassium 4.1 mmol/L      Chloride 110 mmol/L      CO2 20.1 mmol/L      Calcium 8.8 mg/dL      eGFR Non African Amer 50 mL/min/1.73      BUN/Creatinine Ratio 15.2     Anion Gap 9.9 mmol/L     Narrative:       GFR Normal >60  Chronic Kidney Disease <60  Kidney Failure <15      Blood Culture - Blood, Arm, Left [[id number]]  (Abnormal) Collected:  06/14/20 1446    Specimen:  Blood from Arm, Left Updated:  06/16/20 0937     Blood Culture Corynebacterium species     Comment: No definitive guidelines. All are susceptible to vancomycin. Resistance to penicillins & cephalosporins does occur.          Isolated from Aerobic Bottle     Gram Stain Aerobic Bottle Gram positive bacilli    Narrative:       Blood culture does not meet the specified criteria for PCR identification.  If pregnant, immunocompromised, or clinical concern for meningitis, call lab to run BCID for Listeria monocytogenes.    Comprehensive Metabolic Panel [[id number]]  (Abnormal) Collected:  06/16/20 0744    Specimen:  Blood Updated:  06/16/20 0844     Glucose 112 mg/dL      BUN 19 mg/dL      Creatinine 1.39 mg/dL      Sodium 140 mmol/L      Potassium 4.4 mmol/L      Chloride 109 mmol/L      CO2 19.0 mmol/L      Calcium 8.9 mg/dL      Total Protein 6.6 g/dL      Albumin 2.74 g/dL      ALT (SGPT) 11 U/L      AST (SGOT) 10 U/L       Alkaline Phosphatase 75 U/L      Total Bilirubin 0.2 mg/dL      eGFR Non African Amer 50 mL/min/1.73      Globulin 3.9 gm/dL      A/G Ratio 0.7 g/dL      BUN/Creatinine Ratio 13.7     Anion Gap 12.0 mmol/L     Narrative:       GFR Normal >60  Chronic Kidney Disease <60  Kidney Failure <15      Hemoglobin & Hematocrit, Blood [[id number]]  (Abnormal) Collected:  06/16/20 0022    Specimen:  Blood Updated:  06/16/20 0025     Hemoglobin 10.0 g/dL      Hematocrit 33.4 %     CBC & Differential [[id number]] Collected:  06/16/20 0022    Specimen:  Blood Updated:  06/16/20 0025    Narrative:       The following orders were created for panel order CBC & Differential.  Procedure                               Abnormality         Status                     ---------                               -----------         ------                     CBC Auto Differential[[id number]]        Abnormal            Final result                 Please view results for these tests on the individual orders.    CBC Auto Differential [[id number]]  (Abnormal) Collected:  06/16/20 0022    Specimen:  Blood Updated:  06/16/20 0025     WBC 8.90 10*3/mm3      RBC 3.95 10*6/mm3      Hemoglobin 10.0 g/dL      Hematocrit 33.4 %      MCV 84.6 fL      MCH 25.3 pg      MCHC 29.9 g/dL      RDW 18.5 %      RDW-SD 57.7 fl      MPV 10.4 fL      Platelets 208 10*3/mm3      Neutrophil % 77.2 %      Lymphocyte % 12.8 %      Monocyte % 6.6 %      Eosinophil % 2.1 %      Basophil % 1.0 %      Immature Grans % 0.3 %      Neutrophils, Absolute 6.86 10*3/mm3      Lymphocytes, Absolute 1.14 10*3/mm3      Monocytes, Absolute 0.59 10*3/mm3      Eosinophils, Absolute 0.19 10*3/mm3      Basophils, Absolute 0.09 10*3/mm3      Immature Grans, Absolute 0.03 10*3/mm3      nRBC 0.0 /100 WBC     Urine Culture - Urine, Urine, Clean Catch [[id number]]  (Normal) Collected:  06/14/20 1615    Specimen:  Urine, Clean Catch Updated:  06/15/20 2882     Urine Culture No growth    PSA DIAGNOSTIC  [[id number]]  (Abnormal) Collected:  06/15/20 1001    Specimen:  Blood Updated:  06/15/20 1848     PSA 19.100 ng/mL     Narrative:       Results may be falsely decreased if patient taking Biotin.          Orders (last 24 hrs)      Start     Ordered    06/17/20 0600  Basic Metabolic Panel  Morning Draw      06/16/20 1009    06/17/20 0600  C-reactive Protein  Morning Draw      06/16/20 1201    06/17/20 0000  tamsulosin (FLOMAX) 0.4 MG capsule 24 hr capsule  Daily      06/16/20 1547    06/16/20 1545  Discontinue IV  Once      06/16/20 1547    06/16/20 1538  Discharge patient  Once      06/16/20 1547    06/16/20 1022  Please make apt with me in 2 months after discharge  Misc Nursing Order (Specify)  Once     Comments:  Please make apt with me in 2 months after discharge    06/16/20 1022    06/16/20 1010  Basic Metabolic Panel  STAT      06/16/20 1009    06/16/20 0716  Comprehensive Metabolic Panel  STAT      06/16/20 0715    06/16/20 0600  CBC & Differential  Daily      06/15/20 1214    06/16/20 0600  CBC Auto Differential  PROCEDURE ONCE      06/16/20 0002    06/16/20 0000  metoprolol succinate XL (TOPROL-XL) 25 MG 24 hr tablet  Daily      06/16/20 1547    06/16/20 0000  cefdinir (OMNICEF) 300 MG capsule  2 Times Daily      06/16/20 1547    06/16/20 0000  Discharge Follow-up with PCP      06/16/20 1547    06/16/20 0000  Discharge Follow-up with Specified Provider: Dr. Monroe; 2 Months      06/16/20 1547    06/16/20 0000  Discharge Follow-up with Specified Provider: Dr. Khoury      06/16/20 1547    06/16/20 0000  Referral to Home Health      06/16/20 1547    06/15/20 2335  Hemoglobin & Hematocrit, Blood  STAT      06/15/20 2334    06/15/20 2300  sodium chloride 0.9 % bolus 500 mL  Once      06/15/20 2206    06/15/20 2008  Inpatient Infectious Diseases Consult  Once     Comments:  Dr. Barboza   Specialty:  Infectious Diseases  Provider:  Jamaal Salazar MD    06/15/20 2008    06/15/20 1400  metoprolol tartrate  (LOPRESSOR) tablet 25 mg  Every 12 Hours Scheduled      06/15/20 1213    06/15/20 1200  tamsulosin (FLOMAX) 24 hr capsule 0.4 mg  Daily      06/15/20 1035    06/15/20 0130  sodium chloride 0.9 % flush 10 mL  Every 12 Hours Scheduled      06/15/20 0037    06/15/20 0037  sodium chloride 0.9 % flush 10 mL  As Needed      06/15/20 0037    06/14/20 2200  nicotine (NICODERM CQ) 7 MG/24HR patch 1 patch  Every 24 Hours Scheduled      06/14/20 2058 06/14/20 2100  nystatin-triamcinolone (MYCOLOG II) 035995-1.1 UNIT/GM-% cream  Every 12 Hours Scheduled      06/14/20 1645    06/14/20 2000  cefTRIAXone (ROCEPHIN) 2 g/100 mL 0.9% NS VTB (MARIANO)  Every 24 Hours      06/14/20 1933    06/14/20 1930  nitroglycerin (NITROSTAT) SL tablet 0.4 mg  Every 5 Minutes PRN      06/14/20 1931    06/14/20 1835  dilTIAZem (CARDIZEM) injection 10 mg  Once      06/14/20 1833    06/14/20 1830  sodium chloride 0.9 % infusion  Continuous      06/14/20 1829    06/14/20 1435  sodium chloride 0.9 % flush 10 mL  As Needed      06/14/20 1436    Unscheduled  Telemetry - Pulse Oximetry  Continuous PRN     Comments:  If Patient Develops Unresponsiveness, Acute Dyspnea, Cyanosis or Suspected Hypoxemia Start Continuous Pulse Ox Monitoring, Apply Oxygen & Notify Provider    06/14/20 1931    Unscheduled  ECG 12 Lead  As Needed     Comments:  Nurse to Release if Patient Expericences Acute Chest Pain or Dysrhythmias    06/14/20 1931    Unscheduled  Potassium  As Needed     Comments:  For Ventricular Arrhythmias      06/14/20 1931    Unscheduled  Magnesium  As Needed     Comments:  For Ventricular Arrhythmias      06/14/20 1931    Unscheduled  Troponin  As Needed     Comments:  For Chest Pain      06/14/20 1931    Unscheduled  Blood Gas, Arterial  As Needed     Comments:  Per O2 PolicyNotify Physician      06/14/20 1931                   Physician Progress Notes (last 24 hours) (Notes from 06/15/20 1622 through 06/16/20 1622)      Kelly Monroe MD at 06/16/20 2630           Nephrology Progress Note      Subjective     Patient feeling much better, no chest pain or shortness of breath    Objective       Vital signs :     Temp:  [97.5 °F (36.4 °C)-98.6 °F (37 °C)] 98.6 °F (37 °C)  Heart Rate:  [] 115  Resp:  [18-20] 18  BP: ()/(47-77) 116/77      Intake/Output Summary (Last 24 hours) at 6/16/2020 1018  Last data filed at 6/16/2020 0857  Gross per 24 hour   Intake 3523.47 ml   Output 5300 ml   Net -1776.53 ml       Physical Exam:    General Appearance :not in acute distress  Lungs : clear to auscultation, respirations regular  Heart :  regular rhythm & normal rate, normal S1, S2 and no murmur, no rub  Abdomen : normal bowel sounds, no masses, no hepatomegaly, no splenomegaly, soft non-tender and no guarding  Extremities : moves extremities well, no edema, no cyanosis and no redness  Neurologic :   orientated to person, place, time and situation, Grossly no focal deficits  Acess :       Laboratory Data :     Albumin Albumin   Date Value Ref Range Status   06/16/2020 2.74 (L) 3.50 - 5.20 g/dL Final   06/15/2020 2.61 (L) 3.50 - 5.20 g/dL Final   06/14/2020 3.38 (L) 3.50 - 5.20 g/dL Final      Magnesium Magnesium   Date Value Ref Range Status   06/14/2020 2.3 1.6 - 2.4 mg/dL Final          PTH               No results found for: PTH    CBC and coagulation:  Results from last 7 days   Lab Units 06/16/20  0022 06/15/20  0603 06/14/20  1444 06/14/20  1436   LACTATE mmol/L  --   --  0.6  --    CRP mg/dL  --   --   --  9.67*   WBC 10*3/mm3 8.90 7.92  --  13.07*   HEMOGLOBIN g/dL 10.0*  10.0* 10.8*  --  11.3*   HEMATOCRIT % 33.4*  33.4* 36.3*  --  37.4*   MCV fL 84.6 84.6  --  84.0   MCHC g/dL 29.9* 29.8*  --  30.2*   PLATELETS 10*3/mm3 208 208  --  297     Acid/base balance:      Renal and electrolytes:  Results from last 7 days   Lab Units 06/16/20  0744 06/15/20  0603 06/14/20  2130 06/14/20  1854 06/14/20  1436   SODIUM mmol/L 140 138  --  140 136   POTASSIUM mmol/L 4.4 4.8  5.3* 5.4* 6.3*   MAGNESIUM mg/dL  --   --   --   --  2.3   CHLORIDE mmol/L 109* 111*  --  114* 109*   CO2 mmol/L 19.0* 16.3*  --  16.1* 15.5*   BUN mg/dL 19 44*  --  62* 67*   CREATININE mg/dL 1.39* 2.49*  --  3.74* 4.32*   EGFR IF NONAFRICN AM mL/min/1.73 50* 25*  --  16* 13*   CALCIUM mg/dL 8.9 9.2  --  9.6 9.4     Estimated Creatinine Clearance: 59.2 mL/min (A) (by C-G formula based on SCr of 1.39 mg/dL (H)).    Liver and pancreatic function:  Results from last 7 days   Lab Units 06/16/20  0744 06/15/20  0603 06/14/20  1436   ALBUMIN g/dL 2.74* 2.61* 3.38*   BILIRUBIN mg/dL 0.2 0.2 0.3   ALK PHOS U/L 75 77 91   AST (SGOT) U/L 10 8 8   ALT (SGPT) U/L 11 12 16         Cardiac:  Results from last 7 days   Lab Units 06/14/20  1436   PROBNP pg/mL 2,167.0*     Liver and pancreatic function:  Results from last 7 days   Lab Units 06/16/20  0744 06/15/20  0603 06/14/20  1436   ALBUMIN g/dL 2.74* 2.61* 3.38*   BILIRUBIN mg/dL 0.2 0.2 0.3   ALK PHOS U/L 75 77 91   AST (SGOT) U/L 10 8 8   ALT (SGPT) U/L 11 12 16       Medications :       cefTRIAXone 2 g Intravenous Q24H   dilTIAZem 10 mg Intravenous Once   metoprolol tartrate 25 mg Oral Q12H   nicotine 1 patch Transdermal Q24H   nystatin-triamcinolone  Topical Q12H   sodium chloride 500 mL Intravenous Once   sodium chloride 10 mL Intravenous Q12H   tamsulosin 0.4 mg Oral Daily       sodium chloride 100 mL/hr Last Rate: 100 mL/hr (06/16/20 0743)         Assessment/Plan     1. ANGEL  2. Sepsis likely due to UTI  3. B/L hydronephrosis  4. Hyperkalemia  5. Cardiomyopathy with history of CHF  6. Essential hypertension     Cr improved to 1.3, that is almost baseline. hyperkalemia has resolved. At this point, there is no active intervention from nephrology stands point, will sign off. Please call us if any question. Reduce IVF to 75ml/h. I will see him in clinic in 2 months.     Baseline Cr 1-1.2, admitted with 4.32  UA suggestive of UTI  B/L hydronephrosis  -reduce NS  75ml/h  -continue tamsolusin 0.4mg daily  -keep the sommer's cath on discharge until seen in urology clinic      Kelly Monroe MD  20  10:18      Electronically signed by Kelly Monroe MD at 20 1021          Consult Notes (last 24 hours) (Notes from 06/15/20 1622 through 20 1622)      Dora Oglesby PA-C at 20 1156      Consult Orders    1. Inpatient Infectious Diseases Consult [[id number]] ordered by Janet Barboza DO at 06/15/20 2008                        INFECTIOUS DISEASE CONSULTATION REPORT        Patient Identification:  Name:  Larry Kehr  Age:  74 y.o.  Sex:  male  :  1945  MRN:  [id number]   Visit Number:  [id number]  Primary Care Physician:  Buster Redd MD       LOS: 2 days        Subjective       Subjective     History of present illness:      Thank you Dr. Nicole for allowing us to participate in the care of your patient.  As you well know, Mr. Larry Kehr is a 74 y.o. male with past medical history significant for Atrial fibrillation, cardiomyopathy, CHF, COPD, tobacco abuse, morbid obesity, and hypertension, who presented to [facility] Emergency Department on 2020 for generalized weakness.  Patient states she has felt poorly for the last 2 to 3 weeks prior to admission with associated decreased appetite and urinary incontinence..  He reports intermittent back pain that had worsened over the last few weeks as well.  He has been having difficulty starting and stopping flow of urine.  Denies any foul odor of the urine, dysuria, or suprapubic pain.  CRP elevated at 9.67 on 2020 with elevated PSA of 19.100 and leukocytosis initially but now resolved.  Strongly positive UA with urine culture finalized as no growth.  One positive blood culture out of 2 finalized is growing bacterium species.      Infectious Disease consultation was requested for antimicrobial  management.      ---------------------------------------------------------------------------------------------------------------------     Review Of Systems:    Constitutional: no fever, chills and night sweats. No appetite change or unexpected weight change. No fatigue.  Eyes: no eye drainage, itching or redness.  HEENT: no mouth sores, dysphagia or nose bleed.  Respiratory: no for shortness of breath, cough or production of sputum.  Cardiovascular: no chest pain, no palpitations, no orthopnea.  Gastrointestinal: no nausea, vomiting or diarrhea. No abdominal pain, hematemesis or rectal bleeding.  Genitourinary: See HPI  Hematologic/lymphatic: no lymph node abnormalities, no easy bruising or easy bleeding.  Musculoskeletal: no muscle or joint pain.  Skin: No rash and no itching.  Neurological: no loss of consciousness, no seizure, no headache.  Behavioral/Psych: no depression or suicidal ideation.  Endocrine: no hot flashes.  Immunologic: negative.    ---------------------------------------------------------------------------------------------------------------------     Past Medical History    Past Medical History:   Diagnosis Date   • Atrial fibrillation (CMS/HCC)    • Cardiomyopathy (CMS/HCC)    • CHF (congestive heart failure) (CMS/HCC)    • COPD (chronic obstructive pulmonary disease) (CMS/HCC)    • Hypertension        Past Surgical History    No past surgical history on file.    Family History    Family History   Problem Relation Age of Onset   • No Known Problems Mother    • Heart disease Father    • No Known Problems Sister    • No Known Problems Brother    • No Known Problems Son    • No Known Problems Daughter    • No Known Problems Maternal Grandmother    • No Known Problems Maternal Grandfather    • No Known Problems Paternal Grandmother    • No Known Problems Paternal Grandfather    • No Known Problems Cousin    • Rheum arthritis Neg Hx    • Osteoarthritis Neg Hx    • Asthma Neg Hx    • Diabetes Neg Hx     • Heart failure Neg Hx    • Hyperlipidemia Neg Hx    • Hypertension Neg Hx    • Migraines Neg Hx    • Rashes / Skin problems Neg Hx    • Seizures Neg Hx    • Stroke Neg Hx    • Thyroid disease Neg Hx        Social History    Social History     Tobacco Use   • Smoking status: Current Every Day Smoker     Packs/day: 1.00     Years: 59.00     Pack years: 59.00     Types: Cigarettes   • Smokeless tobacco: Never Used   Substance Use Topics   • Alcohol use: No   • Drug use: No       Allergies    Patient has no known allergies.  ---------------------------------------------------------------------------------------------------------------------     Home Medications:    Prior to Admission Medications     Prescriptions Last Dose Informant Patient Reported? Taking?    rivaroxaban (XARELTO) 20 MG tablet 6/13/2020 Self No Yes    Take 1 tablet by mouth Daily With Dinner.    sacubitril-valsartan (ENTRESTO) 24-26 MG tablet 6/14/2020 Self No Yes    Take 1 tablet by mouth 2 (Two) Times a Day.    spironolactone (ALDACTONE) 25 MG tablet 6/14/2020 Self No Yes    TAKE 1 TABLET BY MOUTH DAILY.        ---------------------------------------------------------------------------------------------------------------------    Objective       Objective     Hospital Scheduled Meds:    cefTRIAXone 2 g Intravenous Q24H   dilTIAZem 10 mg Intravenous Once   metoprolol tartrate 25 mg Oral Q12H   nicotine 1 patch Transdermal Q24H   nystatin-triamcinolone  Topical Q12H   sodium chloride 500 mL Intravenous Once   sodium chloride 10 mL Intravenous Q12H   tamsulosin 0.4 mg Oral Daily       sodium chloride 75 mL/hr Last Rate: 75 mL/hr (06/16/20 1032)     ---------------------------------------------------------------------------------------------------------------------   Vital Signs:  Temp:  [97.5 °F (36.4 °C)-98.6 °F (37 °C)] 97.8 °F (36.6 °C)  Heart Rate:  [] 82  Resp:  [18-20] 18  BP: ()/(47-77) 98/64  Mean Arterial Pressure (Non-Invasive)  for the past 24 hrs (Last 3 readings):   Noninvasive MAP (mmHg)   06/16/20 1026 74   06/16/20 0611 89   06/15/20 2330 73     SpO2 Percentage    06/16/20 0611 06/16/20 0900 06/16/20 1026   SpO2: 96% 96% 97%     SpO2:  [94 %-97 %] 97 %  on   ;   Device (Oxygen Therapy): room air    Body mass index is 38.57 kg/m².  Wt Readings from Last 3 Encounters:   06/16/20 122 kg (268 lb 12.8 oz)   02/07/20 127 kg (280 lb 6.4 oz)   08/01/19 127 kg (281 lb)     ---------------------------------------------------------------------------------------------------------------------     Physical Exam:    Constitutional:  Well-developed and well-nourished.  No respiratory distress.      HENT:  Head: Normocephalic and atraumatic.  Mouth:  Moist mucous membranes.    Eyes:  Conjunctivae and EOM are normal.  No scleral icterus.  Neck:  Neck supple.  No JVD present.    Cardiovascular:  Normal rate, regular rhythm and normal heart sounds with no murmur. No edema.  Pulmonary/Chest:  No respiratory distress, no wheezes, no crackles, with normal breath sounds and good air movement.  Abdominal:  Soft.  Bowel sounds are normal.  No distension and no tenderness.   Musculoskeletal:  No edema, no tenderness, and no deformity.  No swelling or redness of joints.  Neurological:  Alert and oriented to person, place, and time.  No facial droop.  No slurred speech.   Skin:  Skin is warm and dry.  No rash noted.  No pallor.   Psychiatric:  Normal mood and affect.  Behavior is normal.    ---------------------------------------------------------------------------------------------------------------------    Results from last 7 days   Lab Units 06/14/20  1631 06/14/20  1436   CK TOTAL U/L  --  20   TROPONIN T ng/mL <0.010 <0.010     Results from last 7 days   Lab Units 06/14/20  1436   PROBNP pg/mL 2,167.0*         Results from last 7 days   Lab Units 06/16/20  0022 06/15/20  0603 06/14/20  1444 06/14/20  1436   CRP mg/dL  --   --   --  9.67*   LACTATE mmol/L   --   --  0.6  --    WBC 10*3/mm3 8.90 7.92  --  13.07*   HEMOGLOBIN g/dL 10.0*  10.0* 10.8*  --  11.3*   HEMATOCRIT % 33.4*  33.4* 36.3*  --  37.4*   MCV fL 84.6 84.6  --  84.0   MCHC g/dL 29.9* 29.8*  --  30.2*   PLATELETS 10*3/mm3 208 208  --  297     Results from last 7 days   Lab Units 06/16/20  1043 06/16/20  0744 06/15/20  0603  06/14/20  1436   SODIUM mmol/L 140 140 138   < > 136   POTASSIUM mmol/L 4.1 4.4 4.8   < > 6.3*   MAGNESIUM mg/dL  --   --   --   --  2.3   CHLORIDE mmol/L 110* 109* 111*   < > 109*   CO2 mmol/L 20.1* 19.0* 16.3*   < > 15.5*   BUN mg/dL 21 19 44*   < > 67*   CREATININE mg/dL 1.38* 1.39* 2.49*   < > 4.32*   EGFR IF NONAFRICN AM mL/min/1.73 50* 50* 25*   < > 13*   CALCIUM mg/dL 8.8 8.9 9.2   < > 9.4   GLUCOSE mg/dL 130* 112* 91   < > 99   ALBUMIN g/dL  --  2.74* 2.61*  --  3.38*   BILIRUBIN mg/dL  --  0.2 0.2  --  0.3   ALK PHOS U/L  --  75 77  --  91   AST (SGOT) U/L  --  10 8  --  8   ALT (SGPT) U/L  --  11 12  --  16    < > = values in this interval not displayed.   Estimated Creatinine Clearance: 59.6 mL/min (A) (by C-G formula based on SCr of 1.38 mg/dL (H)).  No results found for: AMMONIA    No results found for: HGBA1C, POCGLU  Lab Results   Component Value Date    HGBA1C 6.50 (H) 01/06/2017     Lab Results   Component Value Date    TSH 2.250 06/14/2020       Blood Culture   Date Value Ref Range Status   06/14/2020 Corynebacterium species (C)  Final     Comment:     No definitive guidelines. All are susceptible to vancomycin. Resistance to penicillins & cephalosporins does occur.     06/14/2020 No growth at 24 hours  Preliminary     Urine Culture   Date Value Ref Range Status   06/14/2020 No growth  Final     No results found for: WOUNDCX  No results found for: STOOLCX  No results found for: RESPCX  Pain Management Panel     There is no flowsheet data to display.        I have personally reviewed the above laboratory results.    ---------------------------------------------------------------------------------------------------------------------  Imaging Results (Last 7 Days)     Procedure Component Value Units Date/Time    US Arterial Doppler Lower Extremity Bilateral [[id number]] Collected:  06/15/20 1231     Updated:  06/15/20 1234    Narrative:       EXAMINATION: US ARTERIAL DOPPLER LOWER EXTREMITY  BILATERAL-      CLINICAL INDICATION:     cramping/pain right calf, evaluate for PVD;  N17.9-Acute kidney failure, unspecified; N13.30-Unspecified  hydronephrosis; R33.9-Retention of urine, unspecified;  I48.91-Unspecified atrial fibrillation     COMPARISON: None.     Technique:  Multiple real time color Doppler images were obtained.       Stenosis measurements if obtained, were performed by the NASCET or  similar method.        FINDINGS: Monophasic waveform patterns noted in the left popliteal and  posterior tibial artery. Monophasic waveform patterns noted in the right  posterior tibial artery. Flow was not demonstrated in the left  superficial femoral artery concerning for occlusion.       Impression:       Monophasic waveform patterns noted in the left popliteal  and posterior tibial artery. Monophasic waveform patterns noted in the  right posterior tibial artery. Flow was not demonstrated in the left  superficial femoral artery concerning for occlusion.     This report was finalized on 6/15/2020 12:32 PM by Dr. Serg Ríos MD.       US Venous Doppler Lower Extremity Right (duplex) [[id number]] Collected:  06/15/20 1210     Updated:  06/15/20 1212    Narrative:       US VENOUS DOPPLER LOWER EXTREMITY RIGHT (DUPLEX)-     REASON FOR EXAM:  right calf pain/cramping; N17.9-Acute kidney failure,  unspecified; N13.30-Unspecified hydronephrosis; R33.9-Retention of  urine, unspecified; I48.91-Unspecified atrial fibrillation     Multiple real-time images were obtained. The deep veins were well  demonstrated sonographically. There is good color  doppler signal seen  filling the deep veins. They were completely compressed by the  ultrasound transducer. There was good spontaneous venous flow and  augmentation. There are no echoes seen along the deep veins to suggest  clot.          Impression:       No sonographic findings of DVT in the right lower extremity     This report was finalized on 6/15/2020 12:10 PM by Dr. Miguel Prasad II, MD.       XR Chest 1 View [[id number]] Collected:  06/14/20 1905     Updated:  06/14/20 1907    Narrative:       CR Chest 1 Vw    INDICATION:   74-year-old male with atrial fibrillation.     COMPARISON:    None available.    FINDINGS:  Single portable AP view(s) of the chest.  Cardiac silhouette is borderline in size. Unremarkable vascularity. Lower lung volumes with mild bronchovascular crowding and probable faint perihilar atelectasis. No effusion dense consolidation or pneumothorax.      Impression:         1. Borderline cardiac size. Improved appearance compared to the prior study.    Signer Name: Omar Christian MD   Signed: 6/14/2020 7:05 PM   Workstation Name: [facility]    [facility]    MRI Lumbar Spine Without Contrast [[id number]] Collected:  06/14/20 1807     Updated:  06/14/20 1809    Narrative:       MRI Spine Lumbar WO    INDICATION:    Lumbar pain radiating to the right leg with urinary incontinence    TECHNIQUE:   MRI of the lumbar spine without IV contrast.    COMPARISON:    None available.    FINDINGS:  Alignment is satisfactory. The lumbar discs show mild degenerative change without significant bulging or herniation. Mild concentric disc bulging is seen all levels. Posterior facet hypertrophy is noted to a moderate degree at L1-2 and L2-3 and to a mild  degree at L3-4. The lower lumbar facets are unremarkable.    The conus is normal. There is no evidence of marrow edema or paraspinous mass. There is an infrarenal abdominal aortic aneurysm that was described on the CT report.       Impression:       Mild degenerative disc disease with mild to moderate multilevel facet arthropathy. No evidence of lesion or compression of the cauda equina.    Signer Name: Merlin Ceballos MD   Signed: 6/14/2020 6:07 PM   [facility]    [facility]    CT Abdomen Pelvis Without Contrast [[id number]] Collected:  06/14/20 1528     Updated:  06/14/20 1530    Narrative:       CT Abdomen Pelvis WO    INDICATION:   Back pain with urinary incontinence beginning 2-3 weeks ago    TECHNIQUE:   CT of the abdomen and pelvis without IV contrast. Coronal and sagittal reconstructions were obtained.  Radiation dose reduction techniques included automated exposure control or exposure modulation based on body size. Count of known CT and cardiac nuc  med studies performed in previous 12 months: 0.     COMPARISON:   None available.    FINDINGS:  Abdomen: The liver, spleen and pancreas are normal in size. Multiple small gallstones are seen in the gallbladder. No adrenal masses are noted. Both kidneys show severe hydronephrosis. There is perinephric edema. The ureters are dilated down to the  bladder which is markedly dilated. No retroperitoneal adenopathy. No distended bowel loops. There is an infrarenal abdominal aortic aneurysm. It measures 4.3 x 4.6 cm in transverse diameter and extends over a length of approximately 5 cm.    Pelvis: Markedly distended bladder with a smooth contour. No evidence of adenopathy, mass or fluid collection. Normal appendix.      Impression:       Marked distention of the bladder is noted with bilateral hydronephrosis consistent with urinary retention. Also noted is cholelithiasis. 4.6 cm abdominal aortic aneurysm.          Signer Name: Merlin Ceballos MD   Signed: 6/14/2020 3:28 PM   [facility]station [facility]: [facility]    [facility]    CT Chest Without Contrast [[id number]] Collected:  06/14/20 1526     Updated:  06/14/20 1528    Narrative:       CT  Chest WO    INDICATION:   Weakness and back pain beginning 2-3 weeks ago    TECHNIQUE:   CT of the thorax without IV contrast. Coronal and sagittal reconstructions were obtained.  Radiation dose reduction techniques included automated exposure control or exposure modulation based on body size. Count of known CT and cardiac nuc med studies  performed in previous 12 months: 0.     COMPARISON:   None available.    FINDINGS:  Chest images at mediastinal window show no adenopathy or effusions.    Chest images at lung window show both lungs to be fully expanded and clear.      Impression:       Negative CT of the chest.    Signer Name: Merlin Ceballos MD   Signed: 6/14/2020 3:26 PM   Workstation Name: [id number]    [facility]    CT Lumbar Spine Without Contrast [[id number]] Collected:  06/14/20 1524     Updated:  06/14/20 1526    Narrative:       CT Spine Lumbar WO    INDICATION:    Lumbar pain radiating to the right leg with urinary incontinence beginning 2-3 weeks ago    TECHNIQUE:   CT of the lumbar spine without IV contrast. Coronal and sagittal reconstructions were obtained.  Radiation dose reduction techniques included automated exposure control or exposure modulation based on body size. Count of known CT and cardiac nuc med  studies performed in previous 12 months: 0.     COMPARISON:    None available.    FINDINGS:  Alignment is satisfactory. Disc space heights are preserved.    At L2-3 there is concentric disc bulging with moderately severe central stenosis and mild facet hypertrophy. L3-4 there is moderate central stenosis from concentric disc bulging. L4-5 there is moderate central stenosis from concentric disc bulge.    No fractures or destructive bone lesions are seen. The facets show no erosions. No pars defects are noted.      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    Signer Name: Merlin Ceballos MD   Signed: 6/14/2020 3:24 PM   Workstation Name: [facility]    [facility]    CT Head Without Contrast [[id number]] Collected:  06/14/20 1521     Updated:  06/14/20 1523    Narrative:       CT Head WO    HISTORY:   Weakness on arrival    TECHNIQUE:   Axial unenhanced head CT. Radiation dose reduction techniques included automated exposure control or exposure modulation based on body size. Count of known CT and cardiac nuc med studies performed in previous 12 months: 0.     Time of scan: 3:13 PM    COMPARISON:   None.    FINDINGS:   No intracranial hemorrhage, mass, or infarct. No hydrocephalus or extra-axial fluid collection. There are senescent changes, including volume loss and nonspecific white matter change, but no acute abnormality is seen. The skull base, calvarium, and  extracranial soft tissues are normal.      Impression:       Senescent changes without acute abnormality.          Signer Name: Merlin Ceballos MD   Signed: 6/14/2020 3:21 PM   Workstation Name: [facility]    [facility]        I have personally reviewed the above radiology results.   ---------------------------------------------------------------------------------------------------------------------      Assessment & Plan        Assessment/Plan       ASSESSMENT:    1.  Subacute prostatitis  2.  Bacteremia versus contaminant.    PLAN:    1+ blood culture out of 2 finalized as corynebacterium species is most likely a contaminant.    Based on patient's presentation, elevated PSA, high CRP level, and strongly positive urine analysis we believe the patient has subacute prostatitis and would recommend 3 weeks of antibiotic therapy with high-dose Rocephin 2 g IV every 24 hours while hospitalized to be de-escalated to Omnicef on discharge to complete a 3-week course through 7/5/20.    Again, thank you   Bonnie for allowing us to participate in the care of your patient and please feel free to call for any questions you may have.        Code Status:   Code Status and Medical Interventions:   Ordered at: 06/14/20 1836     Code Status:    CPR     Medical Interventions (Level of Support Prior to Arrest):    Full           Dora Oglesby PA-C  06/16/20  11:56      Electronically signed by Dora Oglesby PA-C at 06/16/20 1200       Physical Therapy Notes (last 24 hours) (Notes from 06/15/20 1622 through 06/16/20 1622)    No notes exist for this encounter.         Occupational Therapy Notes (last 24 hours) (Notes from 06/15/20 1622 through 06/16/20 1622)    No notes exist for this encounter.         ADL Documentation (last day)     Date/Time Presence of Pain Transferring Toileting Bathing Dressing Eating Communication Swallowing Equipment Currently Used at Home    06/16/20 0810  denies pain/discomfort  0 - independent  1 - assistive equipment  0 - independent  1 - assistive equipment  0 - independent  0 - understands/communicates without difficulty  0 - swallows foods/liquids without difficulty  --    06/16/20 0300  non-verbal indicators absent  --  --  --  --  --  --  --  --    06/16/20 0100  non-verbal indicators absent  --  --  --  --  --  --  --  --    06/15/20 2300  non-verbal indicators absent  --  --  --  --  --  --  --  --    06/15/20 2100  denies pain/discomfort  --  --  --  --  --  --  --  --    06/15/20 1940  denies pain/discomfort  0 - independent  1 - assistive equipment  0 - independent  1 - assistive equipment  0 - independent  0 - understands/communicates without difficulty  0 - swallows foods/liquids without difficulty  --    06/15/20 1312  --  --  --  --  --  --  --  --  none    06/15/20 0812  denies pain/discomfort  0 - independent  1 - assistive equipment  0 - independent  1 - assistive equipment  0 - independent  0 - understands/communicates without difficulty  0 - swallows foods/liquids  without difficulty  --    06/15/20 0500  denies pain/discomfort  --  --  --  --  --  --  --  --    06/15/20 0300  denies pain/discomfort  --  --  --  --  --  --  --  --    06/15/20 0100  denies pain/discomfort  --  --  --  --  --  --  --  --                Discharge Summary    No notes of this type exist for this encounter.       [facility]  [place] [address]  Phone:  [phone]  Fax:   Date: 2020      Referral to Home Health     Patient:  Larry Kehr MRN:  [id number]   [address]  [place] [address] :  1945  SSN:    Phone:  Sex:  M      INSURANCE PAYOR PLAN GROUP # SUBSCRIBER ID   Primary:    MEDICARE [id number]   [id number]      Referring Provider Information:  TERRI COLEMAN Phone: [phone] Fax:       Referral Information:   # Visits:  1 Referral Type: Home Health [42]   Urgency:  Routine Referral Reason: Specialty Services Required   Start Date: 2020 End Date:  To be determined by Insurer   Diagnosis: Malhotra catheter in place (Z96.0 [ICD-10-CM] V45.89 [ICD-9-CM])  Obstructive uropathy (N13.9 [ICD-10-CM] 599.60 [ICD-9-CM])      Refer to Dept:   Refer to Provider:   Refer to Facility:       Face to Face Visit Date: 2020  Follow-up provider for Plan of Care? I treated the patient in an acute care facility and will not continue treatment after discharge.  Follow-up provider: LASHAWN CAVAZOS [[id number]]  Reason/Clinical Findings: Struct of uropathy requiring Malhotra catheter placement  Describe mobility limitations that make leaving home difficult: Patient has congestive heart failure, A. fib with COPD and requires company to get out of the house  Nursing/Therapeutic Services Requested: Skilled Nursing  Skilled nursing orders: Monthly catheter care  Frequency: 1 Week 1     This document serves as a request of services and does not constitute Insurance authorization or approval of services.  To determine eligibility, please contact the members Insurance carrier to  verify and review coverage.     If you have medical questions regarding this request for services. Please contact [address] at [phone] during normal business hours.       Authorizing Provider:Terri Nicole MD  Authorizing Provider's NPI: [id number]  Order Entered By: Terri Nicole MD 6/16/2020  3:47 PM     Electronically signed by: Terri Nicole MD 6/16/2020  3:47 PM            Discharge Order (From admission, onward)     Start     Ordered    06/16/20 1538  Discharge patient  Once     Expected Discharge Date:  06/16/20    Discharge Disposition:  Home-Health Care [facility]    Physician of Record for Attribution - Please select from Treatment Team:  TERRI NICOLE [[id number]]    Review needed by CMO to determine Physician of Record:  No       Question Answer Comment   Physician of Record for Attribution - Please select from Treatment Team TERRI NICOLE    Review needed by CMO to determine Physician of Record No        06/16/20 1549

## 2020-06-16 NOTE — DISCHARGE INSTRUCTIONS
You have been enrolled into the transition from hospital to home program.  A nurse (Chrissy) will be contacting you after you discharge to home to set up a time to come out to your home for a follow-up visit.  If you have any questions or concerns you can call this number anytime and a nurse will contact you:  [facility]  [phone].

## 2020-06-16 NOTE — NURSING NOTE
16 fr sommer catheter was replaced per Dr. Nicole, and Dr. Monroe note. Pt was unable to void for several hours after the removal of the first sommer catheter. Pt tolerated procedure well, urine return was noted. Dr. Nicole notified, pt to be discharged this afternoon. No s/s of distress noted. Pt is pale yellow in color with no clots noted at this time.

## 2020-06-16 NOTE — NURSING NOTE
"Transitional Care Note    Enrolled in [facility] Transitional Care Note    Enrolled in [facility] Transitional Care Services under theTCM Model to be followed for 30 days post discharge.  [facility] will assist with support and education at time of transition home from hospital.  Hospital  will follow throughout the stay at [facility].  Home  will visit within 48 hours of discharge and follow with home vitals and telephone contact for 30 days.      Patient admitted to [facility] via Emergency Department, complaints of weakness admitted for treatment of sepsis.    Patient contacted via phone.    Explained transition to home program and Patient is agreeable to home visits.  Home  will be Chrissy Flood RN    Clinical Assessment Instrument Scores:  >Short Portable Mental Status 10, normal mental function  >Geriatric Depression Scale 4, normal  >IADL 8, Independent with ADL's  >BYRNE-ADL 6, Independent with self-care  >Subjective Health Rating \"good\"  >General Anxiety Scale  2    "

## 2020-06-16 NOTE — CONSULTS
INFECTIOUS DISEASE CONSULTATION REPORT        Patient Identification:  Name:  Larry Kehr  Age:  74 y.o.  Sex:  male  :  1945  MRN:  [id number]   Visit Number:  [id number]  Primary Care Physician:  Buster Redd MD       LOS: 2 days        Subjective       Subjective     History of present illness:      Thank you Dr. Nicole for allowing us to participate in the care of your patient.  As you well know, Mr. Larry Kehr is a 74 y.o. male with past medical history significant for Atrial fibrillation, cardiomyopathy, CHF, COPD, tobacco abuse, morbid obesity, and hypertension, who presented to [facility] on 2020 for generalized weakness.  Patient states she has felt poorly for the last 2 to 3 weeks prior to admission with associated decreased appetite and urinary incontinence..  He reports intermittent back pain that had worsened over the last few weeks as well.  He has been having difficulty starting and stopping flow of urine.  Denies any foul odor of the urine, dysuria, or suprapubic pain.  CRP elevated at 9.67 on 2020 with elevated PSA of 19.100 and leukocytosis initially but now resolved.  Strongly positive UA with urine culture finalized as no growth.  One positive blood culture out of 2 finalized is growing bacterium species.      Infectious Disease consultation was requested for antimicrobial management.      ---------------------------------------------------------------------------------------------------------------------     Review Of Systems:    Constitutional: no fever, chills and night sweats. No appetite change or unexpected weight change. No fatigue.  Eyes: no eye drainage, itching or redness.  HEENT: no mouth sores, dysphagia or nose bleed.  Respiratory: no for shortness of breath, cough or production of sputum.  Cardiovascular: no chest pain, no palpitations, no orthopnea.  Gastrointestinal: no nausea, vomiting or diarrhea. No abdominal pain,  hematemesis or rectal bleeding.  Genitourinary: See HPI  Hematologic/lymphatic: no lymph node abnormalities, no easy bruising or easy bleeding.  Musculoskeletal: no muscle or joint pain.  Skin: No rash and no itching.  Neurological: no loss of consciousness, no seizure, no headache.  Behavioral/Psych: no depression or suicidal ideation.  Endocrine: no hot flashes.  Immunologic: negative.    ---------------------------------------------------------------------------------------------------------------------     Past Medical History    Past Medical History:   Diagnosis Date   • Atrial fibrillation (CMS/[facility])    • Cardiomyopathy (CMS/[facility])    • CHF (congestive heart failure) (CMS/[facility])    • COPD (chronic obstructive pulmonary disease) (CMS/[facility])    • Hypertension        Past Surgical History    No past surgical history on file.    Family History    Family History   Problem Relation Age of Onset   • No Known Problems Mother    • Heart disease Father    • No Known Problems Sister    • No Known Problems Brother    • No Known Problems Son    • No Known Problems Daughter    • No Known Problems Maternal Grandmother    • No Known Problems Maternal Grandfather    • No Known Problems Paternal Grandmother    • No Known Problems Paternal Grandfather    • No Known Problems Cousin    • Rheum arthritis Neg Hx    • Osteoarthritis Neg Hx    • Asthma Neg Hx    • Diabetes Neg Hx    • Heart failure Neg Hx    • Hyperlipidemia Neg Hx    • Hypertension Neg Hx    • Migraines Neg Hx    • Rashes / Skin problems Neg Hx    • Seizures Neg Hx    • Stroke Neg Hx    • Thyroid disease Neg Hx        Social History    Social History     Tobacco Use   • Smoking status: Current Every Day Smoker     Packs/day: 1.00     Years: 59.00     Pack years: 59.00     Types: Cigarettes   • Smokeless tobacco: Never Used   Substance Use Topics   • Alcohol use: No   • Drug use: No       Allergies    Patient has no known  allergies.  ---------------------------------------------------------------------------------------------------------------------     Home Medications:    Prior to Admission Medications     Prescriptions Last Dose Informant Patient Reported? Taking?    rivaroxaban (XARELTO) 20 MG tablet 6/13/2020 Self No Yes    Take 1 tablet by mouth Daily With Dinner.    sacubitril-valsartan (ENTRESTO) 24-26 MG tablet 6/14/2020 Self No Yes    Take 1 tablet by mouth 2 (Two) Times a Day.    spironolactone (ALDACTONE) 25 MG tablet 6/14/2020 Self No Yes    TAKE 1 TABLET BY MOUTH DAILY.        ---------------------------------------------------------------------------------------------------------------------    Objective       Objective     Hospital Scheduled Meds:    cefTRIAXone 2 g Intravenous Q24H   dilTIAZem 10 mg Intravenous Once   metoprolol tartrate 25 mg Oral Q12H   nicotine 1 patch Transdermal Q24H   nystatin-triamcinolone  Topical Q12H   sodium chloride 500 mL Intravenous Once   sodium chloride 10 mL Intravenous Q12H   tamsulosin 0.4 mg Oral Daily       sodium chloride 75 mL/hr Last Rate: 75 mL/hr (06/16/20 1032)     ---------------------------------------------------------------------------------------------------------------------   Vital Signs:  Temp:  [97.5 °F (36.4 °C)-98.6 °F (37 °C)] 97.8 °F (36.6 °C)  Heart Rate:  [] 82  Resp:  [18-20] 18  BP: ()/(47-77) 98/64  Mean Arterial Pressure (Non-Invasive) for the past 24 hrs (Last 3 readings):   Noninvasive MAP (mmHg)   06/16/20 1026 74   06/16/20 0611 89   06/15/20 2330 73     SpO2 Percentage    06/16/20 0611 06/16/20 0900 06/16/20 1026   SpO2: 96% 96% 97%     SpO2:  [94 %-97 %] 97 %  on   ;   Device (Oxygen Therapy): room air    Body mass index is 38.57 kg/m².  Wt Readings from Last 3 Encounters:   06/16/20 122 kg (268 lb 12.8 oz)   02/07/20 127 kg (280 lb 6.4 oz)   08/01/19 127 kg (281 lb)      ---------------------------------------------------------------------------------------------------------------------     Physical Exam:    Constitutional:  Well-developed and well-nourished.  No respiratory distress.      HENT:  Head: Normocephalic and atraumatic.  Mouth:  Moist mucous membranes.    Eyes:  Conjunctivae and EOM are normal.  No scleral icterus.  Neck:  Neck supple.  No JVD present.    Cardiovascular:  Normal rate, regular rhythm and normal heart sounds with no murmur. No edema.  Pulmonary/Chest:  No respiratory distress, no wheezes, no crackles, with normal breath sounds and good air movement.  Abdominal:  Soft.  Bowel sounds are normal.  No distension and no tenderness.   Musculoskeletal:  No edema, no tenderness, and no deformity.  No swelling or redness of joints.  Neurological:  Alert and oriented to person, place, and time.  No facial droop.  No slurred speech.   Skin:  Skin is warm and dry.  No rash noted.  No pallor.   Psychiatric:  Normal mood and affect.  Behavior is normal.    ---------------------------------------------------------------------------------------------------------------------    Results from last 7 days   Lab Units 06/14/20  1631 06/14/20  1436   CK TOTAL U/L  --  20   TROPONIN T ng/mL <0.010 <0.010     Results from last 7 days   Lab Units 06/14/20  1436   PROBNP pg/mL 2,167.0*         Results from last 7 days   Lab Units 06/16/20  0022 06/15/20  0603 06/14/20  1444 06/14/20  1436   CRP mg/dL  --   --   --  9.67*   LACTATE mmol/L  --   --  0.6  --    WBC 10*3/mm3 8.90 7.92  --  13.07*   HEMOGLOBIN g/dL 10.0*  10.0* 10.8*  --  11.3*   HEMATOCRIT % 33.4*  33.4* 36.3*  --  37.4*   MCV fL 84.6 84.6  --  84.0   MCHC g/dL 29.9* 29.8*  --  30.2*   PLATELETS 10*3/mm3 208 208  --  297     Results from last 7 days   Lab Units 06/16/20  1043 06/16/20  0744 06/15/20  0603  06/14/20  1436   SODIUM mmol/L 140 140 138   < > 136   POTASSIUM mmol/L 4.1 4.4 4.8   < > 6.3*   MAGNESIUM mg/dL   --   --   --   --  2.3   CHLORIDE mmol/L 110* 109* 111*   < > 109*   CO2 mmol/L 20.1* 19.0* 16.3*   < > 15.5*   BUN mg/dL 21 19 44*   < > 67*   CREATININE mg/dL 1.38* 1.39* 2.49*   < > 4.32*   EGFR IF NONAFRICN AM mL/min/1.73 50* 50* 25*   < > 13*   CALCIUM mg/dL 8.8 8.9 9.2   < > 9.4   GLUCOSE mg/dL 130* 112* 91   < > 99   ALBUMIN g/dL  --  2.74* 2.61*  --  3.38*   BILIRUBIN mg/dL  --  0.2 0.2  --  0.3   ALK PHOS U/L  --  75 77  --  91   AST (SGOT) U/L  --  10 8  --  8   ALT (SGPT) U/L  --  11 12  --  16    < > = values in this interval not displayed.   Estimated Creatinine Clearance: 59.6 mL/min (A) (by C-G formula based on SCr of 1.38 mg/dL (H)).  No results found for: AMMONIA    No results found for: HGBA1C, POCGLU  Lab Results   Component Value Date    HGBA1C 6.50 (H) 01/06/2017     Lab Results   Component Value Date    TSH 2.250 06/14/2020       Blood Culture   Date Value Ref Range Status   06/14/2020 Corynebacterium species (C)  Final     Comment:     No definitive guidelines. All are susceptible to vancomycin. Resistance to penicillins & cephalosporins does occur.     06/14/2020 No growth at 24 hours  Preliminary     Urine Culture   Date Value Ref Range Status   06/14/2020 No growth  Final     No results found for: WOUNDCX  No results found for: STOOLCX  No results found for: RESPCX  Pain Management Panel     There is no flowsheet data to display.        I have personally reviewed the above laboratory results.   ---------------------------------------------------------------------------------------------------------------------  Imaging Results (Last 7 Days)     Procedure Component Value Units Date/Time    US Arterial Doppler Lower Extremity Bilateral [[id number]] Collected:  06/15/20 1231     Updated:  06/15/20 1234    Narrative:       EXAMINATION: US ARTERIAL DOPPLER LOWER EXTREMITY  BILATERAL-      CLINICAL INDICATION:     cramping/pain right calf, evaluate for PVD;  N17.9-Acute kidney failure,  unspecified; N13.30-Unspecified  hydronephrosis; R33.9-Retention of urine, unspecified;  I48.91-Unspecified atrial fibrillation     COMPARISON: None.     Technique:  Multiple real time color Doppler images were obtained.       Stenosis measurements if obtained, were performed by the NASCET or  similar method.        FINDINGS: Monophasic waveform patterns noted in the left popliteal and  posterior tibial artery. Monophasic waveform patterns noted in the right  posterior tibial artery. Flow was not demonstrated in the left  superficial femoral artery concerning for occlusion.       Impression:       Monophasic waveform patterns noted in the left popliteal  and posterior tibial artery. Monophasic waveform patterns noted in the  right posterior tibial artery. Flow was not demonstrated in the left  superficial femoral artery concerning for occlusion.     This report was finalized on 6/15/2020 12:32 PM by Dr. Serg Ríos MD.       US Venous Doppler Lower Extremity Right (duplex) [[id number]] Collected:  06/15/20 1210     Updated:  06/15/20 1212    Narrative:       US VENOUS DOPPLER LOWER EXTREMITY RIGHT (DUPLEX)-     REASON FOR EXAM:  right calf pain/cramping; N17.9-Acute kidney failure,  unspecified; N13.30-Unspecified hydronephrosis; R33.9-Retention of  urine, unspecified; I48.91-Unspecified atrial fibrillation     Multiple real-time images were obtained. The deep veins were well  demonstrated sonographically. There is good color doppler signal seen  filling the deep veins. They were completely compressed by the  ultrasound transducer. There was good spontaneous venous flow and  augmentation. There are no echoes seen along the deep veins to suggest  clot.          Impression:       No sonographic findings of DVT in the right lower extremity     This report was finalized on 6/15/2020 12:10 PM by Dr. Miguel Prasad II, MD.       XR Chest 1 View [[id number]] Collected:  06/14/20 1905     Updated:  06/14/20 1907     Narrative:       CR Chest 1 Vw    INDICATION:   74-year-old male with atrial fibrillation.     COMPARISON:    None available.    FINDINGS:  Single portable AP view(s) of the chest.  Cardiac silhouette is borderline in size. Unremarkable vascularity. Lower lung volumes with mild bronchovascular crowding and probable faint perihilar atelectasis. No effusion dense consolidation or pneumothorax.      Impression:         1. Borderline cardiac size. Improved appearance compared to the prior study.    Signer Name: Omar Christian MD   Signed: 6/14/2020 7:05 PM   Workstation Name: [email or web address]    Radiology [facility]    MRI Lumbar Spine Without Contrast [[id number]] Collected:  06/14/20 1807     Updated:  06/14/20 1809    Narrative:       MRI Spine Lumbar WO    INDICATION:    Lumbar pain radiating to the right leg with urinary incontinence    TECHNIQUE:   MRI of the lumbar spine without IV contrast.    COMPARISON:    None available.    FINDINGS:  Alignment is satisfactory. The lumbar discs show mild degenerative change without significant bulging or herniation. Mild concentric disc bulging is seen all levels. Posterior facet hypertrophy is noted to a moderate degree at L1-2 and L2-3 and to a mild  degree at L3-4. The lower lumbar facets are unremarkable.    The conus is normal. There is no evidence of marrow edema or paraspinous mass. There is an infrarenal abdominal aortic aneurysm that was described on the CT report.      Impression:       Mild degenerative disc disease with mild to moderate multilevel facet arthropathy. No evidence of lesion or compression of the cauda equina.    Signer Name: Merlin Ceballos MD   Signed: 6/14/2020 6:07 PM   Workstation Name: [email or web address]    Radiology [facility]    CT Abdomen Pelvis Without Contrast [[id number]] Collected:  06/14/20 1528     Updated:  06/14/20 1530    Narrative:       CT Abdomen Pelvis WO    INDICATION:   Back pain with urinary incontinence  beginning 2-3 weeks ago    TECHNIQUE:   CT of the abdomen and pelvis without IV contrast. Coronal and sagittal reconstructions were obtained.  Radiation dose reduction techniques included automated exposure control or exposure modulation based on body size. Count of known CT and cardiac nuc  med studies performed in previous 12 months: 0.     COMPARISON:   None available.    FINDINGS:  Abdomen: The liver, spleen and pancreas are normal in size. Multiple small gallstones are seen in the gallbladder. No adrenal masses are noted. Both kidneys show severe hydronephrosis. There is perinephric edema. The ureters are dilated down to the  bladder which is markedly dilated. No retroperitoneal adenopathy. No distended bowel loops. There is an infrarenal abdominal aortic aneurysm. It measures 4.3 x 4.6 cm in transverse diameter and extends over a length of approximately 5 cm.    Pelvis: Markedly distended bladder with a smooth contour. No evidence of adenopathy, mass or fluid collection. Normal appendix.      Impression:       Marked distention of the bladder is noted with bilateral hydronephrosis consistent with urinary retention. Also noted is cholelithiasis. 4.6 cm abdominal aortic aneurysm.          Signer Name: Merlin Ceballos MD   Signed: 6/14/2020 3:28 PM   Workstation Name: [id number]    [facility]    CT Chest Without Contrast [[id number]] Collected:  06/14/20 1526     Updated:  06/14/20 1528    Narrative:       CT Chest WO    INDICATION:   Weakness and back pain beginning 2-3 weeks ago    TECHNIQUE:   CT of the thorax without IV contrast. Coronal and sagittal reconstructions were obtained.  Radiation dose reduction techniques included automated exposure control or exposure modulation based on body size. Count of known CT and cardiac nuc med studies  performed in previous 12 months: 0.     COMPARISON:   None available.    FINDINGS:  Chest images at mediastinal window show no adenopathy or  effusions.    Chest images at lung window show both lungs to be fully expanded and clear.      Impression:       Negative CT of the chest.    Signer Name: Merlin Ceballos MD   Signed: 6/14/2020 3:26 PM   Workstation Name: [facility]    [facility]    CT Lumbar Spine Without Contrast [[id number]] Collected:  06/14/20 1524     Updated:  06/14/20 1526    Narrative:       CT Spine Lumbar WO    INDICATION:    Lumbar pain radiating to the right leg with urinary incontinence beginning 2-3 weeks ago    TECHNIQUE:   CT of the lumbar spine without IV contrast. Coronal and sagittal reconstructions were obtained.  Radiation dose reduction techniques included automated exposure control or exposure modulation based on body size. Count of known CT and cardiac nuc med  studies performed in previous 12 months: 0.     COMPARISON:    None available.    FINDINGS:  Alignment is satisfactory. Disc space heights are preserved.    At L2-3 there is concentric disc bulging with moderately severe central stenosis and mild facet hypertrophy. L3-4 there is moderate central stenosis from concentric disc bulging. L4-5 there is moderate central stenosis from concentric disc bulge.    No fractures or destructive bone lesions are seen. The facets show no erosions. No pars defects are noted.      Impression:       Generally mild degenerative disc and facet disease as described above with multilevel spondylosis secondary to combination of disc bulging and a congenitally narrow canal with no acute findings in the spine.    Signer Name: Merlin Ceballos MD   Signed: 6/14/2020 3:24 PM   Workstation Name: [facility]    Radiology [facility]    CT Head Without Contrast [[id number]] Collected:  06/14/20 1521     Updated:  06/14/20 1523    Narrative:       CT Head WO    HISTORY:   Weakness on arrival    TECHNIQUE:   Axial unenhanced head CT. Radiation dose reduction techniques included automated exposure control or exposure  modulation based on body size. Count of known CT and cardiac nuc med studies performed in previous 12 months: 0.     Time of scan: 3:13 PM    COMPARISON:   None.    FINDINGS:   No intracranial hemorrhage, mass, or infarct. No hydrocephalus or extra-axial fluid collection. There are senescent changes, including volume loss and nonspecific white matter change, but no acute abnormality is seen. The skull base, calvarium, and  extracranial soft tissues are normal.      Impression:       Senescent changes without acute abnormality.          Signer Name: Merlin Ceballos MD   Signed: 6/14/2020 3:21 PM   Workstation Name: [email or web address]    [facility]        I have personally reviewed the above radiology results.   ---------------------------------------------------------------------------------------------------------------------      Assessment & Plan        Assessment/Plan       ASSESSMENT:    1.  Subacute prostatitis  2.  Bacteremia versus contaminant.    PLAN:    1+ blood culture out of 2 finalized as corynebacterium species is most likely a contaminant.    Based on patient's presentation, elevated PSA, high CRP level, and strongly positive urine analysis we believe the patient has subacute prostatitis and would recommend 3 weeks of antibiotic therapy with high-dose Rocephin 2 g IV every 24 hours while hospitalized to be de-escalated to Omnicef on discharge to complete a 3-week course through 7/5/20.    Again, thank you Dr. Nicole for allowing us to participate in the care of your patient and please feel free to call for any questions you may have.        Code Status:   Code Status and Medical Interventions:   Ordered at: 06/14/20 1836     Code Status:    CPR     Medical Interventions (Level of Support Prior to Arrest):    Full           Dora Oglesby PA-C  06/16/20  11:56

## 2020-06-16 NOTE — NURSING NOTE
Pt called out requesting catheter to be removed at this time. Re-educated pt on why we are bladder training, as well as why he has the catheter in at this time. Explained to pt that the catheter is in place at this time and if we remove it too soon it will be difficult for him to void independently. Pt states he understands but still wants the catheter out as he wants to go home. Catheter removed at this time but JAVAD Lr. Explained to pt if he needs to void to let us know. Pt states understanding.

## 2020-06-17 ENCOUNTER — READMISSION MANAGEMENT (OUTPATIENT)
Dept: CALL CENTER | Facility: HOSPITAL | Age: 75
End: 2020-06-17

## 2020-06-17 NOTE — OUTREACH NOTE
Prep Survey      Responses   [facility] patient discharged from?  [facility]   Is LACE score < 7 ?  No   Eligibility  Readm Mgmt   Discharge diagnosis  Sepsis present on admission and likely 2/2 UTI   Does the patient have one of the following disease processes/diagnoses(primary or secondary)?  Sepsis   Does the patient have Home health ordered?  Yes   What is the Home health agency?   [facility].     Is there a DME ordered?  No   General alerts for this patient  Hx: CHF    Prep survey completed?  Yes          Maria Eugenia Irwin RN

## 2020-06-17 NOTE — DISCHARGE SUMMARY
[facility] HOSPITALIST MEDICINE DISCHARGE SUMMARY    Patient Identification:  Name:  Larry Kehr  Age:  74 y.o.  Sex:  male  :  1945  MRN:  [id number]  Visit Number:  [id number]    Date of Admission: 2020  Date of Discharge: 2020  DISCHARGE DISPOSITION   Stable  PCP: Bsuter Redd MD    DISCHARGE DIAGNOSIS : Acute kidney injury secondary to obstructive uropathy, obstructive uropathy secondary to BPH, UTI with possible subacute prostatitis per infectious disease, gross hematuria transient likely from trauma, sepsis present on admission secondary to UTI, bilateral hydronephrosis secondary to obstructive uropathy, phimosis, acute hyperkalemia no EKG changes improved, atrial fibrillation chronic with mild RVR improved, nonischemic cardiomyopathy compensated, COPD without exacerbation, essential hypertension, DJD, tobacco use with dependence disorder, multiple a symptomatic cholelithiasis, infrarenal abdominal aortic aneurysm measuring 4.3 x 4.6 cm, patient reports he follows with his primary care provider on this, facet joint arthropathy of the lumbar spine, obesity with BMI of 39.  Chronic anticoagulation for stroke prophylaxis from A. fib, bacteremia with Corynebacterium likely contamination.    HOSPITAL COURSE  Patient is a 74 y.o. male presented to [facility] complaining of progressive difficulty urinating for almost a week, coincidentally he reports this occurred after he received an injection on his right hip for chronic back pain.  Work-up includes CT scan of the abdomen pelvis, he had an MRI of the lumbar spine which all came back negative for abscess, he does have a facet joint arthropathy, he was noted to have hydronephrosis bilaterally with no urolithiasis.  There is significant bladder distention, Malhotra catheter inserted and immediately patient has significant urine output, he was noted to have hyperkalemia and acute kidney injury, hydration was started,  postobstructive diuresis noted, serial creatinine reveals further improvement almost back to normal, he did have transient episode of gross hematuria, his anticoagulation was held and was restarted after hematuria resolved urology consulted and suspected patient's obstructive uropathy is likely from BPH.  He does have a PSA of 19.  Infectious disease was also comanaging due to bacteremia which culture came back Corynebacterium and was felt to be contamination.  Respite stay was uneventful urology recommended patient to have a trial voiding with Malhotra catheter removed unfortunately patient is unable to void hence urology recommended to keep the Malhotra catheter when discharged to follow-up outpatient.  Infectious disease also felt that patient likely has subacute prostatitis and recommended to continue antibiotic for extended period of time.  Nephrology was comanaging renal failure and would like to follow-up patient in 2 months time, renal failure is almost completely resolved prior to discharge it was 1.3, patient is advised to come to the emergency room should he develop fever, hematuria, abdominal pain.  He will follow-up with urology as scheduled.     Because of the newly placed Malhotra catheter and also patient on Xarelto, home health was consulted to monitor and Malhotra catheter care    VITAL SIGNS:      06/14/20  2025 06/15/20  0500 06/16/20  0500   Weight: 124 kg (272 lb 11.2 oz) 124 kg (272 lb 11.2 oz) 122 kg (268 lb 12.8 oz)     Body mass index is 38.57 kg/m².  Vitals:    06/16/20 1421   BP: 114/52   Pulse: 100   Resp: 20   Temp: 97.9 °F (36.6 °C)   SpO2: 96%     PHYSICAL EXAM:  General: Comfortable,awake, alert, oriented to self, place, and time, well-developed and well-nourished.  No respiratory distress.  Currently eating lunch.   Skin:  Skin is warm and dry. No rash noted. No pallor.    HENT:  Head:  Normocephalic and atraumatic.  Mouth:  Moist mucous membranes.    Eyes:  Conjunctivae and EOM are normal.   Pupils are equal, round, and reactive to light.  No scleral icterus.    Neck:  Neck supple.  No JVD present.  No hepatojugular reflux  Pulmonary/Chest:  No respiratory distress, no wheezes, no crackles, with normal breath sounds and good air movement.  Cardiovascular:  Normal rate, irregularly irregular normal heart sounds with no murmur.  Abdominal:  Soft.  Bowel sounds are normal.  No distension and no tenderness.   Extremities:  No edema, no tenderness, and no deformity.  No red or swollen joints anywhere.  Strong pulses in all 4 extremities with no clubbing, no cyanosis, no edema.  Neurological:  Motor strength equal no obvious deficit, sensory grossly intact.   No cranial nerve deficit.  No tongue deviation.  No facial droop.  No slurred speech.    Genitourinary:  Charu urine noted no longer hematuria Malhotra catheter in place   Back: No skin break    ----------    DISCHARGE MEDICATIONS:     Discharge Medications      New Medications      Instructions Start Date   cefdinir 300 MG capsule  Commonly known as:  OMNICEF   300 mg, Oral, 2 Times Daily      metoprolol succinate XL 25 MG 24 hr tablet  Commonly known as:  TOPROL-XL   25 mg, Oral, Daily      tamsulosin 0.4 MG capsule 24 hr capsule  Commonly known as:  FLOMAX   0.4 mg, Oral, Daily         Continue These Medications      Instructions Start Date   rivaroxaban 20 MG tablet  Commonly known as:  Xarelto   20 mg, Oral, Daily With Dinner      sacubitril-valsartan 24-26 MG tablet  Commonly known as:  Entresto   1 tablet, Oral, 2 Times Daily      spironolactone 25 MG tablet  Commonly known as:  ALDACTONE   25 mg, Oral, Daily             Diet Instructions     Diet Regular; Renal, Cardiac                 Your Scheduled Appointments    Jun 23, 2020  1:00 PM EDT  Follow Up with Jose Khoury MD  [facility] UROLOGY (--) [address] [facility]  [place] [address] [phone]   Arrive 15 minutes prior to appointment.     Aug 10, 2020  1:15 PM  EDT  Follow Up with Hemant Law PA-C  [facility] CARDIOLOGY (--) [address] [place] [address] [phone]   Arrive 15 minutes prior to appointment.          Activity Instructions     Resume activity as tolerated.             Additional Instructions for the Follow-ups that You Need to Schedule     Discharge Follow-up with PCP   As directed       Currently Documented PCP:    Buster Cavazos MD    PCP Phone Number:    [phone]     Follow Up Details:  Buster Cavazos MD         Discharge Follow-up with Specified Provider: Dr. Monroe; 2 Months   As directed      To:  Dr. Monroe    Follow Up:  2 Months         Discharge Follow-up with Specified Provider: Dr. Khoury   As directed      To:  Dr. Khoury         Referral to Home Health   As directed      Face to Face Visit Date:  6/16/2020    Follow-up provider for Plan of Care?:  I treated the patient in an acute care facility and will not continue treatment after discharge.    Follow-up provider:  BUSTER CAVAZOS [[id number]]    Reason/Clinical Findings:  Struct of uropathy requiring Malhotra catheter placement    Describe mobility limitations that make leaving home difficult:  Patient has congestive heart failure, A. fib with COPD and requires company to get out of the house    Nursing/Therapeutic Services Requested:  Skilled Nursing    Skilled nursing orders:  Monthly catheter care    Frequency:  1 Week 1            Contact information for follow-up providers     Kelly Monroe MD Follow up in 2 month(s).    Specialty:  Nephrology  Why:  AUG 20 @12PM  Contact information:  [address] [facility] [address]  [phone]             Jose Khoury MD Follow up in 1 week(s).    Specialty:  Urology  Why:  JUNE 23 @1PM  Contact information:  [address]  [place] [address]  [phone]             Buster Cavazos MD Follow up in 1 week(s).    Specialty:  Family Medicine  Why:  JUNE 29 @10AM  Contact information:  [address]   [facility] [address]  [phone]                   Contact information for after-discharge care     Home Medical Care     [facility] .    Service:  Home Health Services  Contact information:  [address] [facility] [address] [phone]                             Lourdes Nicole MD  06/17/20  18:06    Please note that this discharge summary required more than 30 minutes to complete.

## 2020-06-18 ENCOUNTER — READMISSION MANAGEMENT (OUTPATIENT)
Dept: CALL CENTER | Facility: HOSPITAL | Age: 75
End: 2020-06-18

## 2020-06-18 NOTE — OUTREACH NOTE
Sepsis Week 1 Survey      Responses   [facility] patient discharged from?  [facility]   COVID-19 Test Status  Not tested   Does the patient have one of the following disease processes/diagnoses(primary or secondary)?  Sepsis   Is there a successful TCM telephone encounter documented?  No   Week 1 attempt successful?  Yes   Call start time  1432   Call end time  1434   General alerts for this patient  Hx: CHF    Discharge diagnosis  Sepsis present on admission and likely 2/2 UTI   Is patient permission given to speak with other caregiver?  Yes   List who call center can speak with  [facility]   [facility] reviewed with patient/caregiver?  Yes   Is the patient having any side effects they believe may be caused by any medication additions or changes?  No   Does the patient have all medications related to this admission filled (includes all antibiotics, inhalers, nebulizers,steroids,etc.)  Yes   Is the patient taking all medications as directed (includes completed medication regime)?  Yes   Does the patient have a primary care provider?   Yes   Does the patient have an appointment with their PCP within 7 days of discharge?  Yes   Has the patient kept scheduled appointments due by today?  N/A   Has home health visited the patient within 72 hours of discharge?  Yes   Pulse Ox monitoring  None   Psychosocial issues?  No   Did the patient receive a copy of their discharge instructions?  Yes   Nursing interventions  Reviewed instructions with patient   What is the patient's perception of their health status since discharge?  Improving   Nursing interventions  Nurse provided patient education   Is the patient/caregiver able to teach back Sepsis?  S - Shivering,fever or very cold, E - Extreme pain or generalized discomfort (worst ever,especially abdomen), P - Pale or discolored skin, S - Sleepy, difficult to arouse,confused, I -   I feel like I might die-a feeling of hopelessness, S - Short of breath   Nursing interventions  Nurse  provided reassurance to patient, Nurse provided patient education   Is patient/caregiver able to teach back steps to recovery at home?  Set small, achievable goals for return to baseline health, Rest and regain strength   Is the patient/caregiver able to teach back signs and symptoms of worsening condition:  Fever, Hyperthermia, Rapid heart rate (>90), Shortness of breath/rapid respiratory rate   Is the patient/caregiver able to teach back the hierarchy of who to call/visit for symptoms/problems? PCP, Specialist, Home health nurse, Urgent Care, ED, 911  Yes   Week 1 call completed?  Yes          Elle Trejo RN

## 2020-06-19 LAB — BACTERIA SPEC AEROBE CULT: NORMAL

## 2020-06-24 ENCOUNTER — READMISSION MANAGEMENT (OUTPATIENT)
Dept: CALL CENTER | Facility: HOSPITAL | Age: 75
End: 2020-06-24

## 2020-06-24 NOTE — OUTREACH NOTE
Sepsis Week 2 Survey      Responses   [facility] patient discharged from?  [facility]   COVID-19 Test Status  Not tested   Does the patient have one of the following disease processes/diagnoses(primary or secondary)?  Sepsis   Week 2 attempt successful?  Yes   Call start time  0929   Call end time  0933   General alerts for this patient  Hx: CHF    Discharge diagnosis  Sepsis present on admission and likely 2/2 UTI   Meds reviewed with patient/caregiver?  Yes   Is the patient having any side effects they believe may be caused by any medication additions or changes?  No   Does the patient have all medications related to this admission filled (includes all antibiotics, inhalers, nebulizers,steroids,etc.)  Yes   Is the patient taking all medications as directed (includes completed medication regime)?  Yes   Does the patient have a primary care provider?   Yes   Does the patient have an appointment with their PCP within 7 days of discharge?  Yes   Has the patient kept scheduled appointments due by today?  No   Nursing Interventions  Advised to reschedule appointment   Comments  Pt states home health told him to cancel appt with Dr Khoury. Advised to reschedule.   What is the Home health agency?   [facility]     Has home health visited the patient within 72 hours of discharge?  Yes   Pulse Ox monitoring  None   Psychosocial issues?  No   Did the patient receive a copy of their discharge instructions?  Yes   Nursing interventions  Reviewed instructions with patient   What is the patient's perception of their health status since discharge?  Improving   Nursing interventions  Nurse provided patient education   Is the patient/caregiver able to teach back Sepsis?  S - Shivering,fever or very cold, E - Extreme pain or generalized discomfort (worst ever,especially abdomen), P - Pale or discolored skin, S - Sleepy, difficult to arouse,confused, I -   I feel like I might die-a feeling of hopelessness, S - Short of breath    Nursing interventions  Nurse provided reassurance to patient, Nurse provided patient education   Is patient/caregiver able to teach back steps to recovery at home?  Set small, achievable goals for return to baseline health, Rest and regain strength, Make a list of questions for PCP appoinment   Is the patient/caregiver able to teach back signs and symptoms of worsening condition:  Fever, Hyperthermia, Rapid heart rate (>90), Shortness of breath/rapid respiratory rate, Altered mental status(confusion/coma)   Is the patient/caregiver able to teach back the hierarchy of who to call/visit for symptoms/problems? PCP, Specialist, Home health nurse, Urgent Care, ED, 911  Yes   Additional teach back comments  Patient states he is doing well.  He has questions on how long he will need the catheter.  Advised to discuss at PCP appt.  He was told to cancel appt with urology and I advised to reschedule appt.   Week 2 call completed?  Yes   Wrap up additional comments  Pt will address how long he will need catheter with PCP          Yoselin Ya LPN

## 2020-06-30 ENCOUNTER — OFFICE VISIT (OUTPATIENT)
Dept: UROLOGY | Facility: CLINIC | Age: 75
End: 2020-06-30

## 2020-06-30 VITALS — HEIGHT: 70 IN | TEMPERATURE: 98 F | WEIGHT: 268 LBS | BODY MASS INDEX: 38.37 KG/M2

## 2020-06-30 DIAGNOSIS — N13.9 OBSTRUCTIVE UROPATHY: Primary | ICD-10-CM

## 2020-06-30 DIAGNOSIS — N40.1 BENIGN PROSTATIC HYPERPLASIA WITH URINARY RETENTION: ICD-10-CM

## 2020-06-30 DIAGNOSIS — R33.8 BENIGN PROSTATIC HYPERPLASIA WITH URINARY RETENTION: ICD-10-CM

## 2020-06-30 PROCEDURE — 99213 OFFICE O/P EST LOW 20 MIN: CPT | Performed by: UROLOGY

## 2020-07-01 PROBLEM — N40.1 BENIGN PROSTATIC HYPERPLASIA WITH URINARY RETENTION: Status: ACTIVE | Noted: 2020-07-01

## 2020-07-01 PROBLEM — R33.8 BENIGN PROSTATIC HYPERPLASIA WITH URINARY RETENTION: Status: ACTIVE | Noted: 2020-07-01

## 2020-07-02 ENCOUNTER — READMISSION MANAGEMENT (OUTPATIENT)
Dept: CALL CENTER | Facility: HOSPITAL | Age: 75
End: 2020-07-02

## 2020-07-02 NOTE — OUTREACH NOTE
General Surgery Week 3 Survey      Responses   [facility] patient discharged from?  [facility]   Does the patient have one of the following disease processes/diagnoses(primary or secondary)?  Sepsis   Call start time  1500   Call end time  1502   General alerts for this patient  Hx: CHF    Discharge diagnosis  Sepsis present on admission and likely 2/2 UTI   Meds reviewed with patient/caregiver?  Yes   Is the patient having any side effects they believe may be caused by any medication additions or changes?  No   Is the patient taking all medications as directed (includes completed medication regime)?  Yes   Has the patient kept scheduled appointments due by today?  Yes   What is the Home health agency?   [facility].     Has home health visited the patient within 72 hours of discharge?  Yes   Psychosocial issues?  No   Did the patient receive a copy of their discharge instructions?  Yes   Nursing interventions  Reviewed instructions with patient   What is the patient's perception of their health status since discharge?  Improving   Additional teach back comments  Pt reports he still has the sommer catheter and reports he is not having problems with it.           Jamshid Odom RN

## 2020-07-10 ENCOUNTER — READMISSION MANAGEMENT (OUTPATIENT)
Dept: CALL CENTER | Facility: HOSPITAL | Age: 75
End: 2020-07-10

## 2020-07-10 NOTE — OUTREACH NOTE
Sepsis Week 4 Survey      Responses   [facility] patient discharged from?  [facility]   COVID-19 Test Status  Not tested   Does the patient have one of the following disease processes/diagnoses(primary or secondary)?  Sepsis   Week 4 attempt successful?  Yes   Call start time  1626   Call end time  1632   General alerts for this patient  Hx: CHF    Discharge diagnosis  Sepsis present on admission and likely 2/2 UTI   Is patient permission given to speak with other caregiver?  Yes   Meds reviewed with patient/caregiver?  Yes   Is the patient having any side effects they believe may be caused by any medication additions or changes?  No   Is the patient taking all medications as directed (includes completed medication regime)?  Yes   Has the patient kept scheduled appointments due by today?  Yes   Is the patient still receiving Home Health Services?  N/A   Pulse Ox monitoring  None   Psychosocial issues?  No   What is the patient's perception of their health status since discharge?  Improving   Nursing interventions  Nurse provided patient education   Is the patient/caregiver able to teach back Sepsis?  S - Shivering,fever or very cold, E - Extreme pain or generalized discomfort (worst ever,especially abdomen), S - Short of breath, I -   I feel like I might die-a feeling of hopelessness, S - Sleepy, difficult to arouse,confused, P - Pale or discolored skin   Nursing interventions  Nurse provided reassurance to patient   Is patient/caregiver able to teach back steps to recovery at home?  Set small, achievable goals for return to baseline health, Rest and regain strength, Talk about feelings with family/friends, Record milestones and struggles in a journal, Learn about sepsis(sepsis.org), Eat a balanced diet, Exercise as tolerated, Make a list of questions for PCP appoinment   Is the patient/caregiver able to teach back signs and symptoms of worsening condition:  Fever, Edema, Shortness of breath/rapid respiratory rate    Is the patient/caregiver able to teach back the hierarchy of who to call/visit for symptoms/problems? PCP, Specialist, Home health nurse, Urgent Care, ED, 911  Yes   Additional teach back comments  clear yellow urine in catheter, hopefully out next week.  Afebrile, No SOA or swelling, some issues with leg bags.   Week 4 call completed?  Yes   Would the patient like one additional call?  No   Graduated  Yes   Did the patient feel the follow up calls were helpful during their recovery period?  Yes   Was the number of calls appropriate?  Yes   Wrap up additional comments  Discussed diet.  Improving.  Has BP and HR parameters.          Melly Gonsalez RN

## 2020-07-13 ENCOUNTER — PROCEDURE VISIT (OUTPATIENT)
Dept: UROLOGY | Facility: CLINIC | Age: 75
End: 2020-07-13

## 2020-07-13 VITALS — HEIGHT: 70 IN | BODY MASS INDEX: 38.45 KG/M2

## 2020-07-13 DIAGNOSIS — N13.9 OBSTRUCTIVE UROPATHY: Primary | ICD-10-CM

## 2020-07-13 DIAGNOSIS — R33.8 BENIGN PROSTATIC HYPERPLASIA WITH URINARY RETENTION: ICD-10-CM

## 2020-07-13 DIAGNOSIS — N40.1 BENIGN PROSTATIC HYPERPLASIA WITH URINARY RETENTION: ICD-10-CM

## 2020-07-13 PROCEDURE — 99213 OFFICE O/P EST LOW 20 MIN: CPT | Performed by: UROLOGY

## 2020-07-13 PROCEDURE — 52000 CYSTOURETHROSCOPY: CPT | Performed by: UROLOGY

## 2020-07-13 NOTE — PROGRESS NOTES
Chief Complaint:          Chief Complaint   Patient presents with   • Obstructive uropathy     cystoscopy        HPI:   74 y.o. male  who presented to [facility] emergency department on June 14, 2020 with complaints of generalized weakness which has been present for the last 2 to 3 weeks.  Along with generalized weakness he also complains of decreased appetite and urinary incontinence.  He reports that he has had regular bowel movements and denies any bowel incontinence.  He reports that he has been having back pain intermittently for approximately 1 year.  He reports that he was seen 1 to 2 weeks ago in and urgent care clinic and was given a steroid injection to ease back and right leg pain.  Mr. Kehr reports that he has since been experiencing difficulty starting and stopping the flow of urine, dribbling urine post urination and has also been experiencing nocturnal enuresis.  He denies noting any foul odor of his urine, hematuria or dysuria.  He also denies any fever, recent infection, recent antibiotic use, cough, shortness of breath, chest pain, increased edema, abdominal pain, difficulty ambulating, dizziness, headaches, lightheadedness, seizures or syncopal episodes.  He does report smoking approximately 6 to 8 cigarettes/day and denies any alcohol or drug use.     Upon arrival to the ED, vital signs were temperature 98, pulse 105, respirations 18, blood pressure 110/80 and oxygen saturation 99% on room air.  Troponin T <0.010, proBNP 2167.0.  Glucose 99, sodium 136, potassium 6.3, CO2 15.5, chloride 109, creatinine 4.32, BUN 67, EGFR 13, TSH 2.25, C-RP 9.67, lactate 0.6, magnesium 2.3, WBC 13.07, hemoglobin 11.3, hematocrit 37.4.  Urinalysis shows negative glucose, negative ketones, large blood, negative nitrite, large leukocytes, WBC too numerous to count 2+ bacteria.  Blood and urine cultures pending.  CT of the head without contrast shows Sensenig changes without acute abnormality, per  radiology.  CT of the lumbar spine without contrast shows generally mild degenerative disc and facet disease as described above with multilevel spondylosis secondary to combination of disc bulging and a congenitally narrow canal with no acute findings in the spine, per radiology.  CT of the abdomen pelvis without contrast shows marked distention of the bladder noted with bilateral hydronephrosis consistent with urinary retention.  Also noted is cholelithiasis.  4.6 cm meter abdominal aortic aneurysm, per radiology.  CT of the chest without contrast shows negative CT of the chest, per radiology.  Urology is being consulted because of the radiographic finding of bilateral hydronephrosis and urinary retention.  Patient has not seen a urologist in the past.  He has no prior history of voiding dysfunction.  But given the significant upper tract deterioration a catheter is indicated.  I would leave the catheter in and I will see him in the office for appropriate lower tract investigation thank you very much for the consult.       The above depicted image shows a massively dilated bladder bilateral hydronephrosis, and ectatic aortic aneurysm and small prostate.  Currently the catheters to gravity drainage.  He returns today still has the catheter.  He never had problems pre-treatment.  But is unclear as to the degree of his voiding dysfunction originally from [facility][1] his wife passed away in 2015 he lives with his 51-year-old son I am to set him up for an urgent cystoscopy I suspect he is getting a TURP he does not have a big prostate on the CT scan.  I went ahead and did a cystoscopy did not have a great deal of obstruction other than the median bar he is on alpha blockade we are getting him a voiding trial and see him back in 24 hours      Past Medical History:        Past Medical History:   Diagnosis Date   • Atrial fibrillation (CMS/HCC)    • Cardiomyopathy (CMS/HCC)    • CHF (congestive heart failure)  (CMS/[facility])    • COPD (chronic obstructive pulmonary disease) (CMS/[facility])    • Hypertension          Current Meds:     Current Outpatient Medications   Medication Sig Dispense Refill   • cefdinir (OMNICEF) 300 MG capsule Take 1 capsule by mouth 2 (Two) Times a Day. 42 capsule 0   • metoprolol succinate XL (TOPROL-XL) 25 MG 24 hr tablet Take 1 tablet by mouth Daily. 30 tablet 3   • rivaroxaban (XARELTO) 20 MG tablet Take 1 tablet by mouth Daily With Dinner. 30 tablet 5   • sacubitril-valsartan (ENTRESTO) 24-26 MG tablet Take 1 tablet by mouth 2 (Two) Times a Day. 28 tablet 0   • spironolactone (ALDACTONE) 25 MG tablet TAKE 1 TABLET BY MOUTH DAILY. 90 tablet 0   • tamsulosin (FLOMAX) 0.4 MG capsule 24 hr capsule Take 1 capsule by mouth Daily. 30 capsule 3     No current facility-administered medications for this visit.         Allergies:      No Known Allergies     Past Surgical History:     History reviewed. No pertinent surgical history.      Social History:     Social History     Socioeconomic History   • Marital status:      Spouse name: Not on file   • Number of children: Not on file   • Years of education: Not on file   • Highest education level: Not on file   Tobacco Use   • Smoking status: Current Every Day Smoker     Packs/day: 1.00     Years: 59.00     Pack years: 59.00     Types: Cigarettes   • Smokeless tobacco: Never Used   Substance and Sexual Activity   • Alcohol use: No   • Drug use: No   • Sexual activity: Defer       Family History:     Family History   Problem Relation Age of Onset   • No Known Problems Mother    • Heart disease Father    • No Known Problems Sister    • No Known Problems Brother    • No Known Problems Son    • No Known Problems Daughter    • No Known Problems Maternal Grandmother    • No Known Problems Maternal Grandfather    • No Known Problems Paternal Grandmother    • No Known Problems Paternal Grandfather    • No Known Problems Cousin    • Rheum arthritis Neg Hx    •  Osteoarthritis Neg Hx    • Asthma Neg Hx    • Diabetes Neg Hx    • Heart failure Neg Hx    • Hyperlipidemia Neg Hx    • Hypertension Neg Hx    • Migraines Neg Hx    • Rashes / Skin problems Neg Hx    • Seizures Neg Hx    • Stroke Neg Hx    • Thyroid disease Neg Hx        Review of Systems:     Review of Systems   Constitutional: Negative.    HENT: Negative.    Eyes: Negative.    Respiratory: Negative.    Cardiovascular: Negative.    Gastrointestinal: Negative.    Endocrine: Negative.    Genitourinary: Positive for difficulty urinating.   Musculoskeletal: Negative.    Allergic/Immunologic: Negative.    Neurological: Negative.    Hematological: Negative.    Psychiatric/Behavioral: Negative.        Physical Exam:     Physical Exam   Constitutional: He is oriented to person, place, and time. He appears well-developed and well-nourished.   HENT:   Head: Normocephalic and atraumatic.   Eyes: Pupils are equal, round, and reactive to light. Conjunctivae and EOM are normal.   Neck: Normal range of motion.   Cardiovascular: Normal rate, regular rhythm, normal heart sounds and intact distal pulses.   Pulmonary/Chest: Effort normal and breath sounds normal.   Abdominal: Soft. Bowel sounds are normal.   Genitourinary:   Genitourinary Comments: Super morbidly obese, uncircumcised phallus concealed   Musculoskeletal: Normal range of motion.   Neurological: He is alert and oriented to person, place, and time. He has normal reflexes.   Skin: Skin is warm and dry.   Psychiatric: He has a normal mood and affect. His behavior is normal. Judgment and thought content normal.   Nursing note and vitals reviewed.      I have reviewed the following portions of the patient's history: allergies, current medications, past family history, past medical history, past social history, past surgical history, problem list and ROS and confirm it's accurate.      Procedure:   Cystoscopy:  Patient presents today for cystourethroscopy.  I went ahead and  obtained an informed consent including the risk of anesthesia, bleeding, infection, etc.  After prep and drape in a sterile fashion in the low dorsal lithotomy position the urethra was gently anesthetized with 10 cc of 2% viscous Xylocaine jelly.  After an appropriate period of topical anesthesia I used the Olympus digital 14 Italian flexible cystoscope to examine the anterior urethra which was completely normal, the ureteral orifices were visualized and normal in position and configuration there were no stones, tumors or foreign bodies.  He has a median bar. The patient was given 80 mg of gentamicin in an intramuscular fashion  as prophylaxis for the cystoscopy and released from the clinic.    Assessment/Plan:   Urinary retention secondary to BPH-currently on Flomax if he is unable to urinate in 24 hours I would recommend a TURP            Patient's Body mass index is 38.45 kg/m². BMI is above normal parameters. Recommendations include: educational material.              This document has been electronically signed by MONY MARKS MD July 13, 2020 14:00

## 2020-07-14 ENCOUNTER — OFFICE VISIT (OUTPATIENT)
Dept: UROLOGY | Facility: CLINIC | Age: 75
End: 2020-07-14

## 2020-07-14 ENCOUNTER — TELEPHONE (OUTPATIENT)
Dept: UROLOGY | Facility: CLINIC | Age: 75
End: 2020-07-14

## 2020-07-14 ENCOUNTER — TELEPHONE (OUTPATIENT)
Dept: GASTROENTEROLOGY | Facility: CLINIC | Age: 75
End: 2020-07-14

## 2020-07-14 DIAGNOSIS — N40.1 BENIGN PROSTATIC HYPERPLASIA WITH URINARY RETENTION: Primary | ICD-10-CM

## 2020-07-14 DIAGNOSIS — N13.9 OBSTRUCTIVE UROPATHY: Primary | ICD-10-CM

## 2020-07-14 DIAGNOSIS — R33.8 BENIGN PROSTATIC HYPERPLASIA WITH URINARY RETENTION: Primary | ICD-10-CM

## 2020-07-14 PROCEDURE — 99213 OFFICE O/P EST LOW 20 MIN: CPT | Performed by: UROLOGY

## 2020-07-14 PROCEDURE — 51703 INSERT BLADDER CATH COMPLEX: CPT | Performed by: UROLOGY

## 2020-07-14 NOTE — PROGRESS NOTES
Chief Complaint:          Chief Complaint   Patient presents with   • Urinary Retention       HPI:   74 y.o. male  who presented to [facility] emergency department on June 14, 2020 with complaints of generalized weakness which has been present for the last 2 to 3 weeks.  Along with generalized weakness he also complains of decreased appetite and urinary incontinence.  He reports that he has had regular bowel movements and denies any bowel incontinence.  He reports that he has been having back pain intermittently for approximately 1 year.  He reports that he was seen 1 to 2 weeks ago in and urgent care clinic and was given a steroid injection to ease back and right leg pain.  Mr. Kehr reports that he has since been experiencing difficulty starting and stopping the flow of urine, dribbling urine post urination and has also been experiencing nocturnal enuresis.  He denies noting any foul odor of his urine, hematuria or dysuria.  He also denies any fever, recent infection, recent antibiotic use, cough, shortness of breath, chest pain, increased edema, abdominal pain, difficulty ambulating, dizziness, headaches, lightheadedness, seizures or syncopal episodes.  He does report smoking approximately 6 to 8 cigarettes/day and denies any alcohol or drug use.     Upon arrival to the ED, vital signs were temperature 98, pulse 105, respirations 18, blood pressure 110/80 and oxygen saturation 99% on room air.  Troponin T <0.010, proBNP 2167.0.  Glucose 99, sodium 136, potassium 6.3, CO2 15.5, chloride 109, creatinine 4.32, BUN 67, EGFR 13, TSH 2.25, C-RP 9.67, lactate 0.6, magnesium 2.3, WBC 13.07, hemoglobin 11.3, hematocrit 37.4.  Urinalysis shows negative glucose, negative ketones, large blood, negative nitrite, large leukocytes, WBC too numerous to count 2+ bacteria.  Blood and urine cultures pending.  CT of the head without contrast shows Sensenig changes without acute abnormality, per radiology.  CT of the lumbar  spine without contrast shows generally mild degenerative disc and facet disease as described above with multilevel spondylosis secondary to combination of disc bulging and a congenitally narrow canal with no acute findings in the spine, per radiology.  CT of the abdomen pelvis without contrast shows marked distention of the bladder noted with bilateral hydronephrosis consistent with urinary retention.  Also noted is cholelithiasis.  4.6 cm meter abdominal aortic aneurysm, per radiology.  CT of the chest without contrast shows negative CT of the chest, per radiology.  Urology is being consulted because of the radiographic finding of bilateral hydronephrosis and urinary retention.  Patient has not seen a urologist in the past.  He has no prior history of voiding dysfunction.  But given the significant upper tract deterioration a catheter is indicated.  I would leave the catheter in and I will see him in the office for appropriate lower tract investigation thank you very much for the consult.       The above depicted image shows a massively dilated bladder bilateral hydronephrosis, and ectatic aortic aneurysm and small prostate.  Currently the catheters to gravity drainage.  He returns today still has the catheter.  He never had problems pre-treatment.  But is unclear as to the degree of his voiding dysfunction originally from [facility] his wife passed away in 2015 he lives with his 51-year-old son I am to set him up for an urgent cystoscopy I suspect he is getting a TURP he does not have a big prostate on the CT scan.  I went ahead and did a cystoscopy did not have a great deal of obstruction other than the median bar he is on alpha blockade we are getting him a voiding trial and see him back in 24 hours.  He returns today is in her urinary retention I drained 2.2 L out I recommended TURP.  He does not have a large prostate this should be reasonable but he clearly is upper tract deterioration and silent  prostatism.  I will arrange a TURP he will need a cardiac clearance preop      Past Medical History:        Past Medical History:   Diagnosis Date   • Atrial fibrillation (CMS/HCC)    • Cardiomyopathy (CMS/HCC)    • CHF (congestive heart failure) (CMS/HCC)    • COPD (chronic obstructive pulmonary disease) (CMS/HCC)    • Hypertension          Current Meds:     Current Outpatient Medications   Medication Sig Dispense Refill   • cefdinir (OMNICEF) 300 MG capsule Take 1 capsule by mouth 2 (Two) Times a Day. 42 capsule 0   • metoprolol succinate XL (TOPROL-XL) 25 MG 24 hr tablet Take 1 tablet by mouth Daily. 30 tablet 3   • rivaroxaban (XARELTO) 20 MG tablet Take 1 tablet by mouth Daily With Dinner. 30 tablet 5   • sacubitril-valsartan (ENTRESTO) 24-26 MG tablet Take 1 tablet by mouth 2 (Two) Times a Day. 28 tablet 0   • spironolactone (ALDACTONE) 25 MG tablet TAKE 1 TABLET BY MOUTH DAILY. 90 tablet 0   • tamsulosin (FLOMAX) 0.4 MG capsule 24 hr capsule Take 1 capsule by mouth Daily. 30 capsule 3     No current facility-administered medications for this visit.         Allergies:      No Known Allergies     Past Surgical History:     No past surgical history on file.      Social History:     Social History     Socioeconomic History   • Marital status:      Spouse name: Not on file   • Number of children: Not on file   • Years of education: Not on file   • Highest education level: Not on file   Tobacco Use   • Smoking status: Current Every Day Smoker     Packs/day: 1.00     Years: 59.00     Pack years: 59.00     Types: Cigarettes   • Smokeless tobacco: Never Used   Substance and Sexual Activity   • Alcohol use: No   • Drug use: No   • Sexual activity: Defer       Family History:     Family History   Problem Relation Age of Onset   • No Known Problems Mother    • Heart disease Father    • No Known Problems Sister    • No Known Problems Brother    • No Known Problems Son    • No Known Problems Daughter    • No Known  Problems Maternal Grandmother    • No Known Problems Maternal Grandfather    • No Known Problems Paternal Grandmother    • No Known Problems Paternal Grandfather    • No Known Problems Cousin    • Rheum arthritis Neg Hx    • Osteoarthritis Neg Hx    • Asthma Neg Hx    • Diabetes Neg Hx    • Heart failure Neg Hx    • Hyperlipidemia Neg Hx    • Hypertension Neg Hx    • Migraines Neg Hx    • Rashes / Skin problems Neg Hx    • Seizures Neg Hx    • Stroke Neg Hx    • Thyroid disease Neg Hx        Review of Systems:     Review of Systems   Constitutional: Negative.    HENT: Negative.    Eyes: Negative.    Respiratory: Negative.    Cardiovascular: Negative.    Gastrointestinal: Negative.    Endocrine: Negative.    Genitourinary: Positive for difficulty urinating.   Musculoskeletal: Negative.    Allergic/Immunologic: Negative.    Neurological: Negative.    Hematological: Negative.    Psychiatric/Behavioral: Negative.        Physical Exam:     Physical Exam   Constitutional: He is oriented to person, place, and time. He appears well-developed and well-nourished.   HENT:   Head: Normocephalic and atraumatic.   Eyes: Pupils are equal, round, and reactive to light. Conjunctivae and EOM are normal.   Neck: Normal range of motion.   Cardiovascular: Normal rate, regular rhythm, normal heart sounds and intact distal pulses.   Pulmonary/Chest: Effort normal and breath sounds normal.   Abdominal: Soft. Bowel sounds are normal.   Genitourinary:   Genitourinary Comments: Morbidly obese, uncircumcised concealed phallus   Musculoskeletal: Normal range of motion.   Neurological: He is alert and oriented to person, place, and time. He has normal reflexes.   Skin: Skin is warm and dry.   Psychiatric: He has a normal mood and affect. His behavior is normal. Judgment and thought content normal.   Nursing note and vitals reviewed.      I have reviewed the following portions of the patient's history: allergies, current medications, past family  history, past medical history, past social history, past surgical history, problem list and ROS and confirm it's accurate.      Procedure:   Malhotra catheterization: After prepped and draped in a sterile fashion an 18 Citizen of the [place] tip catheter was inserted and 2.2 L were drained with affixation of a catheter leg bag    Assessment/Plan:   Urinary retention secondary to BPH with hydronephrosis he will need a TURP he is failed a voiding trial on appropriate alpha blockade      .      Patient's There is no height or weight on file to calculate BMI. BMI is above normal parameters. Recommendations include: educational material.              This document has been electronically signed by MONY MARKS MD July 14, 2020 13:41

## 2020-07-14 NOTE — TELEPHONE ENCOUNTER
Spoke to pt and explained for him to make sure that he is intaking fluids, per the pt he had not drank much last night or this morning. I went thru a few different ways of hydrating which he voiced understanding as well I explained that when he goes to urinated, to take his time while sitting down on the toilet to relax himself and not think about urinating. Pt wanted to speak about kidney function and why he was recommended to see a Nephrologist which I explained that to him as well. Told pt to call back if he was not able to urinate later and we will get him on schedule.

## 2020-07-14 NOTE — TELEPHONE ENCOUNTER
Patient called and said since he had his cath removed yesterday here in clinic he has not urinated yet. He wanted to know if this is normal? He wanted someone to call him back.    Please and Thank you

## 2020-07-14 NOTE — TELEPHONE ENCOUNTER
Pt called and stated he would like more information regarding the surgery/procedure Steidle mentioned at the ov today. Informed pt if he does not receive a call back today it will be tomorrow. Pt is agreeable to call back time. Office note is still pending completion at this time.

## 2020-07-14 NOTE — TELEPHONE ENCOUNTER
I called the pt back and asked what questions he had he asked what the procedure consisted of and I explained it to him. I found out the pt was on blood thinners and has a stent placed. I called his Cardiology office and he already has an appt scheduled with them they asked me to put in a referral which I did. And in the notes mentioned pt already had an appt.

## 2020-07-16 ENCOUNTER — TELEPHONE (OUTPATIENT)
Dept: CARDIOLOGY | Facility: CLINIC | Age: 75
End: 2020-07-16

## 2020-07-31 ENCOUNTER — OFFICE VISIT (OUTPATIENT)
Dept: UROLOGY | Facility: CLINIC | Age: 75
End: 2020-07-31

## 2020-07-31 VITALS — WEIGHT: 268 LBS | TEMPERATURE: 98.2 F | BODY MASS INDEX: 38.37 KG/M2 | HEIGHT: 70 IN

## 2020-07-31 DIAGNOSIS — R33.8 URINARY RETENTION DUE TO BENIGN PROSTATIC HYPERPLASIA: Primary | ICD-10-CM

## 2020-07-31 DIAGNOSIS — N40.1 URINARY RETENTION DUE TO BENIGN PROSTATIC HYPERPLASIA: Primary | ICD-10-CM

## 2020-07-31 PROCEDURE — 51703 INSERT BLADDER CATH COMPLEX: CPT | Performed by: NURSE PRACTITIONER

## 2020-08-05 NOTE — PATIENT INSTRUCTIONS
Steps to Quit Smoking  Smoking tobacco is the leading cause of preventable death. It can affect almost every organ in the body. Smoking puts you and those around you at risk for developing many serious chronic diseases. Quitting smoking can be difficult, but it is one of the best things that you can do for your health. It is never too late to quit.  How do I get ready to quit?  When you decide to quit smoking, create a plan to help you succeed. Before you quit:  · Pick a date to quit. Set a date within the next 2 weeks to give you time to prepare.  · Write down the reasons why you are quitting. Keep this list in places where you will see it often.  · Tell your family, friends, and co-workers that you are quitting. Support from your loved ones can make quitting easier.  · Talk with your health care provider about your options for quitting smoking.  · Find out what treatment options are covered by your health insurance.  · Identify people, places, things, and activities that make you want to smoke (triggers). Avoid them.  What first steps can I take to quit smoking?  · Throw away all cigarettes at home, at work, and in your car.  · Throw away smoking accessories, such as ashtrays and lighters.  · Clean your car. Make sure to empty the ashtray.  · Clean your home, including curtains and carpets.  What strategies can I use to quit smoking?  Talk with your health care provider about combining strategies, such as taking medicines while you are also receiving in-person counseling. Using these two strategies together makes you more likely to succeed in quitting than if you used either strategy on its own.  · If you are pregnant or breastfeeding, talk with your health care provider about finding counseling or other support strategies to quit smoking. Do not take medicine to help you quit smoking unless your health care provider tells you to do so.  To quit smoking:  Quit right away  · Quit smoking completely, instead of  gradually reducing how much you smoke over a period of time. Research shows that stopping smoking right away is more successful than gradually quitting.  · Attend in-person counseling to help you build problem-solving skills. You are more likely to succeed in quitting if you attend counseling sessions regularly. Even short sessions of 10 minutes can be effective.  Take medicine  You may take medicines to help you quit smoking. Some medicines require a prescription and some you can purchase over-the-counter. Medicines may have nicotine in them to replace the nicotine in cigarettes. Medicines may:  · Help to stop cravings.  · Help to relieve withdrawal symptoms.  Your health care provider may recommend:  · Nicotine patches, gum, or lozenges.  · Nicotine inhalers or sprays.  · Non-nicotine medicine that is taken by mouth.  Find resources  Find resources and support systems that can help you to quit smoking and remain smoke-free after you quit. These resources are most helpful when you use them often. They include:  · Online chats with a counselor.  · Telephone quitlines.  · Printed self-help materials.  · Support groups or group counseling.  · Text messaging programs.  · Mobile phone apps or applications. Use apps that can help you stick to your quit plan by providing reminders, tips, and encouragement. There are many free apps for mobile devices as well as websites. Examples include Quit Guide from the CDC and smokefree.gov  What things can I do to make it easier to quit?    · Reach out to your family and friends for support and encouragement. Call telephone quitlines (9-389-QUIT-NOW), reach out to support groups, or work with a counselor for support.  · Ask people who smoke to avoid smoking around you.  · Avoid places that trigger you to smoke, such as bars, parties, or smoke-break areas at work.  · Spend time with people who do not smoke.  · Lessen the stress in your life. Stress can be a smoking trigger for some  people. To lessen stress, try:  ? Exercising regularly.  ? Doing deep-breathing exercises.  ? Doing yoga.  ? Meditating.  ? Performing a body scan. This involves closing your eyes, scanning your body from head to toe, and noticing which parts of your body are particularly tense. Try to relax the muscles in those areas.  How will I feel when I quit smoking?  Day 1 to 3 weeks  Within the first 24 hours of quitting smoking, you may start to feel withdrawal symptoms. These symptoms are usually most noticeable 2-3 days after quitting, but they usually do not last for more than 2-3 weeks. You may experience these symptoms:  · Mood swings.  · Restlessness, anxiety, or irritability.  · Trouble concentrating.  · Dizziness.  · Strong cravings for sugary foods and nicotine.  · Mild weight gain.  · Constipation.  · Nausea.  · Coughing or a sore throat.  · Changes in how the medicines that you take for unrelated issues work in your body.  · Depression.  · Trouble sleeping (insomnia).  Week 3 and afterward  After the first 2-3 weeks of quitting, you may start to notice more positive results, such as:  · Improved sense of smell and taste.  · Decreased coughing and sore throat.  · Slower heart rate.  · Lower blood pressure.  · Clearer skin.  · The ability to breathe more easily.  · Fewer sick days.  Quitting smoking can be very challenging. Do not get discouraged if you are not successful the first time. Some people need to make many attempts to quit before they achieve long-term success. Do your best to stick to your quit plan, and talk with your health care provider if you have any questions or concerns.  Summary  · Smoking tobacco is the leading cause of preventable death. Quitting smoking is one of the best things that you can do for your health.  · When you decide to quit smoking, create a plan to help you succeed.  · Quit smoking right away, not slowly over a period of time.  · When you start quitting, seek help from your  health care provider, family, or friends.  This information is not intended to replace advice given to you by your health care provider. Make sure you discuss any questions you have with your health care provider.  Document Released: 12/12/2002 Document Revised: 03/06/2020 Document Reviewed: 03/07/2020  Elsevier Patient Education © 2020 [employer]  Acute Urinary Retention, Male    Acute urinary retention means that you cannot pee (urinate) at all, or that you pee too little and your bladder is not emptied completely. If it is not treated, it can lead to kidney damage or other serious problems.  Follow these instructions at home:  · Take over-the-counter and prescription medicines only as told by your doctor. Ask your doctor what medicines you should stay away from. Do not take any medicine unless your doctor says it is okay to do so.  · If you were sent home with a tube that drains the bladder (catheter), take care of it as told by your doctor.  · Drink enough fluid to keep your pee clear or pale yellow.  · If you were given an antibiotic, take it as told by your doctor. Do not stop taking the antibiotic even if you start to feel better.  · Do not use any products that contain nicotine or tobacco, such as cigarettes and e-cigarettes. If you need help quitting, ask your doctor.  · Watch for changes in your symptoms. Tell your doctor about them.  · If told, track changes in your blood pressure at home. Tell your doctor about them.  · Keep all follow-up visits as told by your doctor. This is important.  Contact a doctor if:  · You have spasms or you leak pee when you have spasms.  Get help right away if:  · You have chills or a fever.  · You have a tube that drains the bladder and:  ? The tube stops draining pee.  ? The tube falls out.  · You have blood in your pee.  Summary  · Acute urinary retention means that you have problems peeing. It may mean that you cannot pee at all, or that you pee too little.  · If this  condition is not treated, it can lead to kidney damage or other serious problems.  · If you were sent home with a tube that drains the bladder, take care of it as told by your doctor.  · Monitor any changes in your symptoms. Tell your doctor about any changes.  This information is not intended to replace advice given to you by your health care provider. Make sure you discuss any questions you have with your health care provider.  Document Released: 06/05/2009 Document Revised: 03/05/2020 Document Reviewed: 01/19/2018  [employer] Patient Education © 2020 [employer]  Benign Prostatic Hyperplasia    Benign prostatic hyperplasia (BPH) is an enlarged prostate gland that is caused by the normal aging process and not by cancer. The prostate is a walnut-sized gland that is involved in the production of semen. It is located in front of the rectum and below the bladder. The bladder stores urine and the urethra is the tube that carries the urine out of the body. The prostate may get bigger as a man gets older.  An enlarged prostate can press on the urethra. This can make it harder to pass urine. The build-up of urine in the bladder can cause infection. Back pressure and infection may progress to bladder damage and kidney (renal) failure.  What are the causes?  This condition is part of a normal aging process. However, not all men develop problems from this condition. If the prostate enlarges away from the urethra, urine flow will not be blocked. If it enlarges toward the urethra and compresses it, there will be problems passing urine.  What increases the risk?  This condition is more likely to develop in men over the age of 50 years.  What are the signs or symptoms?  Symptoms of this condition include:  · Getting up often during the night to urinate.  · Needing to urinate frequently during the day.  · Difficulty starting urine flow.  · Decrease in size and strength of your urine stream.  · Leaking (dribbling) after  urinating.  · Inability to pass urine. This needs immediate treatment.  · Inability to completely empty your bladder.  · Pain when you pass urine. This is more common if there is also an infection.  · Urinary tract infection (UTI).  How is this diagnosed?  This condition is diagnosed based on your medical history, a physical exam, and your symptoms. Tests will also be done, such as:  · A post-void bladder scan. This measures any amount of urine that may remain in your bladder after you finish urinating.  · A digital rectal exam. In a rectal exam, your health care provider checks your prostate by putting a lubricated, gloved finger into your rectum to feel the back of your prostate gland. This exam detects the size of your gland and any abnormal lumps or growths.  · An exam of your urine (urinalysis).  · A prostate specific antigen (PSA) screening. This is a blood test used to screen for prostate cancer.  · An ultrasound. This test uses sound waves to electronically produce a picture of your prostate gland.  Your health care provider may refer you to a specialist in kidney and prostate diseases (urologist).  How is this treated?  Once symptoms begin, your health care provider will monitor your condition (active surveillance or watchful waiting). Treatment for this condition will depend on the severity of your condition. Treatment may include:  · Observation and yearly exams. This may be the only treatment needed if your condition and symptoms are mild.  · Medicines to relieve your symptoms, including:  ? Medicines to shrink the prostate.  ? Medicines to relax the muscle of the prostate.  · Surgery in severe cases. Surgery may include:  ? Prostatectomy. In this procedure, the prostate tissue is removed completely through an open incision or with a laparoscope or robotics.  ? Transurethral resection of the prostate (TURP). In this procedure, a tool is inserted through the opening at the tip of the penis (urethra). It  is used to cut away tissue of the inner core of the prostate. The pieces are removed through the same opening of the penis. This removes the blockage.  ? Transurethral incision (TUIP). In this procedure, small cuts are made in the prostate. This lessens the prostate's pressure on the urethra.  ? Transurethral microwave thermotherapy (TUMT). This procedure uses microwaves to create heat. The heat destroys and removes a small amount of prostate tissue.  ? Transurethral needle ablation (TUNA). This procedure uses radio frequencies to destroy and remove a small amount of prostate tissue.  ? Interstitial laser coagulation (ILC). This procedure uses a laser to destroy and remove a small amount of prostate tissue.  ? Transurethral electrovaporization (TUVP). This procedure uses electrodes to destroy and remove a small amount of prostate tissue.  ? Prostatic urethral lift. This procedure inserts an implant to push the lobes of the prostate away from the urethra.  Follow these instructions at home:  · Take over-the-counter and prescription medicines only as told by your health care provider.  · Monitor your symptoms for any changes. Contact your health care provider with any changes.  · Avoid drinking large amounts of liquid before going to bed or out in public.  · Avoid or reduce how much caffeine or alcohol you drink.  · Give yourself time when you urinate.  · Keep all follow-up visits as told by your health care provider. This is important.  Contact a health care provider if:  · You have unexplained back pain.  · Your symptoms do not get better with treatment.  · You develop side effects from the medicine you are taking.  · Your urine becomes very dark or has a bad smell.  · Your lower abdomen becomes distended and you have trouble passing your urine.  Get help right away if:  · You have a fever or chills.  · You suddenly cannot urinate.  · You feel lightheaded, or very dizzy, or you faint.  · There are large amounts of  blood or clots in the urine.  · Your urinary problems become hard to manage.  · You develop moderate to severe low back or flank pain. The flank is the side of your body between the ribs and the hip.  These symptoms may represent a serious problem that is an emergency. Do not wait to see if the symptoms will go away. Get medical help right away. Call your local emergency services (911 in the [place] Do not drive yourself to the hospital.  Summary  · Benign prostatic hyperplasia (BPH) is an enlarged prostate that is caused by the normal aging process and not by cancer.  · An enlarged prostate can press on the urethra. This can make it hard to pass urine.  · This condition is part of a normal aging process and is more likely to develop in men over the age of 50 years.  · Get help right away if you suddenly cannot urinate.  This information is not intended to replace advice given to you by your health care provider. Make sure you discuss any questions you have with your health care provider.  Document Released: 12/18/2006 Document Revised: 11/12/2019 Document Reviewed: 01/22/2018  Elsevier Patient Education © 2020 [employer]

## 2020-08-11 ENCOUNTER — OFFICE VISIT (OUTPATIENT)
Dept: CARDIOLOGY | Facility: CLINIC | Age: 75
End: 2020-08-11

## 2020-08-11 VITALS
HEART RATE: 64 BPM | OXYGEN SATURATION: 97 % | BODY MASS INDEX: 37.97 KG/M2 | DIASTOLIC BLOOD PRESSURE: 60 MMHG | HEIGHT: 70 IN | TEMPERATURE: 98.3 F | WEIGHT: 265.2 LBS | SYSTOLIC BLOOD PRESSURE: 90 MMHG

## 2020-08-11 DIAGNOSIS — I42.8 NONISCHEMIC CARDIOMYOPATHY (HCC): Primary | ICD-10-CM

## 2020-08-11 DIAGNOSIS — I48.20 CHRONIC ATRIAL FIBRILLATION (HCC): ICD-10-CM

## 2020-08-11 DIAGNOSIS — I10 ESSENTIAL HYPERTENSION: ICD-10-CM

## 2020-08-11 DIAGNOSIS — I50.22 CHRONIC SYSTOLIC CONGESTIVE HEART FAILURE (HCC): ICD-10-CM

## 2020-08-11 PROCEDURE — 99214 OFFICE O/P EST MOD 30 MIN: CPT | Performed by: PHYSICIAN ASSISTANT

## 2020-08-11 NOTE — PROGRESS NOTES
Buster Redd MD  Larry Kehr  1945 08/11/2020    Patient Active Problem List   Diagnosis   • Chronic atrial fibrillation   • Hypertension- controlled.    • Tobacco use, states that he has cut back to 3-4 cigarettes a day.   • Morbid obesity (CMS/HCC)   • Nonischemic cardiomyopathy , appears to be compensated.   • Chronic systolic heart failure (CMS/HCC)   • Obstructive uropathy   • Chronic obstructive lung disease (CMS/HCC)   • Atrial fibrillation (CMS/HCC)   • Hypertensive disorder   • Benign prostatic hyperplasia with urinary retention       Dear Buster Redd MD:    Subjective     History of Present Illness:    Chief Complaint   Patient presents with   • Follow-up     6 mths f/up.    • Med Management     bottles.    • Hypertension       Larry Kehr is a pleasant 75 y.o. male with a past medical history significant for dilated cardiomyopathy with chronic systolic heart failure for which is ejection fraction has improved to 55-60%, paroxysmal atrial fibrillation anticoagulated with Xarelto, and essential hypertension.  Patient comes in today for routine cardiology follow-up.     Patient reports from cardiac standpoint is been doing well he was recently hospitalized due to obstructive uropathy secondary to BPH and subsequently also developed a UTI with prostatitis.  Thankfully he did slowly improve and was discharged in stable condition with an indwelling catheter.  During his hospital stay he was found to have an abdominal aortic aneurysm of 4.3 cm.  He does deny any chest pains today or shortness of breath.  He also denies any palpitations, dizziness, or syncope.  He did have echocardiogram while in the hospital and it did show a reduction in left ventricular ejection fraction of 36 to 40% where it was originally 56 to 60%.  He does have history of systolic heart failure with at its lowest being 40 to 45% in the past however for the last 2 years it has been within normal limits.    No Known  "Allergies:      Current Outpatient Medications:   •  metoprolol succinate XL (TOPROL-XL) 25 MG 24 hr tablet, Take 1 tablet by mouth Daily., Disp: 30 tablet, Rfl: 3  •  rivaroxaban (XARELTO) 20 MG tablet, Take 1 tablet by mouth Daily With Dinner., Disp: 30 tablet, Rfl: 5  •  sacubitril-valsartan (ENTRESTO) 24-26 MG tablet, Take 1 tablet by mouth 2 (Two) Times a Day., Disp: 28 tablet, Rfl: 0  •  spironolactone (ALDACTONE) 25 MG tablet, TAKE 1 TABLET BY MOUTH DAILY., Disp: 90 tablet, Rfl: 0  •  tamsulosin (FLOMAX) 0.4 MG capsule 24 hr capsule, Take 1 capsule by mouth Daily., Disp: 30 capsule, Rfl: 3    The following portions of the patient's history were reviewed and updated as appropriate: allergies, current medications, past family history, past medical history, past social history, past surgical history and problem list.    Social History     Tobacco Use   • Smoking status: Current Every Day Smoker     Packs/day: 1.00     Years: 59.00     Pack years: 59.00     Types: Cigarettes   • Smokeless tobacco: Never Used   Substance Use Topics   • Alcohol use: No   • Drug use: No       Review of Systems   Constitution: Negative for malaise/fatigue.   Cardiovascular: Negative for chest pain, dyspnea on exertion and irregular heartbeat.   Respiratory: Negative for cough and shortness of breath.    Hematologic/Lymphatic: Negative for bleeding problem. Does not bruise/bleed easily.   Gastrointestinal: Negative for nausea and vomiting.   Neurological: Negative for weakness.       Objective   Vitals:    08/11/20 1025   BP: 90/60   BP Location: Left arm   Patient Position: Sitting   Cuff Size: Adult   Pulse: 64   Temp: 98.3 °F (36.8 °C)   TempSrc: Infrared   SpO2: 97%   Weight: 120 kg (265 lb 3.2 oz)   Height: 177.8 cm (70\")     Body mass index is 38.05 kg/m².    Physical Exam   Constitutional: He is oriented to person, place, and time. He appears well-developed and well-nourished. No distress.   HENT:   Head: Normocephalic and " atraumatic.   Cardiovascular: Normal rate, regular rhythm and normal heart sounds.   Pulmonary/Chest: Effort normal and breath sounds normal. No respiratory distress.   Musculoskeletal: He exhibits no edema.   Neurological: He is alert and oriented to person, place, and time.   Skin: He is not diaphoretic.       Lab Results   Component Value Date     06/16/2020    K 4.1 06/16/2020     (H) 06/16/2020    CO2 20.1 (L) 06/16/2020    BUN 21 06/16/2020    CREATININE 1.38 (H) 06/16/2020    GLUCOSE 130 (H) 06/16/2020    CALCIUM 8.8 06/16/2020    AST 10 06/16/2020    ALT 11 06/16/2020    ALKPHOS 75 06/16/2020     Lab Results   Component Value Date    CKTOTAL 20 06/14/2020     Lab Results   Component Value Date    WBC 8.90 06/16/2020    HGB 10.0 (L) 06/16/2020    HGB 10.0 (L) 06/16/2020    HCT 33.4 (L) 06/16/2020    HCT 33.4 (L) 06/16/2020     06/16/2020     Lab Results   Component Value Date    INR 1.24 (H) 12/10/2016     Lab Results   Component Value Date    MG 2.3 06/14/2020     Lab Results   Component Value Date    TSH 2.250 06/14/2020    PSA 19.100 (H) 06/15/2020    TRIG 107 02/21/2020    HDL 38 (L) 02/21/2020    LDL 93 02/21/2020      Lab Results   Component Value Date    .0 (H) 01/05/2017       During this visit the following were done:  Labs Reviewed [x]    Labs Ordered []    Radiology Reports Reviewed [x]    Radiology Ordered []    PCP Records Reviewed []    Referring Provider Records Reviewed []    ER Records Reviewed []    Hospital Records Reviewed []    History Obtained From Family []    Radiology Images Reviewed []    Other Reviewed []    Records Requested []       Procedures    Assessment/Plan    Diagnosis Plan   1. Nonischemic cardiomyopathy , appears to be compensated.  Ambulatory Referral to Cardiology   2. Essential hypertension     3. Chronic atrial fibrillation     4. Chronic systolic congestive heart failure (CMS/HCC)  Stress Test With Myocardial Perfusion             Recommendations:  1. Since patient's left ventricular ejection fraction has dropped since 2018 I will order a stress test for further evaluation.  2. Patient also had an arterial Doppler which did show some possible significant peripheral arterial disease so I will refer him to Dr. Almonte, interventional cardiologist.  3. Continue spironolactone, Entresto, Xarelto, metoprolol.      Return in about 3 months (around 11/11/2020).    As always, I appreciate very much the opportunity to participate in the cardiovascular care of your patients.      With Best Regards,    Hemant Law PA-C

## 2020-08-14 ENCOUNTER — OFFICE VISIT (OUTPATIENT)
Dept: UROLOGY | Facility: CLINIC | Age: 75
End: 2020-08-14

## 2020-08-14 VITALS — HEIGHT: 70 IN | BODY MASS INDEX: 37.94 KG/M2 | TEMPERATURE: 98.8 F | WEIGHT: 265 LBS

## 2020-08-14 DIAGNOSIS — R33.8 BENIGN PROSTATIC HYPERPLASIA WITH URINARY RETENTION: Primary | ICD-10-CM

## 2020-08-14 DIAGNOSIS — N40.1 BENIGN PROSTATIC HYPERPLASIA WITH URINARY RETENTION: Primary | ICD-10-CM

## 2020-08-14 PROCEDURE — 99213 OFFICE O/P EST LOW 20 MIN: CPT | Performed by: UROLOGY

## 2020-08-14 PROCEDURE — 51703 INSERT BLADDER CATH COMPLEX: CPT | Performed by: UROLOGY

## 2020-08-14 NOTE — PROGRESS NOTES
Chief Complaint:          Chief Complaint   Patient presents with   • Benign Prostatic Hypertrophy       HPI:   75 y.o. male  who presented to [facility] emergency department on June 14, 2020 with complaints of generalized weakness which has been present for the last 2 to 3 weeks.  Along with generalized weakness he also complains of decreased appetite and urinary incontinence.  He reports that he has had regular bowel movements and denies any bowel incontinence.  He reports that he has been having back pain intermittently for approximately 1 year.  He reports that he was seen 1 to 2 weeks ago in and urgent care clinic and was given a steroid injection to ease back and right leg pain.  Mr. Kehr reports that he has since been experiencing difficulty starting and stopping the flow of urine, dribbling urine post urination and has also been experiencing nocturnal enuresis.  He denies noting any foul odor of his urine, hematuria or dysuria.  He also denies any fever, recent infection, recent antibiotic use, cough, shortness of breath, chest pain, increased edema, abdominal pain, difficulty ambulating, dizziness, headaches, lightheadedness, seizures or syncopal episodes.  He does report smoking approximately 6 to 8 cigarettes/day and denies any alcohol or drug use.     Upon arrival to the ED, vital signs were temperature 98, pulse 105, respirations 18, blood pressure 110/80 and oxygen saturation 99% on room air.  Troponin T <0.010, proBNP 2167.0.  Glucose 99, sodium 136, potassium 6.3, CO2 15.5, chloride 109, creatinine 4.32, BUN 67, EGFR 13, TSH 2.25, C-RP 9.67, lactate 0.6, magnesium 2.3, WBC 13.07, hemoglobin 11.3, hematocrit 37.4.  Urinalysis shows negative glucose, negative ketones, large blood, negative nitrite, large leukocytes, WBC too numerous to count 2+ bacteria.  Blood and urine cultures pending.  CT of the head without contrast shows [place] changes without acute abnormality, per radiology.  CT of  the lumbar spine without contrast shows generally mild degenerative disc and facet disease as described above with multilevel spondylosis secondary to combination of disc bulging and a congenitally narrow canal with no acute findings in the spine, per radiology.  CT of the abdomen pelvis without contrast shows marked distention of the bladder noted with bilateral hydronephrosis consistent with urinary retention.  Also noted is cholelithiasis.  4.6 cm meter abdominal aortic aneurysm, per radiology.  CT of the chest without contrast shows negative CT of the chest, per radiology.  Urology is being consulted because of the radiographic finding of bilateral hydronephrosis and urinary retention.  Patient has not seen a urologist in the past.  He has no prior history of voiding dysfunction.  But given the significant upper tract deterioration a catheter is indicated.  I would leave the catheter in and I will see him in the office for appropriate lower tract investigation thank you very much for the consult.       The above depicted image shows a massively dilated bladder bilateral hydronephrosis, and ectatic aortic aneurysm and small prostate.  Currently the catheters to gravity drainage.  He returns today still has the catheter.  He never had problems pre-treatment.  But is unclear as to the degree of his voiding dysfunction originally from [facility] his wife passed away in 2015 he lives with his 51-year-old son I am to set him up for an urgent cystoscopy I suspect he is getting a TURP he does not have a big prostate on the CT scan.  I went ahead and did a cystoscopy did not have a great deal of obstruction other than the median bar he is on alpha blockade we are getting him a voiding trial and see him back in 24 hours.  He returns today is in her urinary retention I drained 2.2 L out I recommended TURP.  He does not have a large prostate this should be reasonable but he clearly is upper tract deterioration  and silent prostatism.  I will arrange a TURP he will need a cardiac clearance preop.  He returns today cardiology is absolutely not going to clear him for surgery therefore we will begin a monthly change of his catheter I will see him back in 1 month we discussed this with him at length            Past Medical History:        Past Medical History:   Diagnosis Date   • Atrial fibrillation (CMS/HCC)    • Cardiomyopathy (CMS/HCC)    • CHF (congestive heart failure) (CMS/HCC)    • COPD (chronic obstructive pulmonary disease) (CMS/HCC)    • Hypertension          Current Meds:     Current Outpatient Medications   Medication Sig Dispense Refill   • metoprolol succinate XL (TOPROL-XL) 25 MG 24 hr tablet Take 1 tablet by mouth Daily. 30 tablet 3   • rivaroxaban (XARELTO) 20 MG tablet Take 1 tablet by mouth Daily With Dinner. 30 tablet 5   • sacubitril-valsartan (ENTRESTO) 24-26 MG tablet Take 1 tablet by mouth 2 (Two) Times a Day. 28 tablet 0   • spironolactone (ALDACTONE) 25 MG tablet TAKE 1 TABLET BY MOUTH DAILY. 90 tablet 0   • tamsulosin (FLOMAX) 0.4 MG capsule 24 hr capsule Take 1 capsule by mouth Daily. 30 capsule 3     No current facility-administered medications for this visit.         Allergies:      No Known Allergies     Past Surgical History:     No past surgical history on file.      Social History:     Social History     Socioeconomic History   • Marital status:      Spouse name: Not on file   • Number of children: Not on file   • Years of education: Not on file   • Highest education level: Not on file   Tobacco Use   • Smoking status: Current Every Day Smoker     Packs/day: 1.00     Years: 59.00     Pack years: 59.00     Types: Cigarettes   • Smokeless tobacco: Never Used   Substance and Sexual Activity   • Alcohol use: No   • Drug use: No   • Sexual activity: Defer       Family History:     Family History   Problem Relation Age of Onset   • No Known Problems Mother    • Heart disease Father    • No  Known Problems Sister    • No Known Problems Brother    • No Known Problems Son    • No Known Problems Daughter    • No Known Problems Maternal Grandmother    • No Known Problems Maternal Grandfather    • No Known Problems Paternal Grandmother    • No Known Problems Paternal Grandfather    • No Known Problems Cousin    • Rheum arthritis Neg Hx    • Osteoarthritis Neg Hx    • Asthma Neg Hx    • Diabetes Neg Hx    • Heart failure Neg Hx    • Hyperlipidemia Neg Hx    • Hypertension Neg Hx    • Migraines Neg Hx    • Rashes / Skin problems Neg Hx    • Seizures Neg Hx    • Stroke Neg Hx    • Thyroid disease Neg Hx        Review of Systems:     Review of Systems   Constitutional: Negative.    HENT: Negative.    Eyes: Negative.    Respiratory: Negative.    Cardiovascular: Negative.    Gastrointestinal: Negative.    Endocrine: Negative.    Genitourinary: Positive for difficulty urinating.   Musculoskeletal: Negative.    Allergic/Immunologic: Negative.    Neurological: Negative.    Hematological: Negative.    Psychiatric/Behavioral: Negative.        Physical Exam:     Physical Exam   Constitutional: He is oriented to person, place, and time. He appears well-developed and well-nourished.   HENT:   Head: Normocephalic and atraumatic.   Eyes: Pupils are equal, round, and reactive to light. Conjunctivae and EOM are normal.   Neck: Normal range of motion.   Cardiovascular: Normal rate, regular rhythm, normal heart sounds and intact distal pulses.   Pulmonary/Chest: Effort normal and breath sounds normal.   Abdominal: Soft. Bowel sounds are normal.   Musculoskeletal: Normal range of motion.   Neurological: He is alert and oriented to person, place, and time. He has normal reflexes.   Skin: Skin is warm and dry.   Psychiatric: He has a normal mood and affect. His behavior is normal. Judgment and thought content normal.   Nursing note and vitals reviewed.      I have reviewed the following portions of the patient's history:  allergies, current medications, past family history, past medical history, past social history, past surgical history, problem list and ROS and confirm it's accurate.      Procedure:     Malhotra catheter change: After prepped and draped in sterile fashion remove the existing Malhotra and reinserted a 16 Japanese Malhotra to gravity drainage without complication  Assessment/Plan:   Urinary retention secondary to BPH-not a candidate for surgery will leave a catheter in            Patient's Body mass index is 38.02 kg/m². BMI is above normal parameters. Recommendations include: educational material.              This document has been electronically signed by MONY MARKS MD August 14, 2020 13:59

## 2020-08-14 NOTE — PROGRESS NOTES
"Chief Complaint:          Chief Complaint   Patient presents with   • Benign Prostatic Hypertrophy       HPI:   75 y.o. male.      Past Medical History:        Past Medical History:   Diagnosis Date   • Atrial fibrillation (CMS/HCC)    • Cardiomyopathy (CMS/HCC)    • CHF (congestive heart failure) (CMS/HCC)    • COPD (chronic obstructive pulmonary disease) (CMS/HCC)    • Hypertension          The following portions of the patient's history were reviewed and updated as appropriate: {history reviewed:20406::\"allergies\",\"current medications\",\"past family history\",\"past medical history\",\"past social history\",\"past surgical history\",\"problem list\"}.    Current Meds:     Current Outpatient Medications   Medication Sig Dispense Refill   • metoprolol succinate XL (TOPROL-XL) 25 MG 24 hr tablet Take 1 tablet by mouth Daily. 30 tablet 3   • rivaroxaban (XARELTO) 20 MG tablet Take 1 tablet by mouth Daily With Dinner. 30 tablet 5   • sacubitril-valsartan (ENTRESTO) 24-26 MG tablet Take 1 tablet by mouth 2 (Two) Times a Day. 28 tablet 0   • spironolactone (ALDACTONE) 25 MG tablet TAKE 1 TABLET BY MOUTH DAILY. 90 tablet 0   • tamsulosin (FLOMAX) 0.4 MG capsule 24 hr capsule Take 1 capsule by mouth Daily. 30 capsule 3     No current facility-administered medications for this visit.         Allergies:      No Known Allergies     Past Surgical History:     No past surgical history on file.      Social History:     Social History     Socioeconomic History   • Marital status:      Spouse name: Not on file   • Number of children: Not on file   • Years of education: Not on file   • Highest education level: Not on file   Tobacco Use   • Smoking status: Current Every Day Smoker     Packs/day: 1.00     Years: 59.00     Pack years: 59.00     Types: Cigarettes   • Smokeless tobacco: Never Used   Substance and Sexual Activity   • Alcohol use: No   • Drug use: No   • Sexual activity: Defer       Family History:     Family History   "   Problem Relation Age of Onset   • No Known Problems Mother    • Heart disease Father    • No Known Problems Sister    • No Known Problems Brother    • No Known Problems Son    • No Known Problems Daughter    • No Known Problems Maternal Grandmother    • No Known Problems Maternal Grandfather    • No Known Problems Paternal Grandmother    • No Known Problems Paternal Grandfather    • No Known Problems Cousin    • Rheum arthritis Neg Hx    • Osteoarthritis Neg Hx    • Asthma Neg Hx    • Diabetes Neg Hx    • Heart failure Neg Hx    • Hyperlipidemia Neg Hx    • Hypertension Neg Hx    • Migraines Neg Hx    • Rashes / Skin problems Neg Hx    • Seizures Neg Hx    • Stroke Neg Hx    • Thyroid disease Neg Hx        Review of Systems:     Review of Systems     Physical Exam:     Physical Exam    ***    Procedure:       Assessment/Plan:   ***       Patient reports that he is not currently experiencing any symptoms of urinary incontinence.      Patient's Body mass index is 38.02 kg/m². BMI is {BMI range:23790}.          Smoking Cessation Counseling:  {Smoking Cessation Status and Time:64748}  {Smoking Cessation COR:93976}    During this visit, I spent *** minutes counseling Arsenio regarding tobacco cessation.        Counseling was given to {person:[id number]} for the following topics {topic:95374}. The interim medical history and current results were reviewed.  A treatment plan with follow-up was made for No primary diagnosis found.. I spent *** minutes face to face with Larry Kehr and *** percentage was spent in counseling.             This document has been electronically signed by Griselda Cheng-Akwa, APRN August 14, 2020 13:59

## 2020-08-20 ENCOUNTER — TRANSCRIBE ORDERS (OUTPATIENT)
Dept: ADMINISTRATIVE | Facility: HOSPITAL | Age: 75
End: 2020-08-20

## 2020-08-20 ENCOUNTER — LAB (OUTPATIENT)
Dept: LAB | Facility: HOSPITAL | Age: 75
End: 2020-08-20

## 2020-08-20 DIAGNOSIS — I10 ESSENTIAL HYPERTENSION, MALIGNANT: Primary | ICD-10-CM

## 2020-08-20 DIAGNOSIS — I10 ESSENTIAL HYPERTENSION, MALIGNANT: ICD-10-CM

## 2020-08-20 PROCEDURE — 81001 URINALYSIS AUTO W/SCOPE: CPT

## 2020-08-20 PROCEDURE — 80053 COMPREHEN METABOLIC PANEL: CPT

## 2020-08-20 PROCEDURE — 36415 COLL VENOUS BLD VENIPUNCTURE: CPT

## 2020-08-21 LAB
ALBUMIN SERPL-MCNC: 3.8 G/DL (ref 3.5–5.2)
ALBUMIN/GLOB SERPL: 1.2 G/DL
ALP SERPL-CCNC: 86 U/L (ref 39–117)
ALT SERPL W P-5'-P-CCNC: 8 U/L (ref 1–41)
ANION GAP SERPL CALCULATED.3IONS-SCNC: 10 MMOL/L (ref 5–15)
AST SERPL-CCNC: 9 U/L (ref 1–40)
BACTERIA UR QL AUTO: ABNORMAL /HPF
BILIRUB SERPL-MCNC: 0.4 MG/DL (ref 0–1.2)
BILIRUB UR QL STRIP: NEGATIVE
BUN SERPL-MCNC: 16 MG/DL (ref 8–23)
BUN/CREAT SERPL: 15.8 (ref 7–25)
CALCIUM SPEC-SCNC: 10.1 MG/DL (ref 8.6–10.5)
CHLORIDE SERPL-SCNC: 103 MMOL/L (ref 98–107)
CLARITY UR: CLEAR
CO2 SERPL-SCNC: 23 MMOL/L (ref 22–29)
COLOR UR: YELLOW
CREAT SERPL-MCNC: 1.01 MG/DL (ref 0.76–1.27)
GFR SERPL CREATININE-BSD FRML MDRD: 72 ML/MIN/1.73
GLOBULIN UR ELPH-MCNC: 3.3 GM/DL
GLUCOSE SERPL-MCNC: 93 MG/DL (ref 65–99)
GLUCOSE UR STRIP-MCNC: NEGATIVE MG/DL
HGB UR QL STRIP.AUTO: ABNORMAL
HYALINE CASTS UR QL AUTO: ABNORMAL /LPF
KETONES UR QL STRIP: NEGATIVE
LEUKOCYTE ESTERASE UR QL STRIP.AUTO: ABNORMAL
NITRITE UR QL STRIP: NEGATIVE
PH UR STRIP.AUTO: 6 [PH] (ref 5–8)
POTASSIUM SERPL-SCNC: 4.7 MMOL/L (ref 3.5–5.2)
PROT SERPL-MCNC: 7.1 G/DL (ref 6–8.5)
PROT UR QL STRIP: ABNORMAL
RBC # UR: ABNORMAL /HPF
REF LAB TEST METHOD: ABNORMAL
SODIUM SERPL-SCNC: 136 MMOL/L (ref 136–145)
SP GR UR STRIP: 1.02 (ref 1–1.03)
SQUAMOUS #/AREA URNS HPF: ABNORMAL /HPF
UROBILINOGEN UR QL STRIP: ABNORMAL
WBC UR QL AUTO: ABNORMAL /HPF

## 2020-09-03 ENCOUNTER — OFFICE VISIT (OUTPATIENT)
Dept: UROLOGY | Facility: CLINIC | Age: 75
End: 2020-09-03

## 2020-09-03 VITALS — HEIGHT: 70 IN | WEIGHT: 265 LBS | BODY MASS INDEX: 37.94 KG/M2 | TEMPERATURE: 98.2 F

## 2020-09-03 DIAGNOSIS — R33.8 URINARY RETENTION DUE TO BENIGN PROSTATIC HYPERPLASIA: Primary | ICD-10-CM

## 2020-09-03 DIAGNOSIS — N40.1 URINARY RETENTION DUE TO BENIGN PROSTATIC HYPERPLASIA: Primary | ICD-10-CM

## 2020-09-03 PROCEDURE — 99213 OFFICE O/P EST LOW 20 MIN: CPT | Performed by: NURSE PRACTITIONER

## 2020-09-03 NOTE — PROGRESS NOTES
Chief Complaint:          Chief Complaint   Patient presents with   • Trouble with cath bag       HPI:   75 y.o. male.  Returns to clinic with catheter bag leaking.    Past Medical History:        Past Medical History:   Diagnosis Date   • Atrial fibrillation (CMS/HCC)    • Cardiomyopathy (CMS/HCC)    • CHF (congestive heart failure) (CMS/HCC)    • COPD (chronic obstructive pulmonary disease) (CMS/HCC)    • Hypertension      The following portions of the patient's history were reviewed and updated as appropriate: allergies, current medications, past family history, past medical history, past social history, past surgical history and problem list.    Current Meds:     Current Outpatient Medications   Medication Sig Dispense Refill   • metoprolol succinate XL (TOPROL-XL) 25 MG 24 hr tablet Take 1 tablet by mouth Daily. 30 tablet 3   • rivaroxaban (XARELTO) 20 MG tablet Take 1 tablet by mouth Daily With Dinner. 30 tablet 5   • sacubitril-valsartan (ENTRESTO) 24-26 MG tablet Take 1 tablet by mouth 2 (Two) Times a Day. 28 tablet 0   • spironolactone (ALDACTONE) 25 MG tablet TAKE 1 TABLET BY MOUTH DAILY. 90 tablet 0   • tamsulosin (FLOMAX) 0.4 MG capsule 24 hr capsule Take 1 capsule by mouth Daily. 30 capsule 3     No current facility-administered medications for this visit.         Allergies:      No Known Allergies     Past Surgical History:     History reviewed. No pertinent surgical history.      Social History:     Social History     Socioeconomic History   • Marital status:      Spouse name: Not on file   • Number of children: Not on file   • Years of education: Not on file   • Highest education level: Not on file   Tobacco Use   • Smoking status: Current Every Day Smoker     Packs/day: 1.00     Years: 59.00     Pack years: 59.00     Types: Cigarettes   • Smokeless tobacco: Never Used   Substance and Sexual Activity   • Alcohol use: No   • Drug use: No   • Sexual activity: Defer       Family History:      Family History   Problem Relation Age of Onset   • No Known Problems Mother    • Heart disease Father    • No Known Problems Sister    • No Known Problems Brother    • No Known Problems Son    • No Known Problems Daughter    • No Known Problems Maternal Grandmother    • No Known Problems Maternal Grandfather    • No Known Problems Paternal Grandmother    • No Known Problems Paternal Grandfather    • No Known Problems Cousin    • Rheum arthritis Neg Hx    • Osteoarthritis Neg Hx    • Asthma Neg Hx    • Diabetes Neg Hx    • Heart failure Neg Hx    • Hyperlipidemia Neg Hx    • Hypertension Neg Hx    • Migraines Neg Hx    • Rashes / Skin problems Neg Hx    • Seizures Neg Hx    • Stroke Neg Hx    • Thyroid disease Neg Hx        Review of Systems:     Review of Systems   Constitutional: Positive for activity change and fatigue. Negative for appetite change, chills and fever.   HENT: Negative for congestion and sinus pressure.    Eyes: Negative for blurred vision and double vision.   Respiratory: Negative for cough, shortness of breath and wheezing.    Cardiovascular: Negative for leg swelling.   Gastrointestinal: Negative for abdominal pain, constipation, diarrhea, nausea and vomiting.   Genitourinary: Negative for difficulty urinating, discharge, dysuria, flank pain, frequency, genital sores, hematuria, penile pain, penile swelling, scrotal swelling, testicular pain, urgency and urinary incontinence.   Musculoskeletal: Negative for back pain.   Skin: Positive for dry skin and pallor.   Neurological: Negative for dizziness, weakness, headache and confusion.   Psychiatric/Behavioral: Positive for stress. Negative for agitation, behavioral problems and decreased concentration. The patient is not nervous/anxious.         Physical Exam:         Procedure:     We Replaced catheter drainage leg bag, replaced his catheter secured dual tab to upper thigh area    Assessment/Plan:     Urinary retention secondary to BPH with  hydronephrosis: Patient evaluated for catheter issues this morning.  We replaced his urinary drainage bag and attachment device to secure the drainage bag to his upper thigh.  This will prevent any more irritation from catheter pulling.      We will continue monthly catheter changes for now.     Patient agreeable with plan of care     Patient reports that he is not currently experiencing any symptoms of urinary incontinence.     Patient's Body mass index is 38.45 kg/m². BMI is above normal parameters. Recommendations include: educational material, exercise counseling and nutrition counseling.     Smoking Cessation Counseling:  Current every day smoker. less than 3 minutes spent counseling. Will try to cut down.  I advised patient to quit tobacco use and offered support.  I provided patient with tobacco cessation educational material printed in the patient's After Visit Summary.      Counseling was given to patient for the following topics diagnostic results including: BPH with urinary retention, instructions for management as follows: Continue alpha blockade, monthly catheter changes and risk factor reductions including: Smoking cessation, avoiding bladder irritants such as caffeine products, citrusy foods, spicy foods,. The interim medical history and current results were reviewed.  A treatment plan with follow-up was made for Urinary retention due to benign prostatic hyperplasia [N40.1, R33.8].             This document has been electronically signed by Griselda Cheng-Akwa, APRN September 3, 2020 16:24

## 2020-09-09 ENCOUNTER — HOSPITAL ENCOUNTER (OUTPATIENT)
Dept: NUCLEAR MEDICINE | Facility: HOSPITAL | Age: 75
Discharge: HOME | End: 2020-09-09

## 2020-09-09 ENCOUNTER — HOSPITAL ENCOUNTER (OUTPATIENT)
Dept: CARDIOLOGY | Facility: HOSPITAL | Age: 75
Discharge: HOME | End: 2020-09-09

## 2020-09-09 DIAGNOSIS — I50.22 CHRONIC SYSTOLIC CONGESTIVE HEART FAILURE (HCC): ICD-10-CM

## 2020-09-09 PROCEDURE — 93017 CV STRESS TEST TRACING ONLY: CPT

## 2020-09-09 PROCEDURE — 25010000002 REGADENOSON 0.4 MG/5ML SOLUTION: Performed by: PHYSICIAN ASSISTANT

## 2020-09-09 PROCEDURE — 0 TECHNETIUM SESTAMIBI: Performed by: PHYSICIAN ASSISTANT

## 2020-09-09 PROCEDURE — 93018 CV STRESS TEST I&R ONLY: CPT | Performed by: INTERNAL MEDICINE

## 2020-09-09 PROCEDURE — A9500 TC99M SESTAMIBI: HCPCS | Performed by: PHYSICIAN ASSISTANT

## 2020-09-09 PROCEDURE — 78452 HT MUSCLE IMAGE SPECT MULT: CPT

## 2020-09-09 PROCEDURE — 78452 HT MUSCLE IMAGE SPECT MULT: CPT | Performed by: INTERNAL MEDICINE

## 2020-09-09 RX ADMIN — TECHNETIUM TC 99M SESTAMIBI 1 DOSE: 1 INJECTION INTRAVENOUS at 12:35

## 2020-09-09 RX ADMIN — TECHNETIUM TC 99M SESTAMIBI 1 DOSE: 1 INJECTION INTRAVENOUS at 10:50

## 2020-09-09 RX ADMIN — REGADENOSON 0.4 MG: 0.08 INJECTION, SOLUTION INTRAVENOUS at 12:35

## 2020-09-10 ENCOUNTER — TELEPHONE (OUTPATIENT)
Dept: CARDIOLOGY | Facility: CLINIC | Age: 75
End: 2020-09-10

## 2020-09-11 LAB
BH CV NUCLEAR PRIOR STUDY: 3
BH CV STRESS BP STAGE 1: NORMAL
BH CV STRESS BP STAGE 2: NORMAL
BH CV STRESS COMMENTS STAGE 1: NORMAL
BH CV STRESS COMMENTS STAGE 2: NORMAL
BH CV STRESS DOSE REGADENOSON STAGE 1: 0.4
BH CV STRESS DURATION MIN STAGE 1: 0
BH CV STRESS DURATION MIN STAGE 2: 4
BH CV STRESS DURATION SEC STAGE 1: 10
BH CV STRESS DURATION SEC STAGE 2: 0
BH CV STRESS HR STAGE 1: 109
BH CV STRESS HR STAGE 2: 107
BH CV STRESS PROTOCOL 1: NORMAL
BH CV STRESS RECOVERY BP: NORMAL MMHG
BH CV STRESS RECOVERY HR: 107 BPM
BH CV STRESS STAGE 1: 1
BH CV STRESS STAGE 2: 2
LV EF NUC BP: 54 %
MAXIMAL PREDICTED HEART RATE: 143 BPM
PERCENT MAX PREDICTED HR: 76.22 %
STRESS BASELINE BP: NORMAL MMHG
STRESS BASELINE HR: 103 BPM
STRESS PERCENT HR: 90 %
STRESS POST PEAK BP: NORMAL MMHG
STRESS POST PEAK HR: 109 BPM
STRESS TARGET HR: 122 BPM

## 2020-09-14 ENCOUNTER — TELEPHONE (OUTPATIENT)
Dept: CARDIOLOGY | Facility: CLINIC | Age: 75
End: 2020-09-14

## 2020-09-18 ENCOUNTER — OFFICE VISIT (OUTPATIENT)
Dept: UROLOGY | Facility: CLINIC | Age: 75
End: 2020-09-18

## 2020-09-18 VITALS — HEIGHT: 70 IN | TEMPERATURE: 98.2 F | WEIGHT: 265 LBS | BODY MASS INDEX: 37.94 KG/M2

## 2020-09-18 DIAGNOSIS — N40.1 BENIGN PROSTATIC HYPERPLASIA WITH URINARY RETENTION: ICD-10-CM

## 2020-09-18 DIAGNOSIS — R33.8 BENIGN PROSTATIC HYPERPLASIA WITH URINARY RETENTION: ICD-10-CM

## 2020-09-18 PROCEDURE — 99214 OFFICE O/P EST MOD 30 MIN: CPT | Performed by: NURSE PRACTITIONER

## 2020-09-18 PROCEDURE — 51703 INSERT BLADDER CATH COMPLEX: CPT | Performed by: NURSE PRACTITIONER

## 2020-09-18 NOTE — PROGRESS NOTES
Chief Complaint:          Chief Complaint   Patient presents with   • Malhotra Cath Change       HPI:   75 y.o. male.  Very pleasant male seen for cath change today # 18 Coude.    Past Medical History:        Past Medical History:   Diagnosis Date   • Atrial fibrillation (CMS/HCC)    • Cardiomyopathy (CMS/HCC)    • CHF (congestive heart failure) (CMS/HCC)    • COPD (chronic obstructive pulmonary disease) (CMS/HCC)    • Hypertension        The following portions of the patient's history were reviewed and updated as appropriate: allergies, current medications, past family history, past medical history, past social history, past surgical history and problem list.    Current Meds:     Current Outpatient Medications   Medication Sig Dispense Refill   • metoprolol succinate XL (TOPROL-XL) 25 MG 24 hr tablet Take 1 tablet by mouth Daily. 30 tablet 3   • rivaroxaban (Xarelto) 20 MG tablet Take 1 tablet by mouth Daily With Dinner. 14 tablet 0   • sacubitril-valsartan (ENTRESTO) 24-26 MG tablet Take 1 tablet by mouth 2 (Two) Times a Day. 28 tablet 0   • spironolactone (ALDACTONE) 25 MG tablet TAKE 1 TABLET BY MOUTH DAILY. 90 tablet 0   • tamsulosin (FLOMAX) 0.4 MG capsule 24 hr capsule Take 1 capsule by mouth Daily. 30 capsule 3     No current facility-administered medications for this visit.         Allergies:      No Known Allergies     Past Surgical History:     No past surgical history on file.      Social History:     Social History     Socioeconomic History   • Marital status:      Spouse name: Not on file   • Number of children: Not on file   • Years of education: Not on file   • Highest education level: Not on file   Tobacco Use   • Smoking status: Current Every Day Smoker     Packs/day: 0.25     Years: 59.00     Pack years: 14.75     Types: Cigarettes   • Smokeless tobacco: Never Used   Substance and Sexual Activity   • Alcohol use: No   • Drug use: No   • Sexual activity: Defer       Family History:     Family  History   Problem Relation Age of Onset   • No Known Problems Mother    • Heart disease Father    • No Known Problems Sister    • No Known Problems Brother    • No Known Problems Son    • No Known Problems Daughter    • No Known Problems Maternal Grandmother    • No Known Problems Maternal Grandfather    • No Known Problems Paternal Grandmother    • No Known Problems Paternal Grandfather    • No Known Problems Cousin    • Rheum arthritis Neg Hx    • Osteoarthritis Neg Hx    • Asthma Neg Hx    • Diabetes Neg Hx    • Heart failure Neg Hx    • Hyperlipidemia Neg Hx    • Hypertension Neg Hx    • Migraines Neg Hx    • Rashes / Skin problems Neg Hx    • Seizures Neg Hx    • Stroke Neg Hx    • Thyroid disease Neg Hx        Review of Systems:     Review of Systems   Constitutional: Positive for activity change and fatigue. Negative for appetite change, chills and fever.   HENT: Negative for congestion and sinus pressure.    Eyes: Negative for blurred vision and double vision.   Respiratory: Negative for shortness of breath and wheezing.    Gastrointestinal: Positive for abdominal distention. Negative for abdominal pain, constipation, diarrhea, nausea and vomiting.   Genitourinary: Positive for dysuria. Negative for difficulty urinating, discharge, flank pain, frequency, genital sores, hematuria, penile pain, penile swelling, scrotal swelling, testicular pain, urgency and urinary incontinence.   Musculoskeletal: Negative for back pain.   Neurological: Positive for weakness. Negative for dizziness and confusion.   Psychiatric/Behavioral: Negative for agitation, behavioral problems and decreased concentration.        Physical Exam:     Physical Exam  Constitutional:       General: He is in acute distress.      Appearance: He is well-developed.   HENT:      Head: Normocephalic and atraumatic.      Right Ear: External ear normal.      Left Ear: External ear normal.   Eyes:      General:         Right eye: No discharge.          Left eye: No discharge.      Conjunctiva/sclera: Conjunctivae normal.      Pupils: Pupils are equal, round, and reactive to light.   Neck:      Musculoskeletal: Normal range of motion and neck supple.      Thyroid: No thyromegaly.      Trachea: No tracheal deviation.   Cardiovascular:      Rate and Rhythm: Normal rate and regular rhythm.      Heart sounds: No murmur. No friction rub.   Pulmonary:      Effort: Pulmonary effort is normal. No respiratory distress.      Breath sounds: Normal breath sounds. No stridor.   Abdominal:      General: Bowel sounds are normal. There is distension.      Palpations: Abdomen is soft.      Tenderness: There is abdominal tenderness. There is no guarding.   Genitourinary:     Penis: Normal and uncircumcised. No tenderness or discharge.       Scrotum/Testes: Normal.      Rectum: Normal. Guaiac result negative.   Musculoskeletal: Normal range of motion.         General: Tenderness present. No deformity.   Skin:     General: Skin is warm and dry.      Capillary Refill: Capillary refill takes less than 2 seconds.      Coloration: Skin is not pale.   Neurological:      Mental Status: He is alert and oriented to person, place, and time.      Cranial Nerves: No cranial nerve deficit.      Coordination: Coordination normal.   Psychiatric:         Behavior: Behavior normal.         Thought Content: Thought content normal.         Judgment: Judgment normal.         Procedure:     Malhotra Catheter change: I Prepped and Draped the patient in a sterile fashion,  Removed the existing  18 Malian coudé tip catheter after deflating the 10 cc filled balloon.     A new 18 Malian coudé tip catheter was inserted  In a sterile fashion without any complication. Urine was drained and the balloon was inflated with a 10cc syringe and the position was checked for security.      Assessment/Plan:     Urinary retention secondary to BPH with Hydronephrosis: Patient evaluated for catheter CHANGE Today.  He is in no  apparent distress and reports doing well. He tolerated his Cath change today with minimal discomfort. Had adequate urine output prior to leaving.    We will continue monthly catheter changes for now.     Patient agreeable with plan of care    Patient reports that he is not currently experiencing any symptoms of urinary incontinence.    Patient's Body mass index is 38.02 kg/m². BMI is above normal parameters. Recommendations include: educational material, exercise counseling and nutrition counseling.    Smoking Cessation Counseling:  Current every day smoker. less than 3 minutes spent counseling. Has reduced tobacco use.  I advised patient to quit tobacco use and offered support.  I provided patient with tobacco cessation educational material printed in the patient's After Visit Summary.       Counseling was given to patient for the following topics diagnostic results including: BPH with urinary retention, instructions for management as follows: Continue alpha blockade, monthly catheter changes and risk factor reductions including: Smoking cessation, avoiding bladder irritants such as caffeine products, citrusy foods, spicy foods,. The interim medical history and current results were reviewed.  A treatment plan with follow-up was made for Urinary retention due to benign prostatic hyperplasia [N40.1, R33.8].             This document has been electronically signed by Griselda Cheng-Akwa, APRN September 18, 2020 14:07 EDT

## 2020-09-21 ENCOUNTER — TELEPHONE (OUTPATIENT)
Dept: CARDIOLOGY | Facility: CLINIC | Age: 75
End: 2020-09-21

## 2020-09-21 NOTE — TELEPHONE ENCOUNTER
Called [employer] for patients xarelto and he is approved from July 12 2020 to July 13, 2021 and the patient should have a pharmacy card.  The number is [id number] group number is [id number] and bin number is [id number]  And this is the same pharmacy card that the patient has had in the past  I will call the patient and explain this to the patient.

## 2020-09-28 ENCOUNTER — TELEPHONE (OUTPATIENT)
Dept: CARDIOLOGY | Facility: CLINIC | Age: 75
End: 2020-09-28

## 2020-09-28 NOTE — TELEPHONE ENCOUNTER
Tried to call the patient and no one answered.  He is approved for the xarelto medication:  Called [facility] [employer] [facility] for patients xarelto and he is approved from July 12 2020 to July 13, 2021 and the patient should have a pharmacy card.  The number is [id number] group number is [id number] and bin number is [id number]  And this is the same pharmacy card that the patient has had in the past

## 2020-10-20 ENCOUNTER — OFFICE VISIT (OUTPATIENT)
Dept: UROLOGY | Facility: CLINIC | Age: 75
End: 2020-10-20

## 2020-10-20 VITALS — BODY MASS INDEX: 37.94 KG/M2 | HEIGHT: 70 IN | TEMPERATURE: 98.9 F | WEIGHT: 265 LBS

## 2020-10-20 DIAGNOSIS — N40.1 BENIGN PROSTATIC HYPERPLASIA WITH URINARY RETENTION: Primary | ICD-10-CM

## 2020-10-20 DIAGNOSIS — R33.8 BENIGN PROSTATIC HYPERPLASIA WITH URINARY RETENTION: Primary | ICD-10-CM

## 2020-10-20 PROCEDURE — 99213 OFFICE O/P EST LOW 20 MIN: CPT | Performed by: UROLOGY

## 2020-10-20 PROCEDURE — 51703 INSERT BLADDER CATH COMPLEX: CPT | Performed by: UROLOGY

## 2020-10-20 RX ORDER — CLOTRIMAZOLE AND BETAMETHASONE DIPROPIONATE 10; .64 MG/G; MG/G
CREAM TOPICAL 2 TIMES DAILY
Qty: 45 G | Refills: 2 | Status: SHIPPED | OUTPATIENT
Start: 2020-10-20 | End: 2020-11-13 | Stop reason: ALTCHOICE

## 2020-10-20 NOTE — PROGRESS NOTES
Chief Complaint:          Chief Complaint   Patient presents with   • Benign Prostatic Hypertrophy       HPI:   75 y.o. male with urinary retention and not cleared for TURP by cardiology here for cath change it was accomplished without difficulty he has additional balanitis and I placed him on Lotrisone      Past Medical History:        Past Medical History:   Diagnosis Date   • Atrial fibrillation (CMS/HCC)    • Cardiomyopathy (CMS/HCC)    • CHF (congestive heart failure) (CMS/HCC)    • COPD (chronic obstructive pulmonary disease) (CMS/HCC)    • Hypertension          Current Meds:     Current Outpatient Medications   Medication Sig Dispense Refill   • metoprolol succinate XL (TOPROL-XL) 25 MG 24 hr tablet Take 1 tablet by mouth Daily. 30 tablet 3   • rivaroxaban (Xarelto) 20 MG tablet Take 1 tablet by mouth Daily With Dinner. 14 tablet 0   • sacubitril-valsartan (ENTRESTO) 24-26 MG tablet Take 1 tablet by mouth 2 (Two) Times a Day. 28 tablet 0   • spironolactone (ALDACTONE) 25 MG tablet TAKE 1 TABLET BY MOUTH DAILY. 90 tablet 0   • tamsulosin (FLOMAX) 0.4 MG capsule 24 hr capsule Take 1 capsule by mouth Daily. 30 capsule 3     No current facility-administered medications for this visit.         Allergies:      No Known Allergies     Past Surgical History:     No past surgical history on file.      Social History:     Social History     Socioeconomic History   • Marital status:      Spouse name: Not on file   • Number of children: Not on file   • Years of education: Not on file   • Highest education level: Not on file   Tobacco Use   • Smoking status: Current Every Day Smoker     Packs/day: 0.25     Years: 59.00     Pack years: 14.75     Types: Cigarettes   • Smokeless tobacco: Never Used   Substance and Sexual Activity   • Alcohol use: No   • Drug use: No   • Sexual activity: Defer       Family History:     Family History   Problem Relation Age of Onset   • No Known Problems Mother    • Heart disease Father     • No Known Problems Sister    • No Known Problems Brother    • No Known Problems Son    • No Known Problems Daughter    • No Known Problems Maternal Grandmother    • No Known Problems Maternal Grandfather    • No Known Problems Paternal Grandmother    • No Known Problems Paternal Grandfather    • No Known Problems Cousin    • Rheum arthritis Neg Hx    • Osteoarthritis Neg Hx    • Asthma Neg Hx    • Diabetes Neg Hx    • Heart failure Neg Hx    • Hyperlipidemia Neg Hx    • Hypertension Neg Hx    • Migraines Neg Hx    • Rashes / Skin problems Neg Hx    • Seizures Neg Hx    • Stroke Neg Hx    • Thyroid disease Neg Hx        Review of Systems:     Review of Systems   Constitutional: Negative.    HENT: Negative.    Eyes: Negative.    Respiratory: Negative.    Cardiovascular: Negative.    Gastrointestinal: Negative.    Endocrine: Negative.    Musculoskeletal: Negative.    Allergic/Immunologic: Negative.    Neurological: Negative.    Hematological: Negative.    Psychiatric/Behavioral: Negative.        Physical Exam:     Physical Exam  Vitals signs and nursing note reviewed.   Constitutional:       Appearance: He is well-developed.   HENT:      Head: Normocephalic and atraumatic.   Eyes:      Conjunctiva/sclera: Conjunctivae normal.      Pupils: Pupils are equal, round, and reactive to light.   Neck:      Musculoskeletal: Normal range of motion.   Cardiovascular:      Rate and Rhythm: Normal rate and regular rhythm.      Heart sounds: Normal heart sounds.   Pulmonary:      Effort: Pulmonary effort is normal.      Breath sounds: Normal breath sounds.   Abdominal:      General: Bowel sounds are normal.      Palpations: Abdomen is soft.   Genitourinary:     Comments: Uncircumcised with thick balanitis  Musculoskeletal: Normal range of motion.   Skin:     General: Skin is warm and dry.   Neurological:      Mental Status: He is alert and oriented to person, place, and time.      Deep Tendon Reflexes: Reflexes are normal and  symmetric.   Psychiatric:         Behavior: Behavior normal.         Thought Content: Thought content normal.         Judgment: Judgment normal.         I have reviewed the following portions of the patient's history: allergies, current medications, past family history, past medical history, past social history, past surgical history, problem list and ROS and confirm it's accurate.      Procedure:   Catheter change: After prepped and draped in sterile fashion I went ahead remove the existing Malhotra prepped and draped urinary inserted a 16 [place]ian Malhotra was difficult    Assessment/Plan:   Urinary retention secondary to BPH not a candidate for TURP and not cleared for surgery by his cardiologist.  Therefore he is getting his cath changed monthly  Balanitis-start topical Lotrisone            Patient's Body mass index is 38.02 kg/m². BMI is above normal parameters. Recommendations include: educational material.              This document has been electronically signed by MONY MARKS MD October 20, 2020 13:19 EDT

## 2020-10-28 ENCOUNTER — TELEPHONE (OUTPATIENT)
Dept: CARDIOLOGY | Facility: CLINIC | Age: 75
End: 2020-10-28

## 2020-10-28 NOTE — TELEPHONE ENCOUNTER
called [facility] patient assistance foundation for the entresto 24-26. They advised that the patient is approved from December 23 2019 thru December 23 2020.  Called patient and advised that he is approved till December 23 2020 and they will be sending a new application to him to fill out and send back with the appropriate paperwork and then they will be sending our office paperwork that we will have to fill out and send back with script and signatures.  Patient is aware and will be awaiting the paperwork

## 2020-11-12 RX ORDER — SACUBITRIL AND VALSARTAN 24; 26 MG/1; MG/1
1 TABLET, FILM COATED ORAL 2 TIMES DAILY
Qty: 180 TABLET | Refills: 3 | Status: SHIPPED | OUTPATIENT
Start: 2020-11-12 | End: 2020-11-12 | Stop reason: SDUPTHER

## 2020-11-12 RX ORDER — SACUBITRIL AND VALSARTAN 24; 26 MG/1; MG/1
1 TABLET, FILM COATED ORAL 2 TIMES DAILY
Qty: 180 TABLET | Refills: 3 | Status: SHIPPED | OUTPATIENT
Start: 2020-11-12 | End: 2021-01-07 | Stop reason: SDUPTHER

## 2020-11-12 NOTE — TELEPHONE ENCOUNTER
Did paperwork for the [facility] patient assistance program and gave to olivia

## 2020-11-13 ENCOUNTER — LAB (OUTPATIENT)
Dept: LAB | Facility: HOSPITAL | Age: 75
End: 2020-11-13

## 2020-11-13 ENCOUNTER — OFFICE VISIT (OUTPATIENT)
Dept: CARDIOLOGY | Facility: CLINIC | Age: 75
End: 2020-11-13

## 2020-11-13 VITALS
DIASTOLIC BLOOD PRESSURE: 78 MMHG | TEMPERATURE: 97.1 F | HEIGHT: 70 IN | WEIGHT: 265.6 LBS | HEART RATE: 78 BPM | BODY MASS INDEX: 38.02 KG/M2 | SYSTOLIC BLOOD PRESSURE: 110 MMHG

## 2020-11-13 DIAGNOSIS — I50.22 CHRONIC SYSTOLIC CONGESTIVE HEART FAILURE (HCC): Primary | ICD-10-CM

## 2020-11-13 DIAGNOSIS — I10 ESSENTIAL HYPERTENSION: ICD-10-CM

## 2020-11-13 DIAGNOSIS — I48.20 CHRONIC ATRIAL FIBRILLATION (HCC): ICD-10-CM

## 2020-11-13 DIAGNOSIS — I42.8 NONISCHEMIC CARDIOMYOPATHY (HCC): ICD-10-CM

## 2020-11-13 DIAGNOSIS — I73.9 PAD (PERIPHERAL ARTERY DISEASE) (HCC): ICD-10-CM

## 2020-11-13 DIAGNOSIS — I50.22 CHRONIC SYSTOLIC CONGESTIVE HEART FAILURE (HCC): ICD-10-CM

## 2020-11-13 PROCEDURE — 36415 COLL VENOUS BLD VENIPUNCTURE: CPT

## 2020-11-13 PROCEDURE — 80048 BASIC METABOLIC PNL TOTAL CA: CPT

## 2020-11-13 PROCEDURE — 99214 OFFICE O/P EST MOD 30 MIN: CPT | Performed by: NURSE PRACTITIONER

## 2020-11-13 NOTE — PROGRESS NOTES
Buster Redd MD  Larry Kehr  1945 11/13/2020    Patient Active Problem List   Diagnosis   • Chronic atrial fibrillation   • Hypertension- controlled.    • Tobacco use, states that he has cut back to 3-4 cigarettes a day.   • Morbid obesity (CMS/HCC)   • Nonischemic cardiomyopathy , appears to be compensated.   • Chronic systolic heart failure (CMS/HCC)   • Obstructive uropathy   • Chronic obstructive lung disease (CMS/HCC)   • Atrial fibrillation (CMS/HCC)   • Hypertensive disorder   • Benign prostatic hyperplasia with urinary retention       Dear Buster Redd MD:    Subjective     Chief Complaint   Patient presents with   • Follow-up     3 MONTH   • Med Management           History of Present Illness:    Larry Kehr is a 75 y.o. male with a history of chronic atrial fibrillation, history of cardiomyopathy with most recent EF 36 to 40% noted on echocardiogram, and acute kidney injury in June 2020 secondary to obstructive uropathy with most recent creatinine within normal limits.  He is anticoagulated with Xarelto and denies any bleeding issues.  Previously, he had also been on metoprolol and spironolactone for his cardiomyopathy.  However, he reports his PCP had to gradually discontinue these medications due to hypotension and bradycardia.  His blood pressure and heart rate are within normal limits today.  He reports he has been doing well.  He denies any chest pains, shortness of breath, palpitations, dizziness, or lightheadedness.  Denies any weight gain, orthopnea, or PND.  He was noted to have an abnormal arterial Doppler of the lower extremities while inpatient in June 2020.  This revealed monophasic waveform patterns in the left popliteal and posterior tibial artery as well as the right posterior tibial artery.  There is no flow demonstrated in the left superficial femoral artery concerning for occlusion.  The patient denies any claudication symptoms currently.  In addition, he has a history of  "mild cardiomyopathy.  Recently echocardiogram revealed worsening EF of 36 to 40%.  He underwent Lexiscan stress test which revealed no evidence of ischemia.  He is not a diabetic.  He is a smoker.          No Known Allergies:      Current Outpatient Medications:   •  rivaroxaban (Xarelto) 20 MG tablet, Take 1 tablet by mouth Daily With Dinner., Disp: 14 tablet, Rfl: 0  •  sacubitril-valsartan (Entresto) 24-26 MG tablet, Take 1 tablet by mouth 2 (Two) Times a Day., Disp: 180 tablet, Rfl: 3  •  tamsulosin (FLOMAX) 0.4 MG capsule 24 hr capsule, Take 1 capsule by mouth Daily., Disp: 30 capsule, Rfl: 3  •  metoprolol succinate XL (TOPROL-XL) 25 MG 24 hr tablet, Take 1 tablet by mouth Daily., Disp: 30 tablet, Rfl: 3      The following portions of the patient's history were reviewed and updated as appropriate: allergies, current medications, past family history, past medical history, past social history, past surgical history and problem list.    Social History     Tobacco Use   • Smoking status: Current Every Day Smoker     Packs/day: 0.25     Years: 59.00     Pack years: 14.75     Types: Cigarettes   • Smokeless tobacco: Never Used   Substance Use Topics   • Alcohol use: No   • Drug use: No       Review of Systems   Constitution: Negative for decreased appetite and malaise/fatigue.   Cardiovascular: Negative for chest pain, dyspnea on exertion, irregular heartbeat, leg swelling, near-syncope, orthopnea, palpitations, paroxysmal nocturnal dyspnea and syncope.   Respiratory: Negative for cough, shortness of breath and wheezing.    Neurological: Negative for dizziness, light-headedness and weakness.       Objective   Vitals:    11/13/20 1151   BP: 110/78   Pulse: 78   Temp: 97.1 °F (36.2 °C)   Weight: 120 kg (265 lb 9.6 oz)   Height: 177.8 cm (70\")     Body mass index is 38.11 kg/m².        Vitals signs reviewed.   Constitutional:       Appearance: Healthy appearance. Well-developed and not in distress.   HENT:      Head: " Normocephalic and atraumatic.   Pulmonary:      Effort: Pulmonary effort is normal.      Breath sounds: Normal breath sounds. No wheezing. No rales.   Cardiovascular:      Normal rate. Irregularly irregular rhythm.      Murmurs: There is no murmur.      . No S3 and S4 gallop.   Pulses:     Decreased pulses.      Dorsalis pedis: 1+ bilaterally.     Posterior tibial: 1+ bilaterally.  Edema:     Pretibial: bilateral trace edema of the pretibial area.     Ankle: bilateral trace edema of the ankle.     Feet: bilateral trace edema of the feet.  Abdominal:      General: Bowel sounds are normal.      Palpations: Abdomen is soft.   Skin:     General: Skin is warm and dry.   Neurological:      Mental Status: Alert, oriented to person, place, and time and oriented to person, place and time.   Psychiatric:         Mood and Affect: Mood normal.         Behavior: Behavior normal.         Lab Results   Component Value Date     08/20/2020    K 4.7 08/20/2020     08/20/2020    CO2 23.0 08/20/2020    BUN 16 08/20/2020    CREATININE 1.01 08/20/2020    GLUCOSE 93 08/20/2020    CALCIUM 10.1 08/20/2020    AST 9 08/20/2020    ALT 8 08/20/2020    ALKPHOS 86 08/20/2020     Lab Results   Component Value Date    CKTOTAL 20 06/14/2020     Lab Results   Component Value Date    WBC 8.90 06/16/2020    HGB 10.0 (L) 06/16/2020    HGB 10.0 (L) 06/16/2020    HCT 33.4 (L) 06/16/2020    HCT 33.4 (L) 06/16/2020     06/16/2020     Lab Results   Component Value Date    INR 1.24 (H) 12/10/2016     Lab Results   Component Value Date    MG 2.3 06/14/2020     Lab Results   Component Value Date    TSH 2.250 06/14/2020    PSA 19.100 (H) 06/15/2020    TRIG 107 02/21/2020    HDL 38 (L) 02/21/2020    LDL 93 02/21/2020      Lab Results   Component Value Date    .0 (H) 01/05/2017           Procedures      Assessment/Plan    Diagnosis Plan   1. Chronic systolic congestive heart failure (CMS/HCC)  Basic Metabolic Panel   2. cardiomyopathy ,  appears to be compensated, EF 36-40%.  Basic Metabolic Panel   3. Essential hypertension  Basic Metabolic Panel   4. Chronic atrial fibrillation  Basic Metabolic Panel   5. PAD (peripheral artery disease) (CMS/[facility])                  Recommendations:    1.  Since he is taking Entresto, will repeat BMP to monitor renal function and potassium.    2.  I have discussed the results of the arterial Doppler from earlier this year with him today.  Given concern for peripheral arterial disease and worsening cardiomyopathy concerning for underlying ischemia, will refer him to interventional cardiology for further evaluation.    3.  We will continue with current dose of Entresto for now and plan to reinitiate metoprolol if his blood pressure can tolerate in the future.        Return in about 6 weeks (around 12/25/2020) for Recheck.    As always, I appreciate very much the opportunity to participate in the cardiovascular care of your patients.      With Best Regards,    IDA Poole

## 2020-11-14 LAB
ANION GAP SERPL CALCULATED.3IONS-SCNC: 8.7 MMOL/L (ref 5–15)
BUN SERPL-MCNC: 12 MG/DL (ref 8–23)
BUN/CREAT SERPL: 15 (ref 7–25)
CALCIUM SPEC-SCNC: 9.1 MG/DL (ref 8.6–10.5)
CHLORIDE SERPL-SCNC: 101 MMOL/L (ref 98–107)
CO2 SERPL-SCNC: 25.3 MMOL/L (ref 22–29)
CREAT SERPL-MCNC: 0.8 MG/DL (ref 0.76–1.27)
GFR SERPL CREATININE-BSD FRML MDRD: 94 ML/MIN/1.73
GLUCOSE SERPL-MCNC: 92 MG/DL (ref 65–99)
POTASSIUM SERPL-SCNC: 4.4 MMOL/L (ref 3.5–5.2)
SODIUM SERPL-SCNC: 135 MMOL/L (ref 136–145)

## 2020-11-17 ENCOUNTER — TELEPHONE (OUTPATIENT)
Dept: CARDIOLOGY | Facility: CLINIC | Age: 75
End: 2020-11-17

## 2020-11-20 ENCOUNTER — OFFICE VISIT (OUTPATIENT)
Dept: UROLOGY | Facility: CLINIC | Age: 75
End: 2020-11-20

## 2020-11-20 ENCOUNTER — TELEPHONE (OUTPATIENT)
Dept: CARDIOLOGY | Facility: CLINIC | Age: 75
End: 2020-11-20

## 2020-11-20 VITALS — BODY MASS INDEX: 38.11 KG/M2 | HEIGHT: 70 IN

## 2020-11-20 DIAGNOSIS — R33.8 URINARY RETENTION DUE TO BENIGN PROSTATIC HYPERPLASIA: Primary | ICD-10-CM

## 2020-11-20 DIAGNOSIS — N40.1 URINARY RETENTION DUE TO BENIGN PROSTATIC HYPERPLASIA: Primary | ICD-10-CM

## 2020-11-20 DIAGNOSIS — Z46.6 URINARY CATHETER (FOLEY) CHANGE REQUIRED: ICD-10-CM

## 2020-11-20 PROCEDURE — 99213 OFFICE O/P EST LOW 20 MIN: CPT | Performed by: NURSE PRACTITIONER

## 2020-11-20 PROCEDURE — 51702 INSERT TEMP BLADDER CATH: CPT | Performed by: NURSE PRACTITIONER

## 2020-11-20 NOTE — PROGRESS NOTES
Chief Complaint:          Chief Complaint   Patient presents with   • Urinary Retention     1 month fu        HPI:   75 y.o. male.presents to clinic for monthly cath change, He is in no apparent distress, denies any discomfort.      Past Medical History:        Past Medical History:   Diagnosis Date   • Atrial fibrillation (CMS/HCC)    • Cardiomyopathy (CMS/HCC)    • CHF (congestive heart failure) (CMS/HCC)    • COPD (chronic obstructive pulmonary disease) (CMS/HCC)    • Hypertension          The following portions of the patient's history were reviewed and updated as appropriate: allergies, current medications, past family history, past medical history, past social history, past surgical history and problem list.    Current Meds:     Current Outpatient Medications   Medication Sig Dispense Refill   • metoprolol succinate XL (TOPROL-XL) 25 MG 24 hr tablet Take 1 tablet by mouth Daily. 30 tablet 3   • rivaroxaban (Xarelto) 20 MG tablet Take 1 tablet by mouth Daily With Dinner. 14 tablet 0   • sacubitril-valsartan (Entresto) 24-26 MG tablet Take 1 tablet by mouth 2 (Two) Times a Day. 180 tablet 3   • tamsulosin (FLOMAX) 0.4 MG capsule 24 hr capsule Take 1 capsule by mouth Daily. 30 capsule 3     No current facility-administered medications for this visit.         Allergies:      No Known Allergies     Past Surgical History:     History reviewed. No pertinent surgical history.      Social History:     Social History     Socioeconomic History   • Marital status:      Spouse name: Not on file   • Number of children: Not on file   • Years of education: Not on file   • Highest education level: Not on file   Tobacco Use   • Smoking status: Current Every Day Smoker     Packs/day: 0.25     Years: 59.00     Pack years: 14.75     Types: Cigarettes   • Smokeless tobacco: Never Used   Substance and Sexual Activity   • Alcohol use: No   • Drug use: No   • Sexual activity: Defer       Family History:     Family History    Problem Relation Age of Onset   • No Known Problems Mother    • Heart disease Father    • No Known Problems Sister    • No Known Problems Brother    • No Known Problems Son    • No Known Problems Daughter    • No Known Problems Maternal Grandmother    • No Known Problems Maternal Grandfather    • No Known Problems Paternal Grandmother    • No Known Problems Paternal Grandfather    • No Known Problems Cousin    • Rheum arthritis Neg Hx    • Osteoarthritis Neg Hx    • Asthma Neg Hx    • Diabetes Neg Hx    • Heart failure Neg Hx    • Hyperlipidemia Neg Hx    • Hypertension Neg Hx    • Migraines Neg Hx    • Rashes / Skin problems Neg Hx    • Seizures Neg Hx    • Stroke Neg Hx    • Thyroid disease Neg Hx        Review of Systems:     Review of Systems   Constitutional: Negative for activity change, appetite change, chills, fatigue and fever.   HENT: Negative for congestion and sinus pressure.    Eyes: Negative for blurred vision and double vision.   Respiratory: Negative for shortness of breath and wheezing.    Cardiovascular: Negative for chest pain.   Gastrointestinal: Negative for abdominal pain, constipation, diarrhea, nausea and vomiting.   Genitourinary: Positive for difficulty urinating and penile pain. Negative for discharge, dysuria, flank pain, frequency, genital sores, hematuria, penile swelling, scrotal swelling, testicular pain, urgency and urinary incontinence.   Musculoskeletal: Negative for back pain and neck pain.   Neurological: Negative for dizziness, weakness, headache and confusion.   Hematological: Does not bruise/bleed easily.   Psychiatric/Behavioral: Positive for stress. Negative for agitation, behavioral problems and decreased concentration. The patient is nervous/anxious.         Physical Exam:     Physical Exam  Constitutional:       General: He is not in acute distress.     Appearance: He is well-developed.   HENT:      Head: Normocephalic and atraumatic.      Right Ear: External ear  normal.      Left Ear: External ear normal.   Eyes:      General:         Right eye: No discharge.         Left eye: No discharge.      Conjunctiva/sclera: Conjunctivae normal.      Pupils: Pupils are equal, round, and reactive to light.   Neck:      Musculoskeletal: Normal range of motion and neck supple.      Thyroid: No thyromegaly.      Trachea: No tracheal deviation.   Cardiovascular:      Rate and Rhythm: Normal rate and regular rhythm.      Heart sounds: No murmur. No friction rub.   Pulmonary:      Effort: Pulmonary effort is normal. No respiratory distress.      Breath sounds: Normal breath sounds. No stridor.   Abdominal:      General: Bowel sounds are normal. There is no distension.      Palpations: Abdomen is soft.      Tenderness: There is abdominal tenderness. There is no guarding.   Genitourinary:     Penis: Normal and uncircumcised. No tenderness or discharge.       Scrotum/Testes: Normal.      Rectum: Normal. Guaiac result negative.   Musculoskeletal: Normal range of motion.         General: Tenderness present. No deformity.   Skin:     General: Skin is warm and dry.      Capillary Refill: Capillary refill takes less than 2 seconds.      Coloration: Skin is not pale.   Neurological:      Mental Status: He is alert and oriented to person, place, and time.      Cranial Nerves: No cranial nerve deficit.      Coordination: Coordination normal.   Psychiatric:         Behavior: Behavior normal.         Thought Content: Thought content normal.         Judgment: Judgment normal.         Procedure:     Malhotra Catheter change: I Prepped and Draped the patient in a sterile fashion,  Removed the existing  18 Croatian coudé tip catheter after deflating the 10 cc filled balloon.     A new 18 Croatian coudé tip catheter was inserted  In a sterile fashion without any complication. Urine was drained and the balloon was inflated with a 10cc syringe and the position was checked for security.    Assessment/Plan:     BPH  With Urinary Retention: Patient tolerated his Malhotra cath change today with minimal discomfort. Discussed increasing PO fluid intake, Malhotra cath site care.     Malhotra Up in ONE Month for Cath Change.    Patient is agreeable with plan of care.    Patient reports that he is not currently experiencing any symptoms of urinary incontinence.    Patient's Body mass index is 38.11 kg/m². BMI is above normal parameters. Recommendations include: educational material, exercise counseling and nutrition counseling.    Smoking Cessation Counseling:  Current every day smoker. less than 3 minutes spent counseling. Will try to cut down.  I advised patient to quit tobacco use and offered support.  I provided patient with tobacco cessation educational material printed in the patient's After Visit Summary.     Counseling was given to patient for the following topics diagnostic results including: BPH with urine Retention, risk factor reductions including: Smoking Cessation and impressions as follows: .Continue Flomax, Finasteride, Monthly Malhotra Cath Change.. The interim medical history and current results were reviewed.  A treatment plan with follow-up was made for Urinary retention due to benign prostatic hyperplasia [N40.1, R33.8]           This document has been electronically signed by Griselda Cheng-Akwa, APRN November 28, 2020 02:09 EST

## 2020-11-23 ENCOUNTER — OFFICE VISIT (OUTPATIENT)
Dept: CARDIOLOGY | Facility: CLINIC | Age: 75
End: 2020-11-23

## 2020-11-23 VITALS
SYSTOLIC BLOOD PRESSURE: 111 MMHG | WEIGHT: 272 LBS | HEART RATE: 96 BPM | OXYGEN SATURATION: 96 % | DIASTOLIC BLOOD PRESSURE: 76 MMHG | BODY MASS INDEX: 38.94 KG/M2 | HEIGHT: 70 IN

## 2020-11-23 DIAGNOSIS — I10 ESSENTIAL HYPERTENSION: ICD-10-CM

## 2020-11-23 DIAGNOSIS — I42.8 NONISCHEMIC CARDIOMYOPATHY (HCC): Primary | ICD-10-CM

## 2020-11-23 DIAGNOSIS — I73.9 PAD (PERIPHERAL ARTERY DISEASE) (HCC): ICD-10-CM

## 2020-11-23 PROCEDURE — 99213 OFFICE O/P EST LOW 20 MIN: CPT | Performed by: INTERNAL MEDICINE

## 2020-11-24 PROBLEM — I73.9 PAD (PERIPHERAL ARTERY DISEASE): Status: ACTIVE | Noted: 2020-11-24

## 2020-11-28 NOTE — PATIENT INSTRUCTIONS
Steps to Quit Smoking  Smoking tobacco is the leading cause of preventable death. It can affect almost every organ in the body. Smoking puts you and people around you at risk for many serious, long-lasting (chronic) diseases. Quitting smoking can be hard, but it is one of the best things that you can do for your health. It is never too late to quit.  How do I get ready to quit?  When you decide to quit smoking, make a plan to help you succeed. Before you quit:  Pick a date to quit. Set a date within the next 2 weeks to give you time to prepare.  Write down the reasons why you are quitting. Keep this list in places where you will see it often.  Tell your family, friends, and co-workers that you are quitting. Their support is important.  Talk with your doctor about the choices that may help you quit.  Find out if your health insurance will pay for these treatments.  Know the people, places, things, and activities that make you want to smoke (triggers). Avoid them.  What first steps can I take to quit smoking?  Throw away all cigarettes at home, at work, and in your car.  Throw away the things that you use when you smoke, such as ashtrays and lighters.  Clean your car. Make sure to empty the ashtray.  Clean your home, including curtains and carpets.  What can I do to help me quit smoking?  Talk with your doctor about taking medicines and seeing a counselor at the same time. You are more likely to succeed when you do both.  If you are pregnant or breastfeeding, talk with your doctor about counseling or other ways to quit smoking. Do not take medicine to help you quit smoking unless your doctor tells you to do so.  To quit smoking:  Quit right away  Quit smoking totally, instead of slowly cutting back on how much you smoke over a period of time.  Go to counseling. You are more likely to quit if you go to counseling sessions regularly.  Take medicine  You may take medicines to help you quit. Some medicines need a  prescription, and some you can buy over-the-counter. Some medicines may contain a drug called nicotine to replace the nicotine in cigarettes. Medicines may:  Help you to stop having the desire to smoke (cravings).  Help to stop the problems that come when you stop smoking (withdrawal symptoms).  Your doctor may ask you to use:  Nicotine patches, gum, or lozenges.  Nicotine inhalers or sprays.  Non-nicotine medicine that is taken by mouth.  Find resources  Find resources and other ways to help you quit smoking and remain smoke-free after you quit. These resources are most helpful when you use them often. They include:  Online chats with a counselor.  Phone quitlines.  Printed self-help materials.  Support groups or group counseling.  Text messaging programs.  Mobile phone apps. Use apps on your mobile phone or tablet that can help you stick to your quit plan. There are many free apps for mobile phones and tablets as well as websites. Examples include Quit Guide from the CDC and smokefree.gov    What things can I do to make it easier to quit?    Talk to your family and friends. Ask them to support and encourage you.  Call a phone quitline (1-110-QUITNOW), reach out to support groups, or work with a counselor.  Ask people who smoke to not smoke around you.  Avoid places that make you want to smoke, such as:  Bars.  Parties.  Smoke-break areas at work.  Spend time with people who do not smoke.  Lower the stress in your life. Stress can make you want to smoke. Try these things to help your stress:  Getting regular exercise.  Doing deep-breathing exercises.  Doing yoga.  Meditating.  Doing a body scan. To do this, close your eyes, focus on one area of your body at a time from head to toe. Notice which parts of your body are tense. Try to relax the muscles in those areas.  How will I feel when I quit smoking?  Day 1 to 3 weeks  Within the first 24 hours, you may start to have some problems that come from quitting tobacco.  These problems are very bad 2-3 days after you quit, but they do not often last for more than 2-3 weeks. You may get these symptoms:  Mood swings.  Feeling restless, nervous, angry, or annoyed.  Trouble concentrating.  Dizziness.  Strong desire for high-sugar foods and nicotine.  Weight gain.  Trouble pooping (constipation).  Feeling like you may vomit (nausea).  Coughing or a sore throat.  Changes in how the medicines that you take for other issues work in your body.  Depression.  Trouble sleeping (insomnia).  Week 3 and afterward  After the first 2-3 weeks of quitting, you may start to notice more positive results, such as:  Better sense of smell and taste.  Less coughing and sore throat.  Slower heart rate.  Lower blood pressure.  Clearer skin.  Better breathing.  Fewer sick days.  Quitting smoking can be hard. Do not give up if you fail the first time. Some people need to try a few times before they succeed. Do your best to stick to your quit plan, and talk with your doctor if you have any questions or concerns.  Summary  Smoking tobacco is the leading cause of preventable death. Quitting smoking can be hard, but it is one of the best things that you can do for your health.  When you decide to quit smoking, make a plan to help you succeed.  Quit smoking right away, not slowly over a period of time.  When you start quitting, seek help from your doctor, family, or friends.  This information is not intended to replace advice given to you by your health care provider. Make sure you discuss any questions you have with your health care provider.  Document Revised: 09/11/2020 Document Reviewed: 03/07/2020  Elsevier Patient Education © 2020 [employer]  Acute Urinary Retention, Male    Acute urinary retention means that you cannot pee (urinate) at all, or that you pee too little and your bladder is not emptied completely. If it is not treated, it can lead to kidney damage or other serious problems.  Follow these  instructions at home:  · Take over-the-counter and prescription medicines only as told by your doctor. Ask your doctor what medicines you should stay away from. Do not take any medicine unless your doctor says it is okay to do so.  · If you were sent home with a tube that drains the bladder (catheter), take care of it as told by your doctor.  · Drink enough fluid to keep your pee clear or pale yellow.  · If you were given an antibiotic, take it as told by your doctor. Do not stop taking the antibiotic even if you start to feel better.  · Do not use any products that contain nicotine or tobacco, such as cigarettes and e-cigarettes. If you need help quitting, ask your doctor.  · Watch for changes in your symptoms. Tell your doctor about them.  · If told, track changes in your blood pressure at home. Tell your doctor about them.  · Keep all follow-up visits as told by your doctor. This is important.  Contact a doctor if:  · You have spasms or you leak pee when you have spasms.  Get help right away if:  · You have chills or a fever.  · You have a tube that drains the bladder and:  ? The tube stops draining pee.  ? The tube falls out.  · You have blood in your pee.  Summary  · Acute urinary retention means that you have problems peeing. It may mean that you cannot pee at all, or that you pee too little.  · If this condition is not treated, it can lead to kidney damage or other serious problems.  · If you were sent home with a tube that drains the bladder, take care of it as told by your doctor.  · Monitor any changes in your symptoms. Tell your doctor about any changes.  This information is not intended to replace advice given to you by your health care provider. Make sure you discuss any questions you have with your health care provider.  Document Revised: 03/05/2020 Document Reviewed: 01/19/2018  Elsevier Patient Education © 2020 [employer]  Benign Prostatic Hyperplasia    Benign prostatic hyperplasia (BPH) is an  enlarged prostate gland that is caused by the normal aging process and not by cancer. The prostate is a walnut-sized gland that is involved in the production of semen. It is located in front of the rectum and below the bladder. The bladder stores urine and the urethra is the tube that carries the urine out of the body. The prostate may get bigger as a man gets older.  An enlarged prostate can press on the urethra. This can make it harder to pass urine. The build-up of urine in the bladder can cause infection. Back pressure and infection may progress to bladder damage and kidney (renal) failure.  What are the causes?  This condition is part of a normal aging process. However, not all men develop problems from this condition. If the prostate enlarges away from the urethra, urine flow will not be blocked. If it enlarges toward the urethra and compresses it, there will be problems passing urine.  What increases the risk?  This condition is more likely to develop in men over the age of 50 years.  What are the signs or symptoms?  Symptoms of this condition include:  · Getting up often during the night to urinate.  · Needing to urinate frequently during the day.  · Difficulty starting urine flow.  · Decrease in size and strength of your urine stream.  · Leaking (dribbling) after urinating.  · Inability to pass urine. This needs immediate treatment.  · Inability to completely empty your bladder.  · Pain when you pass urine. This is more common if there is also an infection.  · Urinary tract infection (UTI).  How is this diagnosed?  This condition is diagnosed based on your medical history, a physical exam, and your symptoms. Tests will also be done, such as:  · A post-void bladder scan. This measures any amount of urine that may remain in your bladder after you finish urinating.  · A digital rectal exam. In a rectal exam, your health care provider checks your prostate by putting a lubricated, gloved finger into your rectum  to feel the back of your prostate gland. This exam detects the size of your gland and any abnormal lumps or growths.  · An exam of your urine (urinalysis).  · A prostate specific antigen (PSA) screening. This is a blood test used to screen for prostate cancer.  · An ultrasound. This test uses sound waves to electronically produce a picture of your prostate gland.  Your health care provider may refer you to a specialist in kidney and prostate diseases (urologist).  How is this treated?  Once symptoms begin, your health care provider will monitor your condition (active surveillance or watchful waiting). Treatment for this condition will depend on the severity of your condition. Treatment may include:  · Observation and yearly exams. This may be the only treatment needed if your condition and symptoms are mild.  · Medicines to relieve your symptoms, including:  ? Medicines to shrink the prostate.  ? Medicines to relax the muscle of the prostate.  · Surgery in severe cases. Surgery may include:  ? Prostatectomy. In this procedure, the prostate tissue is removed completely through an open incision or with a laparoscope or robotics.  ? Transurethral resection of the prostate (TURP). In this procedure, a tool is inserted through the opening at the tip of the penis (urethra). It is used to cut away tissue of the inner core of the prostate. The pieces are removed through the same opening of the penis. This removes the blockage.  ? Transurethral incision (TUIP). In this procedure, small cuts are made in the prostate. This lessens the prostate's pressure on the urethra.  ? Transurethral microwave thermotherapy (TUMT). This procedure uses microwaves to create heat. The heat destroys and removes a small amount of prostate tissue.  ? Transurethral needle ablation (TUNA). This procedure uses radio frequencies to destroy and remove a small amount of prostate tissue.  ? Interstitial laser coagulation (ILC). This procedure uses a  laser to destroy and remove a small amount of prostate tissue.  ? Transurethral electrovaporization (TUVP). This procedure uses electrodes to destroy and remove a small amount of prostate tissue.  ? Prostatic urethral lift. This procedure inserts an implant to push the lobes of the prostate away from the urethra.  Follow these instructions at home:  · Take over-the-counter and prescription medicines only as told by your health care provider.  · Monitor your symptoms for any changes. Contact your health care provider with any changes.  · Avoid drinking large amounts of liquid before going to bed or out in public.  · Avoid or reduce how much caffeine or alcohol you drink.  · Give yourself time when you urinate.  · Keep all follow-up visits as told by your health care provider. This is important.  Contact a health care provider if:  · You have unexplained back pain.  · Your symptoms do not get better with treatment.  · You develop side effects from the medicine you are taking.  · Your urine becomes very dark or has a bad smell.  · Your lower abdomen becomes distended and you have trouble passing your urine.  Get help right away if:  · You have a fever or chills.  · You suddenly cannot urinate.  · You feel lightheaded, or very dizzy, or you faint.  · There are large amounts of blood or clots in the urine.  · Your urinary problems become hard to manage.  · You develop moderate to severe low back or flank pain. The flank is the side of your body between the ribs and the hip.  These symptoms may represent a serious problem that is an emergency. Do not wait to see if the symptoms will go away. Get medical help right away. Call your local emergency services (911 in the [place] Do not drive yourself to the hospital.  Summary  · Benign prostatic hyperplasia (BPH) is an enlarged prostate that is caused by the normal aging process and not by cancer.  · An enlarged prostate can press on the urethra. This can make it hard to pass  urine.  · This condition is part of a normal aging process and is more likely to develop in men over the age of 50 years.  · Get help right away if you suddenly cannot urinate.  This information is not intended to replace advice given to you by your health care provider. Make sure you discuss any questions you have with your health care provider.  Document Revised: 11/12/2019 Document Reviewed: 01/22/2018  Elsevier Patient Education © 2020 [employer]

## 2020-12-09 ENCOUNTER — TELEPHONE (OUTPATIENT)
Dept: CARDIOLOGY | Facility: CLINIC | Age: 75
End: 2020-12-09

## 2020-12-09 NOTE — TELEPHONE ENCOUNTER
Unable to leave a message  Need to let patient know that I need 2021 social security income statement so I can submit the application for the entrosto PAP application for 2021

## 2020-12-10 ENCOUNTER — TELEPHONE (OUTPATIENT)
Dept: CARDIOLOGY | Facility: CLINIC | Age: 75
End: 2020-12-10

## 2020-12-15 ENCOUNTER — OFFICE VISIT (OUTPATIENT)
Dept: UROLOGY | Facility: CLINIC | Age: 75
End: 2020-12-15

## 2020-12-15 VITALS — BODY MASS INDEX: 38.94 KG/M2 | WEIGHT: 272 LBS | HEIGHT: 70 IN | TEMPERATURE: 97.9 F

## 2020-12-15 DIAGNOSIS — Z97.8 CHRONIC INDWELLING FOLEY CATHETER: ICD-10-CM

## 2020-12-15 DIAGNOSIS — R33.8 BENIGN PROSTATIC HYPERPLASIA WITH URINARY RETENTION: Primary | ICD-10-CM

## 2020-12-15 DIAGNOSIS — N40.1 BENIGN PROSTATIC HYPERPLASIA WITH URINARY RETENTION: Primary | ICD-10-CM

## 2020-12-15 PROCEDURE — 99213 OFFICE O/P EST LOW 20 MIN: CPT | Performed by: NURSE PRACTITIONER

## 2020-12-15 PROCEDURE — 51702 INSERT TEMP BLADDER CATH: CPT | Performed by: NURSE PRACTITIONER

## 2020-12-15 NOTE — PROGRESS NOTES
Chief Complaint:          Chief Complaint   Patient presents with   • Urinary Retention     Monthly cath change       HPI:   75 y.o. male.returns for Cath Change today. He reports decreased urine output and feeling lots of pressure and discomfort.      Past Medical History:        Past Medical History:   Diagnosis Date   • Atrial fibrillation (CMS/HCC)    • Cardiomyopathy (CMS/HCC)    • CHF (congestive heart failure) (CMS/HCC)    • COPD (chronic obstructive pulmonary disease) (CMS/[facility])    • Hypertension          Current Meds:     Current Outpatient Medications   Medication Sig Dispense Refill   • metoprolol succinate XL (TOPROL-XL) 25 MG 24 hr tablet Take 1 tablet by mouth Daily. 30 tablet 3   • rivaroxaban (Xarelto) 20 MG tablet Take 1 tablet by mouth Daily With Dinner. 14 tablet 0   • sacubitril-valsartan (Entresto) 24-26 MG tablet Take 1 tablet by mouth 2 (Two) Times a Day. 180 tablet 3   • tamsulosin (FLOMAX) 0.4 MG capsule 24 hr capsule Take 1 capsule by mouth Daily. 30 capsule 3     No current facility-administered medications for this visit.         Allergies:      No Known Allergies     Past Surgical History:     History reviewed. No pertinent surgical history.      Social History:     Social History     Socioeconomic History   • Marital status:      Spouse name: Not on file   • Number of children: Not on file   • Years of education: Not on file   • Highest education level: Not on file   Tobacco Use   • Smoking status: Current Every Day Smoker     Packs/day: 0.25     Years: 59.00     Pack years: 14.75     Types: Cigarettes   • Smokeless tobacco: Never Used   Substance and Sexual Activity   • Alcohol use: No   • Drug use: No   • Sexual activity: Defer       Family History:     Family History   Problem Relation Age of Onset   • No Known Problems Mother    • Heart disease Father    • No Known Problems Sister    • No Known Problems Brother    • No Known Problems Son    • No Known Problems Daughter    • No  Known Problems Maternal Grandmother    • No Known Problems Maternal Grandfather    • No Known Problems Paternal Grandmother    • No Known Problems Paternal Grandfather    • No Known Problems Cousin    • Rheum arthritis Neg Hx    • Osteoarthritis Neg Hx    • Asthma Neg Hx    • Diabetes Neg Hx    • Heart failure Neg Hx    • Hyperlipidemia Neg Hx    • Hypertension Neg Hx    • Migraines Neg Hx    • Rashes / Skin problems Neg Hx    • Seizures Neg Hx    • Stroke Neg Hx    • Thyroid disease Neg Hx        Review of Systems:     Review of Systems   Constitutional: Positive for fatigue. Negative for activity change, chills and fever.   HENT: Negative for congestion, sinus pressure and sinus pain.    Eyes: Negative for discharge and itching.   Respiratory: Negative for apnea, cough, chest tightness and shortness of breath.    Cardiovascular: Negative for chest pain and leg swelling.   Gastrointestinal: Negative for abdominal distention, abdominal pain, nausea and vomiting.   Endocrine: Negative for cold intolerance and heat intolerance.   Genitourinary: Positive for decreased urine volume, difficulty urinating, dysuria and flank pain. Negative for frequency, hematuria and urgency.   Musculoskeletal: Positive for back pain.   Skin: Negative for color change.   Neurological: Negative for dizziness and headaches.   Psychiatric/Behavioral: Negative for behavioral problems and confusion. The patient is nervous/anxious.    All other systems reviewed and are negative.      Physical Exam:     Physical Exam  Constitutional:       General: He is not in acute distress.     Appearance: He is well-developed. He is ill-appearing.   HENT:      Head: Normocephalic and atraumatic.      Right Ear: External ear normal.      Left Ear: External ear normal.   Eyes:      General:         Right eye: No discharge.         Left eye: No discharge.      Conjunctiva/sclera: Conjunctivae normal.      Pupils: Pupils are equal, round, and reactive to light.    Neck:      Musculoskeletal: Normal range of motion and neck supple.      Thyroid: No thyromegaly.      Trachea: No tracheal deviation.   Cardiovascular:      Rate and Rhythm: Normal rate and regular rhythm.      Heart sounds: No murmur. No friction rub.   Pulmonary:      Effort: Pulmonary effort is normal. No respiratory distress.      Breath sounds: Normal breath sounds. No stridor.   Abdominal:      General: Bowel sounds are normal. There is distension.      Palpations: Abdomen is soft.      Tenderness: There is abdominal tenderness. There is no guarding.   Genitourinary:     Penis: Normal and uncircumcised. No tenderness or discharge.       Scrotum/Testes: Normal.      Rectum: Normal. Guaiac result negative.   Musculoskeletal: Normal range of motion.         General: Tenderness present. No deformity.   Skin:     General: Skin is warm and dry.      Capillary Refill: Capillary refill takes less than 2 seconds.      Coloration: Skin is not pale.   Neurological:      Mental Status: He is alert and oriented to person, place, and time.      Cranial Nerves: No cranial nerve deficit.      Coordination: Coordination normal.   Psychiatric:         Behavior: Behavior normal.         Thought Content: Thought content normal.         Judgment: Judgment normal.         Procedure:     Malhotra Catheter change: I Prepped and Draped the patient in a sterile fashion,  Removed the existing  18 Syrian coudé tip catheter after deflating the 10 cc filled balloon.      A new 18 Syrian coudé tip catheter was inserted  In a sterile fashion without any complication. Urine was drained and the balloon was inflated with a 10cc syringe and the position was checked for security.    Assessment/Plan:     BPH WITH URINARY RETENTION: Patient tolerated his Malhotra cath change today with minimal discomfort. Discussed increasing PO fluid intake, Malhotra cath site care.      Malhotra Up in ONE Month for Cath Change.     Patient is agreeable with plan of  care.     Patient reports that he is not currently experiencing any symptoms of urinary incontinence.     Patient's Body mass index is 38.11 kg/m². BMI is above normal parameters. Recommendations include: educational material, exercise counseling and nutrition counseling.     Smoking Cessation Counseling:  Current every day smoker. less than 3 minutes spent counseling. Will try to cut down.  I advised patient to quit tobacco use and offered support.  I provided patient with tobacco cessation educational material printed in the patient's After Visit Summary.      Counseling was given to patient for the following topics diagnostic results including: BPH with urine Retention, risk factor reductions including: Smoking Cessation and impressions as follows: .Continue Flomax, Finasteride, Monthly Malhotra Cath Change. The interim medical history and current results were reviewed.  A treatment plan with follow-up was made for Urinary retention due to benign prostatic hyperplasia [N40.1, R33.8]     Patient reports that he is not currently experiencing any symptoms of urinary incontinence.    Patient's Body mass index is 39.03 kg/m². BMI is above normal parameters. Recommendations include: educational material, exercise counseling and nutrition counseling.                  This document has been electronically signed by Griselda [email or web address], APRN December 15, 2020 11:45 EST

## 2020-12-29 ENCOUNTER — TELEPHONE (OUTPATIENT)
Dept: CARDIOLOGY | Facility: CLINIC | Age: 75
End: 2020-12-29

## 2020-12-29 NOTE — TELEPHONE ENCOUNTER
Called patient and was not able to leave a message. Wanted to advise patient that he was approved for the entresto till 12/31/2021.  He should be getting something in the mail from [employer] advising him of the approval.

## 2021-01-04 ENCOUNTER — OFFICE VISIT (OUTPATIENT)
Dept: CARDIOLOGY | Facility: CLINIC | Age: 76
End: 2021-01-04

## 2021-01-04 VITALS
HEIGHT: 70 IN | RESPIRATION RATE: 16 BRPM | HEART RATE: 56 BPM | WEIGHT: 275.2 LBS | TEMPERATURE: 97.1 F | BODY MASS INDEX: 39.4 KG/M2 | DIASTOLIC BLOOD PRESSURE: 77 MMHG | SYSTOLIC BLOOD PRESSURE: 127 MMHG

## 2021-01-04 DIAGNOSIS — I42.8 NONISCHEMIC CARDIOMYOPATHY (HCC): Primary | ICD-10-CM

## 2021-01-04 DIAGNOSIS — I10 ESSENTIAL HYPERTENSION: ICD-10-CM

## 2021-01-04 DIAGNOSIS — I73.9 PAD (PERIPHERAL ARTERY DISEASE) (HCC): ICD-10-CM

## 2021-01-04 DIAGNOSIS — I50.22 CHRONIC SYSTOLIC CONGESTIVE HEART FAILURE (HCC): ICD-10-CM

## 2021-01-04 PROCEDURE — 99214 OFFICE O/P EST MOD 30 MIN: CPT | Performed by: NURSE PRACTITIONER

## 2021-01-04 RX ORDER — CARVEDILOL 3.12 MG/1
3.12 TABLET ORAL 2 TIMES DAILY
Qty: 60 TABLET | Refills: 2 | Status: SHIPPED | OUTPATIENT
Start: 2021-01-04 | End: 2021-01-18

## 2021-01-04 NOTE — PROGRESS NOTES
Buster Redd MD  Larry Kehr  1945 01/04/2021    Patient Active Problem List   Diagnosis   • Chronic atrial fibrillation   • Hypertension- controlled.    • Tobacco use, states that he has cut back to 3-4 cigarettes a day.   • Morbid obesity (CMS/HCC)   • Nonischemic cardiomyopathy , appears to be compensated.   • Chronic systolic heart failure (CMS/HCC)   • Obstructive uropathy   • Chronic obstructive lung disease (CMS/HCC)   • Atrial fibrillation (CMS/HCC)   • Hypertensive disorder   • Benign prostatic hyperplasia with urinary retention   • PAD (peripheral artery disease) (CMS/[facility])       Dear Buster Redd MD:    Subjective     Chief Complaint   Patient presents with   • Congestive Heart Failure     6 week follow   • Shortness of Breath     routine activity   • Med Management     presented           History of Present Illness:    Larry Kehr is a 75 y.o. male with a history of chronic atrial fibrillation, history of cardiomyopathy with most recent EF 36 to 40% noted on echocardiogram in June 2019, and acute kidney injury in June 2020 secondary to obstructive uropathy with most recent creatinine within normal limits.  He is anticoagulated with Xarelto and denies any bleeding issues.  Previously, he was on metoprolol and spironolactone for his cardiomyopathy.  However, he reports his PCP discontinued these medications due to hypotension.  His blood pressure recently has been much better with readings in the 120s over 70s.  He denies any shortness of breath, chest pains, orthopnea, or PND.  He denies any leg edema or weight gain.  Previously, he was having claudication symptoms in his lower extremities and was evaluated with an arterial Doppler which revealed monophasic waveform patterns in the left popliteal and posterior tibial artery as well as the right posterior tibial artery.  He was evaluated by Dr. Almonte and no further evaluation was recommended.          No Known Allergies:      Current Outpatient  "Medications:   •  rivaroxaban (Xarelto) 20 MG tablet, Take 1 tablet by mouth Daily With Dinner., Disp: 14 tablet, Rfl: 0  •  sacubitril-valsartan (Entresto) 24-26 MG tablet, Take 1 tablet by mouth 2 (Two) Times a Day., Disp: 180 tablet, Rfl: 3  •  tamsulosin (FLOMAX) 0.4 MG capsule 24 hr capsule, Take 1 capsule by mouth Daily., Disp: 30 capsule, Rfl: 3  •  carvedilol (COREG) 3.125 MG tablet, Take 1 tablet by mouth 2 (Two) Times a Day., Disp: 60 tablet, Rfl: 2      The following portions of the patient's history were reviewed and updated as appropriate: allergies, current medications, past family history, past medical history, past social history, past surgical history and problem list.    Social History     Tobacco Use   • Smoking status: Current Every Day Smoker     Packs/day: 0.25     Years: 59.00     Pack years: 14.75     Types: Cigarettes   • Smokeless tobacco: Never Used   Substance Use Topics   • Alcohol use: No   • Drug use: No       Review of Systems   Constitution: Negative for decreased appetite and malaise/fatigue.   Cardiovascular: Negative for chest pain, dyspnea on exertion, irregular heartbeat, leg swelling, near-syncope, orthopnea, palpitations, paroxysmal nocturnal dyspnea and syncope.   Respiratory: Positive for shortness of breath. Negative for cough and wheezing.    Neurological: Negative for dizziness, light-headedness and weakness.       Objective   Vitals:    01/04/21 1433   BP: 127/77   Pulse: 56   Resp: 16   Temp: 97.1 °F (36.2 °C)   Weight: 125 kg (275 lb 3.2 oz)   Height: 177.8 cm (70\")     Body mass index is 39.49 kg/m².        Vitals signs reviewed.   Constitutional:       Appearance: Healthy appearance. Well-developed and not in distress.   HENT:      Head: Normocephalic and atraumatic.   Pulmonary:      Effort: Pulmonary effort is normal.      Breath sounds: Normal breath sounds. No wheezing. No rales.   Cardiovascular:      Normal rate. Irregularly irregular rhythm.      Murmurs: " There is no murmur.      . No S3 and S4 gallop.   Edema:     Peripheral edema absent.   Abdominal:      General: Bowel sounds are normal.      Palpations: Abdomen is soft.   Skin:     General: Skin is warm and dry.   Neurological:      Mental Status: Alert, oriented to person, place, and time and oriented to person, place and time.   Psychiatric:         Mood and Affect: Mood normal.         Behavior: Behavior normal.         Lab Results   Component Value Date     (L) 11/13/2020    K 4.4 11/13/2020     11/13/2020    CO2 25.3 11/13/2020    BUN 12 11/13/2020    CREATININE 0.80 11/13/2020    GLUCOSE 92 11/13/2020    CALCIUM 9.1 11/13/2020    AST 9 08/20/2020    ALT 8 08/20/2020    ALKPHOS 86 08/20/2020     Lab Results   Component Value Date    CKTOTAL 20 06/14/2020     Lab Results   Component Value Date    WBC 8.90 06/16/2020    HGB 10.0 (L) 06/16/2020    HGB 10.0 (L) 06/16/2020    HCT 33.4 (L) 06/16/2020    HCT 33.4 (L) 06/16/2020     06/16/2020     Lab Results   Component Value Date    INR 1.24 (H) 12/10/2016     Lab Results   Component Value Date    MG 2.3 06/14/2020     Lab Results   Component Value Date    TSH 2.250 06/14/2020    PSA 19.100 (H) 06/15/2020    TRIG 107 02/21/2020    HDL 38 (L) 02/21/2020    LDL 93 02/21/2020      Lab Results   Component Value Date    .0 (H) 01/05/2017           Procedures      Assessment/Plan    Diagnosis Plan   1. Nonischemic cardiomyopathy , appears to be compensated.  carvedilol (COREG) 3.125 MG tablet    Adult Transthoracic Echo Limited W/ Cont if Necessary Per Protocol   2. Essential hypertension  carvedilol (COREG) 3.125 MG tablet   3. PAD (peripheral artery disease) (CMS/HCC)     4. Chronic systolic congestive heart failure (CMS/HCC)  Adult Transthoracic Echo Limited W/ Cont if Necessary Per Protocol                Recommendations:    1. Will start carvedilol 3.125 mg BID and monitor BP and heart rate.    2. I have ordered a limited echocardiogram to  evaluate LV function and tailor further therapy accordingly.    3. Continue Entresto and Xarelto.    4. Follow up in 4 weeks or sooner if needed.      Return in about 4 weeks (around 2/1/2021) for Recheck.    As always, I appreciate very much the opportunity to participate in the cardiovascular care of your patients.      With Best Regards,    Mimi Castro, APRN

## 2021-01-06 ENCOUNTER — OFFICE VISIT (OUTPATIENT)
Dept: UROLOGY | Facility: CLINIC | Age: 76
End: 2021-01-06

## 2021-01-06 VITALS — WEIGHT: 275.57 LBS | HEIGHT: 70 IN | TEMPERATURE: 97.4 F | BODY MASS INDEX: 39.45 KG/M2

## 2021-01-06 DIAGNOSIS — N40.1 URINARY RETENTION DUE TO BENIGN PROSTATIC HYPERPLASIA: Primary | ICD-10-CM

## 2021-01-06 DIAGNOSIS — Z46.6 ENCOUNTER FOR FOLEY CATHETER REPLACEMENT: ICD-10-CM

## 2021-01-06 DIAGNOSIS — R33.8 URINARY RETENTION DUE TO BENIGN PROSTATIC HYPERPLASIA: Primary | ICD-10-CM

## 2021-01-06 PROCEDURE — 51702 INSERT TEMP BLADDER CATH: CPT | Performed by: NURSE PRACTITIONER

## 2021-01-06 PROCEDURE — 99213 OFFICE O/P EST LOW 20 MIN: CPT | Performed by: NURSE PRACTITIONER

## 2021-01-06 NOTE — PROGRESS NOTES
Chief Complaint:          Chief Complaint   Patient presents with   • Neurogenic bladder     CATH CHANGE # 20 COUDE TIPPED       HPI:   75 y.o. male.    Patient returns for Malhotra cath change today earlier than scheduled appointment date.  He reports feelings of low abdominal pain, and be building pressure in his lower abdomen.  He believes his catheter is clogged since  IT stopped draining this morning.      Past Medical History:        Past Medical History:   Diagnosis Date   • Atrial fibrillation (CMS/HCC)    • Cardiomyopathy (CMS/HCC)    • CHF (congestive heart failure) (CMS/HCC)    • COPD (chronic obstructive pulmonary disease) (CMS/HCC)    • Hypertension      The following portions of the patient's history were reviewed and updated as appropriate: allergies, current medications, past family history, past medical history, past social history, past surgical history and problem list.    Current Meds:     Current Outpatient Medications   Medication Sig Dispense Refill   • carvedilol (COREG) 3.125 MG tablet Take 1 tablet by mouth 2 (Two) Times a Day. 60 tablet 2   • rivaroxaban (Xarelto) 20 MG tablet Take 1 tablet by mouth Daily With Dinner. 14 tablet 0   • sacubitril-valsartan (Entresto) 24-26 MG tablet Take 1 tablet by mouth 2 (Two) Times a Day. 180 tablet 3   • tamsulosin (FLOMAX) 0.4 MG capsule 24 hr capsule Take 1 capsule by mouth Daily. 30 capsule 3     No current facility-administered medications for this visit.         Allergies:      No Known Allergies     Past Surgical History:     History reviewed. No pertinent surgical history.      Social History:     Social History     Socioeconomic History   • Marital status:      Spouse name: Not on file   • Number of children: Not on file   • Years of education: Not on file   • Highest education level: Not on file   Tobacco Use   • Smoking status: Current Every Day Smoker     Packs/day: 0.25     Years: 59.00     Pack years: 14.75     Types: Cigarettes   •  Smokeless tobacco: Never Used   Substance and Sexual Activity   • Alcohol use: No   • Drug use: No   • Sexual activity: Defer       Family History:     Family History   Problem Relation Age of Onset   • No Known Problems Mother    • Heart disease Father    • No Known Problems Sister    • No Known Problems Brother    • No Known Problems Son    • No Known Problems Daughter    • No Known Problems Maternal Grandmother    • No Known Problems Maternal Grandfather    • No Known Problems Paternal Grandmother    • No Known Problems Paternal Grandfather    • No Known Problems Cousin    • Rheum arthritis Neg Hx    • Osteoarthritis Neg Hx    • Asthma Neg Hx    • Diabetes Neg Hx    • Heart failure Neg Hx    • Hyperlipidemia Neg Hx    • Hypertension Neg Hx    • Migraines Neg Hx    • Rashes / Skin problems Neg Hx    • Seizures Neg Hx    • Stroke Neg Hx    • Thyroid disease Neg Hx        Review of Systems:    Review of Systems   Constitutional: Positive for activity change, fatigue and unexpected weight gain. Negative for appetite change, chills and fever.   HENT: Negative for congestion and sinus pressure.    Eyes: Negative for blurred vision and double vision.   Respiratory: Negative for shortness of breath and wheezing.    Gastrointestinal: Positive for abdominal distention and abdominal pain. Negative for constipation, diarrhea, nausea and vomiting.   Genitourinary: Positive for difficulty urinating and flank pain. Negative for discharge, dysuria, frequency, genital sores, hematuria, penile pain, penile swelling, scrotal swelling, testicular pain, urgency and urinary incontinence.   Musculoskeletal: Positive for gait problem and myalgias. Negative for back pain.   Neurological: Negative for dizziness, weakness and confusion.   Psychiatric/Behavioral: Positive for stress. Negative for agitation, behavioral problems and decreased concentration.            Physical Exam:     Physical Exam  Constitutional:       General: He is in  acute distress.      Appearance: He is well-developed. He is obese. He is ill-appearing.   HENT:      Head: Normocephalic and atraumatic.      Right Ear: External ear normal.      Left Ear: External ear normal.   Eyes:      General:         Right eye: No discharge.         Left eye: No discharge.      Conjunctiva/sclera: Conjunctivae normal.      Pupils: Pupils are equal, round, and reactive to light.   Neck:      Musculoskeletal: Normal range of motion and neck supple.      Thyroid: No thyromegaly.      Trachea: No tracheal deviation.   Cardiovascular:      Rate and Rhythm: Normal rate and regular rhythm.      Heart sounds: No murmur. No friction rub.   Pulmonary:      Effort: Pulmonary effort is normal. No respiratory distress.      Breath sounds: Normal breath sounds. No stridor.   Abdominal:      General: Bowel sounds are normal. There is distension.      Palpations: Abdomen is soft.      Tenderness: There is abdominal tenderness. There is guarding.   Genitourinary:     Penis: Normal and uncircumcised. No tenderness or discharge.       Scrotum/Testes: Normal.      Rectum: Normal. Guaiac result negative.   Musculoskeletal: Normal range of motion.         General: Tenderness present. No deformity.      Right lower leg: Edema present.      Left lower leg: Edema present.   Skin:     General: Skin is warm and dry.      Capillary Refill: Capillary refill takes less than 2 seconds.      Coloration: Skin is not pale.   Neurological:      Mental Status: He is alert and oriented to person, place, and time.      Cranial Nerves: No cranial nerve deficit.      Coordination: Coordination normal.   Psychiatric:         Behavior: Behavior normal.         Thought Content: Thought content normal.         Judgment: Judgment normal.         Procedure:   Malhotra Catheter change: I Prepped and Draped the patient in a sterile fashion,  Removed the existing  18 English coudé tip catheter after deflating the 10 cc filled balloon.      A  new 18 Guamanian coudé tip catheter was inserted  In a sterile fashion without any complication.750CC  Urine was drained and the balloon was inflated with a 10cc syringe and the position was checked for security    Assessment:     Encounter Diagnoses   Name Primary?   • Urinary retention due to benign prostatic hyperplasia Yes   • Encounter for Malhotra catheter replacement      No orders of the defined types were placed in this encounter.      Plan:        ASSESSMENT  BPH WITH URINARY RETENTION: Patient tolerated his Malhotra cath change today with moderate discomfort.  We irrigated his replaced catheter with absolutely no resistance.  No sediments were noted in bag.         PLAN   Discussed increasing PO fluid intake, Malhotra cath site care.      We will follow-up in ONE Month for Cath Change.     Patient is agreeable with plan of care.     Patient reports that he is not currently experiencing any symptoms of urinary incontinence.     Patient's Body mass index is 39.54 kg/m². BMI is above normal parameters. Recommendations include: educational material, exercise counseling and nutrition counseling.    Smoking Cessation Counseling:  Current every day smoker. less than 3 minutes spent counseling. Will try to cut down.  I advised patient to quit tobacco use and offered support.  I provided patient with tobacco cessation educational material printed in the patient's After Visit Summary.     Counseling was given to patient for the following topics diagnostic results including: BPH with urine Retention, risk factor reductions including: Smoking Cessation and impressions as follows: .Continue Flomax, Finasteride, Monthly Malhotra Cath Change. The interim medical history and current results were reviewed.  A treatment plan with follow-up was made for  Urinary retention due to benign prostatic hyperplasia [N40.1, R33.8].          This document has been electronically signed by Griselda Cheng-Akwa, APRN January 7, 2021 16:50 EST

## 2021-01-07 RX ORDER — SACUBITRIL AND VALSARTAN 24; 26 MG/1; MG/1
1 TABLET, FILM COATED ORAL 2 TIMES DAILY
Qty: 180 TABLET | Refills: 3 | Status: SHIPPED | OUTPATIENT
Start: 2021-01-07 | End: 2021-11-12 | Stop reason: SDUPTHER

## 2021-01-07 NOTE — TELEPHONE ENCOUNTER
Paperwork filled out and script needs to be printed so we can fax paperwork for the entresto for the PAP 2021

## 2021-01-13 ENCOUNTER — HOSPITAL ENCOUNTER (OUTPATIENT)
Dept: CARDIOLOGY | Facility: HOSPITAL | Age: 76
Discharge: HOME | End: 2021-01-13
Admitting: NURSE PRACTITIONER

## 2021-01-13 DIAGNOSIS — I42.8 NONISCHEMIC CARDIOMYOPATHY (HCC): ICD-10-CM

## 2021-01-13 DIAGNOSIS — I50.22 CHRONIC SYSTOLIC CONGESTIVE HEART FAILURE (HCC): ICD-10-CM

## 2021-01-13 PROCEDURE — 93306 TTE W/DOPPLER COMPLETE: CPT | Performed by: INTERNAL MEDICINE

## 2021-01-13 PROCEDURE — 93306 TTE W/DOPPLER COMPLETE: CPT

## 2021-01-14 LAB
BH CV ECHO MEAS - % IVS THICK: 7.4 %
BH CV ECHO MEAS - % LVPW THICK: 23.2 %
BH CV ECHO MEAS - ACS: 1.9 CM
BH CV ECHO MEAS - AO MAX PG: 14 MMHG
BH CV ECHO MEAS - AO MEAN PG: 7 MMHG
BH CV ECHO MEAS - AO ROOT AREA (BSA CORRECTED): 1.2
BH CV ECHO MEAS - AO ROOT AREA: 6.6 CM^2
BH CV ECHO MEAS - AO ROOT DIAM: 2.9 CM
BH CV ECHO MEAS - AO V2 MAX: 187 CM/SEC
BH CV ECHO MEAS - AO V2 MEAN: 120 CM/SEC
BH CV ECHO MEAS - AO V2 VTI: 32.3 CM
BH CV ECHO MEAS - BSA(HAYCOCK): 2.5 M^2
BH CV ECHO MEAS - BSA: 2.4 M^2
BH CV ECHO MEAS - BZI_BMI: 39.5 KILOGRAMS/M^2
BH CV ECHO MEAS - BZI_METRIC_HEIGHT: 177.8 CM
BH CV ECHO MEAS - BZI_METRIC_WEIGHT: 124.7 KG
BH CV ECHO MEAS - EDV(CUBED): 226.4 ML
BH CV ECHO MEAS - EDV(MOD-SP4): 90.7 ML
BH CV ECHO MEAS - EDV(TEICH): 186.6 ML
BH CV ECHO MEAS - EF(CUBED): 31.8 %
BH CV ECHO MEAS - EF(MOD-SP4): 30.3 %
BH CV ECHO MEAS - EF(TEICH): 25.4 %
BH CV ECHO MEAS - ESV(CUBED): 154.4 ML
BH CV ECHO MEAS - ESV(MOD-SP4): 63.2 ML
BH CV ECHO MEAS - ESV(TEICH): 139.2 ML
BH CV ECHO MEAS - FS: 12 %
BH CV ECHO MEAS - IVS/LVPW: 0.81
BH CV ECHO MEAS - IVSD: 1.2 CM
BH CV ECHO MEAS - IVSS: 1.2 CM
BH CV ECHO MEAS - LA DIMENSION: 3.9 CM
BH CV ECHO MEAS - LA/AO: 1.3
BH CV ECHO MEAS - LV DIASTOLIC VOL/BSA (35-75): 38 ML/M^2
BH CV ECHO MEAS - LV MASS(C)D: 354.4 GRAMS
BH CV ECHO MEAS - LV MASS(C)DI: 148.3 GRAMS/M^2
BH CV ECHO MEAS - LV MASS(C)S: 357.4 GRAMS
BH CV ECHO MEAS - LV MASS(C)SI: 149.5 GRAMS/M^2
BH CV ECHO MEAS - LV SYSTOLIC VOL/BSA (12-30): 26.4 ML/M^2
BH CV ECHO MEAS - LVIDD: 6.1 CM
BH CV ECHO MEAS - LVIDS: 5.4 CM
BH CV ECHO MEAS - LVLD AP4: 7.6 CM
BH CV ECHO MEAS - LVLS AP4: 7.2 CM
BH CV ECHO MEAS - LVOT AREA (M): 2.8 CM^2
BH CV ECHO MEAS - LVOT AREA: 2.8 CM^2
BH CV ECHO MEAS - LVOT DIAM: 1.9 CM
BH CV ECHO MEAS - LVPWD: 1.4 CM
BH CV ECHO MEAS - LVPWS: 1.8 CM
BH CV ECHO MEAS - MV A MAX VEL: 35.5 CM/SEC
BH CV ECHO MEAS - MV E MAX VEL: 129 CM/SEC
BH CV ECHO MEAS - MV E/A: 3.6
BH CV ECHO MEAS - PA ACC TIME: 0.08 SEC
BH CV ECHO MEAS - PA PR(ACCEL): 44.4 MMHG
BH CV ECHO MEAS - RAP SYSTOLE: 10 MMHG
BH CV ECHO MEAS - RVSP: 40.9 MMHG
BH CV ECHO MEAS - SI(AO): 89.3 ML/M^2
BH CV ECHO MEAS - SI(CUBED): 30.1 ML/M^2
BH CV ECHO MEAS - SI(MOD-SP4): 11.5 ML/M^2
BH CV ECHO MEAS - SI(TEICH): 19.8 ML/M^2
BH CV ECHO MEAS - SV(AO): 213.3 ML
BH CV ECHO MEAS - SV(CUBED): 72 ML
BH CV ECHO MEAS - SV(MOD-SP4): 27.5 ML
BH CV ECHO MEAS - SV(TEICH): 47.4 ML
BH CV ECHO MEAS - TR MAX VEL: 278 CM/SEC
MAXIMAL PREDICTED HEART RATE: 143 BPM
STRESS TARGET HR: 122 BPM

## 2021-01-18 ENCOUNTER — OFFICE VISIT (OUTPATIENT)
Dept: CARDIOLOGY | Facility: CLINIC | Age: 76
End: 2021-01-18

## 2021-01-18 VITALS
RESPIRATION RATE: 16 BRPM | WEIGHT: 274 LBS | BODY MASS INDEX: 39.22 KG/M2 | DIASTOLIC BLOOD PRESSURE: 74 MMHG | TEMPERATURE: 96.6 F | HEART RATE: 89 BPM | SYSTOLIC BLOOD PRESSURE: 109 MMHG | HEIGHT: 70 IN

## 2021-01-18 DIAGNOSIS — I34.0 NONRHEUMATIC MITRAL VALVE REGURGITATION: ICD-10-CM

## 2021-01-18 DIAGNOSIS — I50.22 CHRONIC SYSTOLIC CONGESTIVE HEART FAILURE (HCC): ICD-10-CM

## 2021-01-18 DIAGNOSIS — I10 ESSENTIAL HYPERTENSION: ICD-10-CM

## 2021-01-18 DIAGNOSIS — I42.8 NONISCHEMIC CARDIOMYOPATHY (HCC): Primary | ICD-10-CM

## 2021-01-18 DIAGNOSIS — I42.8 NONISCHEMIC CARDIOMYOPATHY (HCC): ICD-10-CM

## 2021-01-18 DIAGNOSIS — I48.20 CHRONIC ATRIAL FIBRILLATION (HCC): ICD-10-CM

## 2021-01-18 PROCEDURE — 99214 OFFICE O/P EST MOD 30 MIN: CPT | Performed by: INTERNAL MEDICINE

## 2021-01-18 RX ORDER — CARVEDILOL 6.25 MG/1
6.25 TABLET ORAL 2 TIMES DAILY WITH MEALS
Qty: 60 TABLET | Refills: 3 | Status: SHIPPED | OUTPATIENT
Start: 2021-01-18 | End: 2021-02-09

## 2021-01-18 RX ORDER — CARVEDILOL 6.25 MG/1
6.25 TABLET ORAL 2 TIMES DAILY WITH MEALS
Qty: 60 TABLET | Refills: 3 | Status: SHIPPED | OUTPATIENT
Start: 2021-01-18 | End: 2021-01-18 | Stop reason: SDUPTHER

## 2021-01-18 NOTE — PROGRESS NOTES
Buster Redd MD  Larry Kehr  2021    Patient Active Problem List   Diagnosis   • Chronic atrial fibrillation   • Hypertension- controlled.    • Tobacco use, states that he has cut back to 3-4 cigarettes a day.   • Morbid obesity (CMS/HCC)   • Nonischemic cardiomyopathy , appears to be compensated.   • Chronic systolic heart failure (CMS/HCC)   • Obstructive uropathy   • Chronic obstructive lung disease (CMS/HCC)   • Atrial fibrillation (CMS/HCC)   • Hypertensive disorder   • Benign prostatic hyperplasia with urinary retention   • PAD (peripheral artery disease) (CMS/HCC)       Dear Buster Redd MD:    Subjective     Larry Kehr is a 75 y.o. male with the problems as listed above, presents    Chief Complaint   Patient presents with   • Results     echo findings   • Med Management     presented       History of Present Illness: Mr. Kehr is a pleasant 75-year-old  male with history of nonischemic dilated cardiomyopathy and chronic atrial fibrillation, is here to review his recent echo Doppler study results.  This revealed moderate to severely depressed LV systolic function with an estimated LV ejection fraction of about 31 to 35% with myxomatous changes of the mitral valve with moderate to severe mitral regurgitation and moderate tricuspid regurgitation.  This was a significant change from previous echo Doppler study in 2020.  On further questioning patient denies any significant dyspnea, PND.  He has some intermittent orthopnea and bilateral leg edema.  He denies any chest pains.    Larry Kehr  Echo Complete w/Doppler and Color Flow  Order# [id number]  Reading physician:   Hemal Mckinney MD Ordering physician:   Mimi Castro APRN Study date: 21   Patient Information    Patient Name   Larry Kehr MRN   [id number] Sex   Male  (Age)   1945 (75 y.o.)   Interpretation Summary    · The left ventricular cavity is moderately dilated. There is left ventricular  global hypokinesis noted.  · Left ventricular ejection fraction appears to be 31 - 35%.  · The aortic valve is not well visualized. No aortic valve regurgitation or stenosis is present. The aortic valve is grossly normal in structure.  · There are myxomatous changes of the mitral valve apparatus present. Moderate to severe mitral valve regurgitation is present. No significant mitral valve stenosis is present.  · Moderate tricuspid valve regurgitation is present. Estimated right ventricular systolic pressure from tricuspid regurgitation is mildly elevated (35-45 mmHg).  · There is no evidence of pericardial effusion. .  · Comments: LV systolic function has decreased and the degree of mitral regurgitation has increased significantly since the last study.        Larry Kehr  Regadenoson Stress Test With Myocardial Perfusion SPECT (Multi Study)  Order# [id number]  Reading physician:   Hemal Mckinney MD Ordering physician:   Hemant Law PA-C Study date: 20   Patient Information    Patient Name   Larry Kehr MRN   [id number] Sex   Male  (Age)   1945 (75 y.o.)   Interpretation Summary    · A pharmacological stress test was performed using regadenoson without low-level exercise.  · Findings consistent with an indeterminate ECG stress test.  · Myocardial perfusion imaging indicates a normal myocardial perfusion study with no evidence of ischemia.  · Normal LV cavity size. Normal LV wall motion noted.  · Left ventricular ejection fraction is normal (Calculated EF = 54%).  · Impressions are consistent with a low risk study.       No Known Allergies:      Current Outpatient Medications:   •  carvedilol (COREG) 6.25 MG tablet, Take 1 tablet by mouth 2 (Two) Times a Day With Meals., Disp: 60 tablet, Rfl: 3  •  rivaroxaban (Xarelto) 20 MG tablet, Take 1 tablet by mouth Daily With Dinner., Disp: 14 tablet, Rfl: 0  •  sacubitril-valsartan (Entresto) 24-26 MG tablet, Take 1 tablet by mouth 2 (Two) Times a Day., Disp:  "180 tablet, Rfl: 3  •  tamsulosin (FLOMAX) 0.4 MG capsule 24 hr capsule, Take 1 capsule by mouth Daily., Disp: 30 capsule, Rfl: 3      The following portions of the patient's history were reviewed and updated as appropriate: allergies, current medications, past family history, past medical history, past social history, past surgical history and problem list.    Social History     Tobacco Use   • Smoking status: Current Every Day Smoker     Packs/day: 0.25     Years: 59.00     Pack years: 14.75     Types: Cigarettes   • Smokeless tobacco: Never Used   Substance Use Topics   • Alcohol use: No   • Drug use: No     Review of Systems   Constitution: Negative for chills and fever.   HENT: Negative for nosebleeds and sore throat.    Cardiovascular: Negative for chest pain, leg swelling and palpitations.   Respiratory: Negative for cough, hemoptysis, shortness of breath and wheezing.    Gastrointestinal: Negative for abdominal pain, hematemesis, hematochezia, melena, nausea and vomiting.   Genitourinary: Positive for bladder incontinence. Negative for dysuria and hematuria.   Neurological: Negative for headaches.     Objective   Vitals:    01/18/21 1105   BP: 109/74   Pulse: 89   Resp: 16   Temp: 96.6 °F (35.9 °C)   Weight: 124 kg (274 lb)   Height: 177.8 cm (70\")     Body mass index is 39.31 kg/m².    Constitutional:       Appearance: Well-developed.   Eyes:      Pupils: Pupils are equal, round, and reactive to light.   Neck:      Musculoskeletal: Neck supple.      Thyroid: No thyromegaly.      Vascular: No JVD.      Trachea: No tracheal deviation.      Lymphadenopathy: No cervical adenopathy.   Pulmonary:      Effort: Pulmonary effort is normal.      Breath sounds: Normal breath sounds.   Cardiovascular:      PMI at left midclavicular line. Normal rate. Regular rhythm. Normal S1. Normal S2.      Murmurs: There is a grade 3/6 holosystolic murmur at the LLSB and apex, radiating to the axilla.      No gallop. No click. No " rub.   Pulses:     Intact distal pulses.   Edema:     Peripheral edema absent.   Abdominal:      General: Bowel sounds are normal.      Palpations: Abdomen is soft. There is no abdominal mass.      Tenderness: There is no abdominal tenderness.   Musculoskeletal: Normal range of motion.   Skin:     General: Skin is warm and dry.      Findings: No rash.   Neurological:      Mental Status: Alert and oriented to person, place, and time.         Lab Results   Component Value Date     (L) 11/13/2020    K 4.4 11/13/2020     11/13/2020    CO2 25.3 11/13/2020    BUN 12 11/13/2020    CREATININE 0.80 11/13/2020    GLUCOSE 92 11/13/2020    CALCIUM 9.1 11/13/2020    AST 9 08/20/2020    ALT 8 08/20/2020    ALKPHOS 86 08/20/2020     Lab Results   Component Value Date    CKTOTAL 20 06/14/2020     Lab Results   Component Value Date    WBC 8.90 06/16/2020    HGB 10.0 (L) 06/16/2020    HGB 10.0 (L) 06/16/2020    HCT 33.4 (L) 06/16/2020    HCT 33.4 (L) 06/16/2020     06/16/2020     Lab Results   Component Value Date    INR 1.24 (H) 12/10/2016     Lab Results   Component Value Date    MG 2.3 06/14/2020     Lab Results   Component Value Date    TSH 2.250 06/14/2020    PSA 19.100 (H) 06/15/2020    TRIG 107 02/21/2020    HDL 38 (L) 02/21/2020    LDL 93 02/21/2020      Lab Results   Component Value Date    .0 (H) 01/05/2017       Assessment/Plan :   Diagnosis Plan   1. Nonischemic cardiomyopathy with LV ejection fraction of about 35%, appears to be compensated.     2. Nonrheumatic mitral valve regurgitation (moderate to severe)     3. Chronic systolic congestive heart failure (CMS/HCC)     4. Chronic atrial fibrillation, on Xarelto for stroke prevention.     5. Nonischemic cardiomyopathy , appears to be compensated.  carvedilol (COREG) 6.25 MG tablet   6. Essential hypertension  carvedilol (COREG) 6.25 MG tablet       Recommendations:  1. I have discussed the results of the echo Doppler study with the  patient.  2. Continue with Entresto current dose.  3. Increase the dose of carvedilol to 6.25 mg p.o. twice daily.  4. Not able to add spironolactone at this time due to baseline relatively low blood pressure.  5. I have discussed with him about the option of further cardiac evaluation with cardiac catheterization given worsening of his LV systolic function and progression of mitral regurgitation and to plan for possible mitral valve repair in the near future.  I told him to hold the Xarelto for 2 days before the procedure.  He expressed understanding.    Return in about 4 weeks (around 2/15/2021).    As always, Buster  I appreciate very much the opportunity to participate in the cardiovascular care of your patients. Please do not hesitate to call me with any questions with regards to Larry Kehr's evaluation and management.       With Best Regards,        Hemal Mckinney MD, [facility]    Please note that portions of this note were completed with a voice recognition program.

## 2021-01-21 ENCOUNTER — PREP FOR SURGERY (OUTPATIENT)
Dept: OTHER | Facility: HOSPITAL | Age: 76
End: 2021-01-21

## 2021-01-21 ENCOUNTER — TRANSCRIBE ORDERS (OUTPATIENT)
Dept: ADMINISTRATIVE | Facility: HOSPITAL | Age: 76
End: 2021-01-21

## 2021-01-21 DIAGNOSIS — Z01.818 PRE-OPERATIVE CLEARANCE: Primary | ICD-10-CM

## 2021-01-21 DIAGNOSIS — I42.9 CARDIOMYOPATHY (HCC): Primary | ICD-10-CM

## 2021-01-22 ENCOUNTER — HOSPITAL ENCOUNTER (EMERGENCY)
Facility: HOSPITAL | Age: 76
Discharge: HOME | End: 2021-01-22
Attending: STUDENT IN AN ORGANIZED HEALTH CARE EDUCATION/TRAINING PROGRAM | Admitting: EMERGENCY MEDICINE

## 2021-01-22 VITALS
HEIGHT: 70 IN | SYSTOLIC BLOOD PRESSURE: 158 MMHG | OXYGEN SATURATION: 98 % | WEIGHT: 170 LBS | HEART RATE: 128 BPM | BODY MASS INDEX: 24.34 KG/M2 | DIASTOLIC BLOOD PRESSURE: 88 MMHG | RESPIRATION RATE: 16 BRPM | TEMPERATURE: 98.2 F

## 2021-01-22 DIAGNOSIS — T83.9XXA FOLEY CATHETER PROBLEM, INITIAL ENCOUNTER (HCC): Primary | ICD-10-CM

## 2021-01-22 DIAGNOSIS — N39.0 COMPLICATED UTI (URINARY TRACT INFECTION): ICD-10-CM

## 2021-01-22 LAB
BACTERIA UR QL AUTO: ABNORMAL /HPF
BILIRUB UR QL STRIP: NEGATIVE
CLARITY UR: CLEAR
COLOR UR: YELLOW
GLUCOSE UR STRIP-MCNC: NEGATIVE MG/DL
HGB UR QL STRIP.AUTO: ABNORMAL
HYALINE CASTS UR QL AUTO: ABNORMAL /LPF
KETONES UR QL STRIP: NEGATIVE
LEUKOCYTE ESTERASE UR QL STRIP.AUTO: ABNORMAL
NITRITE UR QL STRIP: POSITIVE
PH UR STRIP.AUTO: 7.5 [PH] (ref 5–8)
PROT UR QL STRIP: ABNORMAL
RBC # UR: ABNORMAL /HPF
REF LAB TEST METHOD: ABNORMAL
SP GR UR STRIP: 1.02 (ref 1–1.03)
SQUAMOUS #/AREA URNS HPF: ABNORMAL /HPF
UROBILINOGEN UR QL STRIP: ABNORMAL
WBC UR QL AUTO: ABNORMAL /HPF

## 2021-01-22 PROCEDURE — 81001 URINALYSIS AUTO W/SCOPE: CPT | Performed by: PHYSICIAN ASSISTANT

## 2021-01-22 PROCEDURE — 25010000002 CEFTRIAXONE PER 250 MG: Performed by: PHYSICIAN ASSISTANT

## 2021-01-22 PROCEDURE — 96372 THER/PROPH/DIAG INJ SC/IM: CPT

## 2021-01-22 PROCEDURE — 87186 SC STD MICRODIL/AGAR DIL: CPT | Performed by: PHYSICIAN ASSISTANT

## 2021-01-22 PROCEDURE — 99283 EMERGENCY DEPT VISIT LOW MDM: CPT

## 2021-01-22 PROCEDURE — 51702 INSERT TEMP BLADDER CATH: CPT

## 2021-01-22 PROCEDURE — 87077 CULTURE AEROBIC IDENTIFY: CPT | Performed by: PHYSICIAN ASSISTANT

## 2021-01-22 PROCEDURE — 87086 URINE CULTURE/COLONY COUNT: CPT | Performed by: PHYSICIAN ASSISTANT

## 2021-01-22 RX ORDER — CIPROFLOXACIN 500 MG/1
500 TABLET, FILM COATED ORAL 2 TIMES DAILY
Qty: 14 TABLET | Refills: 0 | Status: SHIPPED | OUTPATIENT
Start: 2021-01-22 | End: 2021-02-09

## 2021-01-22 RX ADMIN — LIDOCAINE HYDROCHLORIDE 2 G: 10 INJECTION, SOLUTION EPIDURAL; INFILTRATION; INTRACAUDAL; PERINEURAL at 20:30

## 2021-01-23 NOTE — ED PROVIDER NOTES
Subjective   75-year-old male presents the ER with complaints of decreased urination.  Patient does have a Malhotra catheter.  Patient states his Malhotra catheter has not been working.          Review of Systems   Constitutional: Negative.  Negative for fever.   HENT: Negative.    Respiratory: Negative.    Cardiovascular: Negative.  Negative for chest pain.   Gastrointestinal: Negative.  Negative for abdominal pain.   Endocrine: Negative.    Genitourinary: Positive for difficulty urinating. Negative for dysuria.   Skin: Negative.    Neurological: Negative.    Psychiatric/Behavioral: Negative.    All other systems reviewed and are negative.      Past Medical History:   Diagnosis Date   • Atrial fibrillation (CMS/HCC)    • Cardiomyopathy (CMS/HCC)    • CHF (congestive heart failure) (CMS/HCC)    • COPD (chronic obstructive pulmonary disease) (CMS/HCC)    • Hypertension        No Known Allergies    No past surgical history on file.    Family History   Problem Relation Age of Onset   • No Known Problems Mother    • Heart disease Father    • No Known Problems Sister    • No Known Problems Brother    • No Known Problems Son    • No Known Problems Daughter    • No Known Problems Maternal Grandmother    • No Known Problems Maternal Grandfather    • No Known Problems Paternal Grandmother    • No Known Problems Paternal Grandfather    • No Known Problems Cousin    • Rheum arthritis Neg Hx    • Osteoarthritis Neg Hx    • Asthma Neg Hx    • Diabetes Neg Hx    • Heart failure Neg Hx    • Hyperlipidemia Neg Hx    • Hypertension Neg Hx    • Migraines Neg Hx    • Rashes / Skin problems Neg Hx    • Seizures Neg Hx    • Stroke Neg Hx    • Thyroid disease Neg Hx        Social History     Socioeconomic History   • Marital status:      Spouse name: Not on file   • Number of children: Not on file   • Years of education: Not on file   • Highest education level: Not on file   Tobacco Use   • Smoking status: Current Every Day Smoker      Packs/day: 0.25     Years: 59.00     Pack years: 14.75     Types: Cigarettes   • Smokeless tobacco: Never Used   Substance and Sexual Activity   • Alcohol use: No   • Drug use: No   • Sexual activity: Defer           Objective   Physical Exam  Vitals signs and nursing note reviewed.   Constitutional:       General: He is not in acute distress.     Appearance: He is well-developed. He is not diaphoretic.   HENT:      Head: Normocephalic and atraumatic.      Right Ear: External ear normal.      Left Ear: External ear normal.      Nose: Nose normal.   Eyes:      Conjunctiva/sclera: Conjunctivae normal.   Neck:      Musculoskeletal: Normal range of motion and neck supple.      Vascular: No JVD.      Trachea: No tracheal deviation.   Cardiovascular:      Rate and Rhythm: Normal rate and regular rhythm.      Heart sounds: No murmur.   Pulmonary:      Effort: Pulmonary effort is normal. No respiratory distress.      Breath sounds: No wheezing.   Abdominal:      Palpations: Abdomen is soft.      Tenderness: There is no abdominal tenderness.   Genitourinary:     Comments: Malhotra catheter present.  There does appear to be some malfunction.  Musculoskeletal: Normal range of motion.         General: No deformity.   Skin:     General: Skin is warm and dry.      Coloration: Skin is not pale.      Findings: No erythema or rash.   Neurological:      Mental Status: He is alert and oriented to person, place, and time.      Cranial Nerves: No cranial nerve deficit.   Psychiatric:         Behavior: Behavior normal.         Thought Content: Thought content normal.         Procedures           ED Course  ED Course as of Jan 22 2013 Fri Jan 22, 2021 2011 Malhotra catheter replaced which relieved patient's symptoms of suprapubic pain.  Patient has had moderate output of urine.  Urine does reveal urinary tract infection which patient will be treated for.    [RB]      ED Course User Index  [RB] Winston Vasquez II, PA                                            MDM  Number of Diagnoses or Management Options  Complicated UTI (urinary tract infection): new and requires workup  Malhotra catheter problem, initial encounter (CMS/[facility]): new and requires workup     Amount and/or Complexity of Data Reviewed  Clinical lab tests: ordered and reviewed    Risk of Complications, Morbidity, and/or Mortality  Presenting problems: low  Diagnostic procedures: low  Management options: low    Patient Progress  Patient progress: stable      Final diagnoses:   Malhotra catheter problem, initial encounter (CMS/[facility])   Complicated UTI (urinary tract infection)            Winston Vasquez II, PA  01/22/21 2013

## 2021-01-25 ENCOUNTER — LAB (OUTPATIENT)
Dept: LAB | Facility: HOSPITAL | Age: 76
End: 2021-01-25

## 2021-01-25 DIAGNOSIS — Z01.818 PRE-OPERATIVE CLEARANCE: ICD-10-CM

## 2021-01-25 PROBLEM — I42.9 CARDIOMYOPATHY: Status: ACTIVE | Noted: 2021-01-25

## 2021-01-25 PROCEDURE — U0004 COV-19 TEST NON-CDC HGH THRU: HCPCS | Performed by: INTERNAL MEDICINE

## 2021-01-25 PROCEDURE — C9803 HOPD COVID-19 SPEC COLLECT: HCPCS | Performed by: INTERNAL MEDICINE

## 2021-01-26 LAB
BACTERIA SPEC AEROBE CULT: ABNORMAL
BACTERIA SPEC AEROBE CULT: ABNORMAL
SARS-COV-2 RNA RESP QL NAA+PROBE: NOT DETECTED

## 2021-01-27 ENCOUNTER — HOSPITAL ENCOUNTER (OUTPATIENT)
Facility: HOSPITAL | Age: 76
Setting detail: HOSPITAL OUTPATIENT SURGERY
Discharge: HOME | End: 2021-01-27
Attending: INTERNAL MEDICINE | Admitting: INTERNAL MEDICINE

## 2021-01-27 VITALS
HEART RATE: 105 BPM | RESPIRATION RATE: 20 BRPM | OXYGEN SATURATION: 96 % | BODY MASS INDEX: 39.22 KG/M2 | DIASTOLIC BLOOD PRESSURE: 92 MMHG | TEMPERATURE: 97.7 F | WEIGHT: 274 LBS | HEIGHT: 70 IN | SYSTOLIC BLOOD PRESSURE: 137 MMHG

## 2021-01-27 DIAGNOSIS — I42.9 CARDIOMYOPATHY (HCC): ICD-10-CM

## 2021-01-27 LAB
ALBUMIN SERPL-MCNC: 3.54 G/DL (ref 3.5–5.2)
ALBUMIN/GLOB SERPL: 1.1 G/DL
ALP SERPL-CCNC: 109 U/L (ref 39–117)
ALT SERPL W P-5'-P-CCNC: <5 U/L (ref 1–41)
ANION GAP SERPL CALCULATED.3IONS-SCNC: 8.2 MMOL/L (ref 5–15)
AST SERPL-CCNC: 10 U/L (ref 1–40)
BASOPHILS # BLD AUTO: 0.07 10*3/MM3 (ref 0–0.2)
BASOPHILS NFR BLD AUTO: 0.9 % (ref 0–1.5)
BILIRUB SERPL-MCNC: 0.7 MG/DL (ref 0–1.2)
BUN SERPL-MCNC: 11 MG/DL (ref 8–23)
BUN/CREAT SERPL: 10.2 (ref 7–25)
CALCIUM SPEC-SCNC: 9 MG/DL (ref 8.6–10.5)
CHLORIDE SERPL-SCNC: 105 MMOL/L (ref 98–107)
CO2 SERPL-SCNC: 22.8 MMOL/L (ref 22–29)
CREAT SERPL-MCNC: 1.08 MG/DL (ref 0.76–1.27)
DEPRECATED RDW RBC AUTO: 53.4 FL (ref 37–54)
EOSINOPHIL # BLD AUTO: 0.19 10*3/MM3 (ref 0–0.4)
EOSINOPHIL NFR BLD AUTO: 2.4 % (ref 0.3–6.2)
ERYTHROCYTE [DISTWIDTH] IN BLOOD BY AUTOMATED COUNT: 19.8 % (ref 12.3–15.4)
GFR SERPL CREATININE-BSD FRML MDRD: 66 ML/MIN/1.73
GLOBULIN UR ELPH-MCNC: 3.3 GM/DL
GLUCOSE SERPL-MCNC: 118 MG/DL (ref 65–99)
HCT VFR BLD AUTO: 41 % (ref 37.5–51)
HGB BLD-MCNC: 12.4 G/DL (ref 13–17.7)
IMM GRANULOCYTES # BLD AUTO: 0.02 10*3/MM3 (ref 0–0.05)
IMM GRANULOCYTES NFR BLD AUTO: 0.3 % (ref 0–0.5)
LYMPHOCYTES # BLD AUTO: 1.46 10*3/MM3 (ref 0.7–3.1)
LYMPHOCYTES NFR BLD AUTO: 18.3 % (ref 19.6–45.3)
MCH RBC QN AUTO: 23.6 PG (ref 26.6–33)
MCHC RBC AUTO-ENTMCNC: 30.2 G/DL (ref 31.5–35.7)
MCV RBC AUTO: 77.9 FL (ref 79–97)
MONOCYTES # BLD AUTO: 0.55 10*3/MM3 (ref 0.1–0.9)
MONOCYTES NFR BLD AUTO: 6.9 % (ref 5–12)
NEUTROPHILS NFR BLD AUTO: 5.7 10*3/MM3 (ref 1.7–7)
NEUTROPHILS NFR BLD AUTO: 71.2 % (ref 42.7–76)
NRBC BLD AUTO-RTO: 0 /100 WBC (ref 0–0.2)
PLATELET # BLD AUTO: 191 10*3/MM3 (ref 140–450)
PMV BLD AUTO: 9.9 FL (ref 6–12)
POTASSIUM SERPL-SCNC: 4.2 MMOL/L (ref 3.5–5.2)
PROT SERPL-MCNC: 6.8 G/DL (ref 6–8.5)
RBC # BLD AUTO: 5.26 10*6/MM3 (ref 4.14–5.8)
SODIUM SERPL-SCNC: 136 MMOL/L (ref 136–145)
WBC # BLD AUTO: 7.99 10*3/MM3 (ref 3.4–10.8)

## 2021-01-27 PROCEDURE — 85025 COMPLETE CBC W/AUTO DIFF WBC: CPT | Performed by: INTERNAL MEDICINE

## 2021-01-27 PROCEDURE — 80053 COMPREHEN METABOLIC PANEL: CPT | Performed by: INTERNAL MEDICINE

## 2021-01-27 RX ORDER — CARVEDILOL 6.25 MG/1
6.25 TABLET ORAL 2 TIMES DAILY
Qty: 60 TABLET | Refills: 5 | Status: SHIPPED | OUTPATIENT
Start: 2021-01-27 | End: 2021-05-04

## 2021-02-01 ENCOUNTER — IMMUNIZATION (OUTPATIENT)
Dept: VACCINE CLINIC | Facility: HOSPITAL | Age: 76
End: 2021-02-01

## 2021-02-01 PROCEDURE — 91300 HC SARSCOV02 VAC 30MCG/0.3ML IM: CPT | Performed by: FAMILY MEDICINE

## 2021-02-01 PROCEDURE — 0001A: CPT | Performed by: FAMILY MEDICINE

## 2021-02-08 RX ORDER — RIVAROXABAN 20 MG/1
TABLET, FILM COATED ORAL
Qty: 30 TABLET | Refills: 5 | Status: SHIPPED | OUTPATIENT
Start: 2021-02-08 | End: 2021-07-15 | Stop reason: SDUPTHER

## 2021-02-09 ENCOUNTER — OFFICE VISIT (OUTPATIENT)
Dept: UROLOGY | Facility: CLINIC | Age: 76
End: 2021-02-09

## 2021-02-09 VITALS — TEMPERATURE: 98 F | BODY MASS INDEX: 39.65 KG/M2 | HEIGHT: 70 IN | WEIGHT: 277 LBS

## 2021-02-09 DIAGNOSIS — Z97.8 CHRONIC INDWELLING FOLEY CATHETER: ICD-10-CM

## 2021-02-09 DIAGNOSIS — R33.8 URINARY RETENTION DUE TO BENIGN PROSTATIC HYPERPLASIA: Primary | ICD-10-CM

## 2021-02-09 DIAGNOSIS — N40.1 URINARY RETENTION DUE TO BENIGN PROSTATIC HYPERPLASIA: Primary | ICD-10-CM

## 2021-02-09 PROCEDURE — 99213 OFFICE O/P EST LOW 20 MIN: CPT | Performed by: NURSE PRACTITIONER

## 2021-02-09 PROCEDURE — 51702 INSERT TEMP BLADDER CATH: CPT | Performed by: NURSE PRACTITIONER

## 2021-02-09 RX ORDER — CLOTRIMAZOLE AND BETAMETHASONE DIPROPIONATE 10; .64 MG/G; MG/G
CREAM TOPICAL TAKE AS DIRECTED
COMMUNITY
Start: 2021-01-14 | End: 2021-02-25 | Stop reason: SDUPTHER

## 2021-02-09 NOTE — PATIENT INSTRUCTIONS
Indwelling Urinary Catheter Care, Adult  An indwelling urinary catheter is a thin tube that is put into your bladder. The tube helps to drain pee (urine) out of your body. The tube goes in through your urethra. Your urethra is where pee comes out of your body. Your pee will come out through the catheter, then it will go into a bag (drainage bag).  Take good care of your catheter so it will work well.  How to wear your catheter and bag  Supplies needed  · Sticky tape (adhesive tape) or a leg strap.  · Alcohol wipe or soap and water (if you use tape).  · A clean towel (if you use tape).  · Large overnight bag.  · Smaller bag (leg bag).  Wearing your catheter  Attach your catheter to your leg with tape or a leg strap.  · Make sure the catheter is not pulled tight.  · If a leg strap gets wet, take it off and put on a dry strap.  · If you use tape to hold the bag on your le. Use an alcohol wipe or soap and water to wash your skin where the tape made it sticky before.  2. Use a clean towel to pat-dry that skin.  3. Use new tape to make the bag stay on your leg.  Wearing your bags  You should have been given a large overnight bag.  · You may wear the overnight bag in the day or night.  · Always have the overnight bag lower than your bladder.  Do not let the bag touch the floor.  · Before you go to sleep, put a clean plastic bag in a wastebasket. Then hang the overnight bag inside the wastebasket.  You should also have a smaller leg bag that fits under your clothes.  · Always wear the leg bag below your knee.  · Do not wear your leg bag at night.  How to care for your skin and catheter  Supplies needed  · A clean washcloth.  · Water and mild soap.  · A clean towel.  Caring for your skin and catheter         · Clean the skin around your catheter every day:  1. Wash your hands with soap and water.  2. Wet a clean washcloth in warm water and mild soap.  3. Clean the skin around your urethra.  § If you are  female:  § Gently spread the folds of skin around your vagina (labia).  § With the washcloth in your other hand, wipe the inner side of your labia on each side. Wipe from front to back.  § If you are male:  § Pull back any skin that covers the end of your penis (foreskin).  § With the washcloth in your other hand, wipe your penis in small circles. Start wiping at the tip of your penis, then move away from the catheter.  § Move the foreskin back in place, if needed.  4. With your free hand, hold the catheter close to where it goes into your body.  § Keep holding the catheter during cleaning so it does not get pulled out.  5. With the washcloth in your other hand, clean the catheter.  § Only wipe downward on the catheter.  § Do not wipe upward toward your body. Doing this may push germs into your urethra and cause infection.  6. Use a clean towel to pat-dry the catheter and the skin around it. Make sure to wipe off all soap.  7. Wash your hands with soap and water.  · Shower every day. Do not take baths.  · Do not use cream, ointment, or lotion on the area where the catheter goes into your body, unless your doctor tells you to.  · Do not use powders, sprays, or lotions on your genital area.  · Check your skin around the catheter every day for signs of infection. Check for:  ? Redness, swelling, or pain.  ? Fluid or blood.  ? Warmth.  ? Pus or a bad smell.  How to empty the bag  Supplies needed  · Rubbing alcohol.  · Gauze pad or cotton ball.  · Tape or a leg strap.  Emptying the bag  Pour the pee out of your bag when it is ?-½ full, or at least 2-3 times a day. Do this for your overnight bag and your leg bag.  1. Wash your hands with soap and water.  2. Separate (detach) the bag from your leg.  3. Hold the bag over the toilet or a clean pail. Keep the bag lower than your hips and bladder. This is so the pee (urine) does not go back into the tube.  4. Open the pour spout. It is at the bottom of the bag.  5. Empty the  pee into the toilet or pail. Do not let the pour spout touch any surface.  6. Put rubbing alcohol on a gauze pad or cotton ball.  7. Use the gauze pad or cotton ball to clean the pour spout.  8. Close the pour spout.  9. Attach the bag to your leg with tape or a leg strap.  10. Wash your hands with soap and water.  Follow instructions for cleaning the drainage bag:  · From the product maker.  · As told by your doctor.  How to change the bag  Supplies needed  · Alcohol wipes.  · A clean bag.  · Tape or a leg strap.  Changing the bag  Replace your bag when it starts to leak, smell bad, or look dirty.  1. Wash your hands with soap and water.  2. Separate the dirty bag from your leg.  3. Pinch the catheter with your fingers so that pee does not spill out.  4. Separate the catheter tube from the bag tube where these tubes connect (at the connection valve). Do not let the tubes touch any surface.  5. Clean the end of the catheter tube with an alcohol wipe. Use a different alcohol wipe to clean the end of the bag tube.  6. Connect the catheter tube to the tube of the clean bag.  7. Attach the clean bag to your leg with tape or a leg strap. Do not make the bag tight on your leg.  8. Wash your hands with soap and water.  General rules    · Never pull on your catheter. Never try to take it out. Doing that can hurt you.  · Always wash your hands before and after you touch your catheter or bag. Use a mild, fragrance-free soap. If you do not have soap and water, use hand .  · Always make sure there are no twists or bends (kinks) in the catheter tube.  · Always make sure there are no leaks in the catheter or bag.  · Drink enough fluid to keep your pee pale yellow.  · Do not take baths, swim, or use a hot tub.  · If you are female, wipe from front to back after you poop (have a bowel movement).  Contact a doctor if:  · Your pee is cloudy.  · Your pee smells worse than usual.  · Your catheter gets clogged.  · Your catheter  leaks.  · Your bladder feels full.  Get help right away if:  · You have redness, swelling, or pain where the catheter goes into your body.  · You have fluid, blood, pus, or a bad smell coming from the area where the catheter goes into your body.  · Your skin feels warm where the catheter goes into your body.  · You have a fever.  · You have pain in your:  ? Belly (abdomen).  ? Legs.  ? Lower back.  ? Bladder.  · You see blood in the catheter.  · Your pee is pink or red.  · You feel sick to your stomach (nauseous).  · You throw up (vomit).  · You have chills.  · Your pee is not draining into the bag.  · Your catheter gets pulled out.  Summary  · An indwelling urinary catheter is a thin tube that is placed into the bladder to help drain pee (urine) out of the body.  · The catheter is placed into the part of the body that drains pee from the bladder (urethra).  · Taking good care of your catheter will keep it working properly and help prevent problems.  · Always wash your hands before and after touching your catheter or bag.  · Never pull on your catheter or try to take it out.  This information is not intended to replace advice given to you by your health care provider. Make sure you discuss any questions you have with your health care provider.  Document Revised: 04/10/2020 Document Reviewed: 08/03/2018  [employer] Patient Education © 2020 [employer]

## 2021-02-09 NOTE — PROGRESS NOTES
Chief Complaint:          Chief Complaint   Patient presents with   • Urinary Retention     2.5 week cath change       HPI:   75 y.o. male.  Returns to clinic today for Malhotra cath change.  Tolerated procedure well.  #20 coudé tip catheter placed.    Past Medical History:        Past Medical History:   Diagnosis Date   • Atrial fibrillation (CMS/HCC)    • Cardiomyopathy (CMS/HCC)    • CHF (congestive heart failure) (CMS/HCC)    • COPD (chronic obstructive pulmonary disease) (CMS/HCC)    • Hypertension          The following portions of the patient's history were reviewed and updated as appropriate: allergies, current medications, past family history, past medical history, past social history, past surgical history and problem list.    Current Meds:     Current Outpatient Medications   Medication Sig Dispense Refill   • carvedilol (COREG) 6.25 MG tablet Take 1 tablet by mouth 2 (Two) Times a Day. 60 tablet 5   • clotrimazole-betamethasone (LOTRISONE) 1-0.05 % cream Take As Directed.     • sacubitril-valsartan (Entresto) 24-26 MG tablet Take 1 tablet by mouth 2 (Two) Times a Day. 180 tablet 3   • tamsulosin (FLOMAX) 0.4 MG capsule 24 hr capsule Take 1 capsule by mouth Daily. 30 capsule 3   • Xarelto 20 MG tablet TAKE 1 TABLET BY MOUTH DAILY WITH DINNER. 30 tablet 5     No current facility-administered medications for this visit.         Allergies:      No Known Allergies     Past Surgical History:     History reviewed. No pertinent surgical history.      Social History:     Social History     Socioeconomic History   • Marital status:      Spouse name: Not on file   • Number of children: Not on file   • Years of education: Not on file   • Highest education level: Not on file   Tobacco Use   • Smoking status: Current Every Day Smoker     Packs/day: 0.25     Years: 59.00     Pack years: 14.75     Types: Cigarettes   • Smokeless tobacco: Never Used   Substance and Sexual Activity   • Alcohol use: No   • Drug use: No    • Sexual activity: Defer       Family History:     Family History   Problem Relation Age of Onset   • No Known Problems Mother    • Heart disease Father    • No Known Problems Sister    • No Known Problems Brother    • No Known Problems Son    • No Known Problems Daughter    • No Known Problems Maternal Grandmother    • No Known Problems Maternal Grandfather    • No Known Problems Paternal Grandmother    • No Known Problems Paternal Grandfather    • No Known Problems Cousin    • Rheum arthritis Neg Hx    • Osteoarthritis Neg Hx    • Asthma Neg Hx    • Diabetes Neg Hx    • Heart failure Neg Hx    • Hyperlipidemia Neg Hx    • Hypertension Neg Hx    • Migraines Neg Hx    • Rashes / Skin problems Neg Hx    • Seizures Neg Hx    • Stroke Neg Hx    • Thyroid disease Neg Hx        Review of Systems:     Review of Systems   Constitutional: Positive for activity change and fatigue. Negative for appetite change, chills and fever.   HENT: Negative for congestion and sinus pressure.    Eyes: Negative for blurred vision and double vision.   Respiratory: Negative for cough, shortness of breath and wheezing.    Cardiovascular: Negative for leg swelling.   Gastrointestinal: Positive for abdominal distention and abdominal pain. Negative for constipation, diarrhea, nausea and vomiting.   Genitourinary: Positive for difficulty urinating, flank pain, penile pain and testicular pain. Negative for discharge, dysuria, frequency, genital sores, hematuria, penile swelling, scrotal swelling, urgency and urinary incontinence.   Musculoskeletal: Positive for back pain and myalgias.   Neurological: Negative for dizziness, weakness, headache and confusion.   Psychiatric/Behavioral: Positive for stress. Negative for agitation, behavioral problems and decreased concentration. The patient is nervous/anxious.         Physical Exam:     Physical Exam  Constitutional:       General: He is not in acute distress.     Appearance: He is well-developed.  He is obese.   HENT:      Head: Normocephalic and atraumatic.      Right Ear: External ear normal.      Left Ear: External ear normal.   Eyes:      General:         Right eye: No discharge.         Left eye: No discharge.      Conjunctiva/sclera: Conjunctivae normal.      Pupils: Pupils are equal, round, and reactive to light.   Neck:      Musculoskeletal: Normal range of motion and neck supple.      Thyroid: No thyromegaly.      Trachea: No tracheal deviation.   Cardiovascular:      Rate and Rhythm: Normal rate and regular rhythm.      Heart sounds: No murmur. No friction rub.   Pulmonary:      Effort: Pulmonary effort is normal. No respiratory distress.      Breath sounds: Normal breath sounds. No stridor.   Abdominal:      General: Bowel sounds are normal. There is distension.      Palpations: Abdomen is soft.      Tenderness: There is abdominal tenderness. There is no guarding.   Genitourinary:     Penis: Normal and uncircumcised. No tenderness or discharge.       Scrotum/Testes: Normal.      Rectum: Normal. Guaiac result negative.   Musculoskeletal: Normal range of motion.         General: Tenderness present. No deformity.   Skin:     General: Skin is warm and dry.      Capillary Refill: Capillary refill takes less than 2 seconds.      Coloration: Skin is not pale.   Neurological:      Mental Status: He is alert and oriented to person, place, and time.      Cranial Nerves: No cranial nerve deficit.      Coordination: Coordination normal.   Psychiatric:         Behavior: Behavior normal.         Thought Content: Thought content normal.         Judgment: Judgment normal.         Procedure:   Malhotra Catheter change: I Prepped and Draped the patient in a sterile fashion,  Removed the existing  20 [place] coudé tip catheter after deflating the 10 cc filled balloon.      A new 20 [place] coudé tip catheter was inserted  In a sterile fashion without any complication. Urine was drained and the balloon was inflated with a  10cc syringe and the position was checked for security    Assessment/Plan:                                          ASSESSMENT  BPH WITH URINARY RETENTION: Patient tolerated his Malhotra cath change today with MINIMAL discomfort.  We irrigated his replaced catheter with absolutely no resistance.  No sediments were noted in bag.                                                      PLAN   Discussed increasing PO fluid intake, Malhotra cath site care.      We will follow-up in ONE Month for Cath Change.     Patient is agreeable with plan of care.     Patient reports that he is not currently experiencing any symptoms of urinary incontinence.     Patient's Body mass index is 39.75 kg/m². BMI is above normal parameters. Recommendations include: educational material, exercise counseling and nutrition counseling.    Smoking Cessation Counseling:  Current every day smoker. less than 3 minutes spent counseling. Has reduced tobacco use.     I advised patient to quit tobacco use and offered support.  I provided patient with tobacco cessation educational material printed in the patient's After Visit Summary.      Counseling was given to patient for the following topics diagnostic results including: BPH with urine Retention, risk factor reductions including: Smoking Cessation and impressions as follows: .Continue Flomax, Finasteride, Monthly Malhotra Cath Change. The interim medical history and current results were reviewed.  A treatment plan with follow-up was made for  Urinary retention due to benign prostatic hyperplasia [N40.1, R33.8].          This document has been electronically signed by Griselda Cheng-Akwa, APRN February 9, 2021 18:30 EST

## 2021-02-15 ENCOUNTER — OFFICE VISIT (OUTPATIENT)
Dept: CARDIOLOGY | Facility: CLINIC | Age: 76
End: 2021-02-15

## 2021-02-15 VITALS
DIASTOLIC BLOOD PRESSURE: 90 MMHG | HEIGHT: 70 IN | SYSTOLIC BLOOD PRESSURE: 136 MMHG | HEART RATE: 80 BPM | BODY MASS INDEX: 39.65 KG/M2 | WEIGHT: 277 LBS

## 2021-02-15 DIAGNOSIS — I42.8 NONISCHEMIC CARDIOMYOPATHY (HCC): Primary | ICD-10-CM

## 2021-02-15 DIAGNOSIS — I10 ESSENTIAL HYPERTENSION: ICD-10-CM

## 2021-02-15 DIAGNOSIS — I48.20 CHRONIC ATRIAL FIBRILLATION (HCC): ICD-10-CM

## 2021-02-15 DIAGNOSIS — I50.22 CHRONIC SYSTOLIC CONGESTIVE HEART FAILURE (HCC): ICD-10-CM

## 2021-02-15 DIAGNOSIS — I34.0 NONRHEUMATIC MITRAL VALVE REGURGITATION: ICD-10-CM

## 2021-02-15 PROCEDURE — 99443 PR PHYS/QHP TELEPHONE EVALUATION 21-30 MIN: CPT | Performed by: NURSE PRACTITIONER

## 2021-02-15 NOTE — PROGRESS NOTES
You have chosen to receive care through a telephone visit. Do you consent to use a telephone visit for your medical care today? Yes  Buster Redd MD  Larry Kehr  1945  02/15/2021    Patient Active Problem List   Diagnosis   • Chronic atrial fibrillation   • Hypertension- controlled.    • Tobacco use, states that he has cut back to 3-4 cigarettes a day.   • Morbid obesity (CMS/HCC)   • Nonischemic cardiomyopathy , appears to be compensated.   • Chronic systolic heart failure (CMS/HCC)   • Obstructive uropathy   • Chronic obstructive lung disease (CMS/HCC)   • Atrial fibrillation (CMS/HCC)   • Hypertensive disorder   • Benign prostatic hyperplasia with urinary retention   • PAD (peripheral artery disease) (CMS/HCC)   • Cardiomyopathy (CMS/HCC)       Dear Buster Redd MD:    Subjective     Chief Complaint   Patient presents with   • Follow-up     DID NOT DO CATH           History of Present Illness:    Larry Kehr is a 75 y.o. 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 He presents today for cardiology follow up via telephone visit. Cardiac catheterization was ordered, but the patient decided on the day of the procedure he did not want to proceed. He states he feels great. The patient denies chest pain, shortness of breath, palpitations, dizziness, lightheadedness, near syncope, and syncope. His heart rate has improved since increasing the dose of carvedilol, BP has been stable. He states he has not made up his mind about cardiac catheterization at this point.          No Known Allergies:      Current Outpatient Medications:   •  carvedilol (COREG) 6.25 MG tablet, Take 1 tablet by mouth 2 (Two) Times a Day., Disp: 60 tablet, Rfl: 5  •  clotrimazole-betamethasone (LOTRISONE) 1-0.05 % cream, Take As Directed., Disp: , Rfl:   •  sacubitril-valsartan (Entresto) 24-26 MG tablet, Take 1  "tablet by mouth 2 (Two) Times a Day., Disp: 180 tablet, Rfl: 3  •  tamsulosin (FLOMAX) 0.4 MG capsule 24 hr capsule, Take 1 capsule by mouth Daily., Disp: 30 capsule, Rfl: 3  •  Xarelto 20 MG tablet, TAKE 1 TABLET BY MOUTH DAILY WITH DINNER., Disp: 30 tablet, Rfl: 5      The following portions of the patient's history were reviewed and updated as appropriate: allergies, current medications, past family history, past medical history, past social history, past surgical history and problem list.    Social History     Tobacco Use   • Smoking status: Current Every Day Smoker     Packs/day: 0.25     Years: 59.00     Pack years: 14.75     Types: Cigarettes   • Smokeless tobacco: Never Used   Substance Use Topics   • Alcohol use: No   • Drug use: No       Review of Systems   Constitution: Negative for decreased appetite and malaise/fatigue.   Cardiovascular: Negative for chest pain, dyspnea on exertion, irregular heartbeat, leg swelling, near-syncope, orthopnea, palpitations, paroxysmal nocturnal dyspnea and syncope.   Respiratory: Negative for cough, shortness of breath and wheezing.    Neurological: Negative for dizziness, light-headedness and weakness.       Objective   Vitals:    02/15/21 0850   BP: 136/90   Pulse: 80   Weight: 126 kg (277 lb)   Height: 177.8 cm (70\")     Body mass index is 39.75 kg/m².        Physical Exam    Lab Results   Component Value Date     01/27/2021    K 4.2 01/27/2021     01/27/2021    CO2 22.8 01/27/2021    BUN 11 01/27/2021    CREATININE 1.08 01/27/2021    GLUCOSE 118 (H) 01/27/2021    CALCIUM 9.0 01/27/2021    AST 10 01/27/2021    ALT <5 01/27/2021    ALKPHOS 109 01/27/2021     Lab Results   Component Value Date    CKTOTAL 20 06/14/2020     Lab Results   Component Value Date    WBC 7.99 01/27/2021    HGB 12.4 (L) 01/27/2021    HCT 41.0 01/27/2021     01/27/2021     Lab Results   Component Value Date    INR 1.24 (H) 12/10/2016     Lab Results   Component Value Date    MG " 2.3 06/14/2020     Lab Results   Component Value Date    TSH 2.250 06/14/2020    PSA 19.100 (H) 06/15/2020    TRIG 107 02/21/2020    HDL 38 (L) 02/21/2020    LDL 93 02/21/2020      Lab Results   Component Value Date    .0 (H) 01/05/2017           Procedures      Assessment/Plan    Diagnosis Plan   1. Nonischemic cardiomyopathy with LV ejection fraction of about 35%, appears to be compensated.     2. Nonrheumatic mitral valve regurgitation (moderate to severe)     3. Chronic systolic congestive heart failure (CMS/HCC)     4. Chronic atrial fibrillation, on Xarelto for stroke prevention.     5. Essential hypertension                  Recommendations:    1. I had a long discussion with the patient today in regards to the recent echo findings and cardiomyopathy. I have again recommended cardiac catheterization to further evaluate the patient's worsening cardiomyopathy. He states he needs more time to think about this.  2. Will continue carvedilol and Entresto. May add spironolactone at follow up if BP can tolerate.  3. Follow up in 4 weeks or sooner if needed.       This visit has been rescheduled as a phone visit to comply with patient safety concerns in accordance with CDC recommendations. Total time of discussion was 22 minutes.          Return in about 4 weeks (around 3/15/2021) for Recheck.    As always, I appreciate very much the opportunity to participate in the cardiovascular care of your patients.      With Best Regards,    IDA Poole

## 2021-02-22 ENCOUNTER — IMMUNIZATION (OUTPATIENT)
Dept: VACCINE CLINIC | Facility: HOSPITAL | Age: 76
End: 2021-02-22

## 2021-02-22 PROCEDURE — 91300 HC SARSCOV02 VAC 30MCG/0.3ML IM: CPT | Performed by: INTERNAL MEDICINE

## 2021-02-22 PROCEDURE — 0002A: CPT | Performed by: INTERNAL MEDICINE

## 2021-02-25 DIAGNOSIS — Z46.6 ENCOUNTER FOR FOLEY CATHETER REPLACEMENT: Primary | ICD-10-CM

## 2021-02-25 DIAGNOSIS — N48.0 BALANITIS XEROTICA OBLITERANS: ICD-10-CM

## 2021-02-25 RX ORDER — CLOTRIMAZOLE AND BETAMETHASONE DIPROPIONATE 10; .64 MG/G; MG/G
CREAM TOPICAL TAKE AS DIRECTED
Qty: 45 G | Refills: 2 | Status: SHIPPED | OUTPATIENT
Start: 2021-02-25 | End: 2021-04-22 | Stop reason: SDUPTHER

## 2021-02-25 RX ORDER — CLOTRIMAZOLE AND BETAMETHASONE DIPROPIONATE 10; .64 MG/G; MG/G
CREAM TOPICAL TAKE AS DIRECTED
Status: CANCELLED | OUTPATIENT
Start: 2021-02-25

## 2021-03-09 ENCOUNTER — OFFICE VISIT (OUTPATIENT)
Dept: UROLOGY | Facility: CLINIC | Age: 76
End: 2021-03-09

## 2021-03-09 VITALS — TEMPERATURE: 96.6 F | BODY MASS INDEX: 38.94 KG/M2 | HEIGHT: 70 IN | WEIGHT: 272 LBS

## 2021-03-09 DIAGNOSIS — Z76.89 ENCOUNTER FOR EVALUATION OF FOLEY CATHETER: ICD-10-CM

## 2021-03-09 DIAGNOSIS — N40.1 URINARY RETENTION DUE TO BENIGN PROSTATIC HYPERPLASIA: Primary | ICD-10-CM

## 2021-03-09 DIAGNOSIS — R33.8 URINARY RETENTION DUE TO BENIGN PROSTATIC HYPERPLASIA: Primary | ICD-10-CM

## 2021-03-09 PROCEDURE — 99213 OFFICE O/P EST LOW 20 MIN: CPT | Performed by: NURSE PRACTITIONER

## 2021-03-09 PROCEDURE — 51702 INSERT TEMP BLADDER CATH: CPT | Performed by: NURSE PRACTITIONER

## 2021-03-09 NOTE — PATIENT INSTRUCTIONS
Steps to Quit Smoking  Smoking tobacco is the leading cause of preventable death. It can affect almost every organ in the body. Smoking puts you and people around you at risk for many serious, long-lasting (chronic) diseases. Quitting smoking can be hard, but it is one of the best things that you can do for your health. It is never too late to quit.  How do I get ready to quit?  When you decide to quit smoking, make a plan to help you succeed. Before you quit:  · Pick a date to quit. Set a date within the next 2 weeks to give you time to prepare.  · Write down the reasons why you are quitting. Keep this list in places where you will see it often.  · Tell your family, friends, and co-workers that you are quitting. Their support is important.  · Talk with your doctor about the choices that may help you quit.  · Find out if your health insurance will pay for these treatments.  · Know the people, places, things, and activities that make you want to smoke (triggers). Avoid them.  What first steps can I take to quit smoking?  · Throw away all cigarettes at home, at work, and in your car.  · Throw away the things that you use when you smoke, such as ashtrays and lighters.  · Clean your car. Make sure to empty the ashtray.  · Clean your home, including curtains and carpets.  What can I do to help me quit smoking?  Talk with your doctor about taking medicines and seeing a counselor at the same time. You are more likely to succeed when you do both.  · If you are pregnant or breastfeeding, talk with your doctor about counseling or other ways to quit smoking. Do not take medicine to help you quit smoking unless your doctor tells you to do so.  To quit smoking:  Quit right away  · Quit smoking totally, instead of slowly cutting back on how much you smoke over a period of time.  · Go to counseling. You are more likely to quit if you go to counseling sessions regularly.  Take medicine  You may take medicines to help you quit. Some  medicines need a prescription, and some you can buy over-the-counter. Some medicines may contain a drug called nicotine to replace the nicotine in cigarettes. Medicines may:  · Help you to stop having the desire to smoke (cravings).  · Help to stop the problems that come when you stop smoking (withdrawal symptoms).  Your doctor may ask you to use:  · Nicotine patches, gum, or lozenges.  · Nicotine inhalers or sprays.  · Non-nicotine medicine that is taken by mouth.  Find resources  Find resources and other ways to help you quit smoking and remain smoke-free after you quit. These resources are most helpful when you use them often. They include:  · Online chats with a counselor.  · Phone quitlines.  · Printed self-help materials.  · Support groups or group counseling.  · Text messaging programs.  · Mobile phone apps. Use apps on your mobile phone or tablet that can help you stick to your quit plan. There are many free apps for mobile phones and tablets as well as websites. Examples include Quit Guide from the CDC and smokefree.gov    What things can I do to make it easier to quit?    · Talk to your family and friends. Ask them to support and encourage you.  · Call a phone quitline (0-379-QUITNOW), reach out to support groups, or work with a counselor.  · Ask people who smoke to not smoke around you.  · Avoid places that make you want to smoke, such as:  ? Bars.  ? Parties.  ? Smoke-break areas at work.  · Spend time with people who do not smoke.  · Lower the stress in your life. Stress can make you want to smoke. Try these things to help your stress:  ? Getting regular exercise.  ? Doing deep-breathing exercises.  ? Doing yoga.  ? Meditating.  ? Doing a body scan. To do this, close your eyes, focus on one area of your body at a time from head to toe. Notice which parts of your body are tense. Try to relax the muscles in those areas.  How will I feel when I quit smoking?  Day 1 to 3 weeks  Within the first 24 hours,  you may start to have some problems that come from quitting tobacco. These problems are very bad 2-3 days after you quit, but they do not often last for more than 2-3 weeks. You may get these symptoms:  · Mood swings.  · Feeling restless, nervous, angry, or annoyed.  · Trouble concentrating.  · Dizziness.  · Strong desire for high-sugar foods and nicotine.  · Weight gain.  · Trouble pooping (constipation).  · Feeling like you may vomit (nausea).  · Coughing or a sore throat.  · Changes in how the medicines that you take for other issues work in your body.  · Depression.  · Trouble sleeping (insomnia).  Week 3 and afterward  After the first 2-3 weeks of quitting, you may start to notice more positive results, such as:  · Better sense of smell and taste.  · Less coughing and sore throat.  · Slower heart rate.  · Lower blood pressure.  · Clearer skin.  · Better breathing.  · Fewer sick days.  Quitting smoking can be hard. Do not give up if you fail the first time. Some people need to try a few times before they succeed. Do your best to stick to your quit plan, and talk with your doctor if you have any questions or concerns.  Summary  · Smoking tobacco is the leading cause of preventable death. Quitting smoking can be hard, but it is one of the best things that you can do for your health.  · When you decide to quit smoking, make a plan to help you succeed.  · Quit smoking right away, not slowly over a period of time.  · When you start quitting, seek help from your doctor, family, or friends.  This information is not intended to replace advice given to you by your health care provider. Make sure you discuss any questions you have with your health care provider.  Document Revised: 09/11/2020 Document Reviewed: 03/07/2020  [employer] Patient Education © 2020 [employer]  Indwelling Urinary Catheter Care, Adult  An indwelling urinary catheter is a thin tube that is put into your bladder. The tube helps to drain pee (urine)  out of your body. The tube goes in through your urethra. Your urethra is where pee comes out of your body. Your pee will come out through the catheter, then it will go into a bag (drainage bag).  Take good care of your catheter so it will work well.  How to wear your catheter and bag  Supplies needed  · Sticky tape (adhesive tape) or a leg strap.  · Alcohol wipe or soap and water (if you use tape).  · A clean towel (if you use tape).  · Large overnight bag.  · Smaller bag (leg bag).  Wearing your catheter  Attach your catheter to your leg with tape or a leg strap.  · Make sure the catheter is not pulled tight.  · If a leg strap gets wet, take it off and put on a dry strap.  · If you use tape to hold the bag on your le. Use an alcohol wipe or soap and water to wash your skin where the tape made it sticky before.  2. Use a clean towel to pat-dry that skin.  3. Use new tape to make the bag stay on your leg.  Wearing your bags  You should have been given a large overnight bag.  · You may wear the overnight bag in the day or night.  · Always have the overnight bag lower than your bladder.  Do not let the bag touch the floor.  · Before you go to sleep, put a clean plastic bag in a wastebasket. Then hang the overnight bag inside the wastebasket.  You should also have a smaller leg bag that fits under your clothes.  · Always wear the leg bag below your knee.  · Do not wear your leg bag at night.  How to care for your skin and catheter  Supplies needed  · A clean washcloth.  · Water and mild soap.  · A clean towel.  Caring for your skin and catheter         · Clean the skin around your catheter every day:  1. Wash your hands with soap and water.  2. Wet a clean washcloth in warm water and mild soap.  3. Clean the skin around your urethra.  § If you are female:  § Gently spread the folds of skin around your vagina (labia).  § With the washcloth in your other hand, wipe the inner side of your labia on each side. Wipe from  front to back.  § If you are male:  § Pull back any skin that covers the end of your penis (foreskin).  § With the washcloth in your other hand, wipe your penis in small circles. Start wiping at the tip of your penis, then move away from the catheter.  § Move the foreskin back in place, if needed.  4. With your free hand, hold the catheter close to where it goes into your body.  § Keep holding the catheter during cleaning so it does not get pulled out.  5. With the washcloth in your other hand, clean the catheter.  § Only wipe downward on the catheter.  § Do not wipe upward toward your body. Doing this may push germs into your urethra and cause infection.  6. Use a clean towel to pat-dry the catheter and the skin around it. Make sure to wipe off all soap.  7. Wash your hands with soap and water.  · Shower every day. Do not take baths.  · Do not use cream, ointment, or lotion on the area where the catheter goes into your body, unless your doctor tells you to.  · Do not use powders, sprays, or lotions on your genital area.  · Check your skin around the catheter every day for signs of infection. Check for:  ? Redness, swelling, or pain.  ? Fluid or blood.  ? Warmth.  ? Pus or a bad smell.  How to empty the bag  Supplies needed  · Rubbing alcohol.  · Gauze pad or cotton ball.  · Tape or a leg strap.  Emptying the bag  Pour the pee out of your bag when it is ?-½ full, or at least 2-3 times a day. Do this for your overnight bag and your leg bag.  1. Wash your hands with soap and water.  2. Separate (detach) the bag from your leg.  3. Hold the bag over the toilet or a clean pail. Keep the bag lower than your hips and bladder. This is so the pee (urine) does not go back into the tube.  4. Open the pour spout. It is at the bottom of the bag.  5. Empty the pee into the toilet or pail. Do not let the pour spout touch any surface.  6. Put rubbing alcohol on a gauze pad or cotton ball.  7. Use the gauze pad or cotton ball to  clean the pour spout.  8. Close the pour spout.  9. Attach the bag to your leg with tape or a leg strap.  10. Wash your hands with soap and water.  Follow instructions for cleaning the drainage bag:  · From the product maker.  · As told by your doctor.  How to change the bag  Supplies needed  · Alcohol wipes.  · A clean bag.  · Tape or a leg strap.  Changing the bag  Replace your bag when it starts to leak, smell bad, or look dirty.  1. Wash your hands with soap and water.  2. Separate the dirty bag from your leg.  3. Pinch the catheter with your fingers so that pee does not spill out.  4. Separate the catheter tube from the bag tube where these tubes connect (at the connection valve). Do not let the tubes touch any surface.  5. Clean the end of the catheter tube with an alcohol wipe. Use a different alcohol wipe to clean the end of the bag tube.  6. Connect the catheter tube to the tube of the clean bag.  7. Attach the clean bag to your leg with tape or a leg strap. Do not make the bag tight on your leg.  8. Wash your hands with soap and water.  General rules    · Never pull on your catheter. Never try to take it out. Doing that can hurt you.  · Always wash your hands before and after you touch your catheter or bag. Use a mild, fragrance-free soap. If you do not have soap and water, use hand .  · Always make sure there are no twists or bends (kinks) in the catheter tube.  · Always make sure there are no leaks in the catheter or bag.  · Drink enough fluid to keep your pee pale yellow.  · Do not take baths, swim, or use a hot tub.  · If you are female, wipe from front to back after you poop (have a bowel movement).  Contact a doctor if:  · Your pee is cloudy.  · Your pee smells worse than usual.  · Your catheter gets clogged.  · Your catheter leaks.  · Your bladder feels full.  Get help right away if:  · You have redness, swelling, or pain where the catheter goes into your body.  · You have fluid, blood,  pus, or a bad smell coming from the area where the catheter goes into your body.  · Your skin feels warm where the catheter goes into your body.  · You have a fever.  · You have pain in your:  ? Belly (abdomen).  ? Legs.  ? Lower back.  ? Bladder.  · You see blood in the catheter.  · Your pee is pink or red.  · You feel sick to your stomach (nauseous).  · You throw up (vomit).  · You have chills.  · Your pee is not draining into the bag.  · Your catheter gets pulled out.  Summary  · An indwelling urinary catheter is a thin tube that is placed into the bladder to help drain pee (urine) out of the body.  · The catheter is placed into the part of the body that drains pee from the bladder (urethra).  · Taking good care of your catheter will keep it working properly and help prevent problems.  · Always wash your hands before and after touching your catheter or bag.  · Never pull on your catheter or try to take it out.  This information is not intended to replace advice given to you by your health care provider. Make sure you discuss any questions you have with your health care provider.  Document Revised: 04/10/2020 Document Reviewed: 08/03/2018  [employer] Patient Education © 2020 [employer]

## 2021-03-09 NOTE — PROGRESS NOTES
Chief Complaint:          Chief Complaint   Patient presents with   • Urinary Retention     1 mnth cath change       HPI:   75 y.o. male, pleasant patient returns for sommer cath changed today. We replaced his cath with a # 20 coude tip catheter. Tolerated procedure with minimal discomfort.        Past Medical History:        Past Medical History:   Diagnosis Date   • Atrial fibrillation (CMS/HCC)    • Cardiomyopathy (CMS/HCC)    • CHF (congestive heart failure) (CMS/HCC)    • COPD (chronic obstructive pulmonary disease) (CMS/[facility])    • Hypertension      The following portions of the patient's history were reviewed and updated as appropriate: allergies, current medications, past family history, past medical history, past social history, past surgical history and problem list.    Current Meds:     Current Outpatient Medications   Medication Sig Dispense Refill   • carvedilol (COREG) 6.25 MG tablet Take 1 tablet by mouth 2 (Two) Times a Day. 60 tablet 5   • clotrimazole-betamethasone (LOTRISONE) 1-0.05 % cream Apply  topically to the appropriate area as directed Take As Directed. 45 g 2   • sacubitril-valsartan (Entresto) 24-26 MG tablet Take 1 tablet by mouth 2 (Two) Times a Day. 180 tablet 3   • tamsulosin (FLOMAX) 0.4 MG capsule 24 hr capsule Take 1 capsule by mouth Daily. 30 capsule 3   • Xarelto 20 MG tablet TAKE 1 TABLET BY MOUTH DAILY WITH DINNER. 30 tablet 5     No current facility-administered medications for this visit.        Allergies:      No Known Allergies     Past Surgical History:     History reviewed. No pertinent surgical history.      Social History:     Social History     Socioeconomic History   • Marital status:      Spouse name: Not on file   • Number of children: Not on file   • Years of education: Not on file   • Highest education level: Not on file   Tobacco Use   • Smoking status: Current Every Day Smoker     Packs/day: 0.25     Years: 59.00     Pack years: 14.75     Types: Cigarettes    • Smokeless tobacco: Never Used   Substance and Sexual Activity   • Alcohol use: No   • Drug use: No   • Sexual activity: Defer       Family History:     Family History   Problem Relation Age of Onset   • No Known Problems Mother    • Heart disease Father    • No Known Problems Sister    • No Known Problems Brother    • No Known Problems Son    • No Known Problems Daughter    • No Known Problems Maternal Grandmother    • No Known Problems Maternal Grandfather    • No Known Problems Paternal Grandmother    • No Known Problems Paternal Grandfather    • No Known Problems Cousin    • Rheum arthritis Neg Hx    • Osteoarthritis Neg Hx    • Asthma Neg Hx    • Diabetes Neg Hx    • Heart failure Neg Hx    • Hyperlipidemia Neg Hx    • Hypertension Neg Hx    • Migraines Neg Hx    • Rashes / Skin problems Neg Hx    • Seizures Neg Hx    • Stroke Neg Hx    • Thyroid disease Neg Hx        Review of Systems:     Review of Systems   Constitutional: Positive for activity change and fatigue. Negative for appetite change, chills and fever.   HENT: Negative for congestion and sinus pressure.    Eyes: Negative for blurred vision and double vision.   Respiratory: Negative for shortness of breath and wheezing.    Gastrointestinal: Negative for abdominal pain, constipation, diarrhea, nausea and vomiting.   Genitourinary: Positive for penile pain and penile swelling. Negative for difficulty urinating, discharge, dysuria, flank pain, frequency, genital sores, hematuria, scrotal swelling, testicular pain, urgency and urinary incontinence.   Musculoskeletal: Negative for back pain.   Neurological: Negative for dizziness, weakness and confusion.   Psychiatric/Behavioral: Positive for stress. Negative for agitation, behavioral problems and decreased concentration. The patient is nervous/anxious.         Physical Exam:     Physical Exam  Constitutional:       General: He is not in acute distress.     Appearance: He is well-developed. He is  obese. He is ill-appearing.   HENT:      Head: Normocephalic and atraumatic.      Right Ear: External ear normal.      Left Ear: External ear normal.   Eyes:      General:         Right eye: No discharge.         Left eye: No discharge.      Conjunctiva/sclera: Conjunctivae normal.      Pupils: Pupils are equal, round, and reactive to light.   Neck:      Thyroid: No thyromegaly.      Trachea: No tracheal deviation.   Cardiovascular:      Rate and Rhythm: Normal rate and regular rhythm.      Heart sounds: No murmur. No friction rub.   Pulmonary:      Effort: Pulmonary effort is normal. No respiratory distress.      Breath sounds: Normal breath sounds. No stridor.   Abdominal:      General: Bowel sounds are normal. There is no distension.      Palpations: Abdomen is soft.      Tenderness: There is abdominal tenderness. There is no guarding.   Genitourinary:     Penis: Normal and uncircumcised. No tenderness or discharge.       Testes: Normal.      Rectum: Normal. Guaiac result negative.   Musculoskeletal:         General: Tenderness present. No deformity. Normal range of motion.      Cervical back: Normal range of motion and neck supple.   Skin:     General: Skin is warm and dry.      Capillary Refill: Capillary refill takes less than 2 seconds.      Coloration: Skin is not pale.   Neurological:      Mental Status: He is alert and oriented to person, place, and time.      Cranial Nerves: No cranial nerve deficit.      Coordination: Coordination normal.   Psychiatric:         Behavior: Behavior normal.         Thought Content: Thought content normal.         Judgment: Judgment normal.         Procedure:   Malhotra Catheter change: I Prepped and Draped the patient in a sterile fashion,  Removed the existing  20 Turkmen coudé tip catheter after deflating the 10 cc filled balloon.      A new 20 Turkmen coudé tip catheter was inserted  In a sterile fashion without any complication. Urine was drained and the balloon was inflated  with a 10cc syringe and the position was checked for security       Assessment/Plan:                                           ASSESSMENT  BPH WITH URINARY RETENTION: Patient tolerated his Malhotra cath change today with MINIMAL discomfort.  We irrigated his replaced catheter with absolutely no resistance.  No sediments were noted in bag.                                                      PLAN   Discussed increasing PO fluid intake, Malhotra cath site care.      We will follow-up in ONE Month for Cath Change.     Patient is agreeable with plan of care.     Patient reports that he is not currently experiencing any symptoms of urinary incontinence.    Patient's Body mass index is 39.03 kg/m². BMI is above normal parameters. Recommendations include: educational material, exercise counseling and nutrition counseling.    Smoking Cessation Counseling:  Current every day smoker. less than 3 minutes spent counseling. Has reduced tobacco use.  I advised patient to quit tobacco use and offered support.  I provided patient with tobacco cessation educational material printed in the patient's After Visit Summary.     Counseling was given to patient for the following topics diagnostic results including: BPH with urine Retention, risk factor reductions including: Smoking Cessation and impressions as follows: .Continue Flomax, Finasteride, Monthly Malhotra Cath Change. The interim medical history and current results were reviewed.  A treatment plan with follow-up was made for  Urinary retention due to benign prostatic hyperplasia [N40.1, R33.8].          This document has been electronically signed by Griselda Cheng-Akwa, APRN March 9, 2021 11:39 EST

## 2021-03-16 ENCOUNTER — OFFICE VISIT (OUTPATIENT)
Dept: CARDIOLOGY | Facility: CLINIC | Age: 76
End: 2021-03-16

## 2021-03-16 VITALS
DIASTOLIC BLOOD PRESSURE: 66 MMHG | SYSTOLIC BLOOD PRESSURE: 100 MMHG | HEIGHT: 70 IN | WEIGHT: 271.8 LBS | HEART RATE: 73 BPM | TEMPERATURE: 97.5 F | BODY MASS INDEX: 38.91 KG/M2

## 2021-03-16 DIAGNOSIS — I10 ESSENTIAL HYPERTENSION: ICD-10-CM

## 2021-03-16 DIAGNOSIS — I42.8 NONISCHEMIC CARDIOMYOPATHY (HCC): Primary | ICD-10-CM

## 2021-03-16 DIAGNOSIS — I48.20 CHRONIC ATRIAL FIBRILLATION (HCC): ICD-10-CM

## 2021-03-16 DIAGNOSIS — I34.0 NONRHEUMATIC MITRAL VALVE REGURGITATION: ICD-10-CM

## 2021-03-16 PROCEDURE — 99214 OFFICE O/P EST MOD 30 MIN: CPT | Performed by: NURSE PRACTITIONER

## 2021-03-16 NOTE — PROGRESS NOTES
Buster Redd MD  Larry Kehr  1945 03/16/2021    Patient Active Problem List   Diagnosis   • Chronic atrial fibrillation   • Hypertension- controlled.    • Tobacco use, states that he has cut back to 3-4 cigarettes a day.   • Morbid obesity (CMS/HCC)   • Nonischemic cardiomyopathy , appears to be compensated.   • Chronic systolic heart failure (CMS/HCC)   • Obstructive uropathy   • Chronic obstructive lung disease (CMS/HCC)   • Atrial fibrillation (CMS/HCC)   • Hypertensive disorder   • Benign prostatic hyperplasia with urinary retention   • PAD (peripheral artery disease) (CMS/HCC)   • Cardiomyopathy (CMS/HCC)       Dear Buster Redd MD:    Subjective     Chief Complaint   Patient presents with   • Follow-up     TO DISCUSS MED CHANGES           History of Present Illness:    Larry Kehr is a 75 y.o. male with a past medical history of nonischemic dilated cardiomyopathy with an EF of 3135%, chronic atrial fibrillation, moderate to severe mitral valve regurgitation.  He presents today for routine cardiology follow-up.  He denies any chest pain, shortness of breath, palpitations, dizziness, or lightheadedness.  Denies any leg edema, orthopnea, or PND.  He has lost weight since his last visit here.  He is tolerating his medications well and monitoring his blood pressure closely at home.  He denies any bleeding issues with Xarelto.  He reports he is still considering cardiac catheterization but remains hesitant about the procedure.          No Known Allergies:      Current Outpatient Medications:   •  carvedilol (COREG) 6.25 MG tablet, Take 1 tablet by mouth 2 (Two) Times a Day., Disp: 60 tablet, Rfl: 5  •  sacubitril-valsartan (Entresto) 24-26 MG tablet, Take 1 tablet by mouth 2 (Two) Times a Day., Disp: 180 tablet, Rfl: 3  •  tamsulosin (FLOMAX) 0.4 MG capsule 24 hr capsule, Take 1 capsule by mouth Daily., Disp: 30 capsule, Rfl: 3  •  Xarelto 20 MG tablet, TAKE 1 TABLET BY MOUTH DAILY WITH DINNER., Disp:  "30 tablet, Rfl: 5  •  clotrimazole-betamethasone (LOTRISONE) 1-0.05 % cream, Apply  topically to the appropriate area as directed Take As Directed., Disp: 45 g, Rfl: 2      The following portions of the patient's history were reviewed and updated as appropriate: allergies, current medications, past family history, past medical history, past social history, past surgical history and problem list.    Social History     Tobacco Use   • Smoking status: Current Every Day Smoker     Packs/day: 0.25     Years: 59.00     Pack years: 14.75     Types: Cigarettes   • Smokeless tobacco: Never Used   Substance Use Topics   • Alcohol use: No   • Drug use: No       Review of Systems   Constitutional: Negative for decreased appetite and malaise/fatigue.   Cardiovascular: Negative for chest pain, dyspnea on exertion, irregular heartbeat, leg swelling, near-syncope, orthopnea, palpitations, paroxysmal nocturnal dyspnea and syncope.   Respiratory: Negative for cough, shortness of breath and wheezing.    Neurological: Negative for dizziness, light-headedness and weakness.       Objective   Vitals:    03/16/21 1330   BP: 100/66   Pulse: 73   Temp: 97.5 °F (36.4 °C)   Weight: 123 kg (271 lb 12.8 oz)   Height: 177.8 cm (70\")     Body mass index is 39 kg/m².        Vitals reviewed.   Constitutional:       Appearance: Healthy appearance. Well-developed and not in distress.   HENT:      Head: Normocephalic and atraumatic.   Pulmonary:      Effort: Pulmonary effort is normal.      Breath sounds: Normal breath sounds. No wheezing. No rales.   Cardiovascular:      Normal rate. Irregularly irregular rhythm.      Murmurs: There is no murmur.      . No S3 and S4 gallop.   Edema:     Peripheral edema absent.   Abdominal:      General: Bowel sounds are normal.      Palpations: Abdomen is soft.   Skin:     General: Skin is warm and dry.   Neurological:      Mental Status: Alert, oriented to person, place, and time and oriented to person, place and " time.   Psychiatric:         Mood and Affect: Mood normal.         Behavior: Behavior normal.         Lab Results   Component Value Date     01/27/2021    K 4.2 01/27/2021     01/27/2021    CO2 22.8 01/27/2021    BUN 11 01/27/2021    CREATININE 1.08 01/27/2021    GLUCOSE 118 (H) 01/27/2021    CALCIUM 9.0 01/27/2021    AST 10 01/27/2021    ALT <5 01/27/2021    ALKPHOS 109 01/27/2021     Lab Results   Component Value Date    CKTOTAL 20 06/14/2020     Lab Results   Component Value Date    WBC 7.99 01/27/2021    HGB 12.4 (L) 01/27/2021    HCT 41.0 01/27/2021     01/27/2021     Lab Results   Component Value Date    INR 1.24 (H) 12/10/2016     Lab Results   Component Value Date    MG 2.3 06/14/2020     Lab Results   Component Value Date    TSH 2.250 06/14/2020    PSA 19.100 (H) 06/15/2020    TRIG 107 02/21/2020    HDL 38 (L) 02/21/2020    LDL 93 02/21/2020      Lab Results   Component Value Date    .0 (H) 01/05/2017           Procedures      Assessment/Plan    Diagnosis Plan   1. Nonischemic cardiomyopathy with LV ejection fraction of about 35%, appears to be compensated.     2. Nonrheumatic mitral valve regurgitation (moderate to severe)     3. Chronic atrial fibrillation, on Xarelto for stroke prevention.     4. Essential hypertension                  Recommendations:    1. Again, have discussed the procedure of cardiac catheterization at length with the patient.  He does need evaluation for underlying occult myocardial ischemia given his worsening cardiomyopathy.  He also needs evaluation of his mitral valve.  Again, he states he wants to consider all of this.  I have given him literature today regarding the procedure.  He will let us know if he decides to proceed with this.  2. For now, we will continue with the carvedilol and Entresto for his cardiomyopathy.  I am unable to add spironolactone due to blood pressure.  3. Continue with current dose of Xarelto.  4. Follow-up in 2 months or  sooner if needed.        Return in about 2 months (around 5/16/2021) for Recheck.    As always, I appreciate very much the opportunity to participate in the cardiovascular care of your patients.      With Best Regards,    IDA Poole

## 2021-03-16 NOTE — PATIENT INSTRUCTIONS
Coronary Angiogram  A coronary angiogram is an X-ray procedure that is used to examine the arteries in the heart. Contrast dye is injected through a long, thin tube (catheter) into these arteries. Then X-rays are taken to show any blockage in these arteries.  You may have this procedure if you:  · Are having chest pain, or other symptoms of angina, and you are at risk for heart disease.  · Have an abnormal stress test or test of your heart's electrical activity (electrocardiogram, or ECG).  · Have chest pain and heart failure.  · Are having irregular heart rhythms.  A coronary angiogram or heart catheterization can show if you have valve disease or a disease of the aorta. This procedure can also be used to check the overall function of your heart muscle.  Let your health care provider know about:  · Any allergies you have, including allergies to medicines or contrast dye.  · All medicines you are taking, including vitamins, herbs, eye drops, creams, and over-the-counter medicines.  · Any problems you or family members have had with anesthetic medicines.  · Any blood disorders you have.  · Any surgeries you have had.  · Any history of kidney problems or kidney failure.  · Any medical conditions you have.  · Whether you are pregnant or may be pregnant.  · Whether you are breastfeeding.  What are the risks?  Generally, this is a safe procedure. However, problems may occur, including:  · Infection.  · Allergic reaction to medicines or dyes that are used.  · Bleeding from the insertion site or other places.  · Damage to nearby structures, such as blood vessels, or damage to kidneys from contrast dye.  · Irregular heart rhythms.  · Stroke (rare).  · Heart attack (rare).  What happens before the procedure?  Staying hydrated  Follow instructions from your health care provider about hydration, which may include:  · Up to 2 hours before the procedure - you may continue to drink clear liquids, such as water, clear fruit juice,  black coffee, and plain tea.    Eating and drinking restrictions  Follow instructions from your health care provider about eating and drinking, which may include:  · 8 hours before the procedure - stop eating heavy meals or foods, such as meat, fried foods, or fatty foods.  · 6 hours before the procedure - stop eating light meals or foods, such as toast or cereal.  · 6 hours before the procedure - stop drinking milk or drinks that contain milk.  · 2 hours before the procedure - stop drinking clear liquids.  Medicines  Ask your health care provider about:  · Changing or stopping your regular medicines. This is especially important if you are taking diabetes medicines or blood thinners.  · Taking medicines such as aspirin and ibuprofen. These medicines can thin your blood. Do not take these medicines unless your health care provider tells you to take them. Aspirin may be recommended before coronary angiograms even if you do not normally take it.  · Taking over-the-counter medicines, vitamins, herbs, and supplements.  General instructions  · Do not use any products that contain nicotine or tobacco for at least 4 weeks before the procedure. These products include cigarettes, e-cigarettes, and chewing tobacco. If you need help quitting, ask your health care provider.  · You may have an exam or testing.  · Plan to have someone take you home from the hospital or clinic.  · If you will be going home right after the procedure, plan to have someone with you for 24 hours.  · Ask your health care provider:  ? How your insertion site will be marked.  ? What steps will be taken to help prevent infection. These may include:  § Removing hair at the insertion site.  § Washing skin with a germ-killing soap.  § Taking antibiotic medicine.  What happens during the procedure?    · You will lie on your back on an X-ray table.  · An IV will be inserted into one of your veins.  · Electrodes will be placed on your chest.  · You will be  given one or more of the following:  ? A medicine to help you relax (sedative).  ? A medicine to numb the catheter insertion area (local anesthetic).  · You will be connected to a continuous ECG monitor.  · The catheter will be inserted into an artery in one of these areas:  ? Your groin area in your upper thigh.  ? Your wrist.  ? The fold of your arm, near your elbow.  · An X-ray procedure (fluoroscopy) will be used to help guide the catheter to the opening of the blood vessel to be used.  · A dye will be injected into the catheter and X-rays will be taken. The dye will help to show any narrowing or blockages in the heart arteries.  · Tell your health care provider if you have chest pain or trouble breathing.  · If blockages are found, another procedure may be done to open the artery.  · The catheter will be removed after the fluoroscopy is complete.  · A bandage (dressing) will be placed over the insertion site. Pressure will be applied to stop bleeding.  · The IV will be removed.  The procedure may vary among health care providers and hospitals.  What happens after the procedure?  · Your blood pressure, heart rate, breathing rate, and blood oxygen level will be monitored until you leave the hospital or clinic.  · You will need to lie still for a few hours, or for as long as told by your health care provider.  ? If the procedure is done through the groin, you will be told not to bend or cross your legs.  · The insertion site and the pulse in your foot or wrist will be checked often.  · More blood tests, X-rays, and an ECG may be done.  · Do not drive for 24 hours if you were given a sedative during your procedure.  Summary  · A coronary angiogram is an X-ray procedure that is used to examine the arteries in the heart.  · Contrast dye is injected through a long, thin tube (catheter) into each artery.  · Tell your health care provider about any allergies you have, including allergies to contrast dye.  · After the  procedure, you will need to lie still for a few hours and drink plenty of fluids.  This information is not intended to replace advice given to you by your health care provider. Make sure you discuss any questions you have with your health care provider.  Document Revised: 07/09/2020 Document Reviewed: 07/09/2020  Elsevier Patient Education © 2020 [employer]

## 2021-04-09 ENCOUNTER — OFFICE VISIT (OUTPATIENT)
Dept: UROLOGY | Facility: CLINIC | Age: 76
End: 2021-04-09

## 2021-04-09 VITALS — WEIGHT: 271 LBS | TEMPERATURE: 96.9 F | BODY MASS INDEX: 38.8 KG/M2 | HEIGHT: 70 IN

## 2021-04-09 DIAGNOSIS — Z46.6 ENCOUNTER FOR FOLEY CATHETER REPLACEMENT: ICD-10-CM

## 2021-04-09 DIAGNOSIS — R33.8 BENIGN PROSTATIC HYPERPLASIA WITH URINARY RETENTION: Primary | Chronic | ICD-10-CM

## 2021-04-09 DIAGNOSIS — N40.1 BENIGN PROSTATIC HYPERPLASIA WITH URINARY RETENTION: Primary | Chronic | ICD-10-CM

## 2021-04-09 PROCEDURE — 51702 INSERT TEMP BLADDER CATH: CPT | Performed by: NURSE PRACTITIONER

## 2021-04-09 PROCEDURE — 99213 OFFICE O/P EST LOW 20 MIN: CPT | Performed by: NURSE PRACTITIONER

## 2021-04-09 NOTE — PROGRESS NOTES
"Chief Complaint  Urinary Retention due to BPH (1 mnth cath change)    Subjective          Larry Kehr presents to [facility]  History of Present Illness  Mr. Larry KEhr is a pleasant 75-year-old male significant history of BPH with urinary retention who returns to clinic today for his Malhotra cath change.  He is in no apparent distress today but reports bladder spasms.  We replaced his cath with a #20 coudé tip catheter.  Patient tolerated procedure with minimal discomfort.  Objective   Vital Signs:   Temp 96.9 °F (36.1 °C)   Ht 177.8 cm (70\")   Wt 123 kg (271 lb)   BMI 38.88 kg/m²     Physical Exam  Constitutional:       General: He is in acute distress.      Appearance: He is well-developed. He is obese. He is ill-appearing.   HENT:      Head: Normocephalic and atraumatic.      Right Ear: External ear normal.      Left Ear: External ear normal.   Eyes:      General:         Right eye: No discharge.         Left eye: No discharge.      Conjunctiva/sclera: Conjunctivae normal.      Pupils: Pupils are equal, round, and reactive to light.   Neck:      Thyroid: No thyromegaly.      Trachea: No tracheal deviation.   Cardiovascular:      Rate and Rhythm: Normal rate and regular rhythm.      Heart sounds: No murmur heard.   No friction rub.   Pulmonary:      Effort: Pulmonary effort is normal. No respiratory distress.      Breath sounds: Normal breath sounds. No stridor.   Abdominal:      General: Bowel sounds are normal. There is distension.      Palpations: Abdomen is soft.      Tenderness: There is abdominal tenderness. There is guarding.   Genitourinary:     Penis: Normal and uncircumcised. No tenderness or discharge.       Testes: Normal.      Rectum: Normal. Guaiac result negative.      Comments: Perineal yeast dermatitis  Musculoskeletal:         General: Tenderness present. No deformity. Normal range of motion.      Cervical back: Normal range of motion and neck " supple.   Skin:     General: Skin is warm and dry.      Capillary Refill: Capillary refill takes less than 2 seconds.      Coloration: Skin is not pale.   Neurological:      Mental Status: He is alert and oriented to person, place, and time.      Cranial Nerves: No cranial nerve deficit.      Coordination: Coordination normal.   Psychiatric:         Behavior: Behavior normal.         Thought Content: Thought content normal.         Judgment: Judgment normal.         PROCEDURE  Malhotra Catheter change: I Prepped and Draped the patient in a sterile fashion,  Removed the existing  20 Spanish coudé tip catheter after deflating the 10 cc filled balloon.      A new 20 Spanish coudé tip catheter was inserted  In a sterile fashion without any complication. Urine was drained and the balloon was inflated with a 10cc syringe and the position was checked for security       Result Review :                 Assessment and Plan    Diagnoses and all orders for this visit:    1. Benign prostatic hyperplasia with urinary retention (Primary)    2. Encounter for Malhotra catheter replacement         BPH WITH URINARY RETENTION: Patient tolerated his Malhotra cath change today with MINIMAL discomfort.  We irrigated his replaced catheter with absolutely no resistance.  No sediments were noted in bag.                                                      PLAN   Discussed increasing PO fluid intake, Malhotra cath site care.      We will follow-up in ONE Month for Cath Change.     Patient is agreeable with plan of care.     Patient reports that he is not currently experiencing any symptoms of urinary incontinence.     Patient's Body mass index is 39.03 kg/m². BMI is above normal parameters. Recommendations include: educational material, exercise counseling and nutrition counseling.     Smoking Cessation Counseling:  Current every day smoker. less than 3 minutes spent counseling. Has reduced tobacco use.  I advised patient to quit tobacco use and offered  support.  I provided patient with tobacco cessation educational material printed in the patient's After Visit Summary.          Follow Up     Return in about 1 month (around 5/9/2021) for Next scheduled follow up , Malhotra Catheter Change.     Patient was given instructions and counseling regarding his condition or for health maintenance advice. Please see specific information pulled into the AVS if appropriate.

## 2021-04-12 PROBLEM — Z46.6 ENCOUNTER FOR FOLEY CATHETER REPLACEMENT: Status: ACTIVE | Noted: 2021-04-12

## 2021-04-13 NOTE — PATIENT INSTRUCTIONS
Indwelling Urinary Catheter Care, Adult  An indwelling urinary catheter is a thin tube that is put into your bladder. The tube helps to drain pee (urine) out of your body. The tube goes in through your urethra. Your urethra is where pee comes out of your body. Your pee will come out through the catheter, then it will go into a bag (drainage bag).  Take good care of your catheter so it will work well.  How to wear your catheter and bag  Supplies needed  · Sticky tape (adhesive tape) or a leg strap.  · Alcohol wipe or soap and water (if you use tape).  · A clean towel (if you use tape).  · Large overnight bag.  · Smaller bag (leg bag).  Wearing your catheter  Attach your catheter to your leg with tape or a leg strap.  · Make sure the catheter is not pulled tight.  · If a leg strap gets wet, take it off and put on a dry strap.  · If you use tape to hold the bag on your le. Use an alcohol wipe or soap and water to wash your skin where the tape made it sticky before.  2. Use a clean towel to pat-dry that skin.  3. Use new tape to make the bag stay on your leg.  Wearing your bags  You should have been given a large overnight bag.  · You may wear the overnight bag in the day or night.  · Always have the overnight bag lower than your bladder.  Do not let the bag touch the floor.  · Before you go to sleep, put a clean plastic bag in a wastebasket. Then hang the overnight bag inside the wastebasket.  You should also have a smaller leg bag that fits under your clothes.  · Always wear the leg bag below your knee.  · Do not wear your leg bag at night.  How to care for your skin and catheter  Supplies needed  · A clean washcloth.  · Water and mild soap.  · A clean towel.  Caring for your skin and catheter         · Clean the skin around your catheter every day:  1. Wash your hands with soap and water.  2. Wet a clean washcloth in warm water and mild soap.  3. Clean the skin around your urethra.  § If you are  female:  § Gently spread the folds of skin around your vagina (labia).  § With the washcloth in your other hand, wipe the inner side of your labia on each side. Wipe from front to back.  § If you are male:  § Pull back any skin that covers the end of your penis (foreskin).  § With the washcloth in your other hand, wipe your penis in small circles. Start wiping at the tip of your penis, then move away from the catheter.  § Move the foreskin back in place, if needed.  4. With your free hand, hold the catheter close to where it goes into your body.  § Keep holding the catheter during cleaning so it does not get pulled out.  5. With the washcloth in your other hand, clean the catheter.  § Only wipe downward on the catheter.  § Do not wipe upward toward your body. Doing this may push germs into your urethra and cause infection.  6. Use a clean towel to pat-dry the catheter and the skin around it. Make sure to wipe off all soap.  7. Wash your hands with soap and water.  · Shower every day. Do not take baths.  · Do not use cream, ointment, or lotion on the area where the catheter goes into your body, unless your doctor tells you to.  · Do not use powders, sprays, or lotions on your genital area.  · Check your skin around the catheter every day for signs of infection. Check for:  ? Redness, swelling, or pain.  ? Fluid or blood.  ? Warmth.  ? Pus or a bad smell.  How to empty the bag  Supplies needed  · Rubbing alcohol.  · Gauze pad or cotton ball.  · Tape or a leg strap.  Emptying the bag  Pour the pee out of your bag when it is ?-½ full, or at least 2-3 times a day. Do this for your overnight bag and your leg bag.  1. Wash your hands with soap and water.  2. Separate (detach) the bag from your leg.  3. Hold the bag over the toilet or a clean pail. Keep the bag lower than your hips and bladder. This is so the pee (urine) does not go back into the tube.  4. Open the pour spout. It is at the bottom of the bag.  5. Empty the  pee into the toilet or pail. Do not let the pour spout touch any surface.  6. Put rubbing alcohol on a gauze pad or cotton ball.  7. Use the gauze pad or cotton ball to clean the pour spout.  8. Close the pour spout.  9. Attach the bag to your leg with tape or a leg strap.  10. Wash your hands with soap and water.  Follow instructions for cleaning the drainage bag:  · From the product maker.  · As told by your doctor.  How to change the bag  Supplies needed  · Alcohol wipes.  · A clean bag.  · Tape or a leg strap.  Changing the bag  Replace your bag when it starts to leak, smell bad, or look dirty.  1. Wash your hands with soap and water.  2. Separate the dirty bag from your leg.  3. Pinch the catheter with your fingers so that pee does not spill out.  4. Separate the catheter tube from the bag tube where these tubes connect (at the connection valve). Do not let the tubes touch any surface.  5. Clean the end of the catheter tube with an alcohol wipe. Use a different alcohol wipe to clean the end of the bag tube.  6. Connect the catheter tube to the tube of the clean bag.  7. Attach the clean bag to your leg with tape or a leg strap. Do not make the bag tight on your leg.  8. Wash your hands with soap and water.  General rules    · Never pull on your catheter. Never try to take it out. Doing that can hurt you.  · Always wash your hands before and after you touch your catheter or bag. Use a mild, fragrance-free soap. If you do not have soap and water, use hand .  · Always make sure there are no twists or bends (kinks) in the catheter tube.  · Always make sure there are no leaks in the catheter or bag.  · Drink enough fluid to keep your pee pale yellow.  · Do not take baths, swim, or use a hot tub.  · If you are female, wipe from front to back after you poop (have a bowel movement).  Contact a doctor if:  · Your pee is cloudy.  · Your pee smells worse than usual.  · Your catheter gets clogged.  · Your catheter  leaks.  · Your bladder feels full.  Get help right away if:  · You have redness, swelling, or pain where the catheter goes into your body.  · You have fluid, blood, pus, or a bad smell coming from the area where the catheter goes into your body.  · Your skin feels warm where the catheter goes into your body.  · You have a fever.  · You have pain in your:  ? Belly (abdomen).  ? Legs.  ? Lower back.  ? Bladder.  · You see blood in the catheter.  · Your pee is pink or red.  · You feel sick to your stomach (nauseous).  · You throw up (vomit).  · You have chills.  · Your pee is not draining into the bag.  · Your catheter gets pulled out.  Summary  · An indwelling urinary catheter is a thin tube that is placed into the bladder to help drain pee (urine) out of the body.  · The catheter is placed into the part of the body that drains pee from the bladder (urethra).  · Taking good care of your catheter will keep it working properly and help prevent problems.  · Always wash your hands before and after touching your catheter or bag.  · Never pull on your catheter or try to take it out.  This information is not intended to replace advice given to you by your health care provider. Make sure you discuss any questions you have with your health care provider.  Document Revised: 04/10/2020 Document Reviewed: 08/03/2018  Elsevier Patient Education © 2021 [employer]  Neurogenic Bladder    Neurogenic bladder is a bladder control disorder. It is usually caused by problems with the nerves that control the bladder. Your brain sends signals through your spinal cord to the muscles in your bladder that start and stop urine flow. If you have neurogenic bladder, the nerves and muscles do not work together the way they should.  This condition may make the bladder overactive, meaning you have trouble holding urine. In other cases, it may make the bladder underactive, meaning you have trouble passing urine.  What are the causes?  This  condition may be caused by any kind of nerve damage or condition that disrupts the signals from your brain to your bladder. Many things can cause these nerve problems, including:  · A disease that affects the nervous system, such as:  ? Alzheimer disease.  ? Cerebral palsy.  ? Multiple sclerosis.  ? Diabetes.  ? Parkinson disease.  · Damage to your brain or spinal cord. This can come from:  ? Trauma.  ? Tumors.  ? Infection.  ? Surgery.  ? Alcohol abuse.  ? Stroke.  ? A congenital disability that affects the spinal cord.  What increases the risk?  You are more likely to develop this condition if you have nerve damage or a nerve disorder.  What are the signs or symptoms?  Signs and symptoms of this condition include:  · Leaking or gushing urine (incontinence).  · A sudden, strong urge to pass urine (urgency).  · Frequent urination during the day and night.  · Being unable to empty your bladder completely (urinary retention).  · Frequent urinary tract infections.  How is this diagnosed?  This condition may be diagnosed based on:  · Your symptoms and medical history.  · A physical exam.  · Results of a bladder diary. You may be asked to keep a record of your bladder symptoms and the times that you urinate.  You may also have tests, such as:  · A urine test to check for infection.  · A bladder scan after you urinate to see how much urine is left in your bladder.  · Tests to measure your urine flow and see how well the flow is controlled (urodynamic tests).  · A procedure that uses a small device with a camera to look through your urethra into your bladder (cystoscopy). A health care provider who specializes in the urinary tract (urologist) may do this test.  · Imaging tests of your brain or spine, such as MRI or CT.  How is this treated?  Treatment for this condition depends on the cause and the symptoms that you have. Work closely with your health care provider to find the treatments that will improve your quality of  life. Treatment options include:  · Learning ways to control when you urinate, such as:  ? Urinating at scheduled times.  ? Training yourself to delay urination.  ? Doing exercises to strengthen the muscles that control urine flow (Kegel exercises).  ? Avoiding foods or drinks that make your symptoms worse.  · Taking medicines to:  ? Stimulate an underactive bladder.  ? Relax an overactive bladder.  ? Treat a urinary tract infection.  · Learning how to use a thin tube (catheter) to empty your bladder. A catheter is a hollow tube that you pass through your urethra.  · Procedures to stimulate the nerves that control your bladder.  · Surgery, if other treatments do not help.  Follow these instructions at home:  Lifestyle  · Keep a bladder diary to find out which foods, liquids, or activities make your symptoms worse.  · Use your bladder diary to schedule bathroom trips. If you are away from home, plan to be near a bathroom when your schedule says you will need one.  · Limit your drinking of beverages that stimulate urination. These include soda, coffee, and tea.  · After urinating, wait a few minutes and try again (double voiding).  · Make sure you urinate just before you leave the house and just before you go to bed.  Kegel exercises  Do Kegel exercises to strengthen the muscles that control the passing of urine. These muscles are the ones you use to try to hold urine when you need to urinate. To do Kegel exercises:  1. Squeeze your pelvic floor muscles tight, as if you are trying to stop the flow of urine. You should feel a tight lift in your rectal area. If you are female, you should also feel a tightness in your vaginal area. Keep your stomach, buttocks, and legs relaxed.  2. Hold the muscles tight for 5-10 seconds.  3. Relax your muscles for the same amount of time.  4. Repeat 10 times.  Repeat this exercise 3 times a day or as many times as told by your health care provider.  General instructions  · Take  over-the-counter and prescription medicines only as told by your health care provider.  · Keep all follow-up visits as told by your health care provider. This is important.  Contact a health care provider if:  · You are having a hard time controlling your symptoms.  · Your symptoms are getting worse.  · You have signs of a urinary tract infection. These may include:  ? A burning feeling when you urinate.  ? Chills.  ? Fever.  Get help right away if:  · You cannot pass urine.  Summary  · Neurogenic bladder is a bladder control disorder caused by problems with the nerves that control the bladder. This condition may make the bladder overactive or underactive.  · This condition may be caused by any kind of nerve damage or condition that disrupts the signals from your brain to your bladder.  · Treatment depends on the cause of your neurogenic bladder and the symptoms that you have. Work closely with your health care provider to find the treatments that will improve your quality of life.  This information is not intended to replace advice given to you by your health care provider. Make sure you discuss any questions you have with your health care provider.  Document Revised: 12/31/2018 Document Reviewed: 12/31/2018  [employer] Patient Education © 2021 [employer]  Acute Urinary Retention, Male    Acute urinary retention means that you cannot pee (urinate) at all, or that you pee too little and your bladder is not emptied completely. If it is not treated, it can lead to kidney damage or other serious problems.  Follow these instructions at home:  · Take over-the-counter and prescription medicines only as told by your doctor. Ask your doctor what medicines you should stay away from. Do not take any medicine unless your doctor says it is okay to do so.  · If you were sent home with a tube that drains the bladder (catheter), take care of it as told by your doctor.  · Drink enough fluid to keep your pee clear or pale  yellow.  · If you were given an antibiotic, take it as told by your doctor. Do not stop taking the antibiotic even if you start to feel better.  · Do not use any products that contain nicotine or tobacco, such as cigarettes and e-cigarettes. If you need help quitting, ask your doctor.  · Watch for changes in your symptoms. Tell your doctor about them.  · If told, track changes in your blood pressure at home. Tell your doctor about them.  · Keep all follow-up visits as told by your doctor. This is important.  Contact a doctor if:  · You have spasms or you leak pee when you have spasms.  Get help right away if:  · You have chills or a fever.  · You have a tube that drains the bladder and:  ? The tube stops draining pee.  ? The tube falls out.  · You have blood in your pee.  Summary  · Acute urinary retention means that you have problems peeing. It may mean that you cannot pee at all, or that you pee too little.  · If this condition is not treated, it can lead to kidney damage or other serious problems.  · If you were sent home with a tube that drains the bladder, take care of it as told by your doctor.  · Monitor any changes in your symptoms. Tell your doctor about any changes.  This information is not intended to replace advice given to you by your health care provider. Make sure you discuss any questions you have with your health care provider.  Document Revised: 12/22/2020 Document Reviewed: 01/19/2018  [employer] Patient Education © 2021 [employer]

## 2021-04-22 ENCOUNTER — TELEPHONE (OUTPATIENT)
Dept: UROLOGY | Facility: CLINIC | Age: 76
End: 2021-04-22

## 2021-04-22 DIAGNOSIS — Z46.6 ENCOUNTER FOR FOLEY CATHETER REPLACEMENT: ICD-10-CM

## 2021-04-22 DIAGNOSIS — N48.0 BALANITIS XEROTICA OBLITERANS: ICD-10-CM

## 2021-04-22 RX ORDER — CLOTRIMAZOLE AND BETAMETHASONE DIPROPIONATE 10; .64 MG/G; MG/G
CREAM TOPICAL TAKE AS DIRECTED
Qty: 45 G | Refills: 3 | Status: SHIPPED | OUTPATIENT
Start: 2021-04-22 | End: 2021-07-23 | Stop reason: SDUPTHER

## 2021-05-04 RX ORDER — CARVEDILOL 6.25 MG/1
TABLET ORAL
Qty: 60 TABLET | Refills: 3 | Status: SHIPPED | OUTPATIENT
Start: 2021-05-04 | End: 2021-09-09 | Stop reason: SDUPTHER

## 2021-05-10 ENCOUNTER — OFFICE VISIT (OUTPATIENT)
Dept: UROLOGY | Facility: CLINIC | Age: 76
End: 2021-05-10

## 2021-05-10 VITALS — TEMPERATURE: 96.7 F | BODY MASS INDEX: 38.65 KG/M2 | HEIGHT: 70 IN | WEIGHT: 270 LBS

## 2021-05-10 DIAGNOSIS — Z46.6 ENCOUNTER FOR FOLEY CATHETER REPLACEMENT: ICD-10-CM

## 2021-05-10 DIAGNOSIS — R33.8 BENIGN PROSTATIC HYPERPLASIA WITH URINARY RETENTION: Primary | ICD-10-CM

## 2021-05-10 DIAGNOSIS — N40.1 BENIGN PROSTATIC HYPERPLASIA WITH URINARY RETENTION: Primary | ICD-10-CM

## 2021-05-10 PROCEDURE — 51702 INSERT TEMP BLADDER CATH: CPT | Performed by: NURSE PRACTITIONER

## 2021-05-10 PROCEDURE — 99213 OFFICE O/P EST LOW 20 MIN: CPT | Performed by: NURSE PRACTITIONER

## 2021-05-10 NOTE — PROGRESS NOTES
"Chief Complaint  BPH with urinary retention (monthly cath change )    Subjective          Larry Kehr presents to [facility]  History of Present Illness     Pleasant 75-year-old male with significant history of BPH with urinary retention, returns to clinic today for monthly Malhotra catheter change.  Patient is in no apparent distress, tolerated Malhotra catheter change #20 coudé tip catheter with minimal discomfort.    Also significant improvement noted to perineal yeast dermatitis around groin folds.  She is utilizing nystatin cream as ordered.    Objective   Vital Signs:   Temp 96.7 °F (35.9 °C)   Ht 177.8 cm (70\")   Wt 122 kg (270 lb)   BMI 38.74 kg/m²     Physical Exam  Constitutional:       General: He is not in acute distress.     Appearance: He is well-developed. He is obese. He is ill-appearing.   HENT:      Head: Normocephalic and atraumatic.      Right Ear: External ear normal.      Left Ear: External ear normal.   Eyes:      General:         Right eye: No discharge.         Left eye: No discharge.      Conjunctiva/sclera: Conjunctivae normal.      Pupils: Pupils are equal, round, and reactive to light.   Neck:      Thyroid: No thyromegaly.      Trachea: No tracheal deviation.   Cardiovascular:      Rate and Rhythm: Normal rate and regular rhythm.      Heart sounds: No murmur heard.   No friction rub.   Pulmonary:      Effort: Pulmonary effort is normal. No respiratory distress.      Breath sounds: Normal breath sounds. No stridor.   Abdominal:      General: Bowel sounds are normal. There is distension.      Palpations: Abdomen is soft.      Tenderness: There is abdominal tenderness. There is guarding.   Genitourinary:     Penis: Normal and uncircumcised. No tenderness or discharge.       Testes: Normal.      Rectum: Normal. Guaiac result negative.   Musculoskeletal:         General: Tenderness present. No deformity. Normal range of motion.      Cervical back: " Normal range of motion and neck supple.   Skin:     General: Skin is warm and dry.      Capillary Refill: Capillary refill takes less than 2 seconds.      Coloration: Skin is pale.   Neurological:      Mental Status: He is alert and oriented to person, place, and time.      Cranial Nerves: No cranial nerve deficit.      Coordination: Coordination normal.   Psychiatric:         Behavior: Behavior normal.         Thought Content: Thought content normal.         Judgment: Judgment normal.         PROCEDURE  Malhotra Catheter change: I Prepped and Draped the patient in a sterile fashion,  Removed the existing  20 Monegasque coudé tip catheter after deflating the 10 cc filled balloon.     A new 20 Monegasque coudé tip catheter was inserted  In a sterile fashion without any complication. Urine was drained and the balloon was inflated with a 10cc syringe and the position was checked for security.    Result Review :                 Assessment and Plan {CC Problem List  Visit Diagnosis  ROS  Review (Popup)  [facility]  Quality  BestPractice  Medications  SmartSets  SnapShot Encounters  Media :23}   Diagnoses and all orders for this visit:    1. Benign prostatic hyperplasia with urinary retention (Primary)    2. Encounter for Malhtora catheter replacement    BPH WITH URINARY RETENTION: Patient tolerated his Malhotra cath change today with MINIMAL discomfort.  We irrigated his replaced catheter with absolutely no resistance.  No sediments were noted in bag.  Patient tolerated his Malhotra cath change for minimal discomfort today.                                                     PLAN   Discussed increasing PO fluid intake, Malhotra cath site care.  Continue perineal care     We will follow-up in ONE Month for Cath Change.     Patient is agreeable with plan of care.     Patient reports that he is not currently experiencing any symptoms of urinary incontinence.     Patient's Body mass index is 39.03 kg/m². BMI is above normal  parameters. Recommendations include: educational material, exercise counseling and nutrition counseling.       Follow Up   Return in about 1 month (around 6/10/2021) for Next scheduled follow up, Malhotra Catheter Change.  Patient was given instructions and counseling regarding his condition or for health maintenance advice. Please see specific information pulled into the AVS if appropriate.

## 2021-05-13 NOTE — PATIENT INSTRUCTIONS
Indwelling Urinary Catheter Care, Adult  An indwelling urinary catheter is a thin tube that is put into your bladder. The tube helps to drain pee (urine) out of your body. The tube goes in through your urethra. Your urethra is where pee comes out of your body. Your pee will come out through the catheter, then it will go into a bag (drainage bag).  Take good care of your catheter so it will work well.  How to wear your catheter and bag  Supplies needed  · Sticky tape (adhesive tape) or a leg strap.  · Alcohol wipe or soap and water (if you use tape).  · A clean towel (if you use tape).  · Large overnight bag.  · Smaller bag (leg bag).  Wearing your catheter  Attach your catheter to your leg with tape or a leg strap.  · Make sure the catheter is not pulled tight.  · If a leg strap gets wet, take it off and put on a dry strap.  · If you use tape to hold the bag on your le. Use an alcohol wipe or soap and water to wash your skin where the tape made it sticky before.  2. Use a clean towel to pat-dry that skin.  3. Use new tape to make the bag stay on your leg.  Wearing your bags  You should have been given a large overnight bag.  · You may wear the overnight bag in the day or night.  · Always have the overnight bag lower than your bladder.  Do not let the bag touch the floor.  · Before you go to sleep, put a clean plastic bag in a wastebasket. Then hang the overnight bag inside the wastebasket.  You should also have a smaller leg bag that fits under your clothes.  · Always wear the leg bag below your knee.  · Do not wear your leg bag at night.  How to care for your skin and catheter  Supplies needed  · A clean washcloth.  · Water and mild soap.  · A clean towel.  Caring for your skin and catheter         · Clean the skin around your catheter every day:  1. Wash your hands with soap and water.  2. Wet a clean washcloth in warm water and mild soap.  3. Clean the skin around your urethra.  § If you are  female:  § Gently spread the folds of skin around your vagina (labia).  § With the washcloth in your other hand, wipe the inner side of your labia on each side. Wipe from front to back.  § If you are male:  § Pull back any skin that covers the end of your penis (foreskin).  § With the washcloth in your other hand, wipe your penis in small circles. Start wiping at the tip of your penis, then move away from the catheter.  § Move the foreskin back in place, if needed.  4. With your free hand, hold the catheter close to where it goes into your body.  § Keep holding the catheter during cleaning so it does not get pulled out.  5. With the washcloth in your other hand, clean the catheter.  § Only wipe downward on the catheter.  § Do not wipe upward toward your body. Doing this may push germs into your urethra and cause infection.  6. Use a clean towel to pat-dry the catheter and the skin around it. Make sure to wipe off all soap.  7. Wash your hands with soap and water.  · Shower every day. Do not take baths.  · Do not use cream, ointment, or lotion on the area where the catheter goes into your body, unless your doctor tells you to.  · Do not use powders, sprays, or lotions on your genital area.  · Check your skin around the catheter every day for signs of infection. Check for:  ? Redness, swelling, or pain.  ? Fluid or blood.  ? Warmth.  ? Pus or a bad smell.  How to empty the bag  Supplies needed  · Rubbing alcohol.  · Gauze pad or cotton ball.  · Tape or a leg strap.  Emptying the bag  Pour the pee out of your bag when it is ?-½ full, or at least 2-3 times a day. Do this for your overnight bag and your leg bag.  1. Wash your hands with soap and water.  2. Separate (detach) the bag from your leg.  3. Hold the bag over the toilet or a clean pail. Keep the bag lower than your hips and bladder. This is so the pee (urine) does not go back into the tube.  4. Open the pour spout. It is at the bottom of the bag.  5. Empty the  pee into the toilet or pail. Do not let the pour spout touch any surface.  6. Put rubbing alcohol on a gauze pad or cotton ball.  7. Use the gauze pad or cotton ball to clean the pour spout.  8. Close the pour spout.  9. Attach the bag to your leg with tape or a leg strap.  10. Wash your hands with soap and water.  Follow instructions for cleaning the drainage bag:  · From the product maker.  · As told by your doctor.  How to change the bag  Supplies needed  · Alcohol wipes.  · A clean bag.  · Tape or a leg strap.  Changing the bag  Replace your bag when it starts to leak, smell bad, or look dirty.  1. Wash your hands with soap and water.  2. Separate the dirty bag from your leg.  3. Pinch the catheter with your fingers so that pee does not spill out.  4. Separate the catheter tube from the bag tube where these tubes connect (at the connection valve). Do not let the tubes touch any surface.  5. Clean the end of the catheter tube with an alcohol wipe. Use a different alcohol wipe to clean the end of the bag tube.  6. Connect the catheter tube to the tube of the clean bag.  7. Attach the clean bag to your leg with tape or a leg strap. Do not make the bag tight on your leg.  8. Wash your hands with soap and water.  General rules    · Never pull on your catheter. Never try to take it out. Doing that can hurt you.  · Always wash your hands before and after you touch your catheter or bag. Use a mild, fragrance-free soap. If you do not have soap and water, use hand .  · Always make sure there are no twists or bends (kinks) in the catheter tube.  · Always make sure there are no leaks in the catheter or bag.  · Drink enough fluid to keep your pee pale yellow.  · Do not take baths, swim, or use a hot tub.  · If you are female, wipe from front to back after you poop (have a bowel movement).  Contact a doctor if:  · Your pee is cloudy.  · Your pee smells worse than usual.  · Your catheter gets clogged.  · Your catheter  leaks.  · Your bladder feels full.  Get help right away if:  · You have redness, swelling, or pain where the catheter goes into your body.  · You have fluid, blood, pus, or a bad smell coming from the area where the catheter goes into your body.  · Your skin feels warm where the catheter goes into your body.  · You have a fever.  · You have pain in your:  ? Belly (abdomen).  ? Legs.  ? Lower back.  ? Bladder.  · You see blood in the catheter.  · Your pee is pink or red.  · You feel sick to your stomach (nauseous).  · You throw up (vomit).  · You have chills.  · Your pee is not draining into the bag.  · Your catheter gets pulled out.  Summary  · An indwelling urinary catheter is a thin tube that is placed into the bladder to help drain pee (urine) out of the body.  · The catheter is placed into the part of the body that drains pee from the bladder (urethra).  · Taking good care of your catheter will keep it working properly and help prevent problems.  · Always wash your hands before and after touching your catheter or bag.  · Never pull on your catheter or try to take it out.  This information is not intended to replace advice given to you by your health care provider. Make sure you discuss any questions you have with your health care provider.  Document Revised: 04/10/2020 Document Reviewed: 08/03/2018  Elsevier Patient Education © 2021 [employer]

## 2021-05-25 ENCOUNTER — OFFICE VISIT (OUTPATIENT)
Dept: CARDIOLOGY | Facility: CLINIC | Age: 76
End: 2021-05-25

## 2021-05-25 VITALS
HEIGHT: 69 IN | BODY MASS INDEX: 40.46 KG/M2 | WEIGHT: 273.2 LBS | SYSTOLIC BLOOD PRESSURE: 101 MMHG | RESPIRATION RATE: 16 BRPM | DIASTOLIC BLOOD PRESSURE: 62 MMHG | HEART RATE: 52 BPM | TEMPERATURE: 97.5 F

## 2021-05-25 DIAGNOSIS — I48.20 CHRONIC ATRIAL FIBRILLATION (HCC): ICD-10-CM

## 2021-05-25 DIAGNOSIS — I42.8 NONISCHEMIC CARDIOMYOPATHY (HCC): Primary | ICD-10-CM

## 2021-05-25 DIAGNOSIS — I10 ESSENTIAL HYPERTENSION: ICD-10-CM

## 2021-05-25 DIAGNOSIS — I34.0 NONRHEUMATIC MITRAL VALVE REGURGITATION: ICD-10-CM

## 2021-05-25 PROCEDURE — 99213 OFFICE O/P EST LOW 20 MIN: CPT | Performed by: NURSE PRACTITIONER

## 2021-05-25 NOTE — PROGRESS NOTES
Buster Redd MD  Larry Kehr  1945 05/25/2021    Patient Active Problem List   Diagnosis   • Chronic atrial fibrillation   • Hypertension- controlled.    • Tobacco use, states that he has cut back to 3-4 cigarettes a day.   • Morbid obesity (CMS/HCC)   • Nonischemic cardiomyopathy , appears to be compensated.   • Chronic systolic heart failure (CMS/HCC)   • Obstructive uropathy   • Chronic obstructive lung disease (CMS/HCC)   • Atrial fibrillation (CMS/HCC)   • Hypertensive disorder   • Benign prostatic hyperplasia with urinary retention   • PAD (peripheral artery disease) (CMS/HCC)   • Cardiomyopathy (CMS/HCC)   • Encounter for Malhotra catheter replacement       Dear Buster Redd MD:    Subjective     Chief Complaint   Patient presents with   • Atrial Fibrillation   • Cardiomyopathy     2 mos follow   • Med Management     verbal           History of Present Illness:    Larry Kehr is a 75 y.o. 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  He presents today for cardiology follow-up.  States he is feeling very well.  He denies any chest pains, shortness of breath, or palpitations.  Denies any leg edema or weight gain.  He has been compliant with medications.  He denies any bleeding issues with Xarelto.  He initially was scheduled for cardiac catheterization due to his worsening cardiomyopathy.  However, the patient canceled the procedure.  He has been contemplating rescheduling the procedure for a few months now.          No Known Allergies:      Current Outpatient Medications:   •  carvedilol (COREG) 6.25 MG tablet, TAKE ONE TABLET BY MOUTH TWO TIMES A DAY WITH MEALS, Disp: 60 tablet, Rfl: 3  •  clotrimazole-betamethasone (LOTRISONE) 1-0.05 % cream, Apply  topically to the appropriate area as directed Take As Directed., Disp: 45 g, Rfl: 3  •  sacubitril-valsartan (Entresto)  "24-26 MG tablet, Take 1 tablet by mouth 2 (Two) Times a Day., Disp: 180 tablet, Rfl: 3  •  tamsulosin (FLOMAX) 0.4 MG capsule 24 hr capsule, Take 1 capsule by mouth Daily., Disp: 30 capsule, Rfl: 3  •  Xarelto 20 MG tablet, TAKE 1 TABLET BY MOUTH DAILY WITH DINNER., Disp: 30 tablet, Rfl: 5      The following portions of the patient's history were reviewed and updated as appropriate: allergies, current medications, past family history, past medical history, past social history, past surgical history and problem list.    Social History     Tobacco Use   • Smoking status: Current Every Day Smoker     Packs/day: 0.25     Years: 59.00     Pack years: 14.75     Types: Cigarettes   • Smokeless tobacco: Never Used   Substance Use Topics   • Alcohol use: No   • Drug use: No       Review of Systems   Constitutional: Negative for decreased appetite and malaise/fatigue.   Cardiovascular: Negative for chest pain, dyspnea on exertion, irregular heartbeat, leg swelling, near-syncope, orthopnea, palpitations, paroxysmal nocturnal dyspnea and syncope.   Respiratory: Negative for cough, shortness of breath and wheezing.    Neurological: Negative for dizziness, light-headedness and weakness.       Objective   Vitals:    05/25/21 1350   BP: 101/62   Pulse: 52   Resp: 16   Temp: 97.5 °F (36.4 °C)   Weight: 124 kg (273 lb 3.2 oz)   Height: 175.3 cm (69\")     Body mass index is 40.34 kg/m².        Vitals reviewed.   Constitutional:       Appearance: Healthy appearance. Well-developed and not in distress.   HENT:      Head: Normocephalic and atraumatic.   Pulmonary:      Effort: Pulmonary effort is normal.      Breath sounds: Normal breath sounds. No wheezing. No rales.   Cardiovascular:      Normal rate. Irregularly irregular rhythm.      Murmurs: There is no murmur.      . No S3 and S4 gallop.   Edema:     Peripheral edema absent.   Abdominal:      General: Bowel sounds are normal.      Palpations: Abdomen is soft.   Skin:     General: " Skin is warm and dry.   Neurological:      Mental Status: Alert, oriented to person, place, and time and oriented to person, place and time.   Psychiatric:         Mood and Affect: Mood normal.         Behavior: Behavior normal.         Lab Results   Component Value Date     01/27/2021    K 4.2 01/27/2021     01/27/2021    CO2 22.8 01/27/2021    BUN 11 01/27/2021    CREATININE 1.08 01/27/2021    GLUCOSE 118 (H) 01/27/2021    CALCIUM 9.0 01/27/2021    AST 10 01/27/2021    ALT <5 01/27/2021    ALKPHOS 109 01/27/2021     Lab Results   Component Value Date    CKTOTAL 20 06/14/2020     Lab Results   Component Value Date    WBC 7.99 01/27/2021    HGB 12.4 (L) 01/27/2021    HCT 41.0 01/27/2021     01/27/2021     Lab Results   Component Value Date    INR 1.24 (H) 12/10/2016     Lab Results   Component Value Date    MG 2.3 06/14/2020     Lab Results   Component Value Date    TSH 2.250 06/14/2020    PSA 19.100 (H) 06/15/2020    TRIG 107 02/21/2020    HDL 38 (L) 02/21/2020    LDL 93 02/21/2020      Lab Results   Component Value Date    .0 (H) 01/05/2017           Procedures      Assessment/Plan    Diagnosis Plan   1. Nonischemic cardiomyopathy with LV ejection fraction of about 35%, appears to be compensated.  [facility] Wearable Cardioverter-Defibrillator   2. Nonrheumatic mitral valve regurgitation (moderate to severe)     3. Chronic atrial fibrillation, on Xarelto for stroke prevention.     4. Essential hypertension                  Recommendations:    1. In regards to his cardiomyopathy and severe mitral valve regurgitation,  I have again discussed with him about pursuing cardiac catheterization to rule out ischemic cause for his worsening cardiomyopathy.  We have also again discussed LifeVest as he is at risk for life-threatening ventricular arrhythmias with EF less than 35%.  He has decided to proceed with this today.  I have placed an order.  He does think he wants to pursue the cardiac  catheterization but would like to discuss with his family this evening and call me tomorrow and let me know for sure what he decides.  2. We will continue with carvedilol, Entresto, and Xarelto.  3. Follow-up in 6 weeks or sooner if needed.         Return in about 6 weeks (around 7/6/2021) for Recheck.    As always, I appreciate very much the opportunity to participate in the cardiovascular care of your patients.      With Best Regards,    IDA Poole

## 2021-05-26 ENCOUNTER — TELEPHONE (OUTPATIENT)
Dept: CARDIOLOGY | Facility: CLINIC | Age: 76
End: 2021-05-26

## 2021-05-26 NOTE — TELEPHONE ENCOUNTER
We discussed at length yesterday, he was going to decide about left heart cath. Has he made a decision about this or have any specific questions?

## 2021-05-27 NOTE — TELEPHONE ENCOUNTER
Called pt and he stated that he has decided that he doesn't want to wear the life vest or do the heart cath.

## 2021-06-11 ENCOUNTER — OFFICE VISIT (OUTPATIENT)
Dept: UROLOGY | Facility: CLINIC | Age: 76
End: 2021-06-11

## 2021-06-11 VITALS — BODY MASS INDEX: 40.43 KG/M2 | HEIGHT: 69 IN | WEIGHT: 273 LBS

## 2021-06-11 DIAGNOSIS — R33.8 BENIGN PROSTATIC HYPERPLASIA WITH URINARY RETENTION: Primary | ICD-10-CM

## 2021-06-11 DIAGNOSIS — N40.1 BENIGN PROSTATIC HYPERPLASIA WITH URINARY RETENTION: Primary | ICD-10-CM

## 2021-06-11 DIAGNOSIS — Z46.6 ENCOUNTER FOR FOLEY CATHETER REPLACEMENT: ICD-10-CM

## 2021-06-11 PROCEDURE — 99213 OFFICE O/P EST LOW 20 MIN: CPT | Performed by: NURSE PRACTITIONER

## 2021-06-11 PROCEDURE — 51702 INSERT TEMP BLADDER CATH: CPT | Performed by: NURSE PRACTITIONER

## 2021-06-11 NOTE — PROGRESS NOTES
"Chief Complaint  Benign Prostatic Hypertrophy WITH URINE RETENTION (Cath change # 20 COUDE)    Subjective          Larry Kehr presents to [facility]  History of Present Illness    MR. LARRY KEHR IS a Pleasant 75-year-old male with significant history of BPH with urinary retention, WHO returns to clinic today for monthly Malhotra catheter change.  Patient is in no apparent distress, tolerated Malhotra catheter change #20 coudé tip catheter with minimal discomfort.     Also significant improvement noted to perineal yeast dermatitis around groin folds.  She is utilizing nystatin cream as ordered.    Objective   Vital Signs:   Ht 175.3 cm (69\")   Wt 124 kg (273 lb)   BMI 40.32 kg/m²     Physical Exam  Constitutional:       General: He is in acute distress.      Appearance: He is well-developed. He is obese. He is ill-appearing.   HENT:      Head: Normocephalic and atraumatic.      Right Ear: External ear normal.      Left Ear: External ear normal.   Eyes:      General:         Right eye: No discharge.         Left eye: No discharge.      Conjunctiva/sclera: Conjunctivae normal.      Pupils: Pupils are equal, round, and reactive to light.   Neck:      Thyroid: No thyromegaly.      Trachea: No tracheal deviation.   Cardiovascular:      Rate and Rhythm: Normal rate and regular rhythm.      Heart sounds: No murmur heard.   No friction rub.   Pulmonary:      Effort: Pulmonary effort is normal. No respiratory distress.      Breath sounds: Normal breath sounds. No stridor.   Abdominal:      General: Bowel sounds are normal. There is distension.      Palpations: Abdomen is soft.      Tenderness: There is abdominal tenderness. There is guarding.   Genitourinary:     Penis: Normal and uncircumcised. No tenderness or discharge.       Testes: Normal.      Rectum: Normal. Guaiac result negative.   Musculoskeletal:         General: Tenderness present. No deformity. Normal range of motion.    "   Cervical back: Normal range of motion and neck supple.   Skin:     General: Skin is warm and dry.      Capillary Refill: Capillary refill takes less than 2 seconds.      Coloration: Skin is not pale.   Neurological:      Mental Status: He is alert and oriented to person, place, and time.      Cranial Nerves: No cranial nerve deficit.      Coordination: Coordination normal.   Psychiatric:         Behavior: Behavior normal.         Thought Content: Thought content normal.         Judgment: Judgment normal.        Result Review :                 Assessment and Plan    Diagnoses and all orders for this visit:    1. Benign prostatic hyperplasia with urinary retention (Primary)    2. Encounter for Malhotra catheter replacement    1. Benign prostatic hyperplasia with urinary retention (Primary)     2. Encounter for Malhotra catheter replacement     BPH WITH URINARY RETENTION: Patient tolerated his Malhotra cath change today with MINIMAL discomfort.  We irrigated his replaced catheter with absolutely no resistance.  No sediments were noted in bag.  Patient tolerated his Malhotra cath change for minimal discomfort today.                                                     PLAN   Discussed increasing PO fluid intake, Malhotra cath site care.  Continue perineal care     We will follow-up in ONE Month for Cath Change.     Patient is agreeable with plan of care.      Follow Up   Return in about 1 month (around 7/11/2021) for Next scheduled follow up, Malhotra Catheter Change.  Patient was given instructions and counseling regarding his condition or for health maintenance advice. Please see specific information pulled into the AVS if appropriate.

## 2021-06-15 NOTE — PATIENT INSTRUCTIONS
Indwelling Urinary Catheter Care, Adult  An indwelling urinary catheter is a thin tube that is put into your bladder. The tube helps to drain pee (urine) out of your body. The tube goes in through your urethra. Your urethra is where pee comes out of your body. Your pee will come out through the catheter, then it will go into a bag (drainage bag).  Take good care of your catheter so it will work well.  How to wear your catheter and bag  Supplies needed  · Sticky tape (adhesive tape) or a leg strap.  · Alcohol wipe or soap and water (if you use tape).  · A clean towel (if you use tape).  · Large overnight bag.  · Smaller bag (leg bag).  Wearing your catheter  Attach your catheter to your leg with tape or a leg strap.  · Make sure the catheter is not pulled tight.  · If a leg strap gets wet, take it off and put on a dry strap.  · If you use tape to hold the bag on your le. Use an alcohol wipe or soap and water to wash your skin where the tape made it sticky before.  2. Use a clean towel to pat-dry that skin.  3. Use new tape to make the bag stay on your leg.  Wearing your bags  You should have been given a large overnight bag.  · You may wear the overnight bag in the day or night.  · Always have the overnight bag lower than your bladder.  Do not let the bag touch the floor.  · Before you go to sleep, put a clean plastic bag in a wastebasket. Then hang the overnight bag inside the wastebasket.  You should also have a smaller leg bag that fits under your clothes.  · Always wear the leg bag below your knee.  · Do not wear your leg bag at night.  How to care for your skin and catheter  Supplies needed  · A clean washcloth.  · Water and mild soap.  · A clean towel.  Caring for your skin and catheter         · Clean the skin around your catheter every day:  1. Wash your hands with soap and water.  2. Wet a clean washcloth in warm water and mild soap.  3. Clean the skin around your urethra.  § If you are  female:  § Gently spread the folds of skin around your vagina (labia).  § With the washcloth in your other hand, wipe the inner side of your labia on each side. Wipe from front to back.  § If you are male:  § Pull back any skin that covers the end of your penis (foreskin).  § With the washcloth in your other hand, wipe your penis in small circles. Start wiping at the tip of your penis, then move away from the catheter.  § Move the foreskin back in place, if needed.  4. With your free hand, hold the catheter close to where it goes into your body.  § Keep holding the catheter during cleaning so it does not get pulled out.  5. With the washcloth in your other hand, clean the catheter.  § Only wipe downward on the catheter.  § Do not wipe upward toward your body. Doing this may push germs into your urethra and cause infection.  6. Use a clean towel to pat-dry the catheter and the skin around it. Make sure to wipe off all soap.  7. Wash your hands with soap and water.  · Shower every day. Do not take baths.  · Do not use cream, ointment, or lotion on the area where the catheter goes into your body, unless your doctor tells you to.  · Do not use powders, sprays, or lotions on your genital area.  · Check your skin around the catheter every day for signs of infection. Check for:  ? Redness, swelling, or pain.  ? Fluid or blood.  ? Warmth.  ? Pus or a bad smell.  How to empty the bag  Supplies needed  · Rubbing alcohol.  · Gauze pad or cotton ball.  · Tape or a leg strap.  Emptying the bag  Pour the pee out of your bag when it is ?-½ full, or at least 2-3 times a day. Do this for your overnight bag and your leg bag.  1. Wash your hands with soap and water.  2. Separate (detach) the bag from your leg.  3. Hold the bag over the toilet or a clean pail. Keep the bag lower than your hips and bladder. This is so the pee (urine) does not go back into the tube.  4. Open the pour spout. It is at the bottom of the bag.  5. Empty the  pee into the toilet or pail. Do not let the pour spout touch any surface.  6. Put rubbing alcohol on a gauze pad or cotton ball.  7. Use the gauze pad or cotton ball to clean the pour spout.  8. Close the pour spout.  9. Attach the bag to your leg with tape or a leg strap.  10. Wash your hands with soap and water.  Follow instructions for cleaning the drainage bag:  · From the product maker.  · As told by your doctor.  How to change the bag  Supplies needed  · Alcohol wipes.  · A clean bag.  · Tape or a leg strap.  Changing the bag  Replace your bag when it starts to leak, smell bad, or look dirty.  1. Wash your hands with soap and water.  2. Separate the dirty bag from your leg.  3. Pinch the catheter with your fingers so that pee does not spill out.  4. Separate the catheter tube from the bag tube where these tubes connect (at the connection valve). Do not let the tubes touch any surface.  5. Clean the end of the catheter tube with an alcohol wipe. Use a different alcohol wipe to clean the end of the bag tube.  6. Connect the catheter tube to the tube of the clean bag.  7. Attach the clean bag to your leg with tape or a leg strap. Do not make the bag tight on your leg.  8. Wash your hands with soap and water.  General rules    · Never pull on your catheter. Never try to take it out. Doing that can hurt you.  · Always wash your hands before and after you touch your catheter or bag. Use a mild, fragrance-free soap. If you do not have soap and water, use hand .  · Always make sure there are no twists or bends (kinks) in the catheter tube.  · Always make sure there are no leaks in the catheter or bag.  · Drink enough fluid to keep your pee pale yellow.  · Do not take baths, swim, or use a hot tub.  · If you are female, wipe from front to back after you poop (have a bowel movement).  Contact a doctor if:  · Your pee is cloudy.  · Your pee smells worse than usual.  · Your catheter gets clogged.  · Your catheter  leaks.  · Your bladder feels full.  Get help right away if:  · You have redness, swelling, or pain where the catheter goes into your body.  · You have fluid, blood, pus, or a bad smell coming from the area where the catheter goes into your body.  · Your skin feels warm where the catheter goes into your body.  · You have a fever.  · You have pain in your:  ? Belly (abdomen).  ? Legs.  ? Lower back.  ? Bladder.  · You see blood in the catheter.  · Your pee is pink or red.  · You feel sick to your stomach (nauseous).  · You throw up (vomit).  · You have chills.  · Your pee is not draining into the bag.  · Your catheter gets pulled out.  Summary  · An indwelling urinary catheter is a thin tube that is placed into the bladder to help drain pee (urine) out of the body.  · The catheter is placed into the part of the body that drains pee from the bladder (urethra).  · Taking good care of your catheter will keep it working properly and help prevent problems.  · Always wash your hands before and after touching your catheter or bag.  · Never pull on your catheter or try to take it out.  This information is not intended to replace advice given to you by your health care provider. Make sure you discuss any questions you have with your health care provider.  Document Revised: 04/10/2020 Document Reviewed: 08/03/2018  Elsevier Patient Education © 2021 [employer]  Acute Urinary Retention, Male    Acute urinary retention is a condition in which a person is unable to pass urine. This can last for a short time or for a long time. If left untreated, it can result in kidney damage or other serious complications.  What are the causes?  This condition may be caused by:  · Obstruction or narrowing of the tube that drains the bladder (urethra). This may be caused by surgery or problems with nearby organs, such as the prostate gland, which can press or squeeze the urethra.  · Problems with the nerves in the bladder. These can be caused by  diseases, such as multiple sclerosis, or by spinal cord injuries.  · Certain medicines.  · Tumors in the area of the pelvis, bladder, or urethra.  · Diabetes.  · Degenerative cognitive conditions such as delirium or dementia.  · Bladder or urinary tract infection.  · Constipation.  · Blood in the urine (hematuria).  · Injury to the bladder or urethra.   · Psychological (psychogenic) conditions. Someone may hold his urine due to trauma or because he does not want to use the bathroom.  What increases the risk?  This condition is more likely to develop in older men. As men age, their prostate may become larger and may start pressing or squeezing on the bladder or the urethra.  What are the signs or symptoms?  Symptoms of this condition include:  · Trouble urinating.  · Pain in the lower abdomen.  Symptoms usually come on slowly over a long period of time.  How is this diagnosed?  This condition is diagnosed based on a physical exam and a medical history. You may also have other tests, including:  · An ultrasound of the bladder or kidneys or both.  · Blood tests.  · A urine analysis.  · Additional tests may be needed such as an MRI, kidney, or bladder function tests.  How is this treated?  Treatment for this condition may include:  · Medicines.  · Placing a thin, sterile tube (catheter) into the bladder to drain urine out of the body. This is called an indwelling urinary catheter. After being inserted, the catheter is held in place with a small balloon that is filled with sterile water. Urine drains from the catheter into a collection bag outside of the body.  · Behavioral therapy.  · Treatment for any underlying conditions.  · If needed, you may be treated in the hospital for kidney function problems or to manage other complications.  Follow these instructions at home:  · Take over-the-counter and prescription medicines only as told by your health care provider. Avoid certain medicines, such as decongestants,  antihistamines, and some prescription medicines. Do not take any medicine unless your health care provider has approved.  · If you were given an indwelling urinary catheter, take care of it as told by your health care provider.  · Drink enough fluid to keep your urine clear or pale yellow.  · If you were prescribed an antibiotic, take it as told by your health care provider. Do not stop taking the antibiotic even if you start to feel better.  · Do not use any products that contain nicotine or tobacco, such as cigarettes and e-cigarettes. If you need help quitting, ask your health care provider.  · Monitor any changes in your symptoms. Tell your health care provider about any changes.  · If instructed, monitor your blood pressure at home. Report changes as told by your health care provider.  · Keep all follow-up visits as told by your health care provider. This is important.  Contact a health care provider if:  · You have uncomfortable bladder contractions that you cannot control (spasms) or you leak urine with the spasms.  Get help right away if:  · You have chills or fever.  · You have blood in your urine.  · You have a catheter and:  ? Your catheter stops draining urine.  ? Your catheter falls out.  Summary  · Acute urinary retention is a condition in which a person is unable to pass urine. If left untreated, it can result in kidney damage or other serious complications.  · The cause of this condition may include an enlarged prostate. As men age, their prostate gland may become larger and may start pressing or squeezing on the bladder or the urethra.  · Treatment for this condition may include medicines and placement of an indwelling urinary catheter.  · Monitor any changes in your symptoms. Tell your health care provider about any changes.  This information is not intended to replace advice given to you by your health care provider. Make sure you discuss any questions you have with your health care  provider.  Document Revised: 11/30/2018 Document Reviewed: 01/19/2018  Elsevier Patient Education © 2021 [employer]

## 2021-07-06 ENCOUNTER — OFFICE VISIT (OUTPATIENT)
Dept: CARDIOLOGY | Facility: CLINIC | Age: 76
End: 2021-07-06

## 2021-07-06 VITALS
BODY MASS INDEX: 40.55 KG/M2 | DIASTOLIC BLOOD PRESSURE: 70 MMHG | HEIGHT: 69 IN | WEIGHT: 273.8 LBS | TEMPERATURE: 97.7 F | SYSTOLIC BLOOD PRESSURE: 109 MMHG | HEART RATE: 47 BPM

## 2021-07-06 DIAGNOSIS — I42.9 CARDIOMYOPATHY, UNSPECIFIED TYPE (HCC): Primary | ICD-10-CM

## 2021-07-06 DIAGNOSIS — I10 ESSENTIAL HYPERTENSION: ICD-10-CM

## 2021-07-06 DIAGNOSIS — I48.20 CHRONIC ATRIAL FIBRILLATION (HCC): ICD-10-CM

## 2021-07-06 DIAGNOSIS — I34.0 NONRHEUMATIC MITRAL VALVE REGURGITATION: ICD-10-CM

## 2021-07-06 PROCEDURE — 99213 OFFICE O/P EST LOW 20 MIN: CPT | Performed by: NURSE PRACTITIONER

## 2021-07-06 PROCEDURE — 93000 ELECTROCARDIOGRAM COMPLETE: CPT | Performed by: NURSE PRACTITIONER

## 2021-07-06 NOTE — PROGRESS NOTES
Buster Redd MD  Larry Kehr  1945 07/06/2021    Patient Active Problem List   Diagnosis   • Chronic atrial fibrillation   • Hypertension- controlled.    • Tobacco use, states that he has cut back to 3-4 cigarettes a day.   • Morbid obesity (CMS/HCC)   • Nonischemic cardiomyopathy , appears to be compensated.   • Chronic systolic heart failure (CMS/HCC)   • Obstructive uropathy   • Chronic obstructive lung disease (CMS/HCC)   • Atrial fibrillation (CMS/HCC)   • Hypertensive disorder   • Benign prostatic hyperplasia with urinary retention   • PAD (peripheral artery disease) (CMS/HCC)   • Cardiomyopathy (CMS/HCC)   • Encounter for Malhotra catheter replacement       Dear Buster Redd MD:    Subjective     Chief Complaint   Patient presents with   • Follow-up     MICHAEL ESPOSITO REFUSED   • Med Management     BROUGHT MEDS TO OFFICE           History of Present Illness:    Larry Kehr is a 75 y.o. male with a past medical history of cardiomyopathy with an EF of 31 to 35%, chronic atrial fibrillation, moderate to severe mitral valve regurgitation.  Patient presents today for routine cardiology follow-up.  Previously, a LifeVest was recommended but the patient refused.  He was also scheduled for cardiac catheterization to further evaluate his cardiomyopathy.  However, he canceled the procedure on the morning it was scheduled to be completed. He presents today for cardiology follow up. He denies any chest pains, shortness of breath, or dizziness. He denies any bleeding issues.           No Known Allergies:      Current Outpatient Medications:   •  carvedilol (COREG) 6.25 MG tablet, TAKE ONE TABLET BY MOUTH TWO TIMES A DAY WITH MEALS, Disp: 60 tablet, Rfl: 3  •  clotrimazole-betamethasone (LOTRISONE) 1-0.05 % cream, Apply  topically to the appropriate area as directed Take As Directed., Disp: 45 g, Rfl: 3  •  sacubitril-valsartan (Entresto) 24-26 MG tablet, Take 1 tablet by mouth 2 (Two) Times a Day., Disp: 180 tablet,  "Rfl: 3  •  tamsulosin (FLOMAX) 0.4 MG capsule 24 hr capsule, Take 1 capsule by mouth Daily., Disp: 30 capsule, Rfl: 3  •  Xarelto 20 MG tablet, TAKE 1 TABLET BY MOUTH DAILY WITH DINNER., Disp: 30 tablet, Rfl: 5      The following portions of the patient's history were reviewed and updated as appropriate: allergies, current medications, past family history, past medical history, past social history, past surgical history and problem list.    Social History     Tobacco Use   • Smoking status: Current Every Day Smoker     Packs/day: 0.25     Years: 59.00     Pack years: 14.75     Types: Cigarettes   • Smokeless tobacco: Never Used   Substance Use Topics   • Alcohol use: No   • Drug use: No       ROS    Objective   Vitals:    07/06/21 1337   BP: 109/70   Pulse: (!) 47   Temp: 97.7 °F (36.5 °C)   Weight: 124 kg (273 lb 12.8 oz)   Height: 175.3 cm (69\")     Body mass index is 40.43 kg/m².        Vitals reviewed.   Constitutional:       Appearance: Healthy appearance. Well-developed and not in distress.   HENT:      Head: Normocephalic and atraumatic.   Pulmonary:      Effort: Pulmonary effort is normal.      Breath sounds: Normal breath sounds. No wheezing. No rales.   Cardiovascular:      Normal rate. Irregularly irregular rhythm.      Murmurs: There is no murmur.      . No S3 and S4 gallop.   Edema:     Peripheral edema absent.   Abdominal:      General: Bowel sounds are normal.      Palpations: Abdomen is soft.   Skin:     General: Skin is warm and dry.   Neurological:      Mental Status: Alert, oriented to person, place, and time and oriented to person, place and time.   Psychiatric:         Mood and Affect: Mood normal.         Behavior: Behavior normal.         Lab Results   Component Value Date     01/27/2021    K 4.2 01/27/2021     01/27/2021    CO2 22.8 01/27/2021    BUN 11 01/27/2021    CREATININE 1.08 01/27/2021    GLUCOSE 118 (H) 01/27/2021    CALCIUM 9.0 01/27/2021    AST 10 01/27/2021    ALT <5 " 01/27/2021    ALKPHOS 109 01/27/2021     Lab Results   Component Value Date    CKTOTAL 20 06/14/2020     Lab Results   Component Value Date    WBC 7.99 01/27/2021    HGB 12.4 (L) 01/27/2021    HCT 41.0 01/27/2021     01/27/2021     Lab Results   Component Value Date    INR 1.24 (H) 12/10/2016     Lab Results   Component Value Date    MG 2.3 06/14/2020     Lab Results   Component Value Date    TSH 2.250 06/14/2020    PSA 19.100 (H) 06/15/2020    TRIG 107 02/21/2020    HDL 38 (L) 02/21/2020    LDL 93 02/21/2020      Lab Results   Component Value Date    .0 (H) 01/05/2017             ECG 12 Lead    Date/Time: 7/6/2021 1:44 PM  Performed by: Mimi Castro APRN  Authorized by: Mimi Castro APRN   Comparison: compared with previous ECG   Similar to previous ECG  Rhythm: atrial fibrillation  BPM: 80  Comments: Frequent PVC              Assessment/Plan    Diagnosis Plan   1. Cardiomyopathy, unspecified type (CMS/HCC)  ECG 12 Lead    Comprehensive Metabolic Panel    CBC & Differential   2. Nonrheumatic mitral valve regurgitation (moderate to severe)  ECG 12 Lead    Comprehensive Metabolic Panel    CBC & Differential   3. Chronic atrial fibrillation, on Xarelto for stroke prevention.  ECG 12 Lead    Comprehensive Metabolic Panel    CBC & Differential   4. Essential hypertension  ECG 12 Lead    Comprehensive Metabolic Panel    CBC & Differential                Recommendations:    1. I have again discussed the risk of life threatening ventricular arrhythmias with his known cardiomyopathy. He voices understanding but states he does not think he could tolerate a Life Vest.   2. We have also again discussed evaluating his cardiomyopathy with cardiac catheterization to rule out underlying myocardial ischemia. He also needs evaluation of the mitral valve to see if he is a candidate for mitral valve replacement. He voices understanding and states he has considered all of this and at this time, doesn't want to  pursue any further treatment. He states he understands the possible outcomes.  3. CBC, CMP ordered today.  4. Follow up in 2 months or sooner if needed.         Return in about 2 months (around 9/6/2021) for Recheck.    As always, I appreciate very much the opportunity to participate in the cardiovascular care of your patients.      With Best Regards,    IDA Poole

## 2021-07-16 ENCOUNTER — OFFICE VISIT (OUTPATIENT)
Dept: UROLOGY | Facility: CLINIC | Age: 76
End: 2021-07-16

## 2021-07-16 VITALS — WEIGHT: 273 LBS | BODY MASS INDEX: 40.43 KG/M2 | HEIGHT: 69 IN

## 2021-07-16 DIAGNOSIS — Z91.89 AT RISK FOR INFECTION ASSOCIATED WITH FOLEY CATHETER: ICD-10-CM

## 2021-07-16 DIAGNOSIS — N30.01 ACUTE CYSTITIS WITH HEMATURIA: ICD-10-CM

## 2021-07-16 DIAGNOSIS — Z46.6 ENCOUNTER FOR FOLEY CATHETER REPLACEMENT: ICD-10-CM

## 2021-07-16 DIAGNOSIS — N40.1 BENIGN PROSTATIC HYPERPLASIA WITH URINARY RETENTION: Primary | ICD-10-CM

## 2021-07-16 DIAGNOSIS — R33.8 BENIGN PROSTATIC HYPERPLASIA WITH URINARY RETENTION: Primary | ICD-10-CM

## 2021-07-16 PROCEDURE — 99213 OFFICE O/P EST LOW 20 MIN: CPT | Performed by: NURSE PRACTITIONER

## 2021-07-16 PROCEDURE — 51701 INSERT BLADDER CATHETER: CPT | Performed by: NURSE PRACTITIONER

## 2021-07-16 RX ORDER — PHENAZOPYRIDINE HYDROCHLORIDE 200 MG/1
200 TABLET, FILM COATED ORAL 3 TIMES DAILY PRN
Qty: 20 TABLET | Refills: 0 | Status: SHIPPED | OUTPATIENT
Start: 2021-07-16 | End: 2022-01-10 | Stop reason: SDUPTHER

## 2021-07-16 RX ORDER — SULFAMETHOXAZOLE AND TRIMETHOPRIM 800; 160 MG/1; MG/1
1 TABLET ORAL 2 TIMES DAILY
Qty: 20 TABLET | Refills: 0 | Status: SHIPPED | OUTPATIENT
Start: 2021-07-16 | End: 2021-11-09

## 2021-07-16 NOTE — PROGRESS NOTES
"Chief Complaint  BPH with retention (1 month Malhotra cath change)    Subjective          Larry Kehr presents to [facility]  History of Present Illness     MR. LARRY KEHR IS a Pleasant 75-year-old male with significant history of BPH with urinary retention, WHO returns to clinic today for monthly Malhotra catheter change.  He says dysuria, patient is in no apparent distress, tolerated Malhotra catheter change #20 coudé tip catheter with minimal discomfort.     Also significant improvement noted to perineal yeast dermatitis around groin folds.  She is utilizing nystatin cream as ordered.     Objective   Vital Signs:   Ht 175.3 cm (69\")   Wt 124 kg (273 lb)   BMI 40.32 kg/m²     Physical Exam  Constitutional:       General: He is in acute distress.      Appearance: He is well-developed. He is obese. He is ill-appearing.   HENT:      Head: Normocephalic and atraumatic.      Right Ear: External ear normal.      Left Ear: External ear normal.   Eyes:      General:         Right eye: No discharge.         Left eye: No discharge.      Conjunctiva/sclera: Conjunctivae normal.      Pupils: Pupils are equal, round, and reactive to light.   Neck:      Thyroid: No thyromegaly.      Trachea: No tracheal deviation.   Cardiovascular:      Rate and Rhythm: Normal rate and regular rhythm.      Heart sounds: No murmur heard.   No friction rub.   Pulmonary:      Effort: Pulmonary effort is normal. No respiratory distress.      Breath sounds: Normal breath sounds. No stridor.   Abdominal:      General: Bowel sounds are normal. There is distension.      Palpations: Abdomen is soft.      Tenderness: There is abdominal tenderness. There is guarding.   Genitourinary:     Penis: Normal and uncircumcised. No tenderness or discharge.       Testes: Normal.      Rectum: Normal. Guaiac result negative.   Musculoskeletal:         General: No deformity. Normal range of motion.      Cervical back: Normal " range of motion and neck supple.   Skin:     General: Skin is warm and dry.      Capillary Refill: Capillary refill takes less than 2 seconds.      Coloration: Skin is not pale.   Neurological:      Mental Status: He is alert and oriented to person, place, and time.      Cranial Nerves: No cranial nerve deficit.      Coordination: Coordination normal.   Psychiatric:         Behavior: Behavior normal.         Thought Content: Thought content normal.         Judgment: Judgment normal.        Result Review :                 Assessment and Plan    Diagnoses and all orders for this visit:    1. Benign prostatic hyperplasia with urinary retention (Primary)  -     sulfamethoxazole-trimethoprim (Bactrim DS) 800-160 MG per tablet; Take 1 tablet by mouth 2 (Two) Times a Day.  Dispense: 20 tablet; Refill: 0  -     phenazopyridine (Pyridium) 200 MG tablet; Take 1 tablet by mouth 3 (Three) Times a Day As Needed for Bladder Spasms (STOP AFTER 3 DAYS- CHANGE URINE ORANGE IS NORMAL).  Dispense: 20 tablet; Refill: 0    2. At risk for infection associated with Malhotra catheter  -     sulfamethoxazole-trimethoprim (Bactrim DS) 800-160 MG per tablet; Take 1 tablet by mouth 2 (Two) Times a Day.  Dispense: 20 tablet; Refill: 0  -     phenazopyridine (Pyridium) 200 MG tablet; Take 1 tablet by mouth 3 (Three) Times a Day As Needed for Bladder Spasms (STOP AFTER 3 DAYS- CHANGE URINE ORANGE IS NORMAL).  Dispense: 20 tablet; Refill: 0    3. Acute cystitis with hematuria  -     sulfamethoxazole-trimethoprim (Bactrim DS) 800-160 MG per tablet; Take 1 tablet by mouth 2 (Two) Times a Day.  Dispense: 20 tablet; Refill: 0  -     phenazopyridine (Pyridium) 200 MG tablet; Take 1 tablet by mouth 3 (Three) Times a Day As Needed for Bladder Spasms (STOP AFTER 3 DAYS- CHANGE URINE ORANGE IS NORMAL).  Dispense: 20 tablet; Refill: 0    4. Encounter for Malhotra catheter replacement    BPH WITH URINARY RETENTION: Patient tolerated his Malhotra cath change today  with MINIMAL discomfort.  We irrigated his replaced catheter with absolutely no resistance.  No sediments were noted in bag.  Patient tolerated his Malhotra cath change for minimal discomfort today.                                                     PLAN   Discussed increasing PO fluid intake, Malhotra cath site care.  Continue perineal care     We will follow-up in ONE Month for Cath Change.     Patient is agreeable with plan of care.       Follow Up   Return in about 1 month (around 8/16/2021) for Next scheduled follow up, Malhotra Catheter Change.  Patient was given instructions and counseling regarding his condition or for health maintenance advice. Please see specific information pulled into the AVS if appropriate.

## 2021-07-20 NOTE — PATIENT INSTRUCTIONS
Acute Urinary Retention, Male    Acute urinary retention means that you cannot pee (urinate) at all, or that you pee too little and your bladder is not emptied completely. If it is not treated, it can lead to kidney damage or other serious problems.  Follow these instructions at home:  · Take over-the-counter and prescription medicines only as told by your doctor. Ask your doctor what medicines you should stay away from. Do not take any medicine unless your doctor says it is okay to do so.  · If you were sent home with a tube that drains the bladder (catheter), take care of it as told by your doctor.  · Drink enough fluid to keep your pee clear or pale yellow.  · If you were given an antibiotic, take it as told by your doctor. Do not stop taking the antibiotic even if you start to feel better.  · Do not use any products that contain nicotine or tobacco, such as cigarettes and e-cigarettes. If you need help quitting, ask your doctor.  · Watch for changes in your symptoms. Tell your doctor about them.  · If told, track changes in your blood pressure at home. Tell your doctor about them.  · Keep all follow-up visits as told by your doctor. This is important.  Contact a doctor if:  · You have spasms or you leak pee when you have spasms.  Get help right away if:  · You have chills or a fever.  · You have a tube that drains the bladder and:  ? The tube stops draining pee.  ? The tube falls out.  · You have blood in your pee.  Summary  · Acute urinary retention means that you have problems peeing. It may mean that you cannot pee at all, or that you pee too little.  · If this condition is not treated, it can lead to kidney damage or other serious problems.  · If you were sent home with a tube that drains the bladder, take care of it as told by your doctor.  · Monitor any changes in your symptoms. Tell your doctor about any changes.  This information is not intended to replace advice given to you by your health care  provider. Make sure you discuss any questions you have with your health care provider.  Document Revised: 12/22/2020 Document Reviewed: 01/19/2018  Elsevier Patient Education © 2021 [employer]

## 2021-07-23 ENCOUNTER — TELEPHONE (OUTPATIENT)
Dept: CARDIOLOGY | Facility: CLINIC | Age: 76
End: 2021-07-23

## 2021-07-23 DIAGNOSIS — N48.0 BALANITIS XEROTICA OBLITERANS: ICD-10-CM

## 2021-07-23 DIAGNOSIS — Z46.6 ENCOUNTER FOR FOLEY CATHETER REPLACEMENT: ICD-10-CM

## 2021-07-23 RX ORDER — CLOTRIMAZOLE AND BETAMETHASONE DIPROPIONATE 10; .64 MG/G; MG/G
CREAM TOPICAL TAKE AS DIRECTED
Qty: 45 G | Refills: 3 | Status: SHIPPED | OUTPATIENT
Start: 2021-07-23 | End: 2021-09-16 | Stop reason: SDUPTHER

## 2021-08-03 ENCOUNTER — PRIOR AUTHORIZATION (OUTPATIENT)
Dept: CARDIOLOGY | Facility: CLINIC | Age: 76
End: 2021-08-03

## 2021-08-04 ENCOUNTER — LAB (OUTPATIENT)
Dept: LAB | Facility: HOSPITAL | Age: 76
End: 2021-08-04

## 2021-08-04 DIAGNOSIS — I34.0 NONRHEUMATIC MITRAL VALVE REGURGITATION: ICD-10-CM

## 2021-08-04 DIAGNOSIS — I42.9 CARDIOMYOPATHY, UNSPECIFIED TYPE (HCC): ICD-10-CM

## 2021-08-04 DIAGNOSIS — I48.20 CHRONIC ATRIAL FIBRILLATION (HCC): ICD-10-CM

## 2021-08-04 DIAGNOSIS — I10 ESSENTIAL HYPERTENSION: ICD-10-CM

## 2021-08-04 PROCEDURE — 80053 COMPREHEN METABOLIC PANEL: CPT

## 2021-08-04 PROCEDURE — 85025 COMPLETE CBC W/AUTO DIFF WBC: CPT

## 2021-08-04 PROCEDURE — 36415 COLL VENOUS BLD VENIPUNCTURE: CPT

## 2021-08-05 LAB
ALBUMIN SERPL-MCNC: 3.6 G/DL (ref 3.5–5.2)
ALBUMIN/GLOB SERPL: 1.3 G/DL
ALP SERPL-CCNC: 104 U/L (ref 39–117)
ALT SERPL W P-5'-P-CCNC: 10 U/L (ref 1–41)
ANION GAP SERPL CALCULATED.3IONS-SCNC: 9.4 MMOL/L (ref 5–15)
AST SERPL-CCNC: 11 U/L (ref 1–40)
BASOPHILS # BLD AUTO: 0.07 10*3/MM3 (ref 0–0.2)
BASOPHILS NFR BLD AUTO: 1.1 % (ref 0–1.5)
BILIRUB SERPL-MCNC: 0.3 MG/DL (ref 0–1.2)
BUN SERPL-MCNC: 11 MG/DL (ref 8–23)
BUN/CREAT SERPL: 12.5 (ref 7–25)
CALCIUM SPEC-SCNC: 9.2 MG/DL (ref 8.6–10.5)
CHLORIDE SERPL-SCNC: 107 MMOL/L (ref 98–107)
CO2 SERPL-SCNC: 23.6 MMOL/L (ref 22–29)
CREAT SERPL-MCNC: 0.88 MG/DL (ref 0.76–1.27)
DEPRECATED RDW RBC AUTO: 50.6 FL (ref 37–54)
EOSINOPHIL # BLD AUTO: 0.15 10*3/MM3 (ref 0–0.4)
EOSINOPHIL NFR BLD AUTO: 2.4 % (ref 0.3–6.2)
ERYTHROCYTE [DISTWIDTH] IN BLOOD BY AUTOMATED COUNT: 16.2 % (ref 12.3–15.4)
GFR SERPL CREATININE-BSD FRML MDRD: 84 ML/MIN/1.73
GLOBULIN UR ELPH-MCNC: 2.7 GM/DL
GLUCOSE SERPL-MCNC: 81 MG/DL (ref 65–99)
HCT VFR BLD AUTO: 43.6 % (ref 37.5–51)
HGB BLD-MCNC: 13.6 G/DL (ref 13–17.7)
IMM GRANULOCYTES # BLD AUTO: 0.03 10*3/MM3 (ref 0–0.05)
IMM GRANULOCYTES NFR BLD AUTO: 0.5 % (ref 0–0.5)
LYMPHOCYTES # BLD AUTO: 1.4 10*3/MM3 (ref 0.7–3.1)
LYMPHOCYTES NFR BLD AUTO: 22 % (ref 19.6–45.3)
MCH RBC QN AUTO: 27.1 PG (ref 26.6–33)
MCHC RBC AUTO-ENTMCNC: 31.2 G/DL (ref 31.5–35.7)
MCV RBC AUTO: 86.9 FL (ref 79–97)
MONOCYTES # BLD AUTO: 0.4 10*3/MM3 (ref 0.1–0.9)
MONOCYTES NFR BLD AUTO: 6.3 % (ref 5–12)
NEUTROPHILS NFR BLD AUTO: 4.3 10*3/MM3 (ref 1.7–7)
NEUTROPHILS NFR BLD AUTO: 67.7 % (ref 42.7–76)
NRBC BLD AUTO-RTO: 0 /100 WBC (ref 0–0.2)
PLATELET # BLD AUTO: 170 10*3/MM3 (ref 140–450)
PMV BLD AUTO: 10.9 FL (ref 6–12)
POTASSIUM SERPL-SCNC: 4.3 MMOL/L (ref 3.5–5.2)
PROT SERPL-MCNC: 6.3 G/DL (ref 6–8.5)
RBC # BLD AUTO: 5.02 10*6/MM3 (ref 4.14–5.8)
SODIUM SERPL-SCNC: 140 MMOL/L (ref 136–145)
WBC # BLD AUTO: 6.35 10*3/MM3 (ref 3.4–10.8)

## 2021-08-09 ENCOUNTER — TELEPHONE (OUTPATIENT)
Dept: CARDIOLOGY | Facility: CLINIC | Age: 76
End: 2021-08-09

## 2021-08-16 ENCOUNTER — OFFICE VISIT (OUTPATIENT)
Dept: UROLOGY | Facility: CLINIC | Age: 76
End: 2021-08-16

## 2021-08-16 VITALS — HEIGHT: 69 IN | BODY MASS INDEX: 40.32 KG/M2

## 2021-08-16 DIAGNOSIS — Z46.6 ENCOUNTER FOR FOLEY CATHETER REPLACEMENT: ICD-10-CM

## 2021-08-16 DIAGNOSIS — N40.1 BENIGN PROSTATIC HYPERPLASIA WITH URINARY RETENTION: Primary | ICD-10-CM

## 2021-08-16 DIAGNOSIS — R33.8 BENIGN PROSTATIC HYPERPLASIA WITH URINARY RETENTION: Primary | ICD-10-CM

## 2021-08-16 PROCEDURE — 99213 OFFICE O/P EST LOW 20 MIN: CPT | Performed by: NURSE PRACTITIONER

## 2021-08-16 PROCEDURE — 51702 INSERT TEMP BLADDER CATH: CPT | Performed by: NURSE PRACTITIONER

## 2021-08-16 NOTE — PROGRESS NOTES
"Chief Complaint  Urinary Retention (Cath Change )    Subjective          Larry Kehr presents to [facility] UROLOGY  History of Present Illness         MR. LARRY KEHR IS a Pleasant 75-year-old male with significant history of BPH with urinary retention, WHO returns to clinic today for monthly Malhotra catheter change.  THE patient is in no apparent distress, tolerated Malhotra catheter change #20 coudé tip catheter with minimal discomfort.     Also significant improvement noted to perineal yeast dermatitis around groin folds. HE is utilizing nystatin cream as ordered.       Objective   Vital Signs:   Ht 175.3 cm (69\")   BMI 40.32 kg/m²     Physical Exam  Constitutional:       General: He is not in acute distress.     Appearance: He is well-developed. He is obese.   HENT:      Head: Normocephalic and atraumatic.      Right Ear: External ear normal.      Left Ear: External ear normal.   Eyes:      General:         Right eye: No discharge.         Left eye: No discharge.      Conjunctiva/sclera: Conjunctivae normal.      Pupils: Pupils are equal, round, and reactive to light.   Neck:      Thyroid: No thyromegaly.      Trachea: No tracheal deviation.   Cardiovascular:      Rate and Rhythm: Normal rate and regular rhythm.      Heart sounds: No murmur heard.   No friction rub.   Pulmonary:      Effort: Pulmonary effort is normal. No respiratory distress.      Breath sounds: Normal breath sounds. No stridor.   Abdominal:      General: Bowel sounds are normal. There is distension.      Palpations: Abdomen is soft.      Tenderness: There is abdominal tenderness. There is guarding.   Genitourinary:     Penis: Normal and uncircumcised. No tenderness or discharge.       Testes: Normal.      Rectum: Normal. Guaiac result negative.      Comments: URETHRAL PAIN/DISCOMFORT  Musculoskeletal:         General: Tenderness present. No deformity. Normal range of motion.      Cervical back: Normal range of " motion and neck supple.   Skin:     General: Skin is warm and dry.      Capillary Refill: Capillary refill takes less than 2 seconds.      Coloration: Skin is not pale.   Neurological:      Mental Status: He is alert and oriented to person, place, and time.      Cranial Nerves: No cranial nerve deficit.      Coordination: Coordination normal.   Psychiatric:         Behavior: Behavior normal.         Thought Content: Thought content normal.         Judgment: Judgment normal.        Result Review :                 Assessment and Plan    Diagnoses and all orders for this visit:    1. Benign prostatic hyperplasia with urinary retention (Primary)    2. Encounter for Malhotra catheter replacement      BPH WITH URINARY RETENTION: Patient tolerated his Malhotra cath change today with MINIMAL discomfort.  We irrigated his replaced catheter with absolutely no resistance.  No sediments were noted in bag.  Patient tolerated his Malhotra cath change for minimal discomfort today.                                                     PLAN   Discussed increasing PO fluid intake, Malhotra cath site care.  Continue perineal care     We will follow-up in ONE Month for Cath Change.     Patient is agreeable with plan of care.       Follow Up   Return in about 1 month (around 9/16/2021) for Next scheduled follow up, Malhotra Catheter Change.  Patient was given instructions and counseling regarding his condition or for health maintenance advice. Please see specific information pulled into the AVS if appropriate.

## 2021-08-25 ENCOUNTER — TELEPHONE (OUTPATIENT)
Dept: CARDIOLOGY | Facility: CLINIC | Age: 76
End: 2021-08-25

## 2021-08-31 ENCOUNTER — TELEPHONE (OUTPATIENT)
Dept: CARDIOLOGY | Facility: CLINIC | Age: 76
End: 2021-08-31

## 2021-08-31 NOTE — TELEPHONE ENCOUNTER
Called pt and advised him he has been approved for pt's assistance program. He expressed understanding.   He is approved from 8/28/2021-08/28/2022

## 2021-09-09 ENCOUNTER — OFFICE VISIT (OUTPATIENT)
Dept: CARDIOLOGY | Facility: CLINIC | Age: 76
End: 2021-09-09

## 2021-09-09 VITALS
SYSTOLIC BLOOD PRESSURE: 129 MMHG | HEIGHT: 69 IN | DIASTOLIC BLOOD PRESSURE: 70 MMHG | BODY MASS INDEX: 40.43 KG/M2 | TEMPERATURE: 97.3 F | WEIGHT: 273 LBS | HEART RATE: 93 BPM

## 2021-09-09 DIAGNOSIS — I50.22 CHRONIC SYSTOLIC CONGESTIVE HEART FAILURE (HCC): ICD-10-CM

## 2021-09-09 DIAGNOSIS — I48.20 CHRONIC ATRIAL FIBRILLATION (HCC): ICD-10-CM

## 2021-09-09 DIAGNOSIS — I42.8 NONISCHEMIC CARDIOMYOPATHY (HCC): Primary | ICD-10-CM

## 2021-09-09 DIAGNOSIS — I34.0 MODERATE TO SEVERE MITRAL REGURGITATION: ICD-10-CM

## 2021-09-09 PROCEDURE — 99214 OFFICE O/P EST MOD 30 MIN: CPT | Performed by: INTERNAL MEDICINE

## 2021-09-09 RX ORDER — CARVEDILOL 6.25 MG/1
6.25 TABLET ORAL 2 TIMES DAILY
Qty: 60 TABLET | Refills: 5 | Status: SHIPPED | OUTPATIENT
Start: 2021-09-09 | End: 2022-03-14

## 2021-09-16 ENCOUNTER — TELEPHONE (OUTPATIENT)
Dept: UROLOGY | Facility: CLINIC | Age: 76
End: 2021-09-16

## 2021-09-16 DIAGNOSIS — Z46.6 ENCOUNTER FOR FOLEY CATHETER REPLACEMENT: ICD-10-CM

## 2021-09-16 DIAGNOSIS — N48.0 BALANITIS XEROTICA OBLITERANS: ICD-10-CM

## 2021-09-16 RX ORDER — CLOTRIMAZOLE AND BETAMETHASONE DIPROPIONATE 10; .64 MG/G; MG/G
CREAM TOPICAL TAKE AS DIRECTED
Qty: 45 G | Refills: 3 | Status: SHIPPED | OUTPATIENT
Start: 2021-09-16 | End: 2022-01-19 | Stop reason: SDUPTHER

## 2021-09-20 ENCOUNTER — OFFICE VISIT (OUTPATIENT)
Dept: UROLOGY | Facility: CLINIC | Age: 76
End: 2021-09-20

## 2021-09-20 DIAGNOSIS — N40.1 BENIGN PROSTATIC HYPERPLASIA WITH URINARY RETENTION: ICD-10-CM

## 2021-09-20 DIAGNOSIS — R33.8 BENIGN PROSTATIC HYPERPLASIA WITH URINARY RETENTION: ICD-10-CM

## 2021-09-20 DIAGNOSIS — Z46.6 ENCOUNTER FOR FOLEY CATHETER REPLACEMENT: ICD-10-CM

## 2021-09-20 PROCEDURE — 51702 INSERT TEMP BLADDER CATH: CPT | Performed by: NURSE PRACTITIONER

## 2021-09-20 PROCEDURE — 99213 OFFICE O/P EST LOW 20 MIN: CPT | Performed by: NURSE PRACTITIONER

## 2021-09-21 ENCOUNTER — HOSPITAL ENCOUNTER (OUTPATIENT)
Dept: CARDIOLOGY | Facility: HOSPITAL | Age: 76
Discharge: HOME | End: 2021-09-21
Admitting: INTERNAL MEDICINE

## 2021-09-21 DIAGNOSIS — I50.22 CHRONIC SYSTOLIC CONGESTIVE HEART FAILURE (HCC): ICD-10-CM

## 2021-09-21 DIAGNOSIS — I34.0 MODERATE TO SEVERE MITRAL REGURGITATION: ICD-10-CM

## 2021-09-21 DIAGNOSIS — I42.8 NONISCHEMIC CARDIOMYOPATHY (HCC): ICD-10-CM

## 2021-09-21 LAB
BH CV ECHO MEAS - % IVS THICK: 37 %
BH CV ECHO MEAS - % LVPW THICK: 28.4 %
BH CV ECHO MEAS - ACS: 1.8 CM
BH CV ECHO MEAS - AO MAX PG: 12.3 MMHG
BH CV ECHO MEAS - AO MEAN PG: 5 MMHG
BH CV ECHO MEAS - AO ROOT AREA (BSA CORRECTED): 1.2
BH CV ECHO MEAS - AO ROOT AREA: 6.4 CM^2
BH CV ECHO MEAS - AO ROOT DIAM: 2.9 CM
BH CV ECHO MEAS - AO V2 MAX: 175 CM/SEC
BH CV ECHO MEAS - AO V2 MEAN: 106 CM/SEC
BH CV ECHO MEAS - AO V2 VTI: 33.3 CM
BH CV ECHO MEAS - BSA(HAYCOCK): 2.5 M^2
BH CV ECHO MEAS - BSA: 2.4 M^2
BH CV ECHO MEAS - BZI_BMI: 40.3 KILOGRAMS/M^2
BH CV ECHO MEAS - BZI_METRIC_HEIGHT: 175.3 CM
BH CV ECHO MEAS - BZI_METRIC_WEIGHT: 123.8 KG
BH CV ECHO MEAS - EDV(CUBED): 216 ML
BH CV ECHO MEAS - EDV(MOD-SP4): 94 ML
BH CV ECHO MEAS - EDV(TEICH): 180 ML
BH CV ECHO MEAS - EF(CUBED): 47.3 %
BH CV ECHO MEAS - EF(MOD-SP4): 41.7 %
BH CV ECHO MEAS - EF(TEICH): 39 %
BH CV ECHO MEAS - ESV(CUBED): 113.7 ML
BH CV ECHO MEAS - ESV(MOD-SP4): 54.8 ML
BH CV ECHO MEAS - ESV(TEICH): 109.9 ML
BH CV ECHO MEAS - FS: 19.3 %
BH CV ECHO MEAS - IVS/LVPW: 0.89
BH CV ECHO MEAS - IVSD: 1.1 CM
BH CV ECHO MEAS - IVSS: 1.5 CM
BH CV ECHO MEAS - LA DIMENSION: 3.9 CM
BH CV ECHO MEAS - LA/AO: 1.4
BH CV ECHO MEAS - LV DIASTOLIC VOL/BSA (35-75): 39.9 ML/M^2
BH CV ECHO MEAS - LV MASS(C)D: 295.8 GRAMS
BH CV ECHO MEAS - LV MASS(C)DI: 125.4 GRAMS/M^2
BH CV ECHO MEAS - LV MASS(C)S: 313.9 GRAMS
BH CV ECHO MEAS - LV MASS(C)SI: 133.1 GRAMS/M^2
BH CV ECHO MEAS - LV SYSTOLIC VOL/BSA (12-30): 23.2 ML/M^2
BH CV ECHO MEAS - LVIDD: 6 CM
BH CV ECHO MEAS - LVIDS: 4.8 CM
BH CV ECHO MEAS - LVLD AP4: 7.8 CM
BH CV ECHO MEAS - LVLS AP4: 7.3 CM
BH CV ECHO MEAS - LVOT AREA (M): 2.8 CM^2
BH CV ECHO MEAS - LVOT AREA: 2.8 CM^2
BH CV ECHO MEAS - LVOT DIAM: 1.9 CM
BH CV ECHO MEAS - LVPWD: 1.2 CM
BH CV ECHO MEAS - LVPWS: 1.6 CM
BH CV ECHO MEAS - PA ACC TIME: 0.09 SEC
BH CV ECHO MEAS - PA PR(ACCEL): 40.8 MMHG
BH CV ECHO MEAS - RAP SYSTOLE: 10 MMHG
BH CV ECHO MEAS - RVSP: 26.2 MMHG
BH CV ECHO MEAS - SI(AO): 90.1 ML/M^2
BH CV ECHO MEAS - SI(CUBED): 43.4 ML/M^2
BH CV ECHO MEAS - SI(MOD-SP4): 16.6 ML/M^2
BH CV ECHO MEAS - SI(TEICH): 29.7 ML/M^2
BH CV ECHO MEAS - SV(AO): 212.4 ML
BH CV ECHO MEAS - SV(CUBED): 102.3 ML
BH CV ECHO MEAS - SV(MOD-SP4): 39.2 ML
BH CV ECHO MEAS - SV(TEICH): 70.1 ML
BH CV ECHO MEAS - TR MAX VEL: 201 CM/SEC
MAXIMAL PREDICTED HEART RATE: 142 BPM
STRESS TARGET HR: 121 BPM

## 2021-09-21 PROCEDURE — 93306 TTE W/DOPPLER COMPLETE: CPT | Performed by: INTERNAL MEDICINE

## 2021-09-21 PROCEDURE — 93306 TTE W/DOPPLER COMPLETE: CPT

## 2021-09-22 ENCOUNTER — TELEPHONE (OUTPATIENT)
Dept: UROLOGY | Facility: CLINIC | Age: 76
End: 2021-09-22

## 2021-09-22 NOTE — TELEPHONE ENCOUNTER
Patient said pharmacy told him they had not received prescription for cream. I told him it was sent on 09/16, he is asking if we can contact the pharmacy and let him know.

## 2021-09-23 ENCOUNTER — TELEPHONE (OUTPATIENT)
Dept: UROLOGY | Facility: CLINIC | Age: 76
End: 2021-09-23

## 2021-09-23 NOTE — TELEPHONE ENCOUNTER
[facility] and [facility] stated [facility] had his cream ready. Called patient and let him know. Patient verbalized understanding.

## 2021-09-29 NOTE — PROGRESS NOTES
Chief Complaint  Benign Prostatic Hypertrophy WITH URINE RETENTION FOLLOW UP     Subjective          Larry Kehr presents to [facility]OLOG[facility] UROLOGY  History of Present Illness    MR. LARRY KEHR IS a Pleasant 75-year-old male with significant history of BPH with urinary retention, WHO returns to clinic today for monthly Malhotra catheter change.  THE patient is in no apparent distress, tolerated Malhotra catheter change #20 coudé tip catheter with minimal discomfort.     Also significant improvement noted to perineal yeast dermatitis around groin folds. HE is utilizing nystatin cream as ordered.     Objective   Vital Signs:   There were no vitals taken for this visit.    Physical Exam  Constitutional:       General: He is in acute distress.      Appearance: He is well-developed. He is obese.   HENT:      Head: Normocephalic and atraumatic.      Right Ear: External ear normal.      Left Ear: External ear normal.   Eyes:      General:         Right eye: No discharge.         Left eye: No discharge.      Conjunctiva/sclera: Conjunctivae normal.      Pupils: Pupils are equal, round, and reactive to light.   Neck:      Thyroid: No thyromegaly.      Trachea: No tracheal deviation.   Cardiovascular:      Rate and Rhythm: Normal rate and regular rhythm.      Heart sounds: No murmur heard.   No friction rub.   Pulmonary:      Effort: Pulmonary effort is normal. No respiratory distress.      Breath sounds: Normal breath sounds. No stridor.   Abdominal:      General: Bowel sounds are normal. There is distension.      Palpations: Abdomen is soft.      Tenderness: There is abdominal tenderness. There is no guarding.   Genitourinary:     Penis: Normal and uncircumcised. No tenderness or discharge.       Testes: Normal.      Rectum: Normal. Guaiac result negative.   Musculoskeletal:         General: Tenderness present. No deformity. Normal range of motion.      Cervical back: Normal range of motion and  neck supple.   Skin:     General: Skin is warm and dry.      Capillary Refill: Capillary refill takes less than 2 seconds.      Coloration: Skin is not pale.   Neurological:      Mental Status: He is alert and oriented to person, place, and time.      Cranial Nerves: No cranial nerve deficit.      Coordination: Coordination normal.   Psychiatric:         Behavior: Behavior normal.         Thought Content: Thought content normal.         Judgment: Judgment normal.        Result Review :                 Assessment and Plan    Diagnoses and all orders for this visit:    1. Benign prostatic hyperplasia with urinary retention    2. Encounter for Malhotra catheter replacement    BPH WITH URINARY RETENTION: Patient tolerated his Malhotra cath change today with MINIMAL discomfort.  We irrigated his replaced catheter with absolutely no resistance.  No sediments were noted in bag.                                                       PLAN   Discussed increasing PO fluid intake, Malhotra cath site care.  Continue perineal care     We will follow-up in ONE Month for Cath Change.     Patient is agreeable with plan of care.    Patient is agreeable plan of care.       Follow Up   Return in about 1 month (around 10/20/2021) for Next scheduled follow up, Malhotra Catheter Change.     Patient was given instructions and counseling regarding his condition or for health maintenance advice. Please see specific information pulled into the AVS if appropriate.

## 2021-10-09 ENCOUNTER — HOSPITAL ENCOUNTER (EMERGENCY)
Facility: HOSPITAL | Age: 76
Discharge: HOME | End: 2021-10-09
Attending: STUDENT IN AN ORGANIZED HEALTH CARE EDUCATION/TRAINING PROGRAM | Admitting: STUDENT IN AN ORGANIZED HEALTH CARE EDUCATION/TRAINING PROGRAM

## 2021-10-09 VITALS
RESPIRATION RATE: 18 BRPM | SYSTOLIC BLOOD PRESSURE: 151 MMHG | OXYGEN SATURATION: 96 % | TEMPERATURE: 98.3 F | WEIGHT: 270 LBS | HEART RATE: 77 BPM | HEIGHT: 69 IN | BODY MASS INDEX: 39.99 KG/M2 | DIASTOLIC BLOOD PRESSURE: 89 MMHG

## 2021-10-09 DIAGNOSIS — T83.9XXA FOLEY CATHETER PROBLEM, INITIAL ENCOUNTER (HCC): Primary | ICD-10-CM

## 2021-10-09 PROCEDURE — 51702 INSERT TEMP BLADDER CATH: CPT

## 2021-10-09 PROCEDURE — 99282 EMERGENCY DEPT VISIT SF MDM: CPT

## 2021-10-09 RX ORDER — LIDOCAINE HYDROCHLORIDE 20 MG/ML
JELLY TOPICAL ONCE
Status: COMPLETED | OUTPATIENT
Start: 2021-10-09 | End: 2021-10-09

## 2021-10-09 RX ORDER — LIDOCAINE HYDROCHLORIDE 20 MG/ML
JELLY TOPICAL AS NEEDED
Status: DISCONTINUED | OUTPATIENT
Start: 2021-10-09 | End: 2021-10-09

## 2021-10-09 RX ADMIN — LIDOCAINE HYDROCHLORIDE: 20 JELLY TOPICAL at 08:31

## 2021-10-09 NOTE — DISCHARGE INSTRUCTIONS
Call your urology office to let them know we change your Malhotra catheter today.  Follow-up with them as directed.  Return to the ED if your symptoms change or worsen.

## 2021-10-09 NOTE — ED PROVIDER NOTES
Subjective   76-year-old male presents to the ED today due to issues with his Malhotra catheter.  He states it seems that his Malhotra catheter stopped working approximately 3 hours ago.  He states no urine is draining out into the bag.  He states he is feeling a lot of pressure in his bladder area.  He states he has had this Malhotra catheter for a long time and has not changed approximately every 30 days at the urology office.  It was last changed on September 20.  He has had this issue with the catheter not draining about 1 other time in the past and he states once they changed the catheter it started working appropriately.  He denies any leakage of urine.  He denies any fever, nausea or vomiting.  He denies any flank pain or back pain.      History provided by:  Patient  Difficulty Urinating  Presenting symptoms comment:  Unable to urinate  Context: spontaneously    Relieved by:  Nothing  Worsened by:  Nothing  Ineffective treatments:  None tried  Associated symptoms: abdominal pain and urinary retention    Associated symptoms: no fever, no flank pain, no groin pain, no hematuria, no nausea, no scrotal swelling, no urinary incontinence and no vomiting    Risk factors: urinary catheter        Review of Systems   Constitutional: Negative.  Negative for fever.   HENT: Negative.    Eyes: Negative.    Respiratory: Negative.    Cardiovascular: Negative.    Gastrointestinal: Positive for abdominal pain. Negative for nausea and vomiting.   Genitourinary: Positive for difficulty urinating. Negative for bladder incontinence, flank pain, hematuria and scrotal swelling.   Musculoskeletal: Negative.    Skin: Negative.    Neurological: Negative.    Psychiatric/Behavioral: Negative.    All other systems reviewed and are negative.      Past Medical History:   Diagnosis Date   • Atrial fibrillation (CMS/HCC)    • Cardiomyopathy (CMS/HCC)    • CHF (congestive heart failure) (CMS/HCC)    • COPD (chronic obstructive pulmonary disease)  (CMS/[facility])    • Hypertension        No Known Allergies    No past surgical history on file.    Family History   Problem Relation Age of Onset   • No Known Problems Mother    • Heart disease Father    • No Known Problems Sister    • No Known Problems Brother    • No Known Problems Son    • No Known Problems Daughter    • No Known Problems Maternal Grandmother    • No Known Problems Maternal Grandfather    • No Known Problems Paternal Grandmother    • No Known Problems Paternal Grandfather    • No Known Problems Cousin    • Rheum arthritis Neg Hx    • Osteoarthritis Neg Hx    • Asthma Neg Hx    • Diabetes Neg Hx    • Heart failure Neg Hx    • Hyperlipidemia Neg Hx    • Hypertension Neg Hx    • Migraines Neg Hx    • Rashes / Skin problems Neg Hx    • Seizures Neg Hx    • Stroke Neg Hx    • Thyroid disease Neg Hx        Social History     Socioeconomic History   • Marital status:    Tobacco Use   • Smoking status: Current Every Day Smoker     Packs/day: 0.25     Years: 59.00     Pack years: 14.75     Types: Cigarettes   • Smokeless tobacco: Never Used   Substance and Sexual Activity   • Alcohol use: No   • Drug use: No   • Sexual activity: Defer           Objective   Physical Exam  Vitals and nursing note reviewed.   Constitutional:       General: He is not in acute distress.     Appearance: Normal appearance. He is not diaphoretic.   HENT:      Head: Normocephalic and atraumatic.      Right Ear: External ear normal.      Left Ear: External ear normal.   Eyes:      Conjunctiva/sclera: Conjunctivae normal.      Pupils: Pupils are equal, round, and reactive to light.   Cardiovascular:      Rate and Rhythm: Normal rate. Rhythm irregular.      Pulses: Normal pulses.      Heart sounds: Normal heart sounds.   Pulmonary:      Effort: Pulmonary effort is normal.      Breath sounds: Normal breath sounds.   Abdominal:      General: Bowel sounds are normal.      Palpations: Abdomen is soft.      Tenderness: There is  abdominal tenderness (mild tenderness over suprapubic area, bladder feels distended).      Comments: Malhotra catheter seen, no urine output noted in bag   Musculoskeletal:         General: Normal range of motion.      Cervical back: Normal range of motion and neck supple.   Skin:     General: Skin is warm and dry.      Capillary Refill: Capillary refill takes less than 2 seconds.   Neurological:      General: No focal deficit present.      Mental Status: He is alert and oriented to person, place, and time.   Psychiatric:         Mood and Affect: Mood normal.         Procedures           ED Course  ED Course as of 10/09/21 0857   Sat Oct 09, 2021   0850 Patient Malhotra catheter was changed and he had 800 mL of urine out.  He states he is feeling much better.  He will be able to be discharged home to follow-up outpatient.  He will return to the ED if his symptoms change or worsen. [AH]      ED Course User Index  [AH] Mikayla Go PA                                           MDM  Number of Diagnoses or Management Options  Patient Progress  Patient progress: resolved      Final diagnoses:   Malhotra catheter problem, initial encounter (HCC)       ED Disposition  ED Disposition     ED Disposition Condition Comment    Discharge Stable           Jose Khoury MD  [address]  [facility] [address]  [phone]    Schedule an appointment as soon as possible for a visit in 1 week  As needed         Medication List      No changes were made to your prescriptions during this visit.          Mikayla Go PA  10/09/21 0857

## 2021-11-02 ENCOUNTER — IMMUNIZATION (OUTPATIENT)
Dept: VACCINE CLINIC | Facility: HOSPITAL | Age: 76
End: 2021-11-02

## 2021-11-02 PROCEDURE — 91300 HC SARSCOV02 VAC 30MCG/0.3ML IM: CPT | Performed by: INTERNAL MEDICINE

## 2021-11-02 PROCEDURE — 0004A ADM SARSCOV2 30MCG/0.3ML BOOSTER: CPT | Performed by: INTERNAL MEDICINE

## 2021-11-09 ENCOUNTER — OFFICE VISIT (OUTPATIENT)
Dept: UROLOGY | Facility: CLINIC | Age: 76
End: 2021-11-09

## 2021-11-09 VITALS — HEIGHT: 69 IN | BODY MASS INDEX: 39.87 KG/M2

## 2021-11-09 DIAGNOSIS — N40.1 BENIGN PROSTATIC HYPERPLASIA WITH URINARY RETENTION: Primary | ICD-10-CM

## 2021-11-09 DIAGNOSIS — Z46.6 ENCOUNTER FOR FOLEY CATHETER REPLACEMENT: ICD-10-CM

## 2021-11-09 DIAGNOSIS — R33.8 BENIGN PROSTATIC HYPERPLASIA WITH URINARY RETENTION: Primary | ICD-10-CM

## 2021-11-09 PROCEDURE — 51701 INSERT BLADDER CATHETER: CPT | Performed by: NURSE PRACTITIONER

## 2021-11-09 PROCEDURE — 99213 OFFICE O/P EST LOW 20 MIN: CPT | Performed by: NURSE PRACTITIONER

## 2021-11-09 NOTE — PROGRESS NOTES
"Chief Complaint  BPH WITH Urinary Retention (FOLLOW UP ONE MONTH Cath change #18 FR.)    Subjective          Larry Kehr presents to [facility]OENTEROLOGY [facility] UROLOGY  History of Present Illness  MR. LARRY KEHR IS a Pleasant 75-year-old male with significant history of BPH with urinary retention, WHO returns to clinic today for monthly Malhotra catheter change.     THE patient is in no apparent distress, tolerated Malhotra catheter change #18 coudé tip catheter with minimal discomfort. Also significant improvement noted to perineal yeast dermatitis around groin folds. HE is utilizing nystatin cream as ordered.     Objective   Vital Signs:   Ht 175.3 cm (69\")   BMI 39.87 kg/m²     Physical Exam  Constitutional:       General: He is in acute distress.      Appearance: He is well-developed. He is obese.   HENT:      Head: Normocephalic and atraumatic.      Right Ear: External ear normal.      Left Ear: External ear normal.   Eyes:      General:         Right eye: No discharge.         Left eye: No discharge.      Conjunctiva/sclera: Conjunctivae normal.      Pupils: Pupils are equal, round, and reactive to light.   Neck:      Thyroid: No thyromegaly.      Trachea: No tracheal deviation.   Cardiovascular:      Rate and Rhythm: Normal rate and regular rhythm.      Heart sounds: No murmur heard.  No friction rub.   Pulmonary:      Effort: Pulmonary effort is normal. No respiratory distress.      Breath sounds: Normal breath sounds. No stridor.   Abdominal:      General: Bowel sounds are normal. There is distension.      Palpations: Abdomen is soft.      Tenderness: There is abdominal tenderness. There is guarding.   Genitourinary:     Penis: Normal and uncircumcised. No tenderness or discharge.       Testes: Normal.      Rectum: Normal. Guaiac result negative.   Musculoskeletal:         General: No tenderness or deformity. Normal range of motion.      Cervical back: Normal range of motion and neck supple. "   Skin:     General: Skin is warm and dry.      Capillary Refill: Capillary refill takes less than 2 seconds.      Coloration: Skin is not pale.   Neurological:      Mental Status: He is alert and oriented to person, place, and time.      Cranial Nerves: No cranial nerve deficit.      Coordination: Coordination normal.   Psychiatric:         Behavior: Behavior normal.         Thought Content: Thought content normal.         Judgment: Judgment normal.      Malhotra Catheter change: I Prepped and Draped the patient in a sterile fashion,  Removed the existing  18 [place] coudé tip catheter after deflating the 10 cc filled balloon.     A new 18 [place] coudé tip catheter was inserted  In a sterile fashion without any complication. Urine was drained and the balloon was inflated with a 10cc syringe and the position was checked for security.    Result Review :                 Assessment and Plan    Diagnoses and all orders for this visit:    1. Benign prostatic hyperplasia with urinary retention (Primary)    2. Encounter for Malhotra catheter replacement    BPH WITH URINARY RETENTION: Patient tolerated his Malhotra cath change today with MINIMAL discomfort.  We irrigated his replaced catheter with absolutely no resistance.  No sediments were noted in bag.                                                       PLAN   Discussed increasing PO fluid intake, Malhotra cath site care.  Continue perineal care     We will follow-up in ONE Month for Cath Change.     Patient is agreeable with plan of care.     Patient is agreeable plan of care.     Follow Up   Return in about 1 month (around 12/9/2021) for Next scheduled follow up, Malhotra Catheter Change.  Patient was given instructions and counseling regarding his condition or for health maintenance advice. Please see specific information pulled into the AVS if appropriate.

## 2021-11-12 RX ORDER — SACUBITRIL AND VALSARTAN 24; 26 MG/1; MG/1
1 TABLET, FILM COATED ORAL 2 TIMES DAILY
Qty: 180 TABLET | Refills: 3 | Status: SHIPPED | OUTPATIENT
Start: 2021-11-12 | End: 2021-11-12 | Stop reason: SDUPTHER

## 2021-11-12 RX ORDER — SACUBITRIL AND VALSARTAN 24; 26 MG/1; MG/1
1 TABLET, FILM COATED ORAL 2 TIMES DAILY
Qty: 180 TABLET | Refills: 3 | Status: SHIPPED | OUTPATIENT
Start: 2021-11-12 | End: 2021-12-09 | Stop reason: DRUGHIGH

## 2021-12-09 ENCOUNTER — OFFICE VISIT (OUTPATIENT)
Dept: CARDIOLOGY | Facility: CLINIC | Age: 76
End: 2021-12-09

## 2021-12-09 VITALS
BODY MASS INDEX: 40.73 KG/M2 | TEMPERATURE: 96.8 F | WEIGHT: 275 LBS | OXYGEN SATURATION: 98 % | DIASTOLIC BLOOD PRESSURE: 79 MMHG | HEART RATE: 84 BPM | HEIGHT: 69 IN | SYSTOLIC BLOOD PRESSURE: 132 MMHG

## 2021-12-09 DIAGNOSIS — I42.8 NONISCHEMIC CARDIOMYOPATHY (HCC): Primary | ICD-10-CM

## 2021-12-09 DIAGNOSIS — I50.22 CHRONIC SYSTOLIC CONGESTIVE HEART FAILURE (HCC): ICD-10-CM

## 2021-12-09 DIAGNOSIS — I48.20 CHRONIC ATRIAL FIBRILLATION (HCC): ICD-10-CM

## 2021-12-09 DIAGNOSIS — I34.0 NONRHEUMATIC MITRAL VALVE REGURGITATION: ICD-10-CM

## 2021-12-09 PROCEDURE — 99214 OFFICE O/P EST MOD 30 MIN: CPT | Performed by: INTERNAL MEDICINE

## 2021-12-09 RX ORDER — SACUBITRIL AND VALSARTAN 49; 51 MG/1; MG/1
1 TABLET, FILM COATED ORAL 2 TIMES DAILY
Qty: 60 TABLET | Refills: 3 | Status: SHIPPED | OUTPATIENT
Start: 2021-12-09 | End: 2021-12-17 | Stop reason: SDUPTHER

## 2021-12-09 NOTE — PROGRESS NOTES
Buster Rded MD  Larry Kehr  2021    Patient Active Problem List   Diagnosis   • Chronic atrial fibrillation   • Hypertension- controlled.    • Tobacco use, states that he has cut back to 3-4 cigarettes a day.   • Morbid obesity (HCC)   • Nonischemic cardiomyopathy , appears to be compensated.   • Chronic systolic heart failure (HCC)   • Obstructive uropathy   • Chronic obstructive lung disease (HCC)   • Atrial fibrillation (HCC)   • Hypertensive disorder   • Benign prostatic hyperplasia with urinary retention   • PAD (peripheral artery disease) (HCC)   • Cardiomyopathy (HCC)   • Encounter for Malhotra catheter replacement       Dear Buster Redd MD:    Subjective     Larry Kehr is a 76 y.o. male with the problems as listed above, presents    Chief complaint: Follow-up of nonischemic cardiomyopathy and moderate to severe mitral regurgitation.    History of Present Illness: Mr. 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  He is here for regular cardiology follow-up.  On today's visit he says his breathing is better and overall he feels better.  He had a repeat echo Doppler study on 2021 which revealed improvement in his LV systolic function with LV ejection fraction up to 46 to 50% and improvement in his mitral regurgitation as well which is now mild to moderate.    Larry Kehr  Echo Complete w/Doppler and Color Flow  Order# [id number]  Reading physician: Hemal Mckinney MD Ordering physician: Hemal Mckinney MD Study date: 21     Patient Name   Larry Kehr MRN   [id number] Legal Sex   Male  (Age)   1945 (76 y.o.)     Interpretation Summary    · Normal left ventricular cavity size and wall thickness noted. There is left ventricular global  hypokinesis noted.  · Left ventricular ejection fraction appears to be 46 - 50%. Left ventricular systolic function is mildly decreased.  · The aortic valve is structurally normal with no regurgitation or stenosis present.  · There are myxomatous changes of the mitral valve apparatus present. Mild to moderate mitral valve regurgitation is present. No significant mitral valve stenosis is present.  · There is no evidence of pericardial effusion.    No Known Allergies:      Current Outpatient Medications:   •  carvedilol (COREG) 6.25 MG tablet, Take 1 tablet by mouth 2 (Two) Times a Day., Disp: 60 tablet, Rfl: 5  •  clotrimazole-betamethasone (LOTRISONE) 1-0.05 % cream, Apply  topically to the appropriate area as directed Take As Directed., Disp: 45 g, Rfl: 3  •  phenazopyridine (Pyridium) 200 MG tablet, Take 1 tablet by mouth 3 (Three) Times a Day As Needed for Bladder Spasms (STOP AFTER 3 DAYS- CHANGE URINE ORANGE IS NORMAL)., Disp: 20 tablet, Rfl: 0  •  rivaroxaban (Xarelto) 20 MG tablet, Take 1 tablet by mouth Daily With Dinner., Disp: 14 tablet, Rfl: 0  •  tamsulosin (FLOMAX) 0.4 MG capsule 24 hr capsule, Take 1 capsule by mouth Daily., Disp: 30 capsule, Rfl: 3  •  sacubitril-valsartan (Entresto) 49-51 MG tablet, Take 1 tablet by mouth 2 (Two) Times a Day., Disp: 60 tablet, Rfl: 3      The following portions of the patient's history were reviewed and updated as appropriate: allergies, current medications, past family history, past medical history, past social history, past surgical history and problem list.    Social History     Tobacco Use   • Smoking status: Current Every Day Smoker     Packs/day: 0.25     Years: 59.00     Pack years: 14.75     Types: Cigarettes   • Smokeless tobacco: Never Used   Substance Use Topics   • Alcohol use: No   • Drug use: No       Review of Systems   Constitutional: Negative for chills and fever.   HENT: Negative for nosebleeds and sore throat.    Respiratory: Negative for cough,  "hemoptysis and wheezing.    Gastrointestinal: Negative for abdominal pain, hematemesis, hematochezia, melena, nausea and vomiting.   Genitourinary: Negative for dysuria and hematuria.   Neurological: Negative for headaches.     Objective   Vitals:    12/09/21 1553   BP: 132/79   Pulse: 84   Temp: 96.8 °F (36 °C)   SpO2: 98%   Weight: 125 kg (275 lb)   Height: 175.3 cm (69.02\")     Body mass index is 40.59 kg/m².    Constitutional:       Appearance: Well-developed.   Eyes:      Conjunctiva/sclera: Conjunctivae normal.   HENT:      Head: Normocephalic.   Neck:      Thyroid: No thyromegaly.      Vascular: No JVD.      Trachea: No tracheal deviation.   Pulmonary:      Effort: No respiratory distress.      Breath sounds: Normal breath sounds. No wheezing. No rales.   Cardiovascular:      PMI at left midclavicular line. Normal rate. Irregularly irregular rhythm. Normal S1. Normal S2.      Murmurs: There is no murmur.      No gallop. No click. No rub.   Pulses:     Intact distal pulses.   Edema:     Pretibial: bilateral 1+ edema of the pretibial area.     Ankle: bilateral 1+ edema of the ankle.     Feet: bilateral 1+ edema of the feet.  Abdominal:      General: Bowel sounds are normal.      Palpations: Abdomen is soft. There is no abdominal mass.      Tenderness: There is no abdominal tenderness.   Musculoskeletal:      Cervical back: Normal range of motion and neck supple. Skin:     General: Skin is warm and dry.   Neurological:      Mental Status: Alert and oriented to person, place, and time.      Cranial Nerves: No cranial nerve deficit.       Lab Results   Component Value Date     08/04/2021    K 4.3 08/04/2021     08/04/2021    CO2 23.6 08/04/2021    BUN 11 08/04/2021    CREATININE 0.88 08/04/2021    GLUCOSE 81 08/04/2021    CALCIUM 9.2 08/04/2021    AST 11 08/04/2021    ALT 10 08/04/2021    ALKPHOS 104 08/04/2021     Lab Results   Component Value Date    CKTOTAL 20 06/14/2020     Lab Results   Component " Value Date    WBC 6.35 08/04/2021    HGB 13.6 08/04/2021    HCT 43.6 08/04/2021     08/04/2021     Lab Results   Component Value Date    INR 1.24 (H) 12/10/2016     Lab Results   Component Value Date    MG 2.3 06/14/2020     Lab Results   Component Value Date    TSH 2.250 06/14/2020    PSA 19.100 (H) 06/15/2020    TRIG 107 02/21/2020    HDL 38 (L) 02/21/2020    LDL 93 02/21/2020      Lab Results   Component Value Date    .0 (H) 01/05/2017        Assessment/Plan :   Diagnosis Plan   1. Nonischemic cardiomyopathy with LV ejection fraction of about 35%, appears to be compensated.     2. Chronic systolic congestive heart failure, appears compensated.  Basic Metabolic Panel   3. Chronic atrial fibrillation, on Xarelto for stroke prevention.     4. Nonrheumatic mitral valve regurgitation (mild to moderate)         Recommendations:  1. I have reviewed the echo Doppler results with the patient.  2. We will increase Entresto to 49/56 mg p.o. twice daily.  3. Continue with carvedilol at current dose.  4. Check BMP in a week.    Return in about 2 months (around 2/9/2022).    As always, Buster Redd MD  I appreciate very much the opportunity to participate in the cardiovascular care of your patients. Please do not hesitate to call me with any questions with regards to Larry Kehr's evaluation and management.       With Best Regards,        Hemal Mckinney MD, [facility]    Please note that portions of this note were completed with a voice recognition program.

## 2021-12-10 ENCOUNTER — OFFICE VISIT (OUTPATIENT)
Dept: UROLOGY | Facility: CLINIC | Age: 76
End: 2021-12-10

## 2021-12-10 VITALS — HEIGHT: 69 IN | BODY MASS INDEX: 40.73 KG/M2 | WEIGHT: 275 LBS

## 2021-12-10 DIAGNOSIS — N40.1 BENIGN PROSTATIC HYPERPLASIA WITH URINARY RETENTION: Primary | ICD-10-CM

## 2021-12-10 DIAGNOSIS — Z46.6 ENCOUNTER FOR FOLEY CATHETER REPLACEMENT: ICD-10-CM

## 2021-12-10 DIAGNOSIS — R33.8 BENIGN PROSTATIC HYPERPLASIA WITH URINARY RETENTION: Primary | ICD-10-CM

## 2021-12-10 PROCEDURE — 51705 CHANGE OF BLADDER TUBE: CPT | Performed by: NURSE PRACTITIONER

## 2021-12-10 PROCEDURE — 99213 OFFICE O/P EST LOW 20 MIN: CPT | Performed by: NURSE PRACTITIONER

## 2021-12-10 NOTE — PROGRESS NOTES
"Chief Complaint  Urinary Retention (Cath change)    Subjective          Larry Kehr presents to [facility]  History of Present Illness    MR. LARRY KEHR IS a Pleasant 75-year-old male with significant history of BPH with urinary retention, WHO returns to clinic today for monthly Malhotra catheter change.  He reports a remarkable improvement in his p.o. fluid intake, he denies any discomfort from his prior catheter and denies any bladder spasms.      THE patient is in no apparent distress, tolerated Malhotra catheter change #18 coudé tip catheter with minimal discomfort. Also significant improvement noted to perineal yeast dermatitis around groin folds. HE is utilizing nystatin cream as ordered.     Objective   Vital Signs:   Ht 175.3 cm (69\")   Wt 125 kg (275 lb)   BMI 40.61 kg/m²     Physical Exam  Constitutional:       General: He is in acute distress.      Appearance: He is well-developed. He is obese.   HENT:      Head: Normocephalic and atraumatic.      Right Ear: External ear normal.      Left Ear: External ear normal.   Eyes:      General:         Right eye: No discharge.         Left eye: No discharge.      Conjunctiva/sclera: Conjunctivae normal.      Pupils: Pupils are equal, round, and reactive to light.   Neck:      Thyroid: No thyromegaly.      Trachea: No tracheal deviation.   Cardiovascular:      Rate and Rhythm: Normal rate and regular rhythm.      Heart sounds: No murmur heard.  No friction rub.   Pulmonary:      Effort: Pulmonary effort is normal. No respiratory distress.      Breath sounds: Normal breath sounds. No stridor.   Abdominal:      General: Bowel sounds are normal. There is distension.      Palpations: Abdomen is soft.      Tenderness: There is abdominal tenderness. There is guarding.   Genitourinary:     Penis: Normal and uncircumcised. No tenderness or discharge.       Testes: Normal.      Rectum: Normal. Guaiac result negative. "   Musculoskeletal:         General: Tenderness present. No deformity. Normal range of motion.      Cervical back: Normal range of motion and neck supple.      Right lower leg: Edema present.      Left lower leg: Edema present.   Skin:     General: Skin is warm and dry.      Capillary Refill: Capillary refill takes less than 2 seconds.      Coloration: Skin is pale.   Neurological:      Mental Status: He is alert and oriented to person, place, and time.      Cranial Nerves: No cranial nerve deficit.      Coordination: Coordination normal.   Psychiatric:         Behavior: Behavior normal.         Thought Content: Thought content normal.         Judgment: Judgment normal.      Malhotra Catheter change: I Prepped and Draped the patient in a sterile fashion,  Removed the existing 18 [place] coudé tip catheter after deflating the 10 cc filled balloon.     A new 18 [place] coudé tip catheter was inserted  In a sterile fashion without any complication. Urine was drained and the balloon was inflated with a 10cc syringe and the position was checked for security.    Result Review :                 Assessment and Plan    Diagnoses and all orders for this visit:    1. Benign prostatic hyperplasia with urinary retention (Primary)    2. Encounter for Malhotra catheter replacement    BPH WITH URINARY RETENTION: Patient tolerated his Malhotra cath change today with MINIMAL discomfort.  We irrigated his replaced catheter with absolutely no resistance.  No sediments were noted in bag.                                                       PLAN   Discussed increasing PO fluid intake, Malhotra cath site care.  Continue perineal care     We will follow-up in ONE Month for Cath Change.     Patient is agreeable with plan of care.     Patient is agreeable plan of care.     Follow Up   Return in about 1 month (around 1/10/2022) for Next scheduled follow up, Malhotra Catheter Change.  Patient was given instructions and counseling regarding his condition or for  health maintenance advice. Please see specific information pulled into the AVS if appropriate.

## 2021-12-16 NOTE — PATIENT INSTRUCTIONS
Indwelling Urinary Catheter Care, Adult  An indwelling urinary catheter is a thin tube that is put into your bladder. The tube helps to drain pee (urine) out of your body. The tube goes in through your urethra. Your urethra is where pee comes out of your body. Your pee will come out through the catheter, then it will go into a bag (drainage bag).  Take good care of your catheter so it will work well.  How to wear your catheter and bag  Supplies needed  · Sticky tape (adhesive tape) or a leg strap.  · Alcohol wipe or soap and water (if you use tape).  · A clean towel (if you use tape).  · Large overnight bag.  · Smaller bag (leg bag).  Wearing your catheter  Attach your catheter to your leg with tape or a leg strap.  · Make sure the catheter is not pulled tight.  · If a leg strap gets wet, take it off and put on a dry strap.  · If you use tape to hold the bag on your le. Use an alcohol wipe or soap and water to wash your skin where the tape made it sticky before.  2. Use a clean towel to pat-dry that skin.  3. Use new tape to make the bag stay on your leg.  Wearing your bags  You should have been given a large overnight bag.  · You may wear the overnight bag in the day or night.  · Always have the overnight bag lower than your bladder.  Do not let the bag touch the floor.  · Before you go to sleep, put a clean plastic bag in a wastebasket. Then hang the overnight bag inside the wastebasket.  You should also have a smaller leg bag that fits under your clothes.  · Always wear the leg bag below your knee.  · Do not wear your leg bag at night.  How to care for your skin and catheter  Supplies needed  · A clean washcloth.  · Water and mild soap.  · A clean towel.  Caring for your skin and catheter         · Clean the skin around your catheter every day:  1. Wash your hands with soap and water.  2. Wet a clean washcloth in warm water and mild soap.  3. Clean the skin around your urethra.  § If you are  female:  § Gently spread the folds of skin around your vagina (labia).  § With the washcloth in your other hand, wipe the inner side of your labia on each side. Wipe from front to back.  § If you are male:  § Pull back any skin that covers the end of your penis (foreskin).  § With the washcloth in your other hand, wipe your penis in small circles. Start wiping at the tip of your penis, then move away from the catheter.  § Move the foreskin back in place, if needed.  4. With your free hand, hold the catheter close to where it goes into your body.  § Keep holding the catheter during cleaning so it does not get pulled out.  5. With the washcloth in your other hand, clean the catheter.  § Only wipe downward on the catheter.  § Do not wipe upward toward your body. Doing this may push germs into your urethra and cause infection.  6. Use a clean towel to pat-dry the catheter and the skin around it. Make sure to wipe off all soap.  7. Wash your hands with soap and water.  · Shower every day. Do not take baths.  · Do not use cream, ointment, or lotion on the area where the catheter goes into your body, unless your doctor tells you to.  · Do not use powders, sprays, or lotions on your genital area.  · Check your skin around the catheter every day for signs of infection. Check for:  ? Redness, swelling, or pain.  ? Fluid or blood.  ? Warmth.  ? Pus or a bad smell.  How to empty the bag  Supplies needed  · Rubbing alcohol.  · Gauze pad or cotton ball.  · Tape or a leg strap.  Emptying the bag  Pour the pee out of your bag when it is ?-½ full, or at least 2-3 times a day. Do this for your overnight bag and your leg bag.  1. Wash your hands with soap and water.  2. Separate (detach) the bag from your leg.  3. Hold the bag over the toilet or a clean pail. Keep the bag lower than your hips and bladder. This is so the pee (urine) does not go back into the tube.  4. Open the pour spout. It is at the bottom of the bag.  5. Empty the  pee into the toilet or pail. Do not let the pour spout touch any surface.  6. Put rubbing alcohol on a gauze pad or cotton ball.  7. Use the gauze pad or cotton ball to clean the pour spout.  8. Close the pour spout.  9. Attach the bag to your leg with tape or a leg strap.  10. Wash your hands with soap and water.  Follow instructions for cleaning the drainage bag:  · From the product maker.  · As told by your doctor.  How to change the bag  Supplies needed  · Alcohol wipes.  · A clean bag.  · Tape or a leg strap.  Changing the bag  Replace your bag when it starts to leak, smell bad, or look dirty.  1. Wash your hands with soap and water.  2. Separate the dirty bag from your leg.  3. Pinch the catheter with your fingers so that pee does not spill out.  4. Separate the catheter tube from the bag tube where these tubes connect (at the connection valve). Do not let the tubes touch any surface.  5. Clean the end of the catheter tube with an alcohol wipe. Use a different alcohol wipe to clean the end of the bag tube.  6. Connect the catheter tube to the tube of the clean bag.  7. Attach the clean bag to your leg with tape or a leg strap. Do not make the bag tight on your leg.  8. Wash your hands with soap and water.  General rules    · Never pull on your catheter. Never try to take it out. Doing that can hurt you.  · Always wash your hands before and after you touch your catheter or bag. Use a mild, fragrance-free soap. If you do not have soap and water, use hand .  · Always make sure there are no twists or bends (kinks) in the catheter tube.  · Always make sure there are no leaks in the catheter or bag.  · Drink enough fluid to keep your pee pale yellow.  · Do not take baths, swim, or use a hot tub.  · If you are female, wipe from front to back after you poop (have a bowel movement).  Contact a doctor if:  · Your pee is cloudy.  · Your pee smells worse than usual.  · Your catheter gets clogged.  · Your catheter  leaks.  · Your bladder feels full.  Get help right away if:  · You have redness, swelling, or pain where the catheter goes into your body.  · You have fluid, blood, pus, or a bad smell coming from the area where the catheter goes into your body.  · Your skin feels warm where the catheter goes into your body.  · You have a fever.  · You have pain in your:  ? Belly (abdomen).  ? Legs.  ? Lower back.  ? Bladder.  · You see blood in the catheter.  · Your pee is pink or red.  · You feel sick to your stomach (nauseous).  · You throw up (vomit).  · You have chills.  · Your pee is not draining into the bag.  · Your catheter gets pulled out.  Summary  · An indwelling urinary catheter is a thin tube that is placed into the bladder to help drain pee (urine) out of the body.  · The catheter is placed into the part of the body that drains pee from the bladder (urethra).  · Taking good care of your catheter will keep it working properly and help prevent problems.  · Always wash your hands before and after touching your catheter or bag.  · Never pull on your catheter or try to take it out.  This information is not intended to replace advice given to you by your health care provider. Make sure you discuss any questions you have with your health care provider.  Document Revised: 04/10/2020 Document Reviewed: 08/03/2018  [employer] Patient Education © 2021 [employer]  Acute Urinary Retention, Male    Acute urinary retention means that you cannot pee (urinate) at all, or that you pee too little and your bladder is not emptied completely. If it is not treated, it can lead to kidney damage or other serious problems.  Follow these instructions at home:  · Take over-the-counter and prescription medicines only as told by your doctor. Ask your doctor what medicines you should stay away from. Do not take any medicine unless your doctor says it is okay to do so.  · If you were sent home with a tube that drains the bladder (catheter), take  care of it as told by your doctor.  · Drink enough fluid to keep your pee clear or pale yellow.  · If you were given an antibiotic, take it as told by your doctor. Do not stop taking the antibiotic even if you start to feel better.  · Do not use any products that contain nicotine or tobacco, such as cigarettes and e-cigarettes. If you need help quitting, ask your doctor.  · Watch for changes in your symptoms. Tell your doctor about them.  · If told, track changes in your blood pressure at home. Tell your doctor about them.  · Keep all follow-up visits as told by your doctor. This is important.  Contact a doctor if:  · You have spasms or you leak pee when you have spasms.  Get help right away if:  · You have chills or a fever.  · You have a tube that drains the bladder and:  ? The tube stops draining pee.  ? The tube falls out.  · You have blood in your pee.  Summary  · Acute urinary retention means that you have problems peeing. It may mean that you cannot pee at all, or that you pee too little.  · If this condition is not treated, it can lead to kidney damage or other serious problems.  · If you were sent home with a tube that drains the bladder, take care of it as told by your doctor.  · Monitor any changes in your symptoms. Tell your doctor about any changes.  This information is not intended to replace advice given to you by your health care provider. Make sure you discuss any questions you have with your health care provider.  Document Revised: 12/22/2020 Document Reviewed: 01/19/2018  [employer] Patient Education © 2021 [employer]

## 2021-12-17 RX ORDER — SACUBITRIL AND VALSARTAN 49; 51 MG/1; MG/1
1 TABLET, FILM COATED ORAL 2 TIMES DAILY
Qty: 180 TABLET | Refills: 3 | Status: SHIPPED | OUTPATIENT
Start: 2021-12-17 | End: 2022-02-09 | Stop reason: DRUGHIGH

## 2022-01-01 ENCOUNTER — TELEPHONE (OUTPATIENT)
Dept: UROLOGY | Facility: CLINIC | Age: 77
End: 2022-01-01

## 2022-01-01 ENCOUNTER — TELEPHONE (OUTPATIENT)
Dept: CARDIOLOGY | Facility: CLINIC | Age: 77
End: 2022-01-01

## 2022-01-10 ENCOUNTER — OFFICE VISIT (OUTPATIENT)
Dept: UROLOGY | Facility: CLINIC | Age: 77
End: 2022-01-10

## 2022-01-10 VITALS — WEIGHT: 275 LBS | HEIGHT: 69 IN | BODY MASS INDEX: 40.73 KG/M2

## 2022-01-10 DIAGNOSIS — N40.1 BENIGN PROSTATIC HYPERPLASIA WITH URINARY RETENTION: ICD-10-CM

## 2022-01-10 DIAGNOSIS — Z91.89 AT RISK FOR INFECTION ASSOCIATED WITH FOLEY CATHETER: ICD-10-CM

## 2022-01-10 DIAGNOSIS — N30.01 ACUTE CYSTITIS WITH HEMATURIA: ICD-10-CM

## 2022-01-10 DIAGNOSIS — F45.8: Primary | ICD-10-CM

## 2022-01-10 DIAGNOSIS — R33.8 BENIGN PROSTATIC HYPERPLASIA WITH URINARY RETENTION: Primary | ICD-10-CM

## 2022-01-10 DIAGNOSIS — N40.1 BENIGN PROSTATIC HYPERPLASIA WITH URINARY RETENTION: Primary | ICD-10-CM

## 2022-01-10 DIAGNOSIS — R33.8 BENIGN PROSTATIC HYPERPLASIA WITH URINARY RETENTION: ICD-10-CM

## 2022-01-10 DIAGNOSIS — Z46.6 ENCOUNTER FOR FOLEY CATHETER REPLACEMENT: ICD-10-CM

## 2022-01-10 PROCEDURE — 99213 OFFICE O/P EST LOW 20 MIN: CPT | Performed by: NURSE PRACTITIONER

## 2022-01-10 PROCEDURE — 51702 INSERT TEMP BLADDER CATH: CPT | Performed by: NURSE PRACTITIONER

## 2022-01-10 RX ORDER — TAMSULOSIN HYDROCHLORIDE 0.4 MG/1
0.4 CAPSULE ORAL DAILY
Qty: 30 CAPSULE | Refills: 3 | Status: SHIPPED | OUTPATIENT
Start: 2022-01-10 | End: 2022-12-02 | Stop reason: SDUPTHER

## 2022-01-10 RX ORDER — PHENAZOPYRIDINE HYDROCHLORIDE 200 MG/1
200 TABLET, FILM COATED ORAL 3 TIMES DAILY PRN
Qty: 20 TABLET | Refills: 0 | Status: SHIPPED | OUTPATIENT
Start: 2022-01-10 | End: 2022-12-21

## 2022-01-10 NOTE — PROGRESS NOTES
"Chief Complaint  BPH WITH URINE RETENTION (ONE MONTH FOLLOW UP/CATH CHANGE # 18 COUDE)    Subjective          Larry Kehr presents to [facility]  History of Present Illness     MR. LARRY KEHR IS a Pleasant 75-year-old male with significant history of BPH with urinary retention, WHO returns to clinic today for monthly Mlahotra catheter change.  He reports a remarkable improvement in his p.o. fluid intake, he denies any discomfort from his prior catheter and denies any bladder spasms.      THE patient is in no apparent distress, tolerated Malhotra catheter change #18 coudé tip catheter with minimal discomfort. Also significant improvement noted to perineal yeast dermatitis around groin folds. HE is utilizing nystatin cream as ordered.    Objective   Vital Signs:   Ht 175.3 cm (69.02\")   Wt 125 kg (275 lb)   BMI 40.59 kg/m²     Physical Exam  Constitutional:       General: He is in acute distress.      Appearance: He is well-developed. He is obese.   HENT:      Head: Normocephalic and atraumatic.      Right Ear: External ear normal.      Left Ear: External ear normal.   Eyes:      General:         Right eye: No discharge.         Left eye: No discharge.      Conjunctiva/sclera: Conjunctivae normal.      Pupils: Pupils are equal, round, and reactive to light.   Neck:      Thyroid: No thyromegaly.      Trachea: No tracheal deviation.   Cardiovascular:      Rate and Rhythm: Normal rate and regular rhythm.      Heart sounds: No murmur heard.  No friction rub.   Pulmonary:      Effort: Pulmonary effort is normal. No respiratory distress.      Breath sounds: Normal breath sounds. No stridor.   Abdominal:      General: Bowel sounds are normal. There is distension.      Palpations: Abdomen is soft.      Tenderness: There is abdominal tenderness. There is guarding.   Genitourinary:     Penis: Normal and uncircumcised. No tenderness or discharge.       Testes: Normal.      Rectum: Normal. " Guaiac result negative.   Musculoskeletal:         General: Tenderness present. No deformity. Normal range of motion.      Cervical back: Normal range of motion and neck supple.   Skin:     General: Skin is warm and dry.      Capillary Refill: Capillary refill takes less than 2 seconds.      Coloration: Skin is pale.   Neurological:      Mental Status: He is alert and oriented to person, place, and time.      Cranial Nerves: No cranial nerve deficit.      Coordination: Coordination normal.   Psychiatric:         Behavior: Behavior normal.         Thought Content: Thought content normal.         Judgment: Judgment normal.        Result Review :                 Assessment and Plan    Diagnoses and all orders for this visit:    1. Benign prostatic hyperplasia with urinary retention (Primary)    2. Encounter for Malhotra catheter replacement      BPH WITH URINARY RETENTION: Patient tolerated his Malhotra cath change today with MINIMAL discomfort.  We irrigated his replaced catheter with absolutely no resistance.  No sediments were noted in bag.                                                       PLAN   Discussed increasing PO fluid intake, Malhotra cath site care.  Continue perineal care     We will follow-up in ONE Month for Cath Change.     Patient is agreeable with plan of care.     Patient is agreeable plan of care.       Follow Up   Return in about 1 month (around 2/10/2022) for Malhotra Catheter Change, Next scheduled follow up.  Patient was given instructions and counseling regarding his condition or for health maintenance advice. Please see specific information pulled into the AVS if appropriate.

## 2022-01-10 NOTE — PATIENT INSTRUCTIONS
Indwelling Urinary Catheter Care, Adult  An indwelling urinary catheter is a thin tube that is put into your bladder. The tube helps to drain pee (urine) out of your body. The tube goes in through your urethra. Your urethra is where pee comes out of your body. Your pee will come out through the catheter, then it will go into a bag (drainage bag).  Take good care of your catheter so it will work well.  How to wear your catheter and bag  Supplies needed  · Sticky tape (adhesive tape) or a leg strap.  · Alcohol wipe or soap and water (if you use tape).  · A clean towel (if you use tape).  · Large overnight bag.  · Smaller bag (leg bag).  Wearing your catheter  Attach your catheter to your leg with tape or a leg strap.  · Make sure the catheter is not pulled tight.  · If a leg strap gets wet, take it off and put on a dry strap.  · If you use tape to hold the bag on your le. Use an alcohol wipe or soap and water to wash your skin where the tape made it sticky before.  2. Use a clean towel to pat-dry that skin.  3. Use new tape to make the bag stay on your leg.  Wearing your bags  You should have been given a large overnight bag.  · You may wear the overnight bag in the day or night.  · Always have the overnight bag lower than your bladder.  Do not let the bag touch the floor.  · Before you go to sleep, put a clean plastic bag in a wastebasket. Then hang the overnight bag inside the wastebasket.  You should also have a smaller leg bag that fits under your clothes.  · Always wear the leg bag below your knee.  · Do not wear your leg bag at night.  How to care for your skin and catheter  Supplies needed  · A clean washcloth.  · Water and mild soap.  · A clean towel.  Caring for your skin and catheter         · Clean the skin around your catheter every day:  1. Wash your hands with soap and water.  2. Wet a clean washcloth in warm water and mild soap.  3. Clean the skin around your urethra.  § If you are  female:  § Gently spread the folds of skin around your vagina (labia).  § With the washcloth in your other hand, wipe the inner side of your labia on each side. Wipe from front to back.  § If you are male:  § Pull back any skin that covers the end of your penis (foreskin).  § With the washcloth in your other hand, wipe your penis in small circles. Start wiping at the tip of your penis, then move away from the catheter.  § Move the foreskin back in place, if needed.  4. With your free hand, hold the catheter close to where it goes into your body.  § Keep holding the catheter during cleaning so it does not get pulled out.  5. With the washcloth in your other hand, clean the catheter.  § Only wipe downward on the catheter.  § Do not wipe upward toward your body. Doing this may push germs into your urethra and cause infection.  6. Use a clean towel to pat-dry the catheter and the skin around it. Make sure to wipe off all soap.  7. Wash your hands with soap and water.  · Shower every day. Do not take baths.  · Do not use cream, ointment, or lotion on the area where the catheter goes into your body, unless your doctor tells you to.  · Do not use powders, sprays, or lotions on your genital area.  · Check your skin around the catheter every day for signs of infection. Check for:  ? Redness, swelling, or pain.  ? Fluid or blood.  ? Warmth.  ? Pus or a bad smell.  How to empty the bag  Supplies needed  · Rubbing alcohol.  · Gauze pad or cotton ball.  · Tape or a leg strap.  Emptying the bag  Pour the pee out of your bag when it is ?-½ full, or at least 2-3 times a day. Do this for your overnight bag and your leg bag.  1. Wash your hands with soap and water.  2. Separate (detach) the bag from your leg.  3. Hold the bag over the toilet or a clean pail. Keep the bag lower than your hips and bladder. This is so the pee (urine) does not go back into the tube.  4. Open the pour spout. It is at the bottom of the bag.  5. Empty the  pee into the toilet or pail. Do not let the pour spout touch any surface.  6. Put rubbing alcohol on a gauze pad or cotton ball.  7. Use the gauze pad or cotton ball to clean the pour spout.  8. Close the pour spout.  9. Attach the bag to your leg with tape or a leg strap.  10. Wash your hands with soap and water.  Follow instructions for cleaning the drainage bag:  · From the product maker.  · As told by your doctor.  How to change the bag  Supplies needed  · Alcohol wipes.  · A clean bag.  · Tape or a leg strap.  Changing the bag  Replace your bag when it starts to leak, smell bad, or look dirty.  1. Wash your hands with soap and water.  2. Separate the dirty bag from your leg.  3. Pinch the catheter with your fingers so that pee does not spill out.  4. Separate the catheter tube from the bag tube where these tubes connect (at the connection valve). Do not let the tubes touch any surface.  5. Clean the end of the catheter tube with an alcohol wipe. Use a different alcohol wipe to clean the end of the bag tube.  6. Connect the catheter tube to the tube of the clean bag.  7. Attach the clean bag to your leg with tape or a leg strap. Do not make the bag tight on your leg.  8. Wash your hands with soap and water.  General rules    · Never pull on your catheter. Never try to take it out. Doing that can hurt you.  · Always wash your hands before and after you touch your catheter or bag. Use a mild, fragrance-free soap. If you do not have soap and water, use hand .  · Always make sure there are no twists or bends (kinks) in the catheter tube.  · Always make sure there are no leaks in the catheter or bag.  · Drink enough fluid to keep your pee pale yellow.  · Do not take baths, swim, or use a hot tub.  · If you are female, wipe from front to back after you poop (have a bowel movement).  Contact a doctor if:  · Your pee is cloudy.  · Your pee smells worse than usual.  · Your catheter gets clogged.  · Your catheter  leaks.  · Your bladder feels full.  Get help right away if:  · You have redness, swelling, or pain where the catheter goes into your body.  · You have fluid, blood, pus, or a bad smell coming from the area where the catheter goes into your body.  · Your skin feels warm where the catheter goes into your body.  · You have a fever.  · You have pain in your:  ? Belly (abdomen).  ? Legs.  ? Lower back.  ? Bladder.  · You see blood in the catheter.  · Your pee is pink or red.  · You feel sick to your stomach (nauseous).  · You throw up (vomit).  · You have chills.  · Your pee is not draining into the bag.  · Your catheter gets pulled out.  Summary  · An indwelling urinary catheter is a thin tube that is placed into the bladder to help drain pee (urine) out of the body.  · The catheter is placed into the part of the body that drains pee from the bladder (urethra).  · Taking good care of your catheter will keep it working properly and help prevent problems.  · Always wash your hands before and after touching your catheter or bag.  · Never pull on your catheter or try to take it out.  This information is not intended to replace advice given to you by your health care provider. Make sure you discuss any questions you have with your health care provider.  Document Revised: 04/10/2020 Document Reviewed: 08/03/2018  [email or web address] Patient Education © 2021 [employer]  Acute Urinary Retention, Male    Acute urinary retention means that you cannot pee (urinate) at all, or that you pee too little and your bladder is not emptied completely. If it is not treated, it can lead to kidney damage or other serious problems.  Follow these instructions at home:  · Take over-the-counter and prescription medicines only as told by your doctor. Ask your doctor what medicines you should stay away from. Do not take any medicine unless your doctor says it is okay to do so.  · If you were sent home with a tube that drains the bladder (catheter), take  care of it as told by your doctor.  · Drink enough fluid to keep your pee clear or pale yellow.  · If you were given an antibiotic, take it as told by your doctor. Do not stop taking the antibiotic even if you start to feel better.  · Do not use any products that contain nicotine or tobacco, such as cigarettes and e-cigarettes. If you need help quitting, ask your doctor.  · Watch for changes in your symptoms. Tell your doctor about them.  · If told, track changes in your blood pressure at home. Tell your doctor about them.  · Keep all follow-up visits as told by your doctor. This is important.  Contact a doctor if:  · You have spasms or you leak pee when you have spasms.  Get help right away if:  · You have chills or a fever.  · You have a tube that drains the bladder and:  ? The tube stops draining pee.  ? The tube falls out.  · You have blood in your pee.  Summary  · Acute urinary retention means that you have problems peeing. It may mean that you cannot pee at all, or that you pee too little.  · If this condition is not treated, it can lead to kidney damage or other serious problems.  · If you were sent home with a tube that drains the bladder, take care of it as told by your doctor.  · Monitor any changes in your symptoms. Tell your doctor about any changes.  This information is not intended to replace advice given to you by your health care provider. Make sure you discuss any questions you have with your health care provider.  Document Revised: 12/22/2020 Document Reviewed: 01/19/2018  [employer] Patient Education © 2021 [employer]

## 2022-01-19 ENCOUNTER — TELEPHONE (OUTPATIENT)
Dept: UROLOGY | Facility: CLINIC | Age: 77
End: 2022-01-19

## 2022-01-19 DIAGNOSIS — N48.0 BALANITIS XEROTICA OBLITERANS: ICD-10-CM

## 2022-01-19 RX ORDER — CLOTRIMAZOLE AND BETAMETHASONE DIPROPIONATE 10; .64 MG/G; MG/G
CREAM TOPICAL TAKE AS DIRECTED
Qty: 45 G | Refills: 3 | Status: SHIPPED | OUTPATIENT
Start: 2022-01-19 | End: 2022-05-25 | Stop reason: SDUPTHER

## 2022-01-19 NOTE — TELEPHONE ENCOUNTER
Patient requesting a refill on clotrimazole-betamethasone be sent to the [facility] in [place].

## 2022-02-06 ENCOUNTER — HOSPITAL ENCOUNTER (EMERGENCY)
Facility: HOSPITAL | Age: 77
Discharge: HOME | End: 2022-02-06
Attending: EMERGENCY MEDICINE | Admitting: EMERGENCY MEDICINE

## 2022-02-06 VITALS
WEIGHT: 223 LBS | TEMPERATURE: 97.8 F | SYSTOLIC BLOOD PRESSURE: 150 MMHG | HEART RATE: 86 BPM | RESPIRATION RATE: 20 BRPM | BODY MASS INDEX: 33.03 KG/M2 | DIASTOLIC BLOOD PRESSURE: 80 MMHG | HEIGHT: 69 IN | OXYGEN SATURATION: 99 %

## 2022-02-06 DIAGNOSIS — T83.9XXA PROBLEM WITH FOLEY CATHETER, INITIAL ENCOUNTER: Primary | ICD-10-CM

## 2022-02-06 PROCEDURE — 99282 EMERGENCY DEPT VISIT SF MDM: CPT

## 2022-02-06 NOTE — ED PROVIDER NOTES
Subjective   77 yo male patient presents to the ED requesting his sommer catheter to be replaced. Patient states his catheter is clogged. Patient states he was having bladder spasms and took a pyridium prior to coming to the ED.       History provided by:  Patient   used: No        Review of Systems   Constitutional: Negative.    HENT: Negative.    Eyes: Negative.    Respiratory: Negative.    Cardiovascular: Negative.    Gastrointestinal: Negative.    Endocrine: Negative.    Genitourinary: Positive for dysuria.   Musculoskeletal: Negative.    Skin: Negative.    Allergic/Immunologic: Negative.    Neurological: Negative.    Hematological: Negative.    Psychiatric/Behavioral: Negative.    All other systems reviewed and are negative.      Past Medical History:   Diagnosis Date   • Atrial fibrillation (HCC)    • Cardiomyopathy (HCC)    • CHF (congestive heart failure) (HCC)    • COPD (chronic obstructive pulmonary disease) (HCC)    • Hypertension        No Known Allergies    History reviewed. No pertinent surgical history.    Family History   Problem Relation Age of Onset   • No Known Problems Mother    • Heart disease Father    • No Known Problems Sister    • No Known Problems Brother    • No Known Problems Son    • No Known Problems Daughter    • No Known Problems Maternal Grandmother    • No Known Problems Maternal Grandfather    • No Known Problems Paternal Grandmother    • No Known Problems Paternal Grandfather    • No Known Problems Cousin    • Rheum arthritis Neg Hx    • Osteoarthritis Neg Hx    • Asthma Neg Hx    • Diabetes Neg Hx    • Heart failure Neg Hx    • Hyperlipidemia Neg Hx    • Hypertension Neg Hx    • Migraines Neg Hx    • Rashes / Skin problems Neg Hx    • Seizures Neg Hx    • Stroke Neg Hx    • Thyroid disease Neg Hx        Social History     Socioeconomic History   • Marital status:    Tobacco Use   • Smoking status: Current Every Day Smoker     Packs/day: 0.25     Years:  "59.00     Pack years: 14.75     Types: Cigarettes   • Smokeless tobacco: Never Used   Vaping Use   • Vaping Use: Never used   Substance and Sexual Activity   • Alcohol use: No   • Drug use: No   • Sexual activity: Defer           Objective   Physical Exam  Vitals and nursing note reviewed.   Constitutional:       Appearance: Normal appearance. He is normal weight.   HENT:      Head: Normocephalic and atraumatic.      Right Ear: External ear normal.      Left Ear: External ear normal.      Nose: Nose normal.      Mouth/Throat:      Mouth: Mucous membranes are moist.      Pharynx: Oropharynx is clear.   Eyes:      Extraocular Movements: Extraocular movements intact.      Conjunctiva/sclera: Conjunctivae normal.      Pupils: Pupils are equal, round, and reactive to light.   Cardiovascular:      Rate and Rhythm: Normal rate and regular rhythm.      Pulses: Normal pulses.      Heart sounds: Normal heart sounds.   Pulmonary:      Effort: Pulmonary effort is normal.      Breath sounds: Normal breath sounds.   Abdominal:      General: Abdomen is flat. Bowel sounds are normal.      Palpations: Abdomen is soft.   Genitourinary:     Comments: Pt states he emptied his sommer bag while in the lobby and thinks that his \"blockage\" is gone now. Patient states that his urologist gave him pyridium for bladder spasms as it is common with indwelling sommer.   Musculoskeletal:         General: Normal range of motion.      Cervical back: Normal range of motion and neck supple.   Skin:     General: Skin is warm and dry.      Capillary Refill: Capillary refill takes less than 2 seconds.   Neurological:      General: No focal deficit present.      Mental Status: He is alert and oriented to person, place, and time. Mental status is at baseline.   Psychiatric:         Mood and Affect: Mood normal.         Behavior: Behavior normal.         Thought Content: Thought content normal.         Judgment: Judgment normal.         Procedures     "       ED Course  ED Course as of 02/06/22 1252   Sun Feb 06, 2022   1250 BRITTANY Ward assessed catheter. There is no complications. Catheter is in place and is working appropriately. Patient doing well. Patient states he feels find now and wants to go home. Patient has no concerns for an infection. He states he is prescribed pyridium for bladder spasms and this is normal for him. No other concerns for UTI. Pt afebrile. Vitals stable and WNL. He will f/u with Dr. Khoury. Discussed sx that would warrant return to the ED.  [ML]      ED Course User Index  [ML] Soraya Garza PA                                                 MDM  Number of Diagnoses or Management Options  Risk of Complications, Morbidity, and/or Mortality  Presenting problems: minimal  Diagnostic procedures: minimal  Management options: minimal    Patient Progress  Patient progress: improved      Final diagnoses:   Problem with Malhotra catheter, initial encounter (HCC)       ED Disposition  ED Disposition     ED Disposition Condition Comment    Discharge Stable           Buster Redd MD  [address] [place] [address]  [phone]    Schedule an appointment as soon as possible for a visit in 1 day           Medication List      No changes were made to your prescriptions during this visit.          Soraya aGrza PA  02/06/22 1252

## 2022-02-06 NOTE — ED NOTES
Pt is in the bathroom at this time. When he comes out he says that he didn't produce any urine through the night. He took pyridium for some minor pain. He drank 2 cups of coffee and came to the er. He says when he walked back to the room, he felt his collection bag pretty full. He says he emptied a half a bag of orange colored urine. PA sees him and he says he is fine now and all seems to be working.     Huan Ward RN  02/06/22 7926

## 2022-02-06 NOTE — ED NOTES
MEDICAL SCREENING:    Reason for Visit: Malhotra cath    Patient initially seen in triage.  The patient was advised further evaluation and diagnostic testing will be needed, some of the treatment and testing will be initiated in the lobby in order to begin the process.  The patient will be returned to the waiting area for the time being and possibly be re-assessed by a subsequent ED provider.  The patient will be brought back to the treatment area in as timely manner as possible.       Berto Noonan PA-C  02/06/22 0922

## 2022-02-08 ENCOUNTER — OFFICE VISIT (OUTPATIENT)
Dept: UROLOGY | Facility: CLINIC | Age: 77
End: 2022-02-08

## 2022-02-08 VITALS — WEIGHT: 223 LBS | BODY MASS INDEX: 33.03 KG/M2 | HEIGHT: 69 IN

## 2022-02-08 DIAGNOSIS — Z97.8 CHRONIC INDWELLING FOLEY CATHETER: ICD-10-CM

## 2022-02-08 DIAGNOSIS — Z46.6 ENCOUNTER FOR FOLEY CATHETER REPLACEMENT: ICD-10-CM

## 2022-02-08 DIAGNOSIS — K59.09 OTHER CONSTIPATION: ICD-10-CM

## 2022-02-08 DIAGNOSIS — B37.2 YEAST DERMATITIS: ICD-10-CM

## 2022-02-08 DIAGNOSIS — N30.01 ACUTE CYSTITIS WITH HEMATURIA: Primary | ICD-10-CM

## 2022-02-08 PROCEDURE — 51701 INSERT BLADDER CATHETER: CPT | Performed by: NURSE PRACTITIONER

## 2022-02-08 PROCEDURE — 87086 URINE CULTURE/COLONY COUNT: CPT | Performed by: NURSE PRACTITIONER

## 2022-02-08 PROCEDURE — 99214 OFFICE O/P EST MOD 30 MIN: CPT | Performed by: NURSE PRACTITIONER

## 2022-02-08 PROCEDURE — 87186 SC STD MICRODIL/AGAR DIL: CPT | Performed by: NURSE PRACTITIONER

## 2022-02-08 PROCEDURE — 87077 CULTURE AEROBIC IDENTIFY: CPT | Performed by: NURSE PRACTITIONER

## 2022-02-08 RX ORDER — NYSTATIN 100000 [USP'U]/G
POWDER TOPICAL
Qty: 60 G | Refills: 2 | Status: SHIPPED | OUTPATIENT
Start: 2022-02-08 | End: 2022-02-08 | Stop reason: SDUPTHER

## 2022-02-08 RX ORDER — SULFAMETHOXAZOLE AND TRIMETHOPRIM 800; 160 MG/1; MG/1
1 TABLET ORAL 2 TIMES DAILY
Qty: 20 TABLET | Refills: 0 | Status: SHIPPED | OUTPATIENT
Start: 2022-02-08 | End: 2022-02-08 | Stop reason: SDUPTHER

## 2022-02-08 RX ORDER — POLYETHYLENE GLYCOL 3350 17 G/17G
17 POWDER, FOR SOLUTION ORAL DAILY
Qty: 30 EACH | Refills: 3 | Status: SHIPPED | OUTPATIENT
Start: 2022-02-08 | End: 2022-12-21

## 2022-02-08 RX ORDER — ONDANSETRON 4 MG/1
4 TABLET, FILM COATED ORAL EVERY 12 HOURS PRN
Qty: 20 TABLET | Refills: 0 | Status: SHIPPED | OUTPATIENT
Start: 2022-02-08 | End: 2022-12-21

## 2022-02-08 RX ORDER — SULFAMETHOXAZOLE AND TRIMETHOPRIM 800; 160 MG/1; MG/1
1 TABLET ORAL 2 TIMES DAILY
Qty: 20 TABLET | Refills: 0 | Status: SHIPPED | OUTPATIENT
Start: 2022-02-08 | End: 2022-12-02 | Stop reason: SDUPTHER

## 2022-02-08 RX ORDER — ONDANSETRON 4 MG/1
4 TABLET, FILM COATED ORAL EVERY 12 HOURS PRN
Qty: 20 TABLET | Refills: 0 | Status: SHIPPED | OUTPATIENT
Start: 2022-02-08 | End: 2022-02-08 | Stop reason: SDUPTHER

## 2022-02-08 RX ORDER — NYSTATIN 100000 [USP'U]/G
POWDER TOPICAL
Qty: 60 G | Refills: 2 | Status: SHIPPED | OUTPATIENT
Start: 2022-02-08 | End: 2022-12-21

## 2022-02-08 NOTE — PATIENT INSTRUCTIONS
Benign Prostatic Hyperplasia    Benign prostatic hyperplasia (BPH) is an enlarged prostate gland that is caused by the normal aging process and not by cancer. The prostate is a walnut-sized gland that is involved in the production of semen. It is located in front of the rectum and below the bladder. The bladder stores urine and the urethra is the tube that carries the urine out of the body. The prostate may get bigger as a man gets older.  An enlarged prostate can press on the urethra. This can make it harder to pass urine. The build-up of urine in the bladder can cause infection. Back pressure and infection may progress to bladder damage and kidney (renal) failure.  What are the causes?  This condition is part of a normal aging process. However, not all men develop problems from this condition. If the prostate enlarges away from the urethra, urine flow will not be blocked. If it enlarges toward the urethra and compresses it, there will be problems passing urine.  What increases the risk?  This condition is more likely to develop in men over the age of 50 years.  What are the signs or symptoms?  Symptoms of this condition include:  · Getting up often during the night to urinate.  · Needing to urinate frequently during the day.  · Difficulty starting urine flow.  · Decrease in size and strength of your urine stream.  · Leaking (dribbling) after urinating.  · Inability to pass urine. This needs immediate treatment.  · Inability to completely empty your bladder.  · Pain when you pass urine. This is more common if there is also an infection.  · Urinary tract infection (UTI).  How is this diagnosed?  This condition is diagnosed based on your medical history, a physical exam, and your symptoms. Tests will also be done, such as:  · A post-void bladder scan. This measures any amount of urine that may remain in your bladder after you finish urinating.  · A digital rectal exam. In a rectal exam, your health care provider  checks your prostate by putting a lubricated, gloved finger into your rectum to feel the back of your prostate gland. This exam detects the size of your gland and any abnormal lumps or growths.  · An exam of your urine (urinalysis).  · A prostate specific antigen (PSA) screening. This is a blood test used to screen for prostate cancer.  · An ultrasound. This test uses sound waves to electronically produce a picture of your prostate gland.  Your health care provider may refer you to a specialist in kidney and prostate diseases (urologist).  How is this treated?  Once symptoms begin, your health care provider will monitor your condition (active surveillance or watchful waiting). Treatment for this condition will depend on the severity of your condition. Treatment may include:  · Observation and yearly exams. This may be the only treatment needed if your condition and symptoms are mild.  · Medicines to relieve your symptoms, including:  ? Medicines to shrink the prostate.  ? Medicines to relax the muscle of the prostate.  · Surgery in severe cases. Surgery may include:  ? Prostatectomy. In this procedure, the prostate tissue is removed completely through an open incision or with a laparoscope or robotics.  ? Transurethral resection of the prostate (TURP). In this procedure, a tool is inserted through the opening at the tip of the penis (urethra). It is used to cut away tissue of the inner core of the prostate. The pieces are removed through the same opening of the penis. This removes the blockage.  ? Transurethral incision (TUIP). In this procedure, small cuts are made in the prostate. This lessens the prostate's pressure on the urethra.  ? Transurethral microwave thermotherapy (TUMT). This procedure uses microwaves to create heat. The heat destroys and removes a small amount of prostate tissue.  ? Transurethral needle ablation (TUNA). This procedure uses radio frequencies to destroy and remove a small amount of  prostate tissue.  ? Interstitial laser coagulation (ILC). This procedure uses a laser to destroy and remove a small amount of prostate tissue.  ? Transurethral electrovaporization (TUVP). This procedure uses electrodes to destroy and remove a small amount of prostate tissue.  ? Prostatic urethral lift. This procedure inserts an implant to push the lobes of the prostate away from the urethra.  Follow these instructions at home:  · Take over-the-counter and prescription medicines only as told by your health care provider.  · Monitor your symptoms for any changes. Contact your health care provider with any changes.  · Avoid drinking large amounts of liquid before going to bed or out in public.  · Avoid or reduce how much caffeine or alcohol you drink.  · Give yourself time when you urinate.  · Keep all follow-up visits as told by your health care provider. This is important.  Contact a health care provider if:  · You have unexplained back pain.  · Your symptoms do not get better with treatment.  · You develop side effects from the medicine you are taking.  · Your urine becomes very dark or has a bad smell.  · Your lower abdomen becomes distended and you have trouble passing your urine.  Get help right away if:  · You have a fever or chills.  · You suddenly cannot urinate.  · You feel lightheaded, or very dizzy, or you faint.  · There are large amounts of blood or clots in the urine.  · Your urinary problems become hard to manage.  · You develop moderate to severe low back or flank pain. The flank is the side of your body between the ribs and the hip.  These symptoms may represent a serious problem that is an emergency. Do not wait to see if the symptoms will go away. Get medical help right away. Call your local emergency services (911 in the [place] Do not drive yourself to the hospital.  Summary  · Benign prostatic hyperplasia (BPH) is an enlarged prostate that is caused by the normal aging process and not by  cancer.  · An enlarged prostate can press on the urethra. This can make it hard to pass urine.  · This condition is part of a normal aging process and is more likely to develop in men over the age of 50 years.  · Get help right away if you suddenly cannot urinate.  This information is not intended to replace advice given to you by your health care provider. Make sure you discuss any questions you have with your health care provider.  Document Revised: 2019 Document Reviewed: 2018  [employer] Patient Education 2021 [employer]  Indwelling Urinary Catheter Care, Adult  An indwelling urinary catheter is a thin tube that is put into your bladder. The tube helps to drain pee (urine) out of your body. The tube goes in through your urethra. Your urethra is where pee comes out of your body. Your pee will come out through the catheter, then it will go into a bag (drainage bag).  Take good care of your catheter so it will work well.  How to wear your catheter and bag  Supplies needed  · Sticky tape (adhesive tape) or a leg strap.  · Alcohol wipe or soap and water (if you use tape).  · A clean towel (if you use tape).  · Large overnight bag.  · Smaller bag (leg bag).  Wearing your catheter  Attach your catheter to your leg with tape or a leg strap.  · Make sure the catheter is not pulled tight.  · If a leg strap gets wet, take it off and put on a dry strap.  · If you use tape to hold the bag on your le. Use an alcohol wipe or soap and water to wash your skin where the tape made it sticky before.  2. Use a clean towel to pat-dry that skin.  3. Use new tape to make the bag stay on your leg.  Wearing your bags  You should have been given a large overnight bag.  · You may wear the overnight bag in the day or night.  · Always have the overnight bag lower than your bladder.  Do not let the bag touch the floor.  · Before you go to sleep, put a clean plastic bag in a wastebasket. Then hang the overnight bag inside  the wastebasket.  You should also have a smaller leg bag that fits under your clothes.  · Always wear the leg bag below your knee.  · Do not wear your leg bag at night.  How to care for your skin and catheter  Supplies needed  · A clean washcloth.  · Water and mild soap.  · A clean towel.  Caring for your skin and catheter         · Clean the skin around your catheter every day:  1. Wash your hands with soap and water.  2. Wet a clean washcloth in warm water and mild soap.  3. Clean the skin around your urethra.  § If you are female:  § Gently spread the folds of skin around your vagina (labia).  § With the washcloth in your other hand, wipe the inner side of your labia on each side. Wipe from front to back.  § If you are male:  § Pull back any skin that covers the end of your penis (foreskin).  § With the washcloth in your other hand, wipe your penis in small circles. Start wiping at the tip of your penis, then move away from the catheter.  § Move the foreskin back in place, if needed.  4. With your free hand, hold the catheter close to where it goes into your body.  § Keep holding the catheter during cleaning so it does not get pulled out.  5. With the washcloth in your other hand, clean the catheter.  § Only wipe downward on the catheter.  § Do not wipe upward toward your body. Doing this may push germs into your urethra and cause infection.  6. Use a clean towel to pat-dry the catheter and the skin around it. Make sure to wipe off all soap.  7. Wash your hands with soap and water.  · Shower every day. Do not take baths.  · Do not use cream, ointment, or lotion on the area where the catheter goes into your body, unless your doctor tells you to.  · Do not use powders, sprays, or lotions on your genital area.  · Check your skin around the catheter every day for signs of infection. Check for:  ? Redness, swelling, or pain.  ? Fluid or blood.  ? Warmth.  ? Pus or a bad smell.  How to empty the bag  Supplies  needed  · Rubbing alcohol.  · Gauze pad or cotton ball.  · Tape or a leg strap.  Emptying the bag  Pour the pee out of your bag when it is ?-½ full, or at least 2-3 times a day. Do this for your overnight bag and your leg bag.  1. Wash your hands with soap and water.  2. Separate (detach) the bag from your leg.  3. Hold the bag over the toilet or a clean pail. Keep the bag lower than your hips and bladder. This is so the pee (urine) does not go back into the tube.  4. Open the pour spout. It is at the bottom of the bag.  5. Empty the pee into the toilet or pail. Do not let the pour spout touch any surface.  6. Put rubbing alcohol on a gauze pad or cotton ball.  7. Use the gauze pad or cotton ball to clean the pour spout.  8. Close the pour spout.  9. Attach the bag to your leg with tape or a leg strap.  10. Wash your hands with soap and water.  Follow instructions for cleaning the drainage bag:  · From the product maker.  · As told by your doctor.  How to change the bag  Supplies needed  · Alcohol wipes.  · A clean bag.  · Tape or a leg strap.  Changing the bag  Replace your bag when it starts to leak, smell bad, or look dirty.  1. Wash your hands with soap and water.  2. Separate the dirty bag from your leg.  3. Pinch the catheter with your fingers so that pee does not spill out.  4. Separate the catheter tube from the bag tube where these tubes connect (at the connection valve). Do not let the tubes touch any surface.  5. Clean the end of the catheter tube with an alcohol wipe. Use a different alcohol wipe to clean the end of the bag tube.  6. Connect the catheter tube to the tube of the clean bag.  7. Attach the clean bag to your leg with tape or a leg strap. Do not make the bag tight on your leg.  8. Wash your hands with soap and water.  General rules    · Never pull on your catheter. Never try to take it out. Doing that can hurt you.  · Always wash your hands before and after you touch your catheter or bag. Use  a mild, fragrance-free soap. If you do not have soap and water, use hand .  · Always make sure there are no twists or bends (kinks) in the catheter tube.  · Always make sure there are no leaks in the catheter or bag.  · Drink enough fluid to keep your pee pale yellow.  · Do not take baths, swim, or use a hot tub.  · If you are female, wipe from front to back after you poop (have a bowel movement).  Contact a doctor if:  · Your pee is cloudy.  · Your pee smells worse than usual.  · Your catheter gets clogged.  · Your catheter leaks.  · Your bladder feels full.  Get help right away if:  · You have redness, swelling, or pain where the catheter goes into your body.  · You have fluid, blood, pus, or a bad smell coming from the area where the catheter goes into your body.  · Your skin feels warm where the catheter goes into your body.  · You have a fever.  · You have pain in your:  ? Belly (abdomen).  ? Legs.  ? Lower back.  ? Bladder.  · You see blood in the catheter.  · Your pee is pink or red.  · You feel sick to your stomach (nauseous).  · You throw up (vomit).  · You have chills.  · Your pee is not draining into the bag.  · Your catheter gets pulled out.  Summary  · An indwelling urinary catheter is a thin tube that is placed into the bladder to help drain pee (urine) out of the body.  · The catheter is placed into the part of the body that drains pee from the bladder (urethra).  · Taking good care of your catheter will keep it working properly and help prevent problems.  · Always wash your hands before and after touching your catheter or bag.  · Never pull on your catheter or try to take it out.  This information is not intended to replace advice given to you by your health care provider. Make sure you discuss any questions you have with your health care provider.  Document Revised: 04/10/2020 Document Reviewed: 08/03/2018  [employer] Patient Education © 2021 [employer]  Urinary Tract Infection, Adult    A  urinary tract infection (UTI) is an infection of any part of the urinary tract. The urinary tract includes the kidneys, ureters, bladder, and urethra. These organs make, store, and get rid of urine in the body.  Your health care provider may use other names to describe the infection. An upper UTI affects the ureters and kidneys (pyelonephritis). A lower UTI affects the bladder (cystitis) and urethra (urethritis).  What are the causes?  Most urinary tract infections are caused by bacteria in your genital area, around the entrance to your urinary tract (urethra). These bacteria grow and cause inflammation of your urinary tract.  What increases the risk?  You are more likely to develop this condition if:  · You have a urinary catheter that stays in place (indwelling).  · You are not able to control when you urinate or have a bowel movement (you have incontinence).  · You are female and you:  ? Use a spermicide or diaphragm for birth control.  ? Have low estrogen levels.  ? Are pregnant.  · You have certain genes that increase your risk (genetics).  · You are sexually active.  · You take antibiotic medicines.  · You have a condition that causes your flow of urine to slow down, such as:  ? An enlarged prostate, if you are male.  ? Blockage in your urethra (stricture).  ? A kidney stone.  ? A nerve condition that affects your bladder control (neurogenic bladder).  ? Not getting enough to drink, or not urinating often.  · You have certain medical conditions, such as:  ? Diabetes.  ? A weak disease-fighting system (immunesystem).  ? Sickle cell disease.  ? Gout.  ? Spinal cord injury.  What are the signs or symptoms?  Symptoms of this condition include:  · Needing to urinate right away (urgently).  · Frequent urination or passing small amounts of urine frequently.  · Pain or burning with urination.  · Blood in the urine.  · Urine that smells bad or unusual.  · Trouble urinating.  · Cloudy urine.  · Vaginal discharge, if you  are female.  · Pain in the abdomen or the lower back.  You may also have:  · Vomiting or a decreased appetite.  · Confusion.  · Irritability or tiredness.  · A fever.  · Diarrhea.  The first symptom in older adults may be confusion. In some cases, they may not have any symptoms until the infection has worsened.  How is this diagnosed?  This condition is diagnosed based on your medical history and a physical exam. You may also have other tests, including:  · Urine tests.  · Blood tests.  · Tests for sexually transmitted infections (STIs).  If you have had more than one UTI, a cystoscopy or imaging studies may be done to determine the cause of the infections.  How is this treated?  Treatment for this condition includes:  · Antibiotic medicine.  · Over-the-counter medicines to treat discomfort.  · Drinking enough water to stay hydrated.  If you have frequent infections or have other conditions such as a kidney stone, you may need to see a health care provider who specializes in the urinary tract (urologist).  In rare cases, urinary tract infections can cause sepsis. Sepsis is a life-threatening condition that occurs when the body responds to an infection. Sepsis is treated in the hospital with IV antibiotics, fluids, and other medicines.  Follow these instructions at home:    Medicines  · Take over-the-counter and prescription medicines only as told by your health care provider.  · If you were prescribed an antibiotic medicine, take it as told by your health care provider. Do not stop using the antibiotic even if you start to feel better.  General instructions  · Make sure you:  ? Empty your bladder often and completely. Do not hold urine for long periods of time.  ? Empty your bladder after sex.  ? Wipe from front to back after a bowel movement if you are female. Use each tissue one time when you wipe.  · Drink enough fluid to keep your urine pale yellow.  · Keep all follow-up visits as told by your health care  provider. This is important.  Contact a health care provider if:  · Your symptoms do not get better after 1-2 days.  · Your symptoms go away and then return.  Get help right away if you have:  · Severe pain in your back or your lower abdomen.  · A fever.  · Nausea or vomiting.  Summary  · A urinary tract infection (UTI) is an infection of any part of the urinary tract, which includes the kidneys, ureters, bladder, and urethra.  · Most urinary tract infections are caused by bacteria in your genital area, around the entrance to your urinary tract (urethra).  · Treatment for this condition often includes antibiotic medicines.  · If you were prescribed an antibiotic medicine, take it as told by your health care provider. Do not stop using the antibiotic even if you start to feel better.  · Keep all follow-up visits as told by your health care provider. This is important.  This information is not intended to replace advice given to you by your health care provider. Make sure you discuss any questions you have with your health care provider.  Document Revised: 12/05/2019 Document Reviewed: 06/27/2019  [employer] Patient Education © 2021 [employer]

## 2022-02-08 NOTE — PROGRESS NOTES
"Chief Complaint  Benign Prostatic Hypertrophy/DYSURIA (Cath change )    Subjective          Larry Kehr presents to [facility] GASTROENTEROLOGY [facility] UROLOGY  History of Present Illness    MR. LARRY KEHR IS a Pleasant 75-year-old male with significant history of BPH with urinary retention, WHO returns to clinic today for monthly Malhotra catheter change.  He reports a remarkable improvement in his p.o. fluid intake, he denies any discomfort from his prior catheter and denies any bladder spasms UNTIL  This morning, patient reports dysuria and lower abdominal pain. He reports his cath was not draining, he took some pyridium due to pain and discomfort.      THE patient is in no apparent distress, tolerated Malhotra catheter change #18 coudé tip catheter with minimal discomfort.Adequate urine output instantly post cath insertion>300cc.   Also significant improvement noted to perineal yeast dermatitis around groin folds. HE is utilizing nystatin cream as ordered. Discussed using the powder instead.Also encouraged adequate bowel movements.     Objective   Vital Signs:   Ht 175.3 cm (69\")   Wt 101 kg (223 lb)   BMI 32.93 kg/m²     Physical Exam  Constitutional:       General: He is in acute distress.      Appearance: He is well-developed. He is obese. He is ill-appearing.   HENT:      Head: Normocephalic and atraumatic.      Right Ear: External ear normal.      Left Ear: External ear normal.   Eyes:      General:         Right eye: No discharge.         Left eye: No discharge.      Conjunctiva/sclera: Conjunctivae normal.      Pupils: Pupils are equal, round, and reactive to light.   Neck:      Thyroid: No thyromegaly.      Trachea: No tracheal deviation.   Cardiovascular:      Rate and Rhythm: Normal rate and regular rhythm.      Heart sounds: No murmur heard.  No friction rub.   Pulmonary:      Effort: Pulmonary effort is normal. No respiratory distress.      Breath sounds: Normal breath sounds. No stridor. "   Abdominal:      General: Bowel sounds are normal. There is distension.      Palpations: Abdomen is soft.      Tenderness: There is abdominal tenderness. There is guarding.   Genitourinary:     Penis: Normal and uncircumcised. No tenderness or discharge.       Testes: Normal.      Rectum: Normal. Guaiac result negative.      Comments: DYSURIA/INDWELLING STROUD    Musculoskeletal:         General: Tenderness present. No deformity. Normal range of motion.      Cervical back: Normal range of motion and neck supple. Tenderness present.   Skin:     General: Skin is warm and dry.      Capillary Refill: Capillary refill takes less than 2 seconds.      Coloration: Skin is pale.      Comments: YEAST AT PERINEAL FOLDS     Neurological:      Mental Status: He is alert and oriented to person, place, and time.      Cranial Nerves: No cranial nerve deficit.      Coordination: Coordination normal.   Psychiatric:         Behavior: Behavior normal.         Thought Content: Thought content normal.         Judgment: Judgment normal.        Result Review :                 Assessment and Plan    Diagnoses and all orders for this visit:    1. Acute cystitis with hematuria (Primary)  -     Urine Culture - Urine, Urine, Catheter  -     Discontinue: sulfamethoxazole-trimethoprim (Bactrim DS) 800-160 MG per tablet; Take 1 tablet by mouth 2 (Two) Times a Day.  Dispense: 20 tablet; Refill: 0  -     Discontinue: ondansetron (Zofran) 4 MG tablet; Take 1 tablet by mouth Every 12 (Twelve) Hours As Needed for Nausea.  Dispense: 20 tablet; Refill: 0  -     Discontinue: nystatin (MYCOSTATIN) 616468 UNIT/GM powder; APPLY TOPICALLY TO PERINEAL FOLDS/GROIN AREAS 3 TIME DAILY AS NEEDED  Dispense: 60 g; Refill: 2  -     sulfamethoxazole-trimethoprim (Bactrim DS) 800-160 MG per tablet; Take 1 tablet by mouth 2 (Two) Times a Day.  Dispense: 20 tablet; Refill: 0  -     ondansetron (Zofran) 4 MG tablet; Take 1 tablet by mouth Every 12 (Twelve) Hours As Needed  for Nausea.  Dispense: 20 tablet; Refill: 0  -     nystatin (MYCOSTATIN) 735405 UNIT/GM powder; APPLY TOPICALLY TO PERINEAL FOLDS/GROIN AREAS 3 TIME DAILY AS NEEDED  Dispense: 60 g; Refill: 2    2. Chronic indwelling Malhotra catheter  -     Discontinue: sulfamethoxazole-trimethoprim (Bactrim DS) 800-160 MG per tablet; Take 1 tablet by mouth 2 (Two) Times a Day.  Dispense: 20 tablet; Refill: 0  -     Discontinue: ondansetron (Zofran) 4 MG tablet; Take 1 tablet by mouth Every 12 (Twelve) Hours As Needed for Nausea.  Dispense: 20 tablet; Refill: 0  -     Discontinue: nystatin (MYCOSTATIN) 394173 UNIT/GM powder; APPLY TOPICALLY TO PERINEAL FOLDS/GROIN AREAS 3 TIME DAILY AS NEEDED  Dispense: 60 g; Refill: 2  -     sulfamethoxazole-trimethoprim (Bactrim DS) 800-160 MG per tablet; Take 1 tablet by mouth 2 (Two) Times a Day.  Dispense: 20 tablet; Refill: 0  -     ondansetron (Zofran) 4 MG tablet; Take 1 tablet by mouth Every 12 (Twelve) Hours As Needed for Nausea.  Dispense: 20 tablet; Refill: 0  -     nystatin (MYCOSTATIN) 544627 UNIT/GM powder; APPLY TOPICALLY TO PERINEAL FOLDS/GROIN AREAS 3 TIME DAILY AS NEEDED  Dispense: 60 g; Refill: 2    3. Yeast dermatitis  -     Discontinue: sulfamethoxazole-trimethoprim (Bactrim DS) 800-160 MG per tablet; Take 1 tablet by mouth 2 (Two) Times a Day.  Dispense: 20 tablet; Refill: 0  -     Discontinue: ondansetron (Zofran) 4 MG tablet; Take 1 tablet by mouth Every 12 (Twelve) Hours As Needed for Nausea.  Dispense: 20 tablet; Refill: 0  -     Discontinue: nystatin (MYCOSTATIN) 775293 UNIT/GM powder; APPLY TOPICALLY TO PERINEAL FOLDS/GROIN AREAS 3 TIME DAILY AS NEEDED  Dispense: 60 g; Refill: 2  -     sulfamethoxazole-trimethoprim (Bactrim DS) 800-160 MG per tablet; Take 1 tablet by mouth 2 (Two) Times a Day.  Dispense: 20 tablet; Refill: 0  -     ondansetron (Zofran) 4 MG tablet; Take 1 tablet by mouth Every 12 (Twelve) Hours As Needed for Nausea.  Dispense: 20 tablet; Refill: 0  -      nystatin (MYCOSTATIN) 614996 UNIT/GM powder; APPLY TOPICALLY TO PERINEAL FOLDS/GROIN AREAS 3 TIME DAILY AS NEEDED  Dispense: 60 g; Refill: 2    4. Encounter for Malhotra catheter replacement  -     Discontinue: sulfamethoxazole-trimethoprim (Bactrim DS) 800-160 MG per tablet; Take 1 tablet by mouth 2 (Two) Times a Day.  Dispense: 20 tablet; Refill: 0  -     Discontinue: ondansetron (Zofran) 4 MG tablet; Take 1 tablet by mouth Every 12 (Twelve) Hours As Needed for Nausea.  Dispense: 20 tablet; Refill: 0  -     sulfamethoxazole-trimethoprim (Bactrim DS) 800-160 MG per tablet; Take 1 tablet by mouth 2 (Two) Times a Day.  Dispense: 20 tablet; Refill: 0  -     ondansetron (Zofran) 4 MG tablet; Take 1 tablet by mouth Every 12 (Twelve) Hours As Needed for Nausea.  Dispense: 20 tablet; Refill: 0       BPH WITH URINARY RETENTION: Patient tolerated his Malhotra cath change today with MINIMAL discomfort.  We irrigated his replaced catheter with absolutely no resistance.  No sediments were noted in bag.  URINE IS FOULD AND ODOROUS WITH SEDIMENTS.                                                     PLAN   Discussed increasing PO fluid intake, Malhotra cath site care.  Continue perineal care    BACTRIM BID X 10 DAYS     We will follow-up in ONE Month for Cath Change.     Patient is agreeable with plan of care.     Patient is agreeable plan of care.     Follow Up   Return in about 1 month (around 3/8/2022) for Malhotra Catheter Change.  Patient was given instructions and counseling regarding his condition or for health maintenance advice. Please see specific information pulled into the AVS if appropriate.

## 2022-02-09 ENCOUNTER — OFFICE VISIT (OUTPATIENT)
Dept: CARDIOLOGY | Facility: CLINIC | Age: 77
End: 2022-02-09

## 2022-02-09 VITALS
SYSTOLIC BLOOD PRESSURE: 130 MMHG | WEIGHT: 276 LBS | DIASTOLIC BLOOD PRESSURE: 77 MMHG | TEMPERATURE: 97.1 F | HEIGHT: 69 IN | BODY MASS INDEX: 40.88 KG/M2 | HEART RATE: 54 BPM

## 2022-02-09 DIAGNOSIS — I48.20 CHRONIC ATRIAL FIBRILLATION: ICD-10-CM

## 2022-02-09 DIAGNOSIS — I50.22 CHRONIC SYSTOLIC CONGESTIVE HEART FAILURE: ICD-10-CM

## 2022-02-09 DIAGNOSIS — I34.0 MITRAL VALVE INSUFFICIENCY, UNSPECIFIED ETIOLOGY: ICD-10-CM

## 2022-02-09 DIAGNOSIS — I42.8 NONISCHEMIC CARDIOMYOPATHY: Primary | ICD-10-CM

## 2022-02-09 PROCEDURE — 93000 ELECTROCARDIOGRAM COMPLETE: CPT | Performed by: NURSE PRACTITIONER

## 2022-02-09 PROCEDURE — 99213 OFFICE O/P EST LOW 20 MIN: CPT | Performed by: NURSE PRACTITIONER

## 2022-02-09 RX ORDER — SACUBITRIL AND VALSARTAN 24; 26 MG/1; MG/1
1 TABLET, FILM COATED ORAL 2 TIMES DAILY
Qty: 60 TABLET | Refills: 11 | Status: SHIPPED | OUTPATIENT
Start: 2022-02-09 | End: 2022-03-01 | Stop reason: SDUPTHER

## 2022-02-09 NOTE — PROGRESS NOTES
Buster Redd MD  Larry Kehr  1945 02/09/2022    Patient Active Problem List   Diagnosis   • Chronic atrial fibrillation   • Hypertension- controlled.    • Tobacco use, states that he has cut back to 3-4 cigarettes a day.   • Morbid obesity (HCC)   • Nonischemic cardiomyopathy , appears to be compensated.   • Chronic systolic heart failure (HCC)   • Obstructive uropathy   • Chronic obstructive lung disease (HCC)   • Atrial fibrillation (HCC)   • Hypertensive disorder   • Benign prostatic hyperplasia with urinary retention   • PAD (peripheral artery disease) (HCC)   • Cardiomyopathy (HCC)   • Encounter for Malhotra catheter replacement       Dear Buster Redd MD:    Subjective     Chief Complaint   Patient presents with   • Follow-up     2 month follow up    • Med Management     pt brought meds            History of Present Illness:    Larry Kehr is a 76 y.o. male with a past medical history of nonischemic dilated cardiomyopathy and chronic atrial fibrillation as well as mild to moderate mitral regurgitation on echocardiogram. He presents today for routine cardiology follow up. Previously, Entresto dosage was increased. However, he developed hypotension and headache. He decreased the dose of Entresto back to the 24/26 mg BID on his own accord and has been feeling much better overall. He denies shortness of breath or leg edema. Denies chest pains or palpitations. He is tolerating Xarelto well with no bleeding issues.           No Known Allergies:      Current Outpatient Medications:   •  carvedilol (COREG) 6.25 MG tablet, Take 1 tablet by mouth 2 (Two) Times a Day., Disp: 60 tablet, Rfl: 5  •  clotrimazole-betamethasone (LOTRISONE) 1-0.05 % cream, Apply  topically to the appropriate area as directed Take As Directed., Disp: 45 g, Rfl: 3  •  nystatin (MYCOSTATIN) 453346 UNIT/GM powder, APPLY TOPICALLY TO PERINEAL FOLDS/GROIN AREAS 3 TIME DAILY AS NEEDED, Disp: 60 g, Rfl: 2  •  ondansetron (Zofran) 4 MG  "tablet, Take 1 tablet by mouth Every 12 (Twelve) Hours As Needed for Nausea., Disp: 20 tablet, Rfl: 0  •  polyethylene glycol (MiraLax) 17 g packet, Take 17 g by mouth Daily., Disp: 30 each, Rfl: 3  •  rivaroxaban (Xarelto) 20 MG tablet, Take 1 tablet by mouth Daily With Dinner., Disp: 14 tablet, Rfl: 0  •  sulfamethoxazole-trimethoprim (Bactrim DS) 800-160 MG per tablet, Take 1 tablet by mouth 2 (Two) Times a Day., Disp: 20 tablet, Rfl: 0  •  tamsulosin (FLOMAX) 0.4 MG capsule 24 hr capsule, Take 1 capsule by mouth Daily., Disp: 30 capsule, Rfl: 3  •  phenazopyridine (Pyridium) 200 MG tablet, Take 1 tablet by mouth 3 (Three) Times a Day As Needed for Bladder Spasms (STOP AFTER 3 DAYS- CHANGE URINE ORANGE IS NORMAL)., Disp: 20 tablet, Rfl: 0  •  sacubitril-valsartan (Entresto) 24-26 MG tablet, Take 1 tablet by mouth 2 (Two) Times a Day., Disp: 60 tablet, Rfl: 11      The following portions of the patient's history were reviewed and updated as appropriate: allergies, current medications, past family history, past medical history, past social history, past surgical history and problem list.    Social History     Tobacco Use   • Smoking status: Current Every Day Smoker     Packs/day: 0.25     Years: 59.00     Pack years: 14.75     Types: Cigarettes   • Smokeless tobacco: Never Used   Vaping Use   • Vaping Use: Never used   Substance Use Topics   • Alcohol use: No   • Drug use: No       ROS    Objective   Vitals:    02/09/22 1511   BP: 130/77   Pulse: 54   Temp: 97.1 °F (36.2 °C)   Weight: 125 kg (276 lb)   Height: 175.3 cm (69.02\")     Body mass index is 40.74 kg/m².        Vitals reviewed.   Constitutional:       Appearance: Healthy appearance. Well-developed and not in distress.   HENT:      Head: Normocephalic and atraumatic.   Pulmonary:      Effort: Pulmonary effort is normal.      Breath sounds: Normal breath sounds. No wheezing. No rales.   Cardiovascular:      Normal rate. Irregularly irregular rhythm.      " Murmurs: There is no murmur.      . No S3 and S4 gallop.   Edema:     Peripheral edema absent.   Abdominal:      General: Bowel sounds are normal.      Palpations: Abdomen is soft.   Skin:     General: Skin is warm and dry.   Neurological:      Mental Status: Alert, oriented to person, place, and time and oriented to person, place and time.   Psychiatric:         Mood and Affect: Mood normal.         Behavior: Behavior normal.         Lab Results   Component Value Date     08/04/2021    K 4.3 08/04/2021     08/04/2021    CO2 23.6 08/04/2021    BUN 11 08/04/2021    CREATININE 0.88 08/04/2021    GLUCOSE 81 08/04/2021    CALCIUM 9.2 08/04/2021    AST 11 08/04/2021    ALT 10 08/04/2021    ALKPHOS 104 08/04/2021     Lab Results   Component Value Date    CKTOTAL 20 06/14/2020     Lab Results   Component Value Date    WBC 6.35 08/04/2021    HGB 13.6 08/04/2021    HCT 43.6 08/04/2021     08/04/2021     Lab Results   Component Value Date    INR 1.24 (H) 12/10/2016     Lab Results   Component Value Date    MG 2.3 06/14/2020     Lab Results   Component Value Date    TSH 2.250 06/14/2020    PSA 19.100 (H) 06/15/2020    TRIG 107 02/21/2020    HDL 38 (L) 02/21/2020    LDL 93 02/21/2020      Lab Results   Component Value Date    .0 (H) 01/05/2017             ECG 12 Lead    Date/Time: 2/9/2022 3:37 PM  Performed by: Mimi Castro APRN  Authorized by: Mimi Castro APRN   Comparison: compared with previous ECG   Similar to previous ECG  Rhythm: atrial fibrillation  BPM: 80                Assessment/Plan    Diagnosis Plan   1. Nonischemic cardiomyopathy   sacubitril-valsartan (Entresto) 24-26 MG tablet    ECG 12 Lead   2. Chronic systolic congestive heart failure, appears compensated.  ECG 12 Lead   3. Chronic atrial fibrillation, on Xarelto for stroke prevention.  ECG 12 Lead   4. Mitral valve insufficiency, unspecified etiology                  Recommendations:    1. NICM  a. EF improving on  echocardiogram  b. Continue with carvedilol and Entresto 24/26 mg BID  2. Chronic systolic heart failure  a. Appears to be well compensated.  3. Chronic atrial fibrillation  a. Rate controlled.  b. Continue with Xarelto  4. Mitral valve insufficiency  a. Continue to monitor periodically on echocardiogram  5. Follow up in 4-5 months or sooner if needed.         Return in about 4 months (around 6/9/2022) for Recheck.    As always, I appreciate very much the opportunity to participate in the cardiovascular care of your patients.      With Best Regards,    IDA Poole

## 2022-02-10 ENCOUNTER — TELEPHONE (OUTPATIENT)
Dept: UROLOGY | Facility: CLINIC | Age: 77
End: 2022-02-10

## 2022-02-10 LAB — BACTERIA SPEC AEROBE CULT: ABNORMAL

## 2022-02-10 NOTE — TELEPHONE ENCOUNTER
Called patient to go over his urine culture which showed infection. Dylan wanted patient to get some magnesium citrate to clean his bowels out. Patient verbalized understanding.

## 2022-03-01 ENCOUNTER — TELEPHONE (OUTPATIENT)
Dept: CARDIOLOGY | Facility: CLINIC | Age: 77
End: 2022-03-01

## 2022-03-01 ENCOUNTER — PRIOR AUTHORIZATION (OUTPATIENT)
Dept: CARDIOLOGY | Facility: CLINIC | Age: 77
End: 2022-03-01

## 2022-03-01 DIAGNOSIS — I42.8 NONISCHEMIC CARDIOMYOPATHY: ICD-10-CM

## 2022-03-01 RX ORDER — SACUBITRIL AND VALSARTAN 24; 26 MG/1; MG/1
1 TABLET, FILM COATED ORAL 2 TIMES DAILY
Qty: 180 TABLET | Refills: 3 | Status: SHIPPED | OUTPATIENT
Start: 2022-03-01 | End: 2022-03-01 | Stop reason: SDUPTHER

## 2022-03-01 RX ORDER — SACUBITRIL AND VALSARTAN 24; 26 MG/1; MG/1
1 TABLET, FILM COATED ORAL 2 TIMES DAILY
Qty: 56 TABLET | Refills: 0 | COMMUNITY
Start: 2022-03-01 | End: 2022-07-20 | Stop reason: SDUPTHER

## 2022-03-01 NOTE — TELEPHONE ENCOUNTER
Pt is asking for a cb, needing to speak to someone concerning his application for assistance program Entresto. He had already filled this out & brought his income paperwork.

## 2022-03-01 NOTE — TELEPHONE ENCOUNTER
Called pt and advised him that I will be faxing the pt assistance form today. He expressed understanding. I also got him samples ready.

## 2022-03-04 NOTE — TELEPHONE ENCOUNTER
Called pt and advised him he has been approved for the pt assistance program for jaime until the end of the year. He expressed understanding.

## 2022-03-08 ENCOUNTER — OFFICE VISIT (OUTPATIENT)
Dept: UROLOGY | Facility: CLINIC | Age: 77
End: 2022-03-08

## 2022-03-08 VITALS — WEIGHT: 276 LBS | BODY MASS INDEX: 40.88 KG/M2 | HEIGHT: 69 IN

## 2022-03-08 DIAGNOSIS — N40.1 BENIGN PROSTATIC HYPERPLASIA WITH URINARY RETENTION: ICD-10-CM

## 2022-03-08 DIAGNOSIS — R33.8 BENIGN PROSTATIC HYPERPLASIA WITH URINARY RETENTION: ICD-10-CM

## 2022-03-08 DIAGNOSIS — Z46.6 ENCOUNTER FOR FOLEY CATHETER REPLACEMENT: Primary | ICD-10-CM

## 2022-03-08 PROCEDURE — 51701 INSERT BLADDER CATHETER: CPT | Performed by: NURSE PRACTITIONER

## 2022-03-08 PROCEDURE — 99213 OFFICE O/P EST LOW 20 MIN: CPT | Performed by: NURSE PRACTITIONER

## 2022-03-08 NOTE — PROGRESS NOTES
"Chief Complaint  Urinary Retention (1 month follow-up cath Change #18 Togolese coudé)    Subjective          Larry Kehr presents to [facility] GASTROENTEROLOGY [facility] UROLOGY  History of Present Illness    MR. LARRY KEHR IS a Pleasant 75-year-old male with significant history of BPH with urinary retention, WHO returns to clinic today for monthly Malhotra catheter change.  He reports a remarkable improvement in his bladder discomfort post antibiotic therapy with Bactrim last month.  He also reports improvement in his p.o. fluid intake, and denies any discomfort from his prior catheter and denies any bladder spasms.      THE patient is in no apparent distress today.  He tolerated Malhotra catheter change #18 coudé tip catheter with minimal discomfort. Adequate urine output noted in drainage bag. Also significant improvement noted to perineal yeast dermatitis around groin folds. HE is utilizing nystatin cream as ordered. Discussed using the powder instead.Also encouraged adequate bowel movements    Objective   Vital Signs:   Ht 175.3 cm (69.02\")   Wt 125 kg (276 lb)   BMI 40.73 kg/m²     Physical Exam  Constitutional:       General: He is in acute distress.      Appearance: He is well-developed. He is obese.   HENT:      Head: Normocephalic and atraumatic.      Right Ear: External ear normal.      Left Ear: External ear normal.   Eyes:      General:         Right eye: No discharge.         Left eye: No discharge.      Conjunctiva/sclera: Conjunctivae normal.      Pupils: Pupils are equal, round, and reactive to light.   Neck:      Thyroid: No thyromegaly.      Trachea: No tracheal deviation.   Cardiovascular:      Rate and Rhythm: Normal rate and regular rhythm.      Heart sounds: No murmur heard.    No friction rub.   Pulmonary:      Effort: Pulmonary effort is normal. No respiratory distress.      Breath sounds: Normal breath sounds. No stridor.   Abdominal:      General: Bowel sounds are normal. There is " distension.      Palpations: Abdomen is soft.      Tenderness: There is abdominal tenderness. There is guarding.   Genitourinary:     Penis: Normal and uncircumcised. No tenderness or discharge.       Testes: Normal.      Rectum: Normal. Guaiac result negative.      Comments: Malhotra catheter/dysuria  Musculoskeletal:         General: No tenderness or deformity. Normal range of motion.      Cervical back: Normal range of motion and neck supple.   Skin:     General: Skin is warm and dry.      Capillary Refill: Capillary refill takes less than 2 seconds.      Coloration: Skin is pale.      Findings: Bruising present.   Neurological:      Mental Status: He is alert and oriented to person, place, and time.      Cranial Nerves: No cranial nerve deficit.      Coordination: Coordination normal.   Psychiatric:         Behavior: Behavior normal.         Thought Content: Thought content normal.         Judgment: Judgment normal.        Result Review :                 Assessment and Plan    Diagnoses and all orders for this visit:    1. Encounter for Malhotra catheter replacement (Primary)    2. Benign prostatic hyperplasia with urinary retention       BPH WITH URINARY RETENTION: Patient tolerated his Malhotra cath change today with MINIMAL discomfort.  We irrigated his replaced catheter with absolutely no resistance.  No sediments were noted in bag.  Urine is tea colored without any smell odor noted.  Patient reports significant improvement post antibiotic therapy last month.  Patient denies any bladder spasms, denies any discomfort.                                                    PLAN   Discussed increasing PO fluid intake, Malhotra cath site care.  Continue perineal care     We will follow-up in ONE Month for Cath Change.     Patient is agreeable with plan of care.     Follow Up   Return in about 1 month (around 4/8/2022) for Next scheduled follow up, Malhotra Catheter Change.  Patient was given instructions and counseling regarding  his condition or for health maintenance advice. Please see specific information pulled into the AVS if appropriate.

## 2022-03-14 RX ORDER — CARVEDILOL 6.25 MG/1
TABLET ORAL
Qty: 60 TABLET | Refills: 3 | Status: SHIPPED | OUTPATIENT
Start: 2022-03-14 | End: 2022-07-20 | Stop reason: SDUPTHER

## 2022-04-08 ENCOUNTER — OFFICE VISIT (OUTPATIENT)
Dept: UROLOGY | Facility: CLINIC | Age: 77
End: 2022-04-08

## 2022-04-08 VITALS — BODY MASS INDEX: 40.88 KG/M2 | HEIGHT: 69 IN | WEIGHT: 276 LBS

## 2022-04-08 DIAGNOSIS — F45.8: ICD-10-CM

## 2022-04-08 DIAGNOSIS — N40.1 BENIGN PROSTATIC HYPERPLASIA WITH URINARY RETENTION: ICD-10-CM

## 2022-04-08 DIAGNOSIS — N30.01 ACUTE CYSTITIS WITH HEMATURIA: ICD-10-CM

## 2022-04-08 DIAGNOSIS — R33.8 BENIGN PROSTATIC HYPERPLASIA WITH URINARY RETENTION: ICD-10-CM

## 2022-04-08 DIAGNOSIS — Z46.6 ENCOUNTER FOR FOLEY CATHETER REPLACEMENT: Primary | ICD-10-CM

## 2022-04-08 DIAGNOSIS — Z46.6 ENCOUNTER FOR FOLEY CATHETER REPLACEMENT: ICD-10-CM

## 2022-04-08 DIAGNOSIS — Z91.89 AT RISK FOR INFECTION ASSOCIATED WITH FOLEY CATHETER: ICD-10-CM

## 2022-04-08 PROCEDURE — 51701 INSERT BLADDER CATHETER: CPT | Performed by: NURSE PRACTITIONER

## 2022-04-08 PROCEDURE — 99214 OFFICE O/P EST MOD 30 MIN: CPT | Performed by: NURSE PRACTITIONER

## 2022-04-08 RX ORDER — AMOXICILLIN AND CLAVULANATE POTASSIUM 875; 125 MG/1; MG/1
1 TABLET, FILM COATED ORAL 2 TIMES DAILY
Qty: 14 TABLET | Refills: 0 | Status: SHIPPED | OUTPATIENT
Start: 2022-04-08 | End: 2022-04-08 | Stop reason: SDUPTHER

## 2022-04-08 RX ORDER — AMOXICILLIN AND CLAVULANATE POTASSIUM 875; 125 MG/1; MG/1
1 TABLET, FILM COATED ORAL 2 TIMES DAILY
Qty: 14 TABLET | Refills: 0 | Status: SHIPPED | OUTPATIENT
Start: 2022-04-08 | End: 2022-12-21

## 2022-04-08 NOTE — PROGRESS NOTES
"Chief Complaint  Benign Prostatic Hypertrophy/indwelling Malhotra catheter (Monthly follow-up cath change #18 Yemeni)    Subjective          Larry Kehr presents to [facility] GASTROENTEROLOGY & UROLOGY  History of Present Illness    MR. LARRY KEHR IS a Pleasant 75-year-old male with significant history of BPH with urinary retention, WHO returns to clinic today for monthly Malhotra catheter change.  He reports a remarkable improvement in his bladder discomfort post antibiotic therapy with Bactrim last month.  He also reports improvement in his p.o. fluid intake, and denies any discomfort from his prior catheter and denies any bladder spasms.TODAY, HIS URINE IS FOUL, ODOROUS WITH DISCHARGE FROM PENIS. WE DISCUSSED ABT AT THIS TIME.       THE patient is in no apparent distress today.  He tolerated Malhotra catheter change #18 coudé tip catheter with minimal discomfort. Adequate urine output noted in drainage bag. Also significant improvement noted to perineal yeast dermatitis around groin folds. HE is utilizing nystatin cream as ordered. Discussed using the powder instead. Also encouraged adequate bowel movements     Objective   Vital Signs:   Ht 175.3 cm (69.02\")   Wt 125 kg (276 lb)   BMI 40.73 kg/m²            Physical Exam  Constitutional:       General: He is in acute distress.      Appearance: He is well-developed. He is obese.   HENT:      Head: Normocephalic and atraumatic.      Right Ear: External ear normal.      Left Ear: External ear normal.   Eyes:      General:         Right eye: No discharge.         Left eye: No discharge.      Conjunctiva/sclera: Conjunctivae normal.      Pupils: Pupils are equal, round, and reactive to light.   Neck:      Thyroid: No thyromegaly.      Trachea: No tracheal deviation.   Cardiovascular:      Rate and Rhythm: Normal rate and regular rhythm.      Heart sounds: No murmur heard.    No friction rub.   Pulmonary:      Effort: Pulmonary effort is normal. No " respiratory distress.      Breath sounds: Normal breath sounds. No stridor.   Abdominal:      General: Bowel sounds are normal. There is distension.      Palpations: Abdomen is soft.      Tenderness: There is abdominal tenderness. There is guarding.   Genitourinary:     Penis: Normal and uncircumcised. No tenderness or discharge.       Testes: Normal.      Rectum: Normal. Guaiac result negative.      Comments: Indwelling Malhotra catheter with dysuria  Musculoskeletal:         General: Tenderness present. No deformity. Normal range of motion.      Cervical back: Normal range of motion and neck supple.   Skin:     General: Skin is warm and dry.      Capillary Refill: Capillary refill takes less than 2 seconds.      Coloration: Skin is not pale.   Neurological:      Mental Status: He is alert and oriented to person, place, and time.      Cranial Nerves: No cranial nerve deficit.      Coordination: Coordination normal.   Psychiatric:         Behavior: Behavior normal.         Thought Content: Thought content normal.         Judgment: Judgment normal.        Result Review :                 Assessment and Plan    Diagnoses and all orders for this visit:    1. Encounter for Malhotra catheter replacement (Primary)  -     amoxicillin-clavulanate (Augmentin) 875-125 MG per tablet; Take 1 tablet by mouth 2 (Two) Times a Day.  Dispense: 14 tablet; Refill: 0    2. Benign prostatic hyperplasia with urinary retention  -     amoxicillin-clavulanate (Augmentin) 875-125 MG per tablet; Take 1 tablet by mouth 2 (Two) Times a Day.  Dispense: 14 tablet; Refill: 0    3. Acute cystitis with hematuria  -     amoxicillin-clavulanate (Augmentin) 875-125 MG per tablet; Take 1 tablet by mouth 2 (Two) Times a Day.  Dispense: 14 tablet; Refill: 0                    Assessment and Plan    BPH WITH URINARY RETENTION: Patient tolerated his Malhotra cath change today with MINIMAL discomfort.  We irrigated his replaced catheter with absolutely no resistance.   No sediments were noted in bag.  Urine is tea colored without any smell odor noted.  Patient reports significant improvement post antibiotic therapy last month.  Patient denies any bladder spasms, denies any discomfort.                                                     PLAN   Discussed increasing PO fluid intake, Malhotra cath site care.  Continue perineal care     We will follow-up in ONE Month for Cath Change.     Patient is agreeable with plan of care.     Follow Up   Return in about 1 month (around 5/8/2022) for Next scheduled follow up, Malhotra Catheter Change.     Patient was given instructions and counseling regarding his condition or for health maintenance advice. Please see specific information pulled into the AVS if appropriate.

## 2022-05-03 ENCOUNTER — OFFICE VISIT (OUTPATIENT)
Dept: UROLOGY | Facility: CLINIC | Age: 77
End: 2022-05-03

## 2022-05-03 VITALS — WEIGHT: 276 LBS | HEIGHT: 69 IN | BODY MASS INDEX: 40.88 KG/M2

## 2022-05-03 DIAGNOSIS — N40.1 BENIGN PROSTATIC HYPERPLASIA WITH URINARY RETENTION: ICD-10-CM

## 2022-05-03 DIAGNOSIS — R33.8 BENIGN PROSTATIC HYPERPLASIA WITH URINARY RETENTION: ICD-10-CM

## 2022-05-03 DIAGNOSIS — Z46.6 ENCOUNTER FOR FOLEY CATHETER REPLACEMENT: Primary | ICD-10-CM

## 2022-05-03 PROCEDURE — 51705 CHANGE OF BLADDER TUBE: CPT | Performed by: NURSE PRACTITIONER

## 2022-05-03 NOTE — PROGRESS NOTES
"Chief Complaint  BPH with urinary retention/encounter for Malhotra catheter rep (Monthly follow-up cath change #18 coudé)    Subjective Larry Kehr presents to [facility] GASTROENTEROLOG[facility] UROLOGY  History of Present Illness     MR. LARRY KEHR IS a Pleasant 75-year-old male with significant history of BPH with urinary retention/incomplete bladder emptying, WHO returns to clinic today for monthly Malhotra catheter change.  He reports a remarkable improvement in his bladder discomfort post antibiotic therapy with Bactrim last month.  He also reports improvement in his p.o. fluid intake, and denies any discomfort from his prior catheter and denies any persistent bladder spasms.      THE patient is in no apparent distress today.  He tolerated Malhotra catheter change #18 coudé tip catheter with minimal discomfort. Adequate urine output noted in drainage bag. Also significant improvement noted to perineal yeast dermatitis around groin folds. HE is utilizing nystatin cream as ordered. Discussed using the powder instead.Also encouraged adequate bowel movements     Objective   Vital Signs:   Ht 175.3 cm (69.02\")   Wt 125 kg (276 lb)   BMI 40.73 kg/m²     Physical Exam  Constitutional:       General: He is in acute distress.      Appearance: He is well-developed. He is obese.   HENT:      Head: Normocephalic and atraumatic.      Right Ear: External ear normal.      Left Ear: External ear normal.   Eyes:      General:         Right eye: No discharge.         Left eye: No discharge.      Conjunctiva/sclera: Conjunctivae normal.      Pupils: Pupils are equal, round, and reactive to light.   Neck:      Thyroid: No thyromegaly.      Trachea: No tracheal deviation.   Cardiovascular:      Rate and Rhythm: Normal rate and regular rhythm.      Heart sounds: No murmur heard.    No friction rub.   Pulmonary:      Effort: Pulmonary effort is normal. No respiratory distress.      Breath sounds: Normal breath sounds. " No stridor.   Abdominal:      General: Bowel sounds are normal. There is distension.      Palpations: Abdomen is soft.      Tenderness: There is abdominal tenderness. There is guarding.   Genitourinary:     Penis: Normal and uncircumcised. No tenderness or discharge.       Testes: Normal.      Rectum: Normal. Guaiac result negative.   Musculoskeletal:         General: Tenderness present. No deformity. Normal range of motion.      Cervical back: Normal range of motion and neck supple.   Skin:     General: Skin is warm and dry.      Capillary Refill: Capillary refill takes less than 2 seconds.      Coloration: Skin is not pale.   Neurological:      Mental Status: He is alert and oriented to person, place, and time.      Cranial Nerves: No cranial nerve deficit.      Motor: Weakness present.      Coordination: Coordination normal.   Psychiatric:         Behavior: Behavior normal.         Thought Content: Thought content normal.         Judgment: Judgment normal.        Result Review :                 Assessment and Plan    Diagnoses and all orders for this visit:    1. Encounter for Malhotra catheter replacement (Primary)    2. Benign prostatic hyperplasia with urinary retention       1. Encounter for Malhotra catheter replacement (Primary)     2. Benign prostatic hyperplasia with urinary retention        BPH WITH URINARY RETENTION: Patient tolerated his Malhotra cath change today with MINIMAL discomfort.  We irrigated his replaced catheter with absolutely no resistance.  No sediments were noted in bag.      Urine is pale yellow, blood-tinged without any smell or odor noted.  Patient reports significant improvement post antibiotic therapy last month.  Patient denies any bladder spasms, denies any discomfort.                                                     PLAN   Discussed increasing PO fluid intake, Malhotra cath site care.  Continue perineal care daily as discussed.     We will follow-up in ONE Month for Cath Change.    Will  return sooner if need be     Patient is agreeable with plan of care.           Follow Up   Return in about 5 weeks (around 6/7/2022) for Next scheduled follow up, Malhotra Catheter Change.     Patient was given instructions and counseling regarding his condition or for health maintenance advice. Please see specific information pulled into the AVS if appropriate.

## 2022-05-25 ENCOUNTER — TELEPHONE (OUTPATIENT)
Dept: UROLOGY | Facility: CLINIC | Age: 77
End: 2022-05-25

## 2022-05-25 DIAGNOSIS — N48.0 BALANITIS XEROTICA OBLITERANS: ICD-10-CM

## 2022-05-25 RX ORDER — CLOTRIMAZOLE AND BETAMETHASONE DIPROPIONATE 10; .64 MG/G; MG/G
CREAM TOPICAL TAKE AS DIRECTED
Qty: 45 G | Refills: 3 | Status: SHIPPED | OUTPATIENT
Start: 2022-05-25 | End: 2022-12-21

## 2022-05-25 NOTE — TELEPHONE ENCOUNTER
I put the order in for the refill and sent it over to be cosigned by the provider. I called the pt to let him know.

## 2022-06-06 ENCOUNTER — TELEPHONE (OUTPATIENT)
Dept: CARDIOLOGY | Facility: CLINIC | Age: 77
End: 2022-06-06

## 2022-06-06 NOTE — TELEPHONE ENCOUNTER
Patient is out of refills on his xarelto. He just got his last bottle. He said he needs them ordered [facility].     Thank you!

## 2022-06-07 ENCOUNTER — OFFICE VISIT (OUTPATIENT)
Dept: UROLOGY | Facility: CLINIC | Age: 77
End: 2022-06-07

## 2022-06-07 VITALS — WEIGHT: 276 LBS | BODY MASS INDEX: 40.88 KG/M2 | HEIGHT: 69 IN

## 2022-06-07 DIAGNOSIS — N40.1 BENIGN PROSTATIC HYPERPLASIA WITH URINARY RETENTION: ICD-10-CM

## 2022-06-07 DIAGNOSIS — R33.8 BENIGN PROSTATIC HYPERPLASIA WITH URINARY RETENTION: ICD-10-CM

## 2022-06-07 DIAGNOSIS — Z46.6 ENCOUNTER FOR FOLEY CATHETER REPLACEMENT: Primary | ICD-10-CM

## 2022-06-07 PROCEDURE — 51701 INSERT BLADDER CATHETER: CPT | Performed by: NURSE PRACTITIONER

## 2022-06-07 PROCEDURE — 99214 OFFICE O/P EST MOD 30 MIN: CPT | Performed by: NURSE PRACTITIONER

## 2022-06-07 NOTE — PROGRESS NOTES
"Chief Complaint  BPH WITH URINE RETENTION/STROUD CATH CHANGE # 18FR. (EVAL MONTHLY STROUD CATH HOWELL #18 COUDE TIP)    Subjective        Larry Kehr presents to [facility]OLOGY  History of Present Illness    MR. LARRY KEHR IS a Pleasant 76-year-old male with significant history of BPH with urinary retention/incomplete bladder emptying, WHO returns to clinic today for monthly Stroud catheter change.  He reports a remarkable improvement in his bladder discomfort post antibiotic therapy with Bactrim last month.  He also reports improvement in his p.o. fluid intake, and denies any discomfort from his prior catheter and denies any persistent bladder spasms.      THE patient is in no apparent distress today.  He tolerated Stroud catheter change #18 coudé tip catheter with minimal discomfort. Adequate urine output noted in drainage bag. Also significant improvement noted to perineal yeast dermatitis around groin folds. HE is utilizing nystatin cream as ordered. Discussed using the powder instead.Also encouraged adequate bowel movements     Objective   Vital Signs:  Ht 175.3 cm (69.02\")   Wt 125 kg (276 lb)   BMI 40.74 kg/m²   Estimated body mass index is 40.74 kg/m² as calculated from the following:    Height as of this encounter: 175.3 cm (69.02\").    Weight as of this encounter: 125 kg (276 lb).    Physical Exam  Constitutional:       General: He is in acute distress.      Appearance: He is well-developed. He is obese. He is ill-appearing.   HENT:      Head: Normocephalic and atraumatic.      Right Ear: External ear normal.      Left Ear: External ear normal.   Eyes:      General:         Right eye: No discharge.         Left eye: No discharge.      Conjunctiva/sclera: Conjunctivae normal.      Pupils: Pupils are equal, round, and reactive to light.   Neck:      Thyroid: No thyromegaly.      Trachea: No tracheal deviation.   Cardiovascular:      Rate and Rhythm: Normal rate and " regular rhythm.      Heart sounds: No murmur heard.    No friction rub.   Pulmonary:      Effort: Pulmonary effort is normal. No respiratory distress.      Breath sounds: Normal breath sounds. No stridor.   Abdominal:      General: Bowel sounds are normal. There is distension.      Palpations: Abdomen is soft.      Tenderness: There is abdominal tenderness. There is guarding and rebound.   Genitourinary:     Penis: Normal and uncircumcised. No tenderness or discharge.       Testes: Normal.      Rectum: Normal. Guaiac result negative.      Comments: Dysuria, indwelling Malhotra catheter, yeast dermatitis  Musculoskeletal:         General: Tenderness present. No deformity. Normal range of motion.      Cervical back: Normal range of motion and neck supple.      Right lower leg: Edema present.      Left lower leg: Edema present.   Skin:     General: Skin is warm and dry.      Capillary Refill: Capillary refill takes less than 2 seconds.      Coloration: Skin is pale.      Findings: Bruising present.   Neurological:      General: No focal deficit present.      Mental Status: He is alert and oriented to person, place, and time.      Cranial Nerves: No cranial nerve deficit.      Motor: Weakness present.      Coordination: Coordination normal.   Psychiatric:         Behavior: Behavior normal.         Thought Content: Thought content normal.         Judgment: Judgment normal.        Result Review :                Assessment and Plan   Diagnoses and all orders for this visit:    1. Encounter for Malhotra catheter replacement (Primary)    2. Benign prostatic hyperplasia with urinary retention    BPH WITH URINARY RETENTION: Patient tolerated his Malhotra cath change today with MINIMAL discomfort.  We irrigated his replaced catheter with absolutely no resistance.  No sediments were noted in bag.       Urine is pale yellow, blood-tinged without any smell or odor noted.  Patient reports significant improvement post antibiotic therapy last  month.  Patient denies any bladder spasms, denies any discomfort.                                                     PLAN   Discussed increasing PO fluid intake, Malhotra cath site care.  Continue perineal care daily as discussed.     We will follow-up in ONE Month for Cath Change.     Will return sooner if need be     Patient is agreeable with plan of care.        Follow Up   Return in about 31 days (around 7/8/2022) for Next scheduled follow up, Malhotra Catheter Change.  Patient was given instructions and counseling regarding his condition or for health maintenance advice. Please see specific information pulled into the AVS if appropriate.

## 2022-07-01 ENCOUNTER — TELEPHONE (OUTPATIENT)
Dept: CARDIOLOGY | Facility: CLINIC | Age: 77
End: 2022-07-01

## 2022-07-08 ENCOUNTER — OFFICE VISIT (OUTPATIENT)
Dept: UROLOGY | Facility: CLINIC | Age: 77
End: 2022-07-08

## 2022-07-08 VITALS — WEIGHT: 276 LBS | BODY MASS INDEX: 40.88 KG/M2 | HEIGHT: 69 IN

## 2022-07-08 DIAGNOSIS — N40.1 BENIGN PROSTATIC HYPERPLASIA WITH URINARY RETENTION: ICD-10-CM

## 2022-07-08 DIAGNOSIS — Z46.6 ENCOUNTER FOR FOLEY CATHETER REPLACEMENT: ICD-10-CM

## 2022-07-08 DIAGNOSIS — R33.8 BENIGN PROSTATIC HYPERPLASIA WITH URINARY RETENTION: ICD-10-CM

## 2022-07-08 PROCEDURE — 99214 OFFICE O/P EST MOD 30 MIN: CPT | Performed by: NURSE PRACTITIONER

## 2022-07-08 PROCEDURE — 51701 INSERT BLADDER CATHETER: CPT | Performed by: NURSE PRACTITIONER

## 2022-07-08 NOTE — PROGRESS NOTES
"Chief Complaint  BPH WITH URINE RETENTION/ ENCOUNTER FOR MALHOTRA CATH CHANGE (MONTHLY FOLLOW UP FOR CATH CHANGE # 18 FR.) and CATH CHANGE     Subjective          Larry Kehr presents to [facility]STROENTEROLOGY [facility]OLOGY for   History of Present Illness    MR. LARRY KEHR IS a Pleasant 76-year-old male with significant history of BPH with urinary retention/incomplete bladder emptying, WHO returns to clinic today for monthly Malhotra catheter change.  He reports a remarkable improvement in his bladder discomfort post antibiotic therapy with Bactrim last month.  He also reports improvement in his p.o. fluid intake, and denies any discomfort from his prior catheter and denies any persistent bladder spasms.      THE patient is in no apparent distress today.  He tolerated Malhotra catheter change #18 coudé tip catheter with minimal discomfort. Adequate urine output noted in drainage bag. Also significant improvement noted to perineal yeast dermatitis around groin folds. HE is utilizing nystatin cream as ordered. Discussed using the powder instead.Also encouraged adequate bowel movements     Objective   Vital Signs:   Ht 175.3 cm (69.02\")   Wt 125 kg (276 lb)   BMI 40.74 kg/m²       ROS:   Review of Systems     Physical Exam  Constitutional:       General: He is in acute distress.      Appearance: He is well-developed. He is obese.   HENT:      Head: Normocephalic and atraumatic.      Right Ear: External ear normal.      Left Ear: External ear normal.   Eyes:      General:         Right eye: No discharge.         Left eye: No discharge.      Conjunctiva/sclera: Conjunctivae normal.      Pupils: Pupils are equal, round, and reactive to light.   Neck:      Thyroid: No thyromegaly.      Trachea: No tracheal deviation.   Cardiovascular:      Rate and Rhythm: Normal rate and regular rhythm.      Heart sounds: No murmur heard.    No friction rub.   Pulmonary:      Effort: Pulmonary effort is normal. No respiratory " distress.      Breath sounds: Normal breath sounds. No stridor.   Abdominal:      General: Bowel sounds are normal. There is distension.      Palpations: Abdomen is soft.      Tenderness: There is abdominal tenderness. There is guarding.   Genitourinary:     Penis: Normal and uncircumcised. No tenderness or discharge.       Testes: Normal.      Rectum: Normal. Guaiac result negative.      Comments: Indwelling Malhotra catheter/dysuria  Musculoskeletal:         General: Tenderness present. No deformity. Normal range of motion.      Cervical back: Normal range of motion and neck supple.   Skin:     General: Skin is warm and dry.      Capillary Refill: Capillary refill takes less than 2 seconds.      Coloration: Skin is pale.      Findings: Bruising present.   Neurological:      General: No focal deficit present.      Mental Status: He is alert and oriented to person, place, and time.      Cranial Nerves: No cranial nerve deficit.      Motor: Weakness present.      Coordination: Coordination normal.   Psychiatric:         Behavior: Behavior normal.         Thought Content: Thought content normal.         Judgment: Judgment normal.        Result Review :            Assessment and Plan    Problem List Items Addressed This Visit        Genitourinary and Reproductive     Benign prostatic hyperplasia with urinary retention    Encounter for Malhotra catheter replacement          Patient reports that he is not currently experiencing any symptoms of urinary incontinence.      Class 3 Severe Obesity (BMI >=40). Obesity-related health conditions include the following: obstructive sleep apnea, hypertension, coronary heart disease, dyslipidemias and GERD. Obesity is improving with lifestyle modifications. BMI is 39.85 kg/M2. We discussed portion control and increasing exercise.      RADIOLOGY (CT AND/OR KUB):    CT Abdomen and Pelvis: No results found for this or any previous visit.       CT Stone Protocol: No results found for this or  any previous visit.       KUB: No results found for this or any previous visit.       LABS (3 MONTHS):    No visits with results within 3 Month(s) from this visit.   Latest known visit with results is:   Office Visit on 02/08/2022   Component Date Value Ref Range Status   • Urine Culture 02/08/2022 >100,000 CFU/mL Enterococcus faecalis (A)  Final         ASSESSMENT    BPH WITH URINARY RETENTION/PT HAS INDWELLING STROUD CATHETER:  MR. LARRY KEHR is a pleasant 76-year-old male significant history of urinary retention who returns to clinic today for monthly Stroud catheter change.  He is in no apparent discomfort today and reports feeling well.  He denies any fevers, chills or malaise, he denies any N/V/D.  Ports adequate p.o. fluid intake, and denies any foul odorous urine.     Patient tolerated his Stroud cath change today with MINIMAL discomfort.  We irrigated his replaced catheter with absolutely no resistance.  No sediments were noted in bag.    Urine is pale yellow, blood-tinged without any smell or odor noted.  Patient reports significant improvement post antibiotic therapy 3 months ago.  Patient denies any bladder spasms, denies any discomfort.                                                     PLAN   Discussed increasing PO fluid intake, Stroud cath site care.  Continue perineal care daily as discussed.     We will follow-up in ONE Month for Cath Change.     Will return sooner if need be     Patient is agreeable with plan of care.    Smoking Cessation Counseling:  Current every day smoker. less than 3 minutes spent counseling. Has reduced tobacco use.  I advised patient to quit tobacco use and offered support.  I provided patient with tobacco cessation educational material printed in the patient's After Visit Summary.     Follow Up   Return in about 5 weeks (around 8/15/2022) for Next scheduled follow up, Stroud Catheter Change # 18 COUDE.    Patient was given instructions and counseling regarding his condition or  for health maintenance advice. Please see specific information pulled into the AVS if appropriate.          This document has been electronically signed by Griselda Cheng-Akwa, APRN   July 27, 2022 10:15 EDT      Please note that portions of this note were completed with a voice recognition program. Efforts were made to edit dictation, but occasionally words are mistranscribed.

## 2022-07-20 ENCOUNTER — OFFICE VISIT (OUTPATIENT)
Dept: CARDIOLOGY | Facility: CLINIC | Age: 77
End: 2022-07-20

## 2022-07-20 VITALS
HEIGHT: 69 IN | SYSTOLIC BLOOD PRESSURE: 115 MMHG | HEART RATE: 58 BPM | WEIGHT: 270 LBS | BODY MASS INDEX: 39.99 KG/M2 | DIASTOLIC BLOOD PRESSURE: 73 MMHG

## 2022-07-20 DIAGNOSIS — I34.0 NONRHEUMATIC MITRAL VALVE REGURGITATION: ICD-10-CM

## 2022-07-20 DIAGNOSIS — I10 ESSENTIAL HYPERTENSION: ICD-10-CM

## 2022-07-20 DIAGNOSIS — I42.8 NONISCHEMIC CARDIOMYOPATHY: Primary | ICD-10-CM

## 2022-07-20 DIAGNOSIS — I48.20 CHRONIC ATRIAL FIBRILLATION: ICD-10-CM

## 2022-07-20 PROCEDURE — 99213 OFFICE O/P EST LOW 20 MIN: CPT | Performed by: NURSE PRACTITIONER

## 2022-07-20 RX ORDER — SACUBITRIL AND VALSARTAN 24; 26 MG/1; MG/1
1 TABLET, FILM COATED ORAL 2 TIMES DAILY
Qty: 60 TABLET | Refills: 11 | COMMUNITY
Start: 2022-07-20 | End: 2022-12-28 | Stop reason: SDUPTHER

## 2022-07-20 RX ORDER — CARVEDILOL 6.25 MG/1
6.25 TABLET ORAL 2 TIMES DAILY WITH MEALS
Qty: 60 TABLET | Refills: 11 | Status: SHIPPED | OUTPATIENT
Start: 2022-07-20

## 2022-07-20 NOTE — PROGRESS NOTES
Buster Redd MD  Larry Kehr  1945 07/20/2022    Patient Active Problem List   Diagnosis   • Chronic atrial fibrillation   • Hypertension- controlled.    • Tobacco use, states that he has cut back to 3-4 cigarettes a day.   • Morbid obesity (HCC)   • Nonischemic cardiomyopathy , appears to be compensated.   • Chronic systolic heart failure (HCC)   • Obstructive uropathy   • Chronic obstructive lung disease (HCC)   • Atrial fibrillation (HCC)   • Hypertensive disorder   • Benign prostatic hyperplasia with urinary retention   • PAD (peripheral artery disease) (HCC)   • Cardiomyopathy (HCC)   • Encounter for Malhotra catheter replacement       Dear Buster Redd MD:    Subjective     Chief Complaint   Patient presents with   • Follow-up           History of Present Illness:    Larry Kehr is a 76 y.o. male with a past medical history of nonischemic dilated cardiomyopathy and chronic atrial fibrillation as well as mild to moderate mitral regurgitation on echocardiogram. He presents today for routine cardiology follow up. He reports he has been doing well. The patient denies chest pain, shortness of breath, palpitations, dizziness, lightheadedness, near syncope, and syncope. His BP has been well controlled. He denies bleeding issues with Xarelto.             No Known Allergies:      Current Outpatient Medications:   •  carvedilol (COREG) 6.25 MG tablet, Take 1 tablet by mouth 2 (Two) Times a Day With Meals., Disp: 60 tablet, Rfl: 11  •  clotrimazole-betamethasone (LOTRISONE) 1-0.05 % cream, Apply  topically to the appropriate area as directed Take As Directed., Disp: 45 g, Rfl: 3  •  nystatin (MYCOSTATIN) 353673 UNIT/GM powder, APPLY TOPICALLY TO PERINEAL FOLDS/GROIN AREAS 3 TIME DAILY AS NEEDED, Disp: 60 g, Rfl: 2  •  ondansetron (Zofran) 4 MG tablet, Take 1 tablet by mouth Every 12 (Twelve) Hours As Needed for Nausea., Disp: 20 tablet, Rfl: 0  •  polyethylene glycol (MiraLax) 17 g packet, Take 17 g by mouth  "Daily., Disp: 30 each, Rfl: 3  •  rivaroxaban (Xarelto) 20 MG tablet, Take 1 tablet by mouth Daily With Dinner., Disp: 30 tablet, Rfl: 11  •  sacubitril-valsartan (Entresto) 24-26 MG tablet, Take 1 tablet by mouth 2 (Two) Times a Day., Disp: 60 tablet, Rfl: 11  •  tamsulosin (FLOMAX) 0.4 MG capsule 24 hr capsule, Take 1 capsule by mouth Daily., Disp: 30 capsule, Rfl: 3  •  amoxicillin-clavulanate (Augmentin) 875-125 MG per tablet, Take 1 tablet by mouth 2 (Two) Times a Day., Disp: 14 tablet, Rfl: 0  •  phenazopyridine (Pyridium) 200 MG tablet, Take 1 tablet by mouth 3 (Three) Times a Day As Needed for Bladder Spasms (STOP AFTER 3 DAYS- CHANGE URINE ORANGE IS NORMAL)., Disp: 20 tablet, Rfl: 0  •  sulfamethoxazole-trimethoprim (Bactrim DS) 800-160 MG per tablet, Take 1 tablet by mouth 2 (Two) Times a Day., Disp: 20 tablet, Rfl: 0      The following portions of the patient's history were reviewed and updated as appropriate: allergies, current medications, past family history, past medical history, past social history, past surgical history and problem list.    Social History     Tobacco Use   • Smoking status: Current Every Day Smoker     Packs/day: 0.25     Years: 59.00     Pack years: 14.75     Types: Cigarettes   • Smokeless tobacco: Never Used   Vaping Use   • Vaping Use: Never used   Substance Use Topics   • Alcohol use: No   • Drug use: No       ROS    Objective   Vitals:    07/20/22 1330   BP: 115/73   Pulse: 58   Weight: 122 kg (270 lb)   Height: 175.3 cm (69.02\")     Body mass index is 39.85 kg/m².        Vitals reviewed.   Constitutional:       Appearance: Healthy appearance. Well-developed and not in distress.   HENT:      Head: Normocephalic and atraumatic.   Pulmonary:      Effort: Pulmonary effort is normal.      Breath sounds: Normal breath sounds. No wheezing. No rales.   Cardiovascular:      Normal rate. Irregularly irregular rhythm.      Murmurs: There is no murmur.      . No S3 and S4 gallop. "   Edema:     Peripheral edema absent.   Abdominal:      General: Bowel sounds are normal.      Palpations: Abdomen is soft.   Skin:     General: Skin is warm and dry.   Neurological:      Mental Status: Alert, oriented to person, place, and time and oriented to person, place and time.   Psychiatric:         Mood and Affect: Mood normal.         Behavior: Behavior normal.         Lab Results   Component Value Date     08/04/2021    K 4.3 08/04/2021     08/04/2021    CO2 23.6 08/04/2021    BUN 11 08/04/2021    CREATININE 0.88 08/04/2021    GLUCOSE 81 08/04/2021    CALCIUM 9.2 08/04/2021    AST 11 08/04/2021    ALT 10 08/04/2021    ALKPHOS 104 08/04/2021     Lab Results   Component Value Date    CKTOTAL 20 06/14/2020     Lab Results   Component Value Date    WBC 6.35 08/04/2021    HGB 13.6 08/04/2021    HCT 43.6 08/04/2021     08/04/2021     Lab Results   Component Value Date    INR 1.24 (H) 12/10/2016     Lab Results   Component Value Date    MG 2.3 06/14/2020     Lab Results   Component Value Date    TSH 2.250 06/14/2020    PSA 19.100 (H) 06/15/2020    TRIG 107 02/21/2020    HDL 38 (L) 02/21/2020    LDL 93 02/21/2020      Lab Results   Component Value Date    .0 (H) 01/05/2017           Procedures      Assessment & Plan    Diagnosis Plan   1. Nonischemic cardiomyopathy   sacubitril-valsartan (Entresto) 24-26 MG tablet   2. Chronic atrial fibrillation, on Xarelto for stroke prevention.  carvedilol (COREG) 6.25 MG tablet    rivaroxaban (Xarelto) 20 MG tablet   3. Nonrheumatic mitral valve regurgitation (mild to moderate)     4. Essential hypertension                  Recommendations:    1. Non-ischemic cardiomyopathy - EF improved on recent echocardiogram. Continue Entresto and carvedilol. Creatinine and potassium normal.  2. Chronic atrial fibrillation - rate controlled. Continue Xarelto.  3. Miltral valve regurgitation - continue to monitor perioidc echocardiogram  4. Essential hypertension  - well controlled.  5. Follow up in 5 months or sooner if needed.         Return in about 5 months (around 12/20/2022) for Recheck.    As always, I appreciate very much the opportunity to participate in the cardiovascular care of your patients.      With Best Regards,    IDA Poole

## 2022-08-04 ENCOUNTER — TELEPHONE (OUTPATIENT)
Dept: CARDIOLOGY | Facility: CLINIC | Age: 77
End: 2022-08-04

## 2022-08-04 DIAGNOSIS — I48.20 CHRONIC ATRIAL FIBRILLATION: ICD-10-CM

## 2022-08-15 ENCOUNTER — OFFICE VISIT (OUTPATIENT)
Dept: UROLOGY | Facility: CLINIC | Age: 77
End: 2022-08-15

## 2022-08-15 VITALS — BODY MASS INDEX: 40.14 KG/M2 | WEIGHT: 271 LBS | HEIGHT: 69 IN

## 2022-08-15 DIAGNOSIS — R33.8 BENIGN PROSTATIC HYPERPLASIA WITH URINARY RETENTION: ICD-10-CM

## 2022-08-15 DIAGNOSIS — Z46.6 ENCOUNTER FOR FOLEY CATHETER REPLACEMENT: Primary | ICD-10-CM

## 2022-08-15 DIAGNOSIS — N40.1 BENIGN PROSTATIC HYPERPLASIA WITH URINARY RETENTION: ICD-10-CM

## 2022-08-15 PROCEDURE — 99214 OFFICE O/P EST MOD 30 MIN: CPT | Performed by: NURSE PRACTITIONER

## 2022-08-15 PROCEDURE — 51701 INSERT BLADDER CATHETER: CPT | Performed by: NURSE PRACTITIONER

## 2022-08-15 NOTE — PROGRESS NOTES
"Chief Complaint  BPH WITH LUTS/URINE RETENTION/ENCOUNTER FOR STROUD CATH GRANT (MONTHLY STROUD CATH CHANGE # 18 COUDE)    Subjective          Larry Kehr presents to [facility] GASTROENTEROLOGY [facility] UROLOGY for BPH with LUTS/CATH CHANGE #18 COUDÉ  History of Present Illness     MR. LARRY KEHR IS a Pleasant 77-year-old male with significant history of BPH with LUTS-URINE Retention/incomplete bladder emptying, WHO returns to clinic today for evaluation.  This is his monthly Stroud catheter change.  He is in apparent discomfort today and reports doing well this last month.  He reports a remarkable improvement in his bladder discomfort post antibiotic therapy with Bactrim few months ago, and denies any bladder pain, or symptoms consistent with cystitis.   He also reports improvement in his p.o. fluid intake, and denies any discomfort from his prior catheter and denies any persistent bladder spasms.      He tolerated his Stroud catheter change #18 coudé tip catheter with minimal discomfort. Adequate/blood tinged urine output noted in drainage bag. Also significant improvement noted to perineal yeast dermatitis around groin folds. HE is utilizing nystatin cream as ordered. Discussed using the powder instead. Also encouraged adequate bowel movements    Objective   Vital Signs:   Ht 175.3 cm (69\")   Wt 123 kg (271 lb)   BMI 40.02 kg/m²       ROS:   Review of Systems   Constitutional: Positive for activity change, fatigue and unexpected weight gain. Negative for appetite change, chills, diaphoresis, fever and unexpected weight loss.   HENT: Negative for congestion, ear discharge, ear pain, nosebleeds, rhinorrhea, sinus pressure and sore throat.    Eyes: Negative for blurred vision, double vision, photophobia, pain, redness and visual disturbance.   Respiratory: Negative for apnea, cough, chest tightness, shortness of breath, wheezing and stridor.    Cardiovascular: Negative for chest pain and palpitations. "   Gastrointestinal: Positive for abdominal distention and abdominal pain. Negative for constipation, diarrhea, nausea and vomiting.   Endocrine: Negative for polydipsia, polyphagia and polyuria.   Genitourinary: Positive for decreased urine volume, difficulty urinating, dysuria, genital sores and hematuria. Negative for discharge, flank pain, frequency, penile pain, penile swelling, scrotal swelling, testicular pain, urgency and urinary incontinence.   Musculoskeletal: Positive for gait problem. Negative for arthralgias, back pain and joint swelling.   Skin: Positive for color change, dry skin and pallor. Negative for rash and wound.   Neurological: Negative for dizziness, tremors, syncope, weakness, light-headedness, memory problem and confusion.   Psychiatric/Behavioral: Positive for stress. Negative for agitation, behavioral problems and decreased concentration. The patient is nervous/anxious.       Physical Exam  Constitutional:       General: He is in acute distress.      Appearance: He is well-developed. He is obese. He is ill-appearing.   HENT:      Head: Normocephalic and atraumatic.      Right Ear: External ear normal.      Left Ear: External ear normal.   Eyes:      General:         Right eye: No discharge.         Left eye: No discharge.      Conjunctiva/sclera: Conjunctivae normal.      Pupils: Pupils are equal, round, and reactive to light.   Neck:      Thyroid: No thyromegaly.      Trachea: No tracheal deviation.   Cardiovascular:      Rate and Rhythm: Normal rate and regular rhythm.      Heart sounds: No murmur heard.    No friction rub.   Pulmonary:      Effort: Pulmonary effort is normal. No respiratory distress.      Breath sounds: Normal breath sounds. No stridor.   Abdominal:      General: Bowel sounds are normal. There is distension.      Palpations: Abdomen is soft.      Tenderness: There is abdominal tenderness. There is guarding.   Genitourinary:     Penis: Normal and uncircumcised. No  tenderness or discharge.       Testes: Normal.      Rectum: Normal. Guaiac result negative.   Musculoskeletal:         General: Tenderness present. No deformity. Normal range of motion.      Cervical back: Normal range of motion and neck supple.   Skin:     General: Skin is warm and dry.      Capillary Refill: Capillary refill takes less than 2 seconds.      Coloration: Skin is pale.   Neurological:      Mental Status: He is alert and oriented to person, place, and time.      Cranial Nerves: No cranial nerve deficit.      Coordination: Coordination normal.   Psychiatric:         Behavior: Behavior normal.         Thought Content: Thought content normal.         Judgment: Judgment normal.        Result Review :            Assessment and Plan    Problem List Items Addressed This Visit        Genitourinary and Reproductive     Benign prostatic hyperplasia with urinary retention    Encounter for Stroud catheter replacement - Primary          Patient reports that he is not currently experiencing any symptoms of urinary incontinence.    Class 2 Severe Obesity (BMI >=35 and <=39.9). Obesity-related health conditions include the following: obstructive sleep apnea, hypertension, dyslipidemias and GERD. Obesity is improving with lifestyle modifications. BMI is 40.02 kg/M2. We discussed portion control and increasing exercise.      RADIOLOGY (CT AND/OR KUB):    CT Abdomen and Pelvis: No results found for this or any previous visit.     CT Stone Protocol: No results found for this or any previous visit.     KUB: No results found for this or any previous visit.       LABS (3 MONTHS):    No visits with results within 3 Month(s) from this visit.   Latest known visit with results is:   Office Visit on 02/08/2022   Component Date Value Ref Range Status   • Urine Culture 02/08/2022 >100,000 CFU/mL Enterococcus faecalis (A)  Final                                                ASSESSMENT     BPH WITH URINARY RETENTION/STROUD CATHETER  CATH CHANGE # 18 FR:  MR. LARRY KEHR is a pleasant 77-year-old male significant history of urinary retention who returns to clinic today for monthly Malhotra catheter change.  He is in no apparent discomfort today and reports feeling well.  He denies any fevers, chills or malaise, he denies any N/V/D.  HE REPorts adequate p.o. fluid intake, and denies any foul odorous urine.      Patient tolerated his Malhotra cath change today with MINIMAL discomfort.  We replaced his catheter with absolutely no resistance.  No sediments were noted in bag.    Urine is blood-tinged from possibly urethra irritation, but without any smell or odor noted.  Patient reports significant improvement post antibiotic therapy 3 months ago.  Patient denies any bladder spasms, denies any discomfort today.                                                     PLAN   Discussed increasing PO fluid intake, Malhotra cath site care.  Continue perineal care daily as discussed.     We will follow-up in ONE Month for Cath Change.     Will return sooner if need be     Patient is agreeable with plan of care.     Smoking Cessation Counseling:  Current every day smoker. less than 3 minutes spent counseling. Has reduced tobacco use.  I advised patient to quit tobacco use and offered support.  I provided patient with tobacco cessation educational material printed in the patient's After Visit Summary.     Smoking Cessation Counseling:  Current every day smoker. less than 3 minutes spent counseling. Not agreeable to stopping.  I advised patient to quit tobacco use and offered support.  I provided patient with tobacco cessation educational material printed in the patient's After Visit Summary.     Follow Up   Return in about 30 days (around 9/14/2022) for Next scheduled follow up for BPH with LUTS/urinary retention/, Malhotra Catheter Change.    Patient was given instructions and counseling regarding his condition or for health maintenance advice. Please see specific  information pulled into the AVS if appropriate.          This document has been electronically signed by Griselda Cheng-Akwa, APRN   August 15, 2022 15:45 EDT      Dictated Utilizing Dragon Dictation: Part of this note may be an electronic transcription/translation of spoken language to printed text using the Dragon Dictation System.

## 2022-09-06 ENCOUNTER — PRIOR AUTHORIZATION (OUTPATIENT)
Dept: CARDIOLOGY | Facility: CLINIC | Age: 77
End: 2022-09-06

## 2022-09-06 DIAGNOSIS — I48.20 CHRONIC ATRIAL FIBRILLATION: ICD-10-CM

## 2022-09-14 ENCOUNTER — OFFICE VISIT (OUTPATIENT)
Dept: UROLOGY | Facility: CLINIC | Age: 77
End: 2022-09-14

## 2022-09-14 VITALS — WEIGHT: 270 LBS | HEIGHT: 69 IN | BODY MASS INDEX: 39.99 KG/M2

## 2022-09-14 DIAGNOSIS — N40.1 BENIGN PROSTATIC HYPERPLASIA WITH URINARY RETENTION: ICD-10-CM

## 2022-09-14 DIAGNOSIS — R33.8 BENIGN PROSTATIC HYPERPLASIA WITH URINARY RETENTION: ICD-10-CM

## 2022-09-14 DIAGNOSIS — Z46.6 ENCOUNTER FOR FOLEY CATHETER REPLACEMENT: Primary | ICD-10-CM

## 2022-09-14 PROCEDURE — 99214 OFFICE O/P EST MOD 30 MIN: CPT | Performed by: NURSE PRACTITIONER

## 2022-09-14 PROCEDURE — 51701 INSERT BLADDER CATHETER: CPT | Performed by: NURSE PRACTITIONER

## 2022-09-14 NOTE — PROGRESS NOTES
"Chief Complaint  Urinary Retention (MONTHLY FOLLOW UP Cath change #18 COUDE)    Subjective          Larry Kehr presents to [facility] GASTROENTEROLOGY & UROLOGY for BPH WITH LUTS/FOCEY CATH CHANGE#18COUDE  History of Present Illness    MR. LARRY KEHR IS a Pleasant 76-year-old male with significant history of BPH with urinary retention/incomplete bladder emptying, WHO returns to clinic today for monthly Malhotra catheter change.  He is in apparent discomfort today and still reports a remarkable improvement in his bladder discomfort post antibiotic therapy with Bactrim 3 months ago.  He also reports improvement in his p.o. fluid intake, and denies any discomfort from his prior catheter and denies any persistent bladder spasms.      THE patient is in no apparent distress today.  He tolerated Malhotra catheter change #18 coudé tip catheter with minimal discomfort. Adequate urine output noted in drainage bag. Also significant improvement noted to perineal yeast dermatitis around groin folds. HE is utilizing nystatin cream as ordered. Discussed using the powder instead.Also encouraged adequate bowel movements     The rest of his PMHx as listed below.    Objective   Vital Signs:   Ht 175.3 cm (69\")   Wt 122 kg (270 lb)   BMI 39.87 kg/m²       ROS:   Review of Systems   Constitutional: Negative for activity change, appetite change, diaphoresis, fatigue, fever, unexpected weight gain and unexpected weight loss.   HENT: Negative for congestion, ear discharge, ear pain, nosebleeds, rhinorrhea, sinus pressure and sore throat.    Eyes: Negative for blurred vision, double vision, photophobia, pain, redness and visual disturbance.   Respiratory: Negative for apnea, cough, chest tightness, shortness of breath, wheezing and stridor.    Cardiovascular: Negative for chest pain and palpitations.   Gastrointestinal: Negative for abdominal distention, abdominal pain, constipation, diarrhea, nausea and vomiting.   Endocrine: " Negative for polydipsia, polyphagia and polyuria.   Genitourinary: Positive for difficulty urinating. Negative for decreased urine volume, dysuria, flank pain, frequency, hematuria, urgency and urinary incontinence.   Musculoskeletal: Negative for arthralgias and joint swelling.   Skin: Negative for pallor, rash and wound.   Neurological: Negative for dizziness, tremors, syncope, weakness, light-headedness, memory problem and confusion.   Hematological: Bruises/bleeds easily.   Psychiatric/Behavioral: Negative for behavioral problems.        Physical Exam  Constitutional:       General: He is in acute distress.      Appearance: He is well-developed. He is ill-appearing.   HENT:      Head: Normocephalic and atraumatic.      Right Ear: External ear normal.      Left Ear: External ear normal.   Eyes:      General:         Right eye: No discharge.         Left eye: No discharge.      Conjunctiva/sclera: Conjunctivae normal.      Pupils: Pupils are equal, round, and reactive to light.   Neck:      Thyroid: No thyromegaly.      Trachea: No tracheal deviation.   Cardiovascular:      Rate and Rhythm: Normal rate and regular rhythm.      Heart sounds: No murmur heard.    No friction rub.   Pulmonary:      Effort: Pulmonary effort is normal. No respiratory distress.      Breath sounds: Normal breath sounds. No stridor.   Abdominal:      General: Bowel sounds are normal. There is distension.      Palpations: Abdomen is soft.      Tenderness: There is abdominal tenderness. There is guarding.   Genitourinary:     Penis: Normal and uncircumcised. No tenderness or discharge.       Testes: Normal.      Rectum: Normal. Guaiac result negative.      Comments: Indwelling Malhotra catheter, hematuria  Musculoskeletal:         General: Tenderness present. No deformity. Normal range of motion.      Cervical back: Normal range of motion and neck supple.   Skin:     General: Skin is warm and dry.      Capillary Refill: Capillary refill takes  less than 2 seconds.      Coloration: Skin is pale.      Findings: Bruising present.   Neurological:      Mental Status: He is alert and oriented to person, place, and time.      Cranial Nerves: No cranial nerve deficit.      Motor: Weakness present.      Coordination: Coordination normal.   Psychiatric:         Behavior: Behavior normal.         Thought Content: Thought content normal.         Judgment: Judgment normal.        Result Review :            Assessment and Plan    Problem List Items Addressed This Visit        Genitourinary and Reproductive     Benign prostatic hyperplasia with urinary retention    Encounter for Stroud catheter replacement - Primary          Patient reports that he is not currently experiencing any symptoms of urinary incontinence.    Class 3 Severe Obesity (BMI >=40). Obesity-related health conditions include the following: obstructive sleep apnea, hypertension, coronary heart disease, dyslipidemias, GERD and osteoarthritis. Obesity is improving with lifestyle modifications. BMI is 39.7 kg/M2. We discussed portion control and increasing exercise.    RADIOLOGY (CT AND/OR KUB):    CT Abdomen and Pelvis: No results found for this or any previous visit.     CT Stone Protocol: No results found for this or any previous visit.     KUB: No results found for this or any previous visit.       LABS (3 MONTHS):    No visits with results within 3 Month(s) from this visit.   Latest known visit with results is:   Office Visit on 02/08/2022   Component Date Value Ref Range Status   • Urine Culture 02/08/2022 >100,000 CFU/mL Enterococcus faecalis (A)  Final                                                       ASSESSMENT    BPH WITH URINARY RETENTION/PT HAS INDWELLING STROUD CATHETER:  MR. LARRY KEHR is a pleasant 77-year-old male, with significant significant voiding dysfunction, history of urinary retention who returns to clinic today for monthly Stroud catheter change.  He is in no apparent discomfort  today and reports feeling well.  He denies any fevers, chills or malaise, he denies any N/V/D.    He reports adequate p.o. fluid intake, and denies any foul odorous urine this past month.      Patient tolerated his Malhotra cath change today with MINIMAL discomfort.  We irrigated his replaced catheter with absolutely no resistance.  He has slight hematuria from catheter irritation, but no sediments were noted in his drainage bag.      Urine is pale yellow, blood-tinged without any clots noted.  Patient reports significant improvement post antibiotic therapy 3 months ago.  Patient denies any bladder spasms, denies any discomfort.                                                     PLAN   Discussed increasing PO fluid intake, Malhotra cath site care.  Continue perineal care daily as discussed.     We will follow-up in ONE Month for Cath Change.     Will return sooner if need be     Patient is agreeable with plan of care.     Smoking Cessation Counseling:  Current every day smoker. less than 3 minutes spent counseling. Has reduced tobacco use.  I advised patient to quit tobacco use and offered support.  I provided patient with tobacco cessation educational material printed in the patient's After Visit Summary.     Smoking Cessation Counseling:  Current every day smoker. 3-10 mintues spent counseling. Not agreeable to stopping.  I advised patient to quit tobacco use and offered support.  I provided patient with tobacco cessation educational material printed in the patient's After Visit Summary.     Follow Up   Return in about 1 month (around 10/14/2022) for Next scheduled follow up, Malhotra Catheter Change.    Patient was given instructions and counseling regarding his condition or for health maintenance advice. Please see specific information pulled into the AVS if appropriate.          This document has been electronically signed by Griselda Cheng-Akwa, APRN   September 15, 2022 22:07 EDT      Dictated Utilizing Dragon  Dictation: Part of this note may be an electronic transcription/translation of spoken language to printed text using the Dragon Dictation System.

## 2022-10-12 ENCOUNTER — OFFICE VISIT (OUTPATIENT)
Dept: UROLOGY | Facility: CLINIC | Age: 77
End: 2022-10-12

## 2022-10-12 DIAGNOSIS — R33.8 BENIGN PROSTATIC HYPERPLASIA WITH URINARY RETENTION: ICD-10-CM

## 2022-10-12 DIAGNOSIS — N40.1 BENIGN PROSTATIC HYPERPLASIA WITH URINARY RETENTION: ICD-10-CM

## 2022-10-12 DIAGNOSIS — Z46.6 ENCOUNTER FOR FOLEY CATHETER REPLACEMENT: Primary | ICD-10-CM

## 2022-10-12 PROCEDURE — 99214 OFFICE O/P EST MOD 30 MIN: CPT | Performed by: NURSE PRACTITIONER

## 2022-10-12 PROCEDURE — 51701 INSERT BLADDER CATHETER: CPT | Performed by: NURSE PRACTITIONER

## 2022-10-13 NOTE — PROGRESS NOTES
Chief Complaint  Encounter for Malhotra catheter replacement/BPH with urinary r (Monthly follow-up Malhotra cath change change #18 Gabonese coudé)    Subjective          Larry Kehr presents to [facility] GASTROENTEROLOGY [facility] UROLOGY for Malhotra cath change #18 Gabonese coudé  History of Present Illness    MR. LARRY KEHR IS a Pleasant 77-year-old male with significant history of BPH with urinary retention/incomplete bladder emptying, WHO returns to clinic today for monthly Malhotra catheter change.  He is in NO apparent discomfort today and still reports a remarkable improvement in his bladder discomfort today; denies any issues with bladder spasms  He also reports improvement in his p.o. fluid intake, and denies any discomfort from his prior catheter.        He tolerated Malhotra catheter change #18 coudé tip catheter with minimal discomfort. Adequate urine output noted in drainage bag. Also significant improvement noted to perineal yeast dermatitis around groin folds. HE is utilizing nystatin cream as ordered. Discussed using the powder instead.Also encouraged adequate bowel movements     The rest of his PMHx as noted in chart    Objective   Vital Signs:   There were no vitals taken for this visit.      ROS:   Review of Systems   Constitutional: Positive for fatigue and unexpected weight gain. Negative for activity change, appetite change, chills, diaphoresis, fever and unexpected weight loss.   HENT: Negative for congestion, ear discharge, ear pain, nosebleeds, rhinorrhea, sinus pressure and sore throat.    Eyes: Negative for blurred vision, double vision, photophobia, pain, redness and visual disturbance.   Respiratory: Negative for apnea, cough, chest tightness, shortness of breath, wheezing and stridor.    Cardiovascular: Negative for chest pain and palpitations.   Gastrointestinal: Negative for abdominal distention, abdominal pain, constipation, diarrhea, nausea and vomiting.   Endocrine: Negative for  polydipsia, polyphagia and polyuria.   Genitourinary: Positive for difficulty urinating, dysuria, genital sores, penile swelling and testicular pain. Negative for decreased urine volume, discharge, flank pain, frequency, hematuria, penile pain, scrotal swelling, urgency and urinary incontinence.   Musculoskeletal: Positive for back pain. Negative for arthralgias and joint swelling.   Skin: Positive for dry skin and pallor. Negative for rash and wound.   Neurological: Positive for weakness. Negative for dizziness, tremors, syncope, light-headedness, memory problem and confusion.   Psychiatric/Behavioral: Positive for sleep disturbance and stress. Negative for agitation, behavioral problems and decreased concentration. The patient is nervous/anxious.         Physical Exam  Constitutional:       General: He is in acute distress.      Appearance: He is well-developed. He is obese. He is ill-appearing.   HENT:      Head: Normocephalic and atraumatic.      Right Ear: External ear normal.      Left Ear: External ear normal.   Eyes:      General:         Right eye: No discharge.         Left eye: No discharge.      Conjunctiva/sclera: Conjunctivae normal.      Pupils: Pupils are equal, round, and reactive to light.   Neck:      Thyroid: No thyromegaly.      Trachea: No tracheal deviation.   Cardiovascular:      Rate and Rhythm: Normal rate and regular rhythm.      Heart sounds: No murmur heard.    No friction rub.   Pulmonary:      Effort: Pulmonary effort is normal. No respiratory distress.      Breath sounds: Normal breath sounds. No stridor.   Abdominal:      General: Bowel sounds are normal. There is distension.      Palpations: Abdomen is soft.      Tenderness: There is abdominal tenderness. There is guarding.   Genitourinary:     Penis: Normal and uncircumcised. No tenderness or discharge.       Testes: Normal.      Rectum: Normal. Guaiac result negative.      Comments: Indwelling Malhotra catheter  Musculoskeletal:          General: No tenderness or deformity. Normal range of motion.      Cervical back: Normal range of motion and neck supple.   Skin:     General: Skin is warm and dry.      Capillary Refill: Capillary refill takes less than 2 seconds.      Coloration: Skin is not pale.   Neurological:      Mental Status: He is alert and oriented to person, place, and time.      Cranial Nerves: No cranial nerve deficit.      Motor: Weakness present.      Coordination: Coordination normal.   Psychiatric:         Behavior: Behavior normal.         Thought Content: Thought content normal.         Judgment: Judgment normal.        Result Review :            Assessment and Plan    Problem List Items Addressed This Visit        Genitourinary and Reproductive     Benign prostatic hyperplasia with urinary retention    Encounter for Stroud catheter replacement - Primary                                                    ASSESSMENT    BPH WITH URINARY RETENTION/PT HAS INDWELLING STROUD CATHETER:  MR. LARRY KEHR is a pleasant 77-year-old male, with significant significant voiding dysfunction, history of urinary retention who returns to clinic today for monthly Stroud catheter change.  He is in no apparent discomfort today and reports feeling well.  He denies any fevers, chills or malaise, he denies any N/V/D.    He reports adequate p.o. fluid intake, and denies any foul odorous urine this past month.      Patient tolerated his Stroud cath change today with MINIMAL discomfort. Urine is concentrated dark yellow, without any hematuria, without any clots noted.  Patient denies any bladder spasms, denies any discomfort.                                                     PLAN   Discussed increasing PO fluid intake, Stroud cath site care.  Continue perineal care daily as discussed.     We will follow-up in ONE Month for Cath Change.     Female eturn sooner if need be     Patient is agreeable with plan of care.    Patient reports that he is not currently  experiencing any symptoms of urinary incontinence.    Class 2 Severe Obesity (BMI >=35 and <=39.9). Obesity-related health conditions include the following: hypertension, dyslipidemias and GERD. Obesity is improving with lifestyle modifications. BMI is 39.87 kg/M squared. We discussed portion control and increasing exercise.      RADIOLOGY (CT AND/OR KUB):    CT Abdomen and Pelvis: No results found for this or any previous visit.     CT Stone Protocol: No results found for this or any previous visit.     KUB: No results found for this or any previous visit.       LABS (3 MONTHS):    No visits with results within 3 Month(s) from this visit.   Latest known visit with results is:   Office Visit on 02/08/2022   Component Date Value Ref Range Status   • Urine Culture 02/08/2022 >100,000 CFU/mL Enterococcus faecalis (A)   Final        Smoking Cessation Counseling:  Never a smoker.  Patient does not currently use any tobacco products.       Follow Up   Return in about 6 weeks (around 11/21/2022) for Next scheduled follow up, Malhotra Catheter Change.    Patient was given instructions and counseling regarding his condition or for health maintenance advice. Please see specific information pulled into the AVS if appropriate.          This document has been electronically signed by Griselda Cheng-Akwa, APRN   October 13, 2022 18:09 EDT      Dictated Utilizing Dragon Dictation: Part of this note may be an electronic transcription/translation of spoken language to printed text using the Dragon Dictation System.

## 2022-11-21 ENCOUNTER — OFFICE VISIT (OUTPATIENT)
Dept: UROLOGY | Facility: CLINIC | Age: 77
End: 2022-11-21

## 2022-11-21 VITALS — HEIGHT: 69 IN | WEIGHT: 270 LBS | BODY MASS INDEX: 39.99 KG/M2

## 2022-11-21 DIAGNOSIS — N40.1 BENIGN PROSTATIC HYPERPLASIA WITH URINARY RETENTION: ICD-10-CM

## 2022-11-21 DIAGNOSIS — Z46.6 ENCOUNTER FOR FOLEY CATHETER REPLACEMENT: Primary | ICD-10-CM

## 2022-11-21 DIAGNOSIS — R33.8 BENIGN PROSTATIC HYPERPLASIA WITH URINARY RETENTION: ICD-10-CM

## 2022-11-21 PROCEDURE — 51701 INSERT BLADDER CATHETER: CPT | Performed by: NURSE PRACTITIONER

## 2022-11-21 PROCEDURE — 99214 OFFICE O/P EST MOD 30 MIN: CPT | Performed by: NURSE PRACTITIONER

## 2022-11-21 NOTE — PROGRESS NOTES
"Chief Complaint  Encouter for Stroud catheter replacement (MONTHLY CATH CHANGE # 18 COUDE TIP)    Subjective          Larry Kehr presents to [facility] for STROUD CATH CHANGE  History of Present Illness    MR. LARRY KEHR IS a Pleasant 77-year-old male with significant history of BPH with urinary retention/incomplete bladder emptying, WHO returns to clinic today for monthly Stroud catheter change.  He is in NO apparent discomfort today and still reports a remarkable improvement in his bladder discomfort today; denies any issues with bladder spasms  He also reports improvement in his p.o. fluid intake, and denies any discomfort from his prior catheter.        He tolerated Stroud catheter change #18 coudé tip catheter with minimal discomfort. Adequate urine output noted in drainage bag. Also significant improvement noted to perineal yeast dermatitis around groin folds. HE is utilizing nystatin cream as ordered. Discussed using the powder instead.Also encouraged adequate bowel movements     The rest of his PMHx as noted in chart     Objective   Vital Signs:   Ht 175.3 cm (69.02\")   Wt 122 kg (270 lb)   BMI 39.85 kg/m²       ROS:   Review of Systems   Constitutional: Positive for activity change, appetite change and unexpected weight gain. Negative for chills, diaphoresis, fatigue, fever and unexpected weight loss.   HENT: Negative for congestion, ear discharge, ear pain, nosebleeds, rhinorrhea, sinus pressure and sore throat.    Eyes: Negative for blurred vision, double vision, photophobia, pain, redness and visual disturbance.   Respiratory: Negative for apnea, cough, chest tightness, shortness of breath, wheezing and stridor.    Cardiovascular: Negative for chest pain and palpitations.   Gastrointestinal: Positive for abdominal distention, abdominal pain and nausea. Negative for constipation, diarrhea and vomiting.   Endocrine: Negative for polydipsia, polyphagia and " polyuria.   Genitourinary: Positive for difficulty urinating, discharge, flank pain and genital sores. Negative for decreased urine volume, dysuria, frequency, hematuria, penile pain, penile swelling, scrotal swelling, testicular pain, urgency and urinary incontinence.   Musculoskeletal: Positive for back pain and myalgias. Negative for arthralgias and joint swelling.   Skin: Positive for dry skin and pallor. Negative for rash and wound.   Neurological: Positive for weakness. Negative for dizziness, tremors, syncope, light-headedness, memory problem and confusion.   Psychiatric/Behavioral: Positive for sleep disturbance and stress. Negative for agitation, behavioral problems and decreased concentration.        Physical Exam  Constitutional:       General: He is in acute distress.      Appearance: He is well-developed. He is obese. He is ill-appearing.   HENT:      Head: Normocephalic and atraumatic.      Right Ear: External ear normal.      Left Ear: External ear normal.   Eyes:      General:         Right eye: No discharge.         Left eye: No discharge.      Conjunctiva/sclera: Conjunctivae normal.      Pupils: Pupils are equal, round, and reactive to light.   Neck:      Thyroid: No thyromegaly.      Trachea: No tracheal deviation.   Cardiovascular:      Rate and Rhythm: Normal rate and regular rhythm.      Heart sounds: No murmur heard.    No friction rub.   Pulmonary:      Effort: Pulmonary effort is normal. No respiratory distress.      Breath sounds: Normal breath sounds. No stridor.   Abdominal:      General: Bowel sounds are normal. There is distension.      Palpations: Abdomen is soft.      Tenderness: There is abdominal tenderness. There is no guarding.   Genitourinary:     Penis: Normal and uncircumcised. No tenderness or discharge.       Testes: Normal.      Rectum: Normal. Guaiac result negative.      Comments: INDWELLING STROUD    Musculoskeletal:         General: Tenderness present. No deformity.  Normal range of motion.      Cervical back: Normal range of motion and neck supple.   Skin:     General: Skin is warm and dry.      Capillary Refill: Capillary refill takes less than 2 seconds.      Coloration: Skin is pale.   Neurological:      Mental Status: He is alert and oriented to person, place, and time.      Cranial Nerves: No cranial nerve deficit.      Coordination: Coordination normal.   Psychiatric:         Behavior: Behavior normal.         Thought Content: Thought content normal.         Judgment: Judgment normal.        Result Review :         Urine Culture    Urine Culture 2/8/22   Urine Culture >100,000 CFU/mL Enterococcus faecalis (A)   (A) Abnormal value                 Assessment and Plan    Problem List Items Addressed This Visit        Genitourinary and Reproductive     Benign prostatic hyperplasia with urinary retention    Encounter for Stroud catheter replacement - Primary                                                                      ASSESSMENT    BPH WITH URINARY RETENTION/ INDWELLING STROUD CATHETER:  MR. LARRY KEHR is a pleasant 77-year-old male, with significant significant voiding dysfunction, history of urinary retention who returns to clinic today for monthly Stroud catheter change.  He is in no apparent discomfort today and reports feeling well.  He denies any fevers, chills or malaise, he denies any N/V/D.    He reports adequate p.o. fluid intake, and denies any foul odorous urine this past month.      Patient tolerated his Stroud cath change today with MINIMAL discomfort. Urine is concentrated dark yellow, without any hematuria, without any clots noted.  Patient denies any bladder spasms, denies any discomfort.                                                     PLAN   Discussed increasing PO fluid intake, Stroud cath site care.  Continue perineal care daily as discussed.     We will follow-up in ONE Month for Cath Change.     Female eturn sooner if need be     Patient is  agreeable with plan of care.     Patient reports that he is not currently experiencing any symptoms of urinary incontinence.    Class 2 Severe Obesity (BMI >=35 and <=39.9). Obesity-related health conditions include the following: obstructive sleep apnea, hypertension, coronary heart disease, dyslipidemias and GERD. Obesity is improving with lifestyle modifications. BMI is 39.85KG/M2. We discussed portion control and increasing exercise.    RADIOLOGY (CT AND/OR KUB):    CT Abdomen and Pelvis: No results found for this or any previous visit.     CT Stone Protocol: No results found for this or any previous visit.     KUB: No results found for this or any previous visit.       LABS (3 MONTHS):    No visits with results within 3 Month(s) from this visit.   Latest known visit with results is:   Office Visit on 02/08/2022   Component Date Value Ref Range Status   • Urine Culture 02/08/2022 >100,000 CFU/mL Enterococcus faecalis (A)   Final          Smoking Cessation Counseling:  Never a smoker.  Patient does not currently use any tobacco products.     Follow Up   Return in about 23 days (around 12/14/2022) for Next scheduled follow up, Malhotra Catheter Change.    Patient was given instructions and counseling regarding his condition or for health maintenance advice. Please see specific information pulled into the AVS if appropriate.          This document has been electronically signed by Griselda Cheng-Akwa, APRN   November 22, 2022 09:37 EST      Dictated Utilizing Dragon Dictation: Part of this note may be an electronic transcription/translation of spoken language to printed text using the Dragon Dictation System.

## 2022-12-02 ENCOUNTER — TELEPHONE (OUTPATIENT)
Dept: UROLOGY | Facility: CLINIC | Age: 77
End: 2022-12-02

## 2022-12-02 DIAGNOSIS — B37.2 YEAST DERMATITIS: ICD-10-CM

## 2022-12-02 DIAGNOSIS — R33.8 BENIGN PROSTATIC HYPERPLASIA WITH URINARY RETENTION: ICD-10-CM

## 2022-12-02 DIAGNOSIS — Z97.8 CHRONIC INDWELLING FOLEY CATHETER: ICD-10-CM

## 2022-12-02 DIAGNOSIS — N30.01 ACUTE CYSTITIS WITH HEMATURIA: ICD-10-CM

## 2022-12-02 DIAGNOSIS — Z46.6 ENCOUNTER FOR FOLEY CATHETER REPLACEMENT: ICD-10-CM

## 2022-12-02 DIAGNOSIS — N40.1 BENIGN PROSTATIC HYPERPLASIA WITH URINARY RETENTION: ICD-10-CM

## 2022-12-02 RX ORDER — TAMSULOSIN HYDROCHLORIDE 0.4 MG/1
0.4 CAPSULE ORAL DAILY
Qty: 90 CAPSULE | Refills: 5 | Status: SHIPPED | OUTPATIENT
Start: 2022-12-02

## 2022-12-02 RX ORDER — SULFAMETHOXAZOLE AND TRIMETHOPRIM 800; 160 MG/1; MG/1
1 TABLET ORAL 2 TIMES DAILY
Qty: 20 TABLET | Refills: 0 | Status: SHIPPED | OUTPATIENT
Start: 2022-12-02 | End: 2022-12-21

## 2022-12-02 RX ORDER — FINASTERIDE 5 MG/1
5 TABLET, FILM COATED ORAL DAILY
Qty: 90 TABLET | Refills: 5 | Status: SHIPPED | OUTPATIENT
Start: 2022-12-02 | End: 2022-12-21

## 2022-12-02 NOTE — TELEPHONE ENCOUNTER
Junior Peralta please kindly call Arsenio to  medications from [facility] pharmacy-finasteride.  He had his PSA done at [facility] which was elevated I am not sure why they were checking it he has a chronic Malhotra catheter.  However PER Dr. Khoury HE is starting medications sent to [facility].  Follow-up in clinic as discussed

## 2022-12-02 NOTE — TELEPHONE ENCOUNTER
Called patient to check on him to discuss PSA result done 09/01/2022 at [facility] by his PCP.   Unable to leave a voicemail we will try to call again later.  However medication sent to patient's pharmacy.    Patient had PSA checked which according to results was 153.  Nevertheless his free PSA orders 25.2% which gives patient a less than 5 to 10% chance of any prostate abnormalities.-Cancer    Patient has a chronic indwelling Malhotra catheter so limited results as is not unusual.  Nevertheless we discussed starting finasteride at this time and his antibiotic therapy for possible prostatitis.

## 2022-12-06 NOTE — TELEPHONE ENCOUNTER
Caller: Kehr, Larry    Relationship: Self    Best call back number: [phone]    What is the best time to reach you: ANYTIME     Who are you requesting to speak with (clinical staff, provider,  specific staff member): ANYONE     What was the call regarding: PATIENT STATES HE RECEIVED LETTER FROM [employer] STATING THEY WOULD CANCEL HIS MEDICATIONS IF THEY ARE NOT RENEWED.     Do you require a callback: YES

## 2022-12-06 NOTE — TELEPHONE ENCOUNTER
HUB CAN READ    PAP expires at the end of the year.  He has to renew every year. We will be sending out new applications the beginning to mid January.  He will just need to make sure he brings in his 2023 infor

## 2022-12-20 ENCOUNTER — HOSPITAL ENCOUNTER (EMERGENCY)
Facility: HOSPITAL | Age: 77
Discharge: ANOTHER HOSPITAL | End: 2022-12-21
Attending: EMERGENCY MEDICINE | Admitting: EMERGENCY MEDICINE
Payer: MEDICARE

## 2022-12-20 ENCOUNTER — APPOINTMENT (OUTPATIENT)
Dept: GENERAL RADIOLOGY | Facility: HOSPITAL | Age: 77
End: 2022-12-20
Payer: MEDICARE

## 2022-12-20 ENCOUNTER — APPOINTMENT (OUTPATIENT)
Dept: CT IMAGING | Facility: HOSPITAL | Age: 77
End: 2022-12-20
Payer: MEDICARE

## 2022-12-20 DIAGNOSIS — I48.91 ATRIAL FIBRILLATION WITH RVR: Primary | ICD-10-CM

## 2022-12-20 LAB
ALBUMIN SERPL-MCNC: 3.59 G/DL (ref 3.5–5.2)
ALBUMIN/GLOB SERPL: 1 G/DL
ALP SERPL-CCNC: 1159 U/L (ref 39–117)
ALT SERPL W P-5'-P-CCNC: 7 U/L (ref 1–41)
ANION GAP SERPL CALCULATED.3IONS-SCNC: 12.4 MMOL/L (ref 5–15)
APTT PPP: 51.9 SECONDS (ref 26.5–34.5)
APTT PPP: 58.6 SECONDS (ref 26.5–34.5)
APTT PPP: >100 SECONDS (ref 26.5–34.5)
AST SERPL-CCNC: 10 U/L (ref 1–40)
BACTERIA UR QL AUTO: ABNORMAL /HPF
BASOPHILS # BLD AUTO: 0.04 10*3/MM3 (ref 0–0.2)
BASOPHILS NFR BLD AUTO: 0.7 % (ref 0–1.5)
BILIRUB SERPL-MCNC: 0.6 MG/DL (ref 0–1.2)
BILIRUB UR QL STRIP: NEGATIVE
BUN SERPL-MCNC: 15 MG/DL (ref 8–23)
BUN/CREAT SERPL: 13 (ref 7–25)
CALCIUM SPEC-SCNC: 8.9 MG/DL (ref 8.6–10.5)
CHLORIDE SERPL-SCNC: 102 MMOL/L (ref 98–107)
CLARITY UR: ABNORMAL
CO2 SERPL-SCNC: 20.6 MMOL/L (ref 22–29)
COLOR UR: YELLOW
CREAT SERPL-MCNC: 1.15 MG/DL (ref 0.76–1.27)
CRP SERPL-MCNC: 6.26 MG/DL (ref 0–0.5)
D-LACTATE SERPL-SCNC: 1.9 MMOL/L (ref 0.5–2)
DEPRECATED RDW RBC AUTO: 51.3 FL (ref 37–54)
EGFRCR SERPLBLD CKD-EPI 2021: 64.7 ML/MIN/1.73
EOSINOPHIL # BLD AUTO: 0.05 10*3/MM3 (ref 0–0.4)
EOSINOPHIL NFR BLD AUTO: 0.8 % (ref 0.3–6.2)
ERYTHROCYTE [DISTWIDTH] IN BLOOD BY AUTOMATED COUNT: 18.5 % (ref 12.3–15.4)
ERYTHROCYTE [SEDIMENTATION RATE] IN BLOOD: 36 MM/HR (ref 0–20)
FLUAV RNA RESP QL NAA+PROBE: NOT DETECTED
FLUBV RNA ISLT QL NAA+PROBE: NOT DETECTED
GLOBULIN UR ELPH-MCNC: 3.6 GM/DL
GLUCOSE SERPL-MCNC: 135 MG/DL (ref 65–99)
GLUCOSE UR STRIP-MCNC: NEGATIVE MG/DL
HCT VFR BLD AUTO: 31.3 % (ref 37.5–51)
HGB BLD-MCNC: 9.3 G/DL (ref 13–17.7)
HGB UR QL STRIP.AUTO: ABNORMAL
HOLD SPECIMEN: NORMAL
HOLD SPECIMEN: NORMAL
HYALINE CASTS UR QL AUTO: ABNORMAL /LPF
IMM GRANULOCYTES # BLD AUTO: 0.1 10*3/MM3 (ref 0–0.05)
IMM GRANULOCYTES NFR BLD AUTO: 1.7 % (ref 0–0.5)
INR PPP: 3.63 (ref 0.9–1.1)
KETONES UR QL STRIP: NEGATIVE
LEUKOCYTE ESTERASE UR QL STRIP.AUTO: ABNORMAL
LYMPHOCYTES # BLD AUTO: 0.47 10*3/MM3 (ref 0.7–3.1)
LYMPHOCYTES NFR BLD AUTO: 7.8 % (ref 19.6–45.3)
MAGNESIUM SERPL-MCNC: 2.2 MG/DL (ref 1.6–2.4)
MCH RBC QN AUTO: 23.1 PG (ref 26.6–33)
MCHC RBC AUTO-ENTMCNC: 29.7 G/DL (ref 31.5–35.7)
MCV RBC AUTO: 77.9 FL (ref 79–97)
MONOCYTES # BLD AUTO: 0.33 10*3/MM3 (ref 0.1–0.9)
MONOCYTES NFR BLD AUTO: 5.4 % (ref 5–12)
NEUTROPHILS NFR BLD AUTO: 5.07 10*3/MM3 (ref 1.7–7)
NEUTROPHILS NFR BLD AUTO: 83.6 % (ref 42.7–76)
NITRITE UR QL STRIP: POSITIVE
NRBC BLD AUTO-RTO: 0.7 /100 WBC (ref 0–0.2)
NT-PROBNP SERPL-MCNC: 2440 PG/ML (ref 0–1800)
PH UR STRIP.AUTO: 6 [PH] (ref 5–8)
PLATELET # BLD AUTO: 163 10*3/MM3 (ref 140–450)
PMV BLD AUTO: 11 FL (ref 6–12)
POTASSIUM SERPL-SCNC: 4.3 MMOL/L (ref 3.5–5.2)
PROT SERPL-MCNC: 7.2 G/DL (ref 6–8.5)
PROT UR QL STRIP: ABNORMAL
PROTHROMBIN TIME: 37.2 SECONDS (ref 12.1–14.7)
RBC # BLD AUTO: 4.02 10*6/MM3 (ref 4.14–5.8)
RBC # UR STRIP: ABNORMAL /HPF
REF LAB TEST METHOD: ABNORMAL
SARS-COV-2 RNA PNL SPEC NAA+PROBE: NOT DETECTED
SODIUM SERPL-SCNC: 135 MMOL/L (ref 136–145)
SP GR UR STRIP: 1.01 (ref 1–1.03)
SQUAMOUS #/AREA URNS HPF: ABNORMAL /HPF
TROPONIN T SERPL-MCNC: <0.01 NG/ML (ref 0–0.03)
TSH SERPL DL<=0.05 MIU/L-ACNC: 1.65 UIU/ML (ref 0.27–4.2)
UROBILINOGEN UR QL STRIP: ABNORMAL
WBC # UR STRIP: ABNORMAL /HPF
WBC NRBC COR # BLD: 6.06 10*3/MM3 (ref 3.4–10.8)
WHOLE BLOOD HOLD COAG: NORMAL
WHOLE BLOOD HOLD SPECIMEN: NORMAL

## 2022-12-20 PROCEDURE — 84443 ASSAY THYROID STIM HORMONE: CPT | Performed by: EMERGENCY MEDICINE

## 2022-12-20 PROCEDURE — 83605 ASSAY OF LACTIC ACID: CPT | Performed by: EMERGENCY MEDICINE

## 2022-12-20 PROCEDURE — 25010000002 PIPERACILLIN SOD-TAZOBACTAM PER 1 G: Performed by: EMERGENCY MEDICINE

## 2022-12-20 PROCEDURE — 83880 ASSAY OF NATRIURETIC PEPTIDE: CPT | Performed by: EMERGENCY MEDICINE

## 2022-12-20 PROCEDURE — 75635 CT ANGIO ABDOMINAL ARTERIES: CPT

## 2022-12-20 PROCEDURE — 85652 RBC SED RATE AUTOMATED: CPT | Performed by: EMERGENCY MEDICINE

## 2022-12-20 PROCEDURE — 36415 COLL VENOUS BLD VENIPUNCTURE: CPT

## 2022-12-20 PROCEDURE — 85025 COMPLETE CBC W/AUTO DIFF WBC: CPT | Performed by: EMERGENCY MEDICINE

## 2022-12-20 PROCEDURE — 87185 SC STD ENZYME DETCJ PER NZM: CPT | Performed by: EMERGENCY MEDICINE

## 2022-12-20 PROCEDURE — 84484 ASSAY OF TROPONIN QUANT: CPT | Performed by: EMERGENCY MEDICINE

## 2022-12-20 PROCEDURE — 80053 COMPREHEN METABOLIC PANEL: CPT | Performed by: EMERGENCY MEDICINE

## 2022-12-20 PROCEDURE — 85610 PROTHROMBIN TIME: CPT | Performed by: EMERGENCY MEDICINE

## 2022-12-20 PROCEDURE — 25010000002 HEPARIN (PORCINE) 25000-0.45 UT/250ML-% SOLUTION: Performed by: EMERGENCY MEDICINE

## 2022-12-20 PROCEDURE — 87636 SARSCOV2 & INF A&B AMP PRB: CPT | Performed by: EMERGENCY MEDICINE

## 2022-12-20 PROCEDURE — 99285 EMERGENCY DEPT VISIT HI MDM: CPT

## 2022-12-20 PROCEDURE — 86140 C-REACTIVE PROTEIN: CPT | Performed by: EMERGENCY MEDICINE

## 2022-12-20 PROCEDURE — 87040 BLOOD CULTURE FOR BACTERIA: CPT | Performed by: EMERGENCY MEDICINE

## 2022-12-20 PROCEDURE — 25010000002 VANCOMYCIN 5 G RECONSTITUTED SOLUTION: Performed by: EMERGENCY MEDICINE

## 2022-12-20 PROCEDURE — 81001 URINALYSIS AUTO W/SCOPE: CPT | Performed by: EMERGENCY MEDICINE

## 2022-12-20 PROCEDURE — 87150 DNA/RNA AMPLIFIED PROBE: CPT | Performed by: EMERGENCY MEDICINE

## 2022-12-20 PROCEDURE — 96366 THER/PROPH/DIAG IV INF ADDON: CPT

## 2022-12-20 PROCEDURE — 87086 URINE CULTURE/COLONY COUNT: CPT | Performed by: EMERGENCY MEDICINE

## 2022-12-20 PROCEDURE — 85730 THROMBOPLASTIN TIME PARTIAL: CPT | Performed by: STUDENT IN AN ORGANIZED HEALTH CARE EDUCATION/TRAINING PROGRAM

## 2022-12-20 PROCEDURE — 85730 THROMBOPLASTIN TIME PARTIAL: CPT | Performed by: EMERGENCY MEDICINE

## 2022-12-20 PROCEDURE — 25010000002 HEPARIN (PORCINE) PER 1000 UNITS: Performed by: EMERGENCY MEDICINE

## 2022-12-20 PROCEDURE — 93005 ELECTROCARDIOGRAM TRACING: CPT | Performed by: EMERGENCY MEDICINE

## 2022-12-20 PROCEDURE — 96365 THER/PROPH/DIAG IV INF INIT: CPT

## 2022-12-20 PROCEDURE — 83735 ASSAY OF MAGNESIUM: CPT | Performed by: EMERGENCY MEDICINE

## 2022-12-20 PROCEDURE — 0 IOPAMIDOL PER 1 ML: Performed by: EMERGENCY MEDICINE

## 2022-12-20 PROCEDURE — 71045 X-RAY EXAM CHEST 1 VIEW: CPT

## 2022-12-20 PROCEDURE — 96376 TX/PRO/DX INJ SAME DRUG ADON: CPT

## 2022-12-20 PROCEDURE — 73630 X-RAY EXAM OF FOOT: CPT

## 2022-12-20 PROCEDURE — 96368 THER/DIAG CONCURRENT INF: CPT

## 2022-12-20 PROCEDURE — 87186 SC STD MICRODIL/AGAR DIL: CPT | Performed by: EMERGENCY MEDICINE

## 2022-12-20 PROCEDURE — 93010 ELECTROCARDIOGRAM REPORT: CPT | Performed by: INTERNAL MEDICINE

## 2022-12-20 PROCEDURE — 87088 URINE BACTERIA CULTURE: CPT | Performed by: EMERGENCY MEDICINE

## 2022-12-20 RX ORDER — HEPARIN SODIUM 5000 [USP'U]/ML
5000 INJECTION, SOLUTION INTRAVENOUS; SUBCUTANEOUS ONCE
Status: COMPLETED | OUTPATIENT
Start: 2022-12-20 | End: 2022-12-20

## 2022-12-20 RX ORDER — HEPARIN SODIUM 10000 [USP'U]/100ML
8.13 INJECTION, SOLUTION INTRAVENOUS
Status: DISCONTINUED | OUTPATIENT
Start: 2022-12-20 | End: 2022-12-21 | Stop reason: HOSPADM

## 2022-12-20 RX ORDER — OXYCODONE HYDROCHLORIDE AND ACETAMINOPHEN 5; 325 MG/1; MG/1
1 TABLET ORAL ONCE
Status: COMPLETED | OUTPATIENT
Start: 2022-12-20 | End: 2022-12-20

## 2022-12-20 RX ORDER — SODIUM CHLORIDE 0.9 % (FLUSH) 0.9 %
10 SYRINGE (ML) INJECTION AS NEEDED
Status: DISCONTINUED | OUTPATIENT
Start: 2022-12-20 | End: 2022-12-21 | Stop reason: HOSPADM

## 2022-12-20 RX ORDER — HEPARIN SODIUM 5000 [USP'U]/ML
2500 INJECTION, SOLUTION INTRAVENOUS; SUBCUTANEOUS AS NEEDED
Status: DISCONTINUED | OUTPATIENT
Start: 2022-12-20 | End: 2022-12-21 | Stop reason: HOSPADM

## 2022-12-20 RX ORDER — HEPARIN SODIUM 5000 [USP'U]/ML
5000 INJECTION, SOLUTION INTRAVENOUS; SUBCUTANEOUS AS NEEDED
Status: DISCONTINUED | OUTPATIENT
Start: 2022-12-20 | End: 2022-12-21 | Stop reason: HOSPADM

## 2022-12-20 RX ORDER — DILTIAZEM HCL/D5W 125 MG/125
5-15 PLASTIC BAG, INJECTION (ML) INTRAVENOUS CONTINUOUS
Status: DISCONTINUED | OUTPATIENT
Start: 2022-12-20 | End: 2022-12-21 | Stop reason: HOSPADM

## 2022-12-20 RX ADMIN — IOPAMIDOL 130 ML: 755 INJECTION, SOLUTION INTRAVENOUS at 05:01

## 2022-12-20 RX ADMIN — OXYCODONE HYDROCHLORIDE AND ACETAMINOPHEN 1 TABLET: 5; 325 TABLET ORAL at 07:27

## 2022-12-20 RX ADMIN — VANCOMYCIN HYDROCHLORIDE 2500 MG: 5 INJECTION, POWDER, LYOPHILIZED, FOR SOLUTION INTRAVENOUS at 06:53

## 2022-12-20 RX ADMIN — HEPARIN SODIUM 5.13 UNITS/KG/HR: 10000 INJECTION, SOLUTION INTRAVENOUS at 17:11

## 2022-12-20 RX ADMIN — PIPERACILLIN SODIUM AND TAZOBACTAM SODIUM 3.38 G: 3; .375 INJECTION, POWDER, LYOPHILIZED, FOR SOLUTION INTRAVENOUS at 06:19

## 2022-12-20 RX ADMIN — Medication 5 MG/HR: at 06:49

## 2022-12-20 RX ADMIN — HEPARIN SODIUM 5000 UNITS: 5000 INJECTION INTRAVENOUS; SUBCUTANEOUS at 06:42

## 2022-12-20 RX ADMIN — HEPARIN SODIUM 8.13 UNITS/KG/HR: 10000 INJECTION, SOLUTION INTRAVENOUS at 06:42

## 2022-12-20 NOTE — ED PROVIDER NOTES
"Subjective   History of Present Illness  Patient is a 77-year-old male who presents by ambulance after a fall at home.  He states that he was standing on a stepstool, which fell out from under him, injuring to his right foot.  Patient reported to EMS that he started having some red discoloration of his right foot about 7 months ago; he tells me that his toes have now been purple, black and blue for \"at least 3 weeks\", he really does not seem to be aware of the period of time and the severity of his right foot problems.  He states that he otherwise has been doing well, not having any problems.  He denies fever, chills, chest pain, shortness of breath, abdominal pain, vomiting, syncope or near syncope, focal numbness or weakness, other symptoms or other complaints. Patient has a history of atrial fibrillation, is chronically anticoagulated with Xarelto.        Review of Systems   All other systems reviewed and are negative.      Past Medical History:   Diagnosis Date   • Atrial fibrillation (HCC)    • Cardiomyopathy (HCC)    • CHF (congestive heart failure) (HCC)    • COPD (chronic obstructive pulmonary disease) (HCC)    • Hypertension        No Known Allergies    History reviewed. No pertinent surgical history.    Family History   Problem Relation Age of Onset   • No Known Problems Mother    • Heart disease Father    • No Known Problems Sister    • No Known Problems Brother    • No Known Problems Son    • No Known Problems Daughter    • No Known Problems Maternal Grandmother    • No Known Problems Maternal Grandfather    • No Known Problems Paternal Grandmother    • No Known Problems Paternal Grandfather    • No Known Problems Cousin    • Rheum arthritis Neg Hx    • Osteoarthritis Neg Hx    • Asthma Neg Hx    • Diabetes Neg Hx    • Heart failure Neg Hx    • Hyperlipidemia Neg Hx    • Hypertension Neg Hx    • Migraines Neg Hx    • Rashes / Skin problems Neg Hx    • Seizures Neg Hx    • Stroke Neg Hx    • Thyroid " disease Neg Hx        Social History     Socioeconomic History   • Marital status:    Tobacco Use   • Smoking status: Every Day     Packs/day: 0.25     Years: 59.00     Pack years: 14.75     Types: Cigarettes   • Smokeless tobacco: Never   Vaping Use   • Vaping Use: Never used   Substance and Sexual Activity   • Alcohol use: No   • Drug use: No   • Sexual activity: Defer           Objective   Physical Exam  Vitals and nursing note reviewed.   Constitutional:       General: He is not in acute distress.     Appearance: Normal appearance. He is well-developed. He is not ill-appearing, toxic-appearing or diaphoretic.   HENT:      Head: Normocephalic and atraumatic.   Eyes:      General: No scleral icterus.     Pupils: Pupils are equal, round, and reactive to light.   Neck:      Trachea: No tracheal deviation.   Cardiovascular:      Rate and Rhythm: Tachycardia present. Rhythm irregular.   Pulmonary:      Effort: Pulmonary effort is normal. No respiratory distress.      Breath sounds: Normal breath sounds.   Chest:      Chest wall: No tenderness.   Abdominal:      General: Bowel sounds are normal.      Palpations: Abdomen is soft.      Tenderness: There is no abdominal tenderness. There is no guarding or rebound.   Musculoskeletal:         General: No tenderness. Normal range of motion.      Cervical back: Normal range of motion and neck supple. No rigidity or tenderness.      Right lower leg: No edema.      Left lower leg: No edema.      Comments: The right great toe appears completely ischemic, dead, and in need of amputation.  The right fifth toe is very dark, likely dead as well or very close.  The second, third, fourth toes are purple in color and very poorly perfused.  The dorsum of the right foot is erythematous.  There is no appreciable bony tenderness or deformity of the right lower extremity, there is no musculoskeletal tenderness in general.   Skin:     General: Skin is warm and dry.      Capillary  Refill: Capillary refill takes less than 2 seconds.   Neurological:      General: No focal deficit present.      Mental Status: He is alert and oriented to person, place, and time.      GCS: GCS eye subscore is 4. GCS verbal subscore is 5. GCS motor subscore is 6.      Motor: No abnormal muscle tone.   Psychiatric:         Mood and Affect: Mood normal.         Behavior: Behavior normal.         Procedures  EKG shows atrial fibrillation, RVR, rate 128.  QRS duration 76, QTc 413 ms.  Baseline artifact.  No evidence for STEMI.  XR Foot 3+ View Right   Final Result   Degenerative joint disease. No acute findings.      Signer Name: Merlin Ceballos MD    Signed: 12/20/2022 5:26 AM    Workstation Name: [facility]      XR Chest 1 View   Final Result   Mild congestive heart failure. Marked progression of metastatic disease diffusely in the skeleton since the previous examination, most likely representing metastatic prostate cancer.      Signer Name: Merlin Ceballos MD    Signed: 12/20/2022 5:29 AM    Workstation Name: [facility]      CT Angio Abdominal Aorta Bilateral Iliofem Runoff   Final Result   The abdominal aortic aneurysm has enlarged significantly over the past 2 1/2 years and now measures 5.6 x 4.9 cm. There is occlusion of both superficial femoral arteries and there is moderately severe diffuse runoff vessel disease bilaterally, greater on   the right. There is approximately 50% left external iliac artery stenosis at its origin.      Also noted is bilateral hydronephrosis, unchanged. There is probable stone at the left UVJ. Metastatic prostate cancer is noted widely throughout the skeleton, new since the previous scan. Cholelithiasis is also noted.      Signer Name: Merlin Ceballos MD    Signed: 12/20/2022 5:45 AM    Work[facility]        Results for orders placed or performed during  the hospital encounter of 12/20/22   Blood Culture - Blood, Arm, Left    Specimen: Arm, Left; Blood   Result Value Ref Range    Blood Culture Prevotella bivia (C)     Isolated from Anaerobic Bottle     Gram Stain Anaerobic Bottle Gram negative bacilli (C)    Blood Culture - Blood, Arm, Right    Specimen: Arm, Right; Blood   Result Value Ref Range    Blood Culture Prevotella bivia (C)     Isolated from      Gram Stain Anaerobic Bottle Gram negative bacilli (C)    COVID-19 and FLU A/B PCR - Swab, Nasopharynx    Specimen: Nasopharynx; Swab   Result Value Ref Range    COVID19 Not Detected Not Detected - Ref. Range    Influenza A PCR Not Detected Not Detected    Influenza B PCR Not Detected Not Detected   Urine Culture - Urine, Urine, Clean Catch    Specimen: Urine, Clean Catch   Result Value Ref Range    Urine Culture >100,000 CFU/mL Escherichia coli (A)        Susceptibility    Escherichia coli - WILBERTO     Ampicillin  Susceptible ug/ml     Ampicillin + Sulbactam  Susceptible ug/ml     Cefazolin  Susceptible ug/ml     Cefepime  Susceptible ug/ml     Ceftazidime  Susceptible ug/ml     Ceftriaxone  Susceptible ug/ml     Gentamicin  Susceptible ug/ml     Levofloxacin  Susceptible ug/ml     Nitrofurantoin  Susceptible ug/ml     Piperacillin + Tazobactam  Susceptible ug/ml     Trimethoprim + Sulfamethoxazole  Susceptible ug/ml   Blood Culture ID, PCR - Blood, Arm, Right    Specimen: Arm, Right; Blood   Result Value Ref Range    BCID, PCR Negative by BCID PCR. Culture to Follow. Negative by BCID PCR. Culture to Follow.   Comprehensive Metabolic Panel    Specimen: Arm, Right; Blood   Result Value Ref Range    Glucose 135 (H) 65 - 99 mg/dL    BUN 15 8 - 23 mg/dL    Creatinine 1.15 0.76 - 1.27 mg/dL    Sodium 135 (L) 136 - 145 mmol/L    Potassium 4.3 3.5 - 5.2 mmol/L    Chloride 102 98 - 107 mmol/L    CO2 20.6 (L) 22.0 - 29.0 mmol/L    Calcium 8.9 8.6 - 10.5 mg/dL    Total Protein 7.2 6.0 - 8.5 g/dL    Albumin 3.59 3.50 - 5.20  g/dL    ALT (SGPT) 7 1 - 41 U/L    AST (SGOT) 10 1 - 40 U/L    Alkaline Phosphatase 1,159 (H) 39 - 117 U/L    Total Bilirubin 0.6 0.0 - 1.2 mg/dL    Globulin 3.6 gm/dL    A/G Ratio 1.0 g/dL    BUN/Creatinine Ratio 13.0 7.0 - 25.0    Anion Gap 12.4 5.0 - 15.0 mmol/L    eGFR 64.7 >60.0 mL/min/1.73   Protime-INR    Specimen: Arm, Right; Blood   Result Value Ref Range    Protime 37.2 (H) 12.1 - 14.7 Seconds    INR 3.63 (H) 0.90 - 1.10   aPTT    Specimen: Arm, Right; Blood   Result Value Ref Range    PTT 58.6 (H) 26.5 - 34.5 seconds   Urinalysis With Microscopic If Indicated (No Culture) - Urine, Catheter    Specimen: Urine, Catheter   Result Value Ref Range    Color, UA Yellow Yellow, Straw    Appearance, UA Cloudy (A) Clear    pH, UA 6.0 5.0 - 8.0    Specific Gravity, UA 1.012 1.005 - 1.030    Glucose, UA Negative Negative    Ketones, UA Negative Negative    Bilirubin, UA Negative Negative    Blood, UA Moderate (2+) (A) Negative    Protein, UA 30 mg/dL (1+) (A) Negative    Leuk Esterase, UA Large (3+) (A) Negative    Nitrite, UA Positive (A) Negative    Urobilinogen, UA 0.2 E.U./dL 0.2 - 1.0 E.U./dL   Troponin    Specimen: Arm, Right; Blood   Result Value Ref Range    Troponin T <0.010 0.000 - 0.030 ng/mL   BNP    Specimen: Arm, Right; Blood   Result Value Ref Range    proBNP 2,440.0 (H) 0.0 - 1,800.0 pg/mL   Lactic Acid, Plasma    Specimen: Arm, Right; Blood   Result Value Ref Range    Lactate 1.9 0.5 - 2.0 mmol/L   C-reactive Protein    Specimen: Arm, Right; Blood   Result Value Ref Range    C-Reactive Protein 6.26 (H) 0.00 - 0.50 mg/dL   Sedimentation Rate    Specimen: Arm, Right; Blood   Result Value Ref Range    Sed Rate 36 (H) 0 - 20 mm/hr   TSH    Specimen: Arm, Right; Blood   Result Value Ref Range    TSH 1.650 0.270 - 4.200 uIU/mL   Magnesium    Specimen: Arm, Right; Blood   Result Value Ref Range    Magnesium 2.2 1.6 - 2.4 mg/dL   CBC Auto Differential    Specimen: Arm, Right; Blood   Result Value Ref Range     WBC 6.06 3.40 - 10.80 10*3/mm3    RBC 4.02 (L) 4.14 - 5.80 10*6/mm3    Hemoglobin 9.3 (L) 13.0 - 17.7 g/dL    Hematocrit 31.3 (L) 37.5 - 51.0 %    MCV 77.9 (L) 79.0 - 97.0 fL    MCH 23.1 (L) 26.6 - 33.0 pg    MCHC 29.7 (L) 31.5 - 35.7 g/dL    RDW 18.5 (H) 12.3 - 15.4 %    RDW-SD 51.3 37.0 - 54.0 fl    MPV 11.0 6.0 - 12.0 fL    Platelets 163 140 - 450 10*3/mm3    Neutrophil % 83.6 (H) 42.7 - 76.0 %    Lymphocyte % 7.8 (L) 19.6 - 45.3 %    Monocyte % 5.4 5.0 - 12.0 %    Eosinophil % 0.8 0.3 - 6.2 %    Basophil % 0.7 0.0 - 1.5 %    Immature Grans % 1.7 (H) 0.0 - 0.5 %    Neutrophils, Absolute 5.07 1.70 - 7.00 10*3/mm3    Lymphocytes, Absolute 0.47 (L) 0.70 - 3.10 10*3/mm3    Monocytes, Absolute 0.33 0.10 - 0.90 10*3/mm3    Eosinophils, Absolute 0.05 0.00 - 0.40 10*3/mm3    Basophils, Absolute 0.04 0.00 - 0.20 10*3/mm3    Immature Grans, Absolute 0.10 (H) 0.00 - 0.05 10*3/mm3    nRBC 0.7 (H) 0.0 - 0.2 /100 WBC   Urinalysis, Microscopic Only - Urine, Catheter    Specimen: Urine, Catheter   Result Value Ref Range    RBC, UA 6-12 (A) None Seen, 0-2 /HPF    WBC, UA 21-30 (A) None Seen, 0-2 /HPF    Bacteria, UA 3+ (A) None Seen /HPF    Squamous Epithelial Cells, UA 0-2 None Seen, 0-2 /HPF    Hyaline Casts, UA 3-6 None Seen /LPF    Methodology Manual Light Microscopy    aPTT    Specimen: Hand, Right; Blood   Result Value Ref Range    PTT >100.0 (C) 26.5 - 34.5 seconds   aPTT    Specimen: Arm, Left; Blood   Result Value Ref Range    PTT 51.9 (H) 26.5 - 34.5 seconds   CBC Auto Differential    Specimen: Blood   Result Value Ref Range    WBC 5.42 3.40 - 10.80 10*3/mm3    RBC 3.25 (L) 4.14 - 5.80 10*6/mm3    Hemoglobin 7.5 (L) 13.0 - 17.7 g/dL    Hematocrit 25.1 (L) 37.5 - 51.0 %    MCV 77.2 (L) 79.0 - 97.0 fL    MCH 23.1 (L) 26.6 - 33.0 pg    MCHC 29.9 (L) 31.5 - 35.7 g/dL    RDW 18.6 (H) 12.3 - 15.4 %    RDW-SD 51.5 37.0 - 54.0 fl    MPV 10.7 6.0 - 12.0 fL    Platelets 129 (L) 140 - 450 10*3/mm3    Neutrophil % 67.3 42.7 -  76.0 %    Lymphocyte % 19.2 (L) 19.6 - 45.3 %    Monocyte % 10.0 5.0 - 12.0 %    Eosinophil % 1.5 0.3 - 6.2 %    Basophil % 0.7 0.0 - 1.5 %    Immature Grans % 1.3 (H) 0.0 - 0.5 %    Neutrophils, Absolute 3.65 1.70 - 7.00 10*3/mm3    Lymphocytes, Absolute 1.04 0.70 - 3.10 10*3/mm3    Monocytes, Absolute 0.54 0.10 - 0.90 10*3/mm3    Eosinophils, Absolute 0.08 0.00 - 0.40 10*3/mm3    Basophils, Absolute 0.04 0.00 - 0.20 10*3/mm3    Immature Grans, Absolute 0.07 (H) 0.00 - 0.05 10*3/mm3    nRBC 0.0 0.0 - 0.2 /100 WBC   aPTT    Specimen: Blood   Result Value Ref Range    PTT 57.7 (H) 26.5 - 34.5 seconds   aPTT    Specimen: Arm, Left; Blood   Result Value Ref Range    PTT 40.7 (H) 26.5 - 34.5 seconds   CBC Auto Differential    Specimen: Arm, Left; Blood   Result Value Ref Range    WBC 5.31 3.40 - 10.80 10*3/mm3    RBC 3.25 (L) 4.14 - 5.80 10*6/mm3    Hemoglobin 7.6 (L) 13.0 - 17.7 g/dL    Hematocrit 25.1 (L) 37.5 - 51.0 %    MCV 77.2 (L) 79.0 - 97.0 fL    MCH 23.4 (L) 26.6 - 33.0 pg    MCHC 30.3 (L) 31.5 - 35.7 g/dL    RDW 18.8 (H) 12.3 - 15.4 %    RDW-SD 52.0 37.0 - 54.0 fl    MPV 10.4 6.0 - 12.0 fL    Platelets 128 (L) 140 - 450 10*3/mm3    Neutrophil % 74.6 42.7 - 76.0 %    Lymphocyte % 14.9 (L) 19.6 - 45.3 %    Monocyte % 8.1 5.0 - 12.0 %    Eosinophil % 0.9 0.3 - 6.2 %    Basophil % 0.6 0.0 - 1.5 %    Immature Grans % 0.9 (H) 0.0 - 0.5 %    Neutrophils, Absolute 3.96 1.70 - 7.00 10*3/mm3    Lymphocytes, Absolute 0.79 0.70 - 3.10 10*3/mm3    Monocytes, Absolute 0.43 0.10 - 0.90 10*3/mm3    Eosinophils, Absolute 0.05 0.00 - 0.40 10*3/mm3    Basophils, Absolute 0.03 0.00 - 0.20 10*3/mm3    Immature Grans, Absolute 0.05 0.00 - 0.05 10*3/mm3    nRBC 0.0 0.0 - 0.2 /100 WBC   CBC Auto Differential    Specimen: Arm, Left; Blood   Result Value Ref Range    WBC 5.77 3.40 - 10.80 10*3/mm3    RBC 3.35 (L) 4.14 - 5.80 10*6/mm3    Hemoglobin 7.7 (L) 13.0 - 17.7 g/dL    Hematocrit 26.1 (L) 37.5 - 51.0 %    MCV 77.9 (L) 79.0 - 97.0  fL    MCH 23.0 (L) 26.6 - 33.0 pg    MCHC 29.5 (L) 31.5 - 35.7 g/dL    RDW 18.9 (H) 12.3 - 15.4 %    RDW-SD 52.6 37.0 - 54.0 fl    MPV 10.3 6.0 - 12.0 fL    Platelets 120 (L) 140 - 450 10*3/mm3    Neutrophil % 71.7 42.7 - 76.0 %    Lymphocyte % 16.1 (L) 19.6 - 45.3 %    Monocyte % 9.4 5.0 - 12.0 %    Eosinophil % 1.2 0.3 - 6.2 %    Basophil % 0.7 0.0 - 1.5 %    Immature Grans % 0.9 (H) 0.0 - 0.5 %    Neutrophils, Absolute 4.14 1.70 - 7.00 10*3/mm3    Lymphocytes, Absolute 0.93 0.70 - 3.10 10*3/mm3    Monocytes, Absolute 0.54 0.10 - 0.90 10*3/mm3    Eosinophils, Absolute 0.07 0.00 - 0.40 10*3/mm3    Basophils, Absolute 0.04 0.00 - 0.20 10*3/mm3    Immature Grans, Absolute 0.05 0.00 - 0.05 10*3/mm3    nRBC 0.0 0.0 - 0.2 /100 WBC   ECG 12 Lead Other; hx afib   Result Value Ref Range    QT Interval 296 ms    QTC Interval 432 ms   Type & Screen    Specimen: Arm, Left; Blood   Result Value Ref Range    ABO Type O     RH type Positive     Antibody Screen Negative     T&S Expiration Date 12/24/2022 11:59:59 PM    ABO RH Specimen Verification    Specimen: Blood   Result Value Ref Range    ABO Type O     RH type Positive    Green Top (Gel)   Result Value Ref Range    Extra Tube Hold for add-ons.    Lavender Top   Result Value Ref Range    Extra Tube hold for add-on    Gold Top - SST   Result Value Ref Range    Extra Tube Hold for add-ons.    Light Blue Top   Result Value Ref Range    Extra Tube Hold for add-ons.                 ED Course  ED Course as of 01/04/23 1820   Tue Dec 20, 2022   0605 Case discussed with cardiovascular surgery at [facility], Dr. Lopez.  He advises to stop the patient's Xarelto and place him on IV heparin per protocol.  He advises they will accept him at [facility], he will be admitted under the hospitalist service as soon as there is an available bed.  Currently there is a waiting list for a bed at [facility], hopefully a bed will be available soon [CM]   6600 Patient and I have discussed  all of his test results, including the suspicion of metastatic cancer.  He agrees with transfer to [facility]. [CM]   0625 Continued atrial fibrillation on the monitor, for the most part his rate has been in the range of 110-120, at times I have noted it as high as 140.  Currently his rate is 116, blood pressure is 156/87.  We will start IV Cardizem as well. [LS]   0643 Case discussed with hospitalist at [facility], Dr. Jackson.  She accepts patient to the hospitalist service, as soon as a bed is available. [CM]   0659 Case discussed with and care endorsed to Dr. Lugo at shift change. [CM]   Wed Dec 21, 2022   0705 Patient's overnight has been uneventful.  He has done well and has shown no signs of distress.  Case discussed with and care endorsed to Dr. Hager at shift change.  Electronically signed by Abhi Rios MD, 12/21/22, 7:05 AM EST.   [CM]      ED Course User Index  [CM] Abhi Rios MD  [LS] Sherry Lugo MD   Electronically signed by Abhi Rios MD, 12/20/22, 6:59 AM EST.                                          MDM    Final diagnoses:   Atrial fibrillation with RVR (HCC)       ED Disposition  ED Disposition     ED Disposition   Transfer to Another Facility     Condition   --    Comment   --                      Mychal Tovar MD  01/04/23 6446

## 2022-12-20 NOTE — ED NOTES
Spoke to lelia at  [facility]   they still have no beds at this time and will call to update us once they do

## 2022-12-21 VITALS
SYSTOLIC BLOOD PRESSURE: 117 MMHG | HEIGHT: 70 IN | RESPIRATION RATE: 16 BRPM | WEIGHT: 272 LBS | DIASTOLIC BLOOD PRESSURE: 72 MMHG | TEMPERATURE: 98.2 F | OXYGEN SATURATION: 96 % | HEART RATE: 103 BPM | BODY MASS INDEX: 38.94 KG/M2

## 2022-12-21 LAB
ABO GROUP BLD: NORMAL
ABO GROUP BLD: NORMAL
APTT PPP: 40.7 SECONDS (ref 26.5–34.5)
APTT PPP: 57.7 SECONDS (ref 26.5–34.5)
BACTERIA BLD CULT: NORMAL
BASOPHILS # BLD AUTO: 0.03 10*3/MM3 (ref 0–0.2)
BASOPHILS # BLD AUTO: 0.04 10*3/MM3 (ref 0–0.2)
BASOPHILS # BLD AUTO: 0.04 10*3/MM3 (ref 0–0.2)
BASOPHILS NFR BLD AUTO: 0.6 % (ref 0–1.5)
BASOPHILS NFR BLD AUTO: 0.7 % (ref 0–1.5)
BASOPHILS NFR BLD AUTO: 0.7 % (ref 0–1.5)
BLD GP AB SCN SERPL QL: NEGATIVE
DEPRECATED RDW RBC AUTO: 51.5 FL (ref 37–54)
DEPRECATED RDW RBC AUTO: 52 FL (ref 37–54)
DEPRECATED RDW RBC AUTO: 52.6 FL (ref 37–54)
EOSINOPHIL # BLD AUTO: 0.05 10*3/MM3 (ref 0–0.4)
EOSINOPHIL # BLD AUTO: 0.07 10*3/MM3 (ref 0–0.4)
EOSINOPHIL # BLD AUTO: 0.08 10*3/MM3 (ref 0–0.4)
EOSINOPHIL NFR BLD AUTO: 0.9 % (ref 0.3–6.2)
EOSINOPHIL NFR BLD AUTO: 1.2 % (ref 0.3–6.2)
EOSINOPHIL NFR BLD AUTO: 1.5 % (ref 0.3–6.2)
ERYTHROCYTE [DISTWIDTH] IN BLOOD BY AUTOMATED COUNT: 18.6 % (ref 12.3–15.4)
ERYTHROCYTE [DISTWIDTH] IN BLOOD BY AUTOMATED COUNT: 18.8 % (ref 12.3–15.4)
ERYTHROCYTE [DISTWIDTH] IN BLOOD BY AUTOMATED COUNT: 18.9 % (ref 12.3–15.4)
HCT VFR BLD AUTO: 25.1 % (ref 37.5–51)
HCT VFR BLD AUTO: 25.1 % (ref 37.5–51)
HCT VFR BLD AUTO: 26.1 % (ref 37.5–51)
HGB BLD-MCNC: 7.5 G/DL (ref 13–17.7)
HGB BLD-MCNC: 7.6 G/DL (ref 13–17.7)
HGB BLD-MCNC: 7.7 G/DL (ref 13–17.7)
IMM GRANULOCYTES # BLD AUTO: 0.05 10*3/MM3 (ref 0–0.05)
IMM GRANULOCYTES # BLD AUTO: 0.05 10*3/MM3 (ref 0–0.05)
IMM GRANULOCYTES # BLD AUTO: 0.07 10*3/MM3 (ref 0–0.05)
IMM GRANULOCYTES NFR BLD AUTO: 0.9 % (ref 0–0.5)
IMM GRANULOCYTES NFR BLD AUTO: 0.9 % (ref 0–0.5)
IMM GRANULOCYTES NFR BLD AUTO: 1.3 % (ref 0–0.5)
LYMPHOCYTES # BLD AUTO: 0.79 10*3/MM3 (ref 0.7–3.1)
LYMPHOCYTES # BLD AUTO: 0.93 10*3/MM3 (ref 0.7–3.1)
LYMPHOCYTES # BLD AUTO: 1.04 10*3/MM3 (ref 0.7–3.1)
LYMPHOCYTES NFR BLD AUTO: 14.9 % (ref 19.6–45.3)
LYMPHOCYTES NFR BLD AUTO: 16.1 % (ref 19.6–45.3)
LYMPHOCYTES NFR BLD AUTO: 19.2 % (ref 19.6–45.3)
MCH RBC QN AUTO: 23 PG (ref 26.6–33)
MCH RBC QN AUTO: 23.1 PG (ref 26.6–33)
MCH RBC QN AUTO: 23.4 PG (ref 26.6–33)
MCHC RBC AUTO-ENTMCNC: 29.5 G/DL (ref 31.5–35.7)
MCHC RBC AUTO-ENTMCNC: 29.9 G/DL (ref 31.5–35.7)
MCHC RBC AUTO-ENTMCNC: 30.3 G/DL (ref 31.5–35.7)
MCV RBC AUTO: 77.2 FL (ref 79–97)
MCV RBC AUTO: 77.2 FL (ref 79–97)
MCV RBC AUTO: 77.9 FL (ref 79–97)
MONOCYTES # BLD AUTO: 0.43 10*3/MM3 (ref 0.1–0.9)
MONOCYTES # BLD AUTO: 0.54 10*3/MM3 (ref 0.1–0.9)
MONOCYTES # BLD AUTO: 0.54 10*3/MM3 (ref 0.1–0.9)
MONOCYTES NFR BLD AUTO: 10 % (ref 5–12)
MONOCYTES NFR BLD AUTO: 8.1 % (ref 5–12)
MONOCYTES NFR BLD AUTO: 9.4 % (ref 5–12)
NEUTROPHILS NFR BLD AUTO: 3.65 10*3/MM3 (ref 1.7–7)
NEUTROPHILS NFR BLD AUTO: 3.96 10*3/MM3 (ref 1.7–7)
NEUTROPHILS NFR BLD AUTO: 4.14 10*3/MM3 (ref 1.7–7)
NEUTROPHILS NFR BLD AUTO: 67.3 % (ref 42.7–76)
NEUTROPHILS NFR BLD AUTO: 71.7 % (ref 42.7–76)
NEUTROPHILS NFR BLD AUTO: 74.6 % (ref 42.7–76)
NRBC BLD AUTO-RTO: 0 /100 WBC (ref 0–0.2)
PLATELET # BLD AUTO: 120 10*3/MM3 (ref 140–450)
PLATELET # BLD AUTO: 128 10*3/MM3 (ref 140–450)
PLATELET # BLD AUTO: 129 10*3/MM3 (ref 140–450)
PMV BLD AUTO: 10.3 FL (ref 6–12)
PMV BLD AUTO: 10.4 FL (ref 6–12)
PMV BLD AUTO: 10.7 FL (ref 6–12)
RBC # BLD AUTO: 3.25 10*6/MM3 (ref 4.14–5.8)
RBC # BLD AUTO: 3.25 10*6/MM3 (ref 4.14–5.8)
RBC # BLD AUTO: 3.35 10*6/MM3 (ref 4.14–5.8)
RH BLD: POSITIVE
RH BLD: POSITIVE
T&S EXPIRATION DATE: NORMAL
WBC NRBC COR # BLD: 5.31 10*3/MM3 (ref 3.4–10.8)
WBC NRBC COR # BLD: 5.42 10*3/MM3 (ref 3.4–10.8)
WBC NRBC COR # BLD: 5.77 10*3/MM3 (ref 3.4–10.8)

## 2022-12-21 PROCEDURE — 86901 BLOOD TYPING SEROLOGIC RH(D): CPT | Performed by: EMERGENCY MEDICINE

## 2022-12-21 PROCEDURE — 85730 THROMBOPLASTIN TIME PARTIAL: CPT | Performed by: EMERGENCY MEDICINE

## 2022-12-21 PROCEDURE — 25010000002 PIPERACILLIN SOD-TAZOBACTAM PER 1 G: Performed by: EMERGENCY MEDICINE

## 2022-12-21 PROCEDURE — 86901 BLOOD TYPING SEROLOGIC RH(D): CPT

## 2022-12-21 PROCEDURE — 85025 COMPLETE CBC W/AUTO DIFF WBC: CPT | Performed by: EMERGENCY MEDICINE

## 2022-12-21 PROCEDURE — 96366 THER/PROPH/DIAG IV INF ADDON: CPT

## 2022-12-21 PROCEDURE — 25010000002 HEPARIN (PORCINE) PER 1000 UNITS: Performed by: EMERGENCY MEDICINE

## 2022-12-21 PROCEDURE — 25010000002 VANCOMYCIN 5 G RECONSTITUTED SOLUTION: Performed by: EMERGENCY MEDICINE

## 2022-12-21 PROCEDURE — 86900 BLOOD TYPING SEROLOGIC ABO: CPT | Performed by: EMERGENCY MEDICINE

## 2022-12-21 PROCEDURE — 25010000002 HEPARIN (PORCINE) 25000-0.45 UT/250ML-% SOLUTION: Performed by: EMERGENCY MEDICINE

## 2022-12-21 PROCEDURE — 86900 BLOOD TYPING SEROLOGIC ABO: CPT

## 2022-12-21 PROCEDURE — 86850 RBC ANTIBODY SCREEN: CPT | Performed by: EMERGENCY MEDICINE

## 2022-12-21 RX ORDER — CARVEDILOL 6.25 MG/1
6.25 TABLET ORAL ONCE
Status: COMPLETED | OUTPATIENT
Start: 2022-12-21 | End: 2022-12-21

## 2022-12-21 RX ORDER — CARVEDILOL 6.25 MG/1
6.25 TABLET ORAL 2 TIMES DAILY WITH MEALS
Status: CANCELLED | OUTPATIENT
Start: 2022-12-21

## 2022-12-21 RX ORDER — POLYETHYLENE GLYCOL 3350 17 G/17G
17 POWDER, FOR SOLUTION ORAL DAILY PRN
COMMUNITY

## 2022-12-21 RX ORDER — TAMSULOSIN HYDROCHLORIDE 0.4 MG/1
0.4 CAPSULE ORAL DAILY
Status: CANCELLED | OUTPATIENT
Start: 2022-12-21

## 2022-12-21 RX ORDER — CARVEDILOL 6.25 MG/1
6.25 TABLET ORAL EVERY 12 HOURS SCHEDULED
Status: DISCONTINUED | OUTPATIENT
Start: 2022-12-21 | End: 2022-12-21 | Stop reason: HOSPADM

## 2022-12-21 RX ORDER — POLYETHYLENE GLYCOL 3350 17 G/17G
17 POWDER, FOR SOLUTION ORAL DAILY PRN
Status: CANCELLED | OUTPATIENT
Start: 2022-12-21

## 2022-12-21 RX ADMIN — PIPERACILLIN SODIUM AND TAZOBACTAM SODIUM 4.5 G: 4; .5 INJECTION, POWDER, LYOPHILIZED, FOR SOLUTION INTRAVENOUS at 03:10

## 2022-12-21 RX ADMIN — HEPARIN SODIUM 8.13 UNITS/KG/HR: 10000 INJECTION, SOLUTION INTRAVENOUS at 14:33

## 2022-12-21 RX ADMIN — VANCOMYCIN HYDROCHLORIDE 1750 MG: 5 INJECTION, POWDER, LYOPHILIZED, FOR SOLUTION INTRAVENOUS at 03:00

## 2022-12-21 RX ADMIN — PIPERACILLIN SODIUM AND TAZOBACTAM SODIUM 4.5 G: 4; .5 INJECTION, POWDER, LYOPHILIZED, FOR SOLUTION INTRAVENOUS at 09:21

## 2022-12-21 RX ADMIN — HEPARIN SODIUM 2500 UNITS: 5000 INJECTION INTRAVENOUS; SUBCUTANEOUS at 00:15

## 2022-12-21 RX ADMIN — CARVEDILOL 6.25 MG: 6.25 TABLET, FILM COATED ORAL at 02:44

## 2022-12-21 RX ADMIN — CARVEDILOL 6.25 MG: 6.25 TABLET, FILM COATED ORAL at 09:22

## 2022-12-21 NOTE — ED NOTES
Zandra from [facility] called and stated patient still does not have a bed at this time, primary RN notified.

## 2022-12-21 NOTE — PROGRESS NOTES
Pharmacy was consulted for vancomycin dosing for bone/joint infection.  Based on pt parameters will initiate vancomycin at 1500mg iv q18hrs after the loading dose to target trough levels of 15-20 mg/L and AUC of 500-600.  Pharmacy will continue to follow and obtain trough level once steady state is achieved.  Thank you.

## 2022-12-21 NOTE — PROGRESS NOTES
Pharmacy to dose Vancomycin and Zosyn for SSTI / Bone infection: CrCl = 68.5  Wt = 123kg.  Dosed as follows:    Zosyn 4.5gm iv ext infusion q8h.  Vancomycin 2500mg ix 1 on 12/20.  Vancomycin 1750mg ix 1 on 12/21 then 1500mg iv q24h.    Pharmacy will monitor and adjust dosing as appropriate.     Vladimir Anthony RPH  05:15 EST  12/21/22

## 2022-12-21 NOTE — ED NOTES
Discussed with Dr. Tovar about pts hemoglobin dropping from previous CBC. Dr. Tovar verbalized to put heparin drip on hold at this time.

## 2022-12-21 NOTE — ED NOTES
[facility] declinded due to only having 2 trucks. [facility] is going to have their oic call me back

## 2022-12-22 LAB
BACTERIA SPEC AEROBE CULT: ABNORMAL
QT INTERVAL: 296 MS
QTC INTERVAL: 432 MS

## 2022-12-23 LAB
BACTERIA SPEC AEROBE CULT: ABNORMAL
BACTERIA SPEC AEROBE CULT: ABNORMAL
GRAM STN SPEC: ABNORMAL
GRAM STN SPEC: ABNORMAL
ISOLATED FROM: ABNORMAL
ISOLATED FROM: ABNORMAL

## 2022-12-27 NOTE — TELEPHONE ENCOUNTER
Caller: MELITON SCHWARZ    Relationship to patient: DAUGHTER    Best call back number: [phone]    Patient is needing: PT WAS TAKEN BY AMBULANCE TO [facility] ON 12/20/22, THEN WAS TRANSFERRED TO [facility] ON 12/21/22. ON 12/22/22 THEY CUT TOES OFF RIGHT FOOT, PLACED STENTS FOR CLOGGED ARTERIES ON 12/27/22, AND WILL FINISH CLOSING UP HIS FOOT ON 12/28-12/29/22. PT ALSO HAS ABDOMINAL AORTIC ANEURYSM AND WILL HAVE SURGERY IN A FEW MONTHS AFTER HAVING PT FOR HIS FOOT. SHE SAYS THEY HAVE ALSO BEEN TREATING HIM FOR A UTI.    PROBABLE PROSTATE CANCER, LESIONS SEEN ON IMAGING AT  12/20/22. SHE IS TRYING TO FIND OUT IF THIS IS NEW OR IF HER FATHER WAS HIDING THIS FROM HER.     SHE IS NOT ON  VERBAL.

## 2022-12-27 NOTE — TELEPHONE ENCOUNTER
Caller: RICKY SCHWARZ    Relationship: Emergency Contact    Best call back number: [phone]    What is the best time to reach you: ANYTIME    Who are you requesting to speak with (clinical staff, provider,  specific staff member): CLINICAL STAFF    Do you know the name of the person who called: RICKY SCHWARZ    What was the call regarding: PT'S DAUGHTER CALLED IN TO NOTIFY THE PRACTICE THAT THE PT HAS BEEN AT [facility] SINCE 12.21.22. HE IS IN SURGERY ON 12.27.22.     THE PATIENT HAD TO HAVE ALL OF HIS TOES CUT OFF ON HIS RIGHT FOOT ON 12.22.22.     THE PT GET FINANCIAL ASSISTANCE FOR MEDICATION. THE PAPERWORK IS COMPLETED AT HIS HOUSE. HE IS WANTING TO MAKE SURE THAT HE CAN BRING THIS INTO THE OFFICE ONCE HE GETS OUT OF THE HOSPITAL.     SHE STATES THAT THE PT MAY ALSO HAVE PROSTATE CANCER.     Do you require a callback: IF NECESSARY

## 2022-12-28 DIAGNOSIS — I42.8 NONISCHEMIC CARDIOMYOPATHY: ICD-10-CM

## 2022-12-28 RX ORDER — SACUBITRIL AND VALSARTAN 24; 26 MG/1; MG/1
1 TABLET, FILM COATED ORAL 2 TIMES DAILY
Qty: 180 TABLET | Refills: 3 | Status: SHIPPED | OUTPATIENT
Start: 2022-12-28 | End: 2023-01-24 | Stop reason: SDUPTHER

## 2023-01-01 ENCOUNTER — TELEPHONE (OUTPATIENT)
Dept: UROLOGY | Facility: CLINIC | Age: 78
End: 2023-01-01

## 2023-01-01 ENCOUNTER — LAB REQUISITION (OUTPATIENT)
Dept: LAB | Facility: HOSPITAL | Age: 78
End: 2023-01-01
Payer: MEDICARE

## 2023-01-01 ENCOUNTER — TELEPHONE (OUTPATIENT)
Dept: CARDIOLOGY | Facility: CLINIC | Age: 78
End: 2023-01-01

## 2023-01-01 DIAGNOSIS — Z20.828 CONTACT WITH AND (SUSPECTED) EXPOSURE TO OTHER VIRAL COMMUNICABLE DISEASES: ICD-10-CM

## 2023-01-01 LAB — SARS-COV-2 RNA RESP QL NAA+PROBE: DETECTED

## 2023-01-01 PROCEDURE — U0003 INFECTIOUS AGENT DETECTION BY NUCLEIC ACID (DNA OR RNA); SEVERE ACUTE RESPIRATORY SYNDROME CORONAVIRUS 2 (SARS-COV-2) (CORONAVIRUS DISEASE [COVID-19]), AMPLIFIED PROBE TECHNIQUE, MAKING USE OF HIGH THROUGHPUT TECHNOLOGIES AS DESCRIBED BY CMS-2020-01-R: HCPCS | Performed by: INTERNAL MEDICINE

## 2023-01-01 PROCEDURE — U0005 INFEC AGEN DETEC AMPLI PROBE: HCPCS | Performed by: INTERNAL MEDICINE

## 2023-01-04 ENCOUNTER — LAB REQUISITION (OUTPATIENT)
Dept: LAB | Facility: HOSPITAL | Age: 78
End: 2023-01-04
Payer: MEDICARE

## 2023-01-04 DIAGNOSIS — I10 ESSENTIAL (PRIMARY) HYPERTENSION: ICD-10-CM

## 2023-01-04 LAB
ALBUMIN SERPL-MCNC: 2.7 G/DL (ref 3.5–5.2)
ALBUMIN/GLOB SERPL: 0.8 G/DL
ALP SERPL-CCNC: 950 U/L (ref 39–117)
ALT SERPL W P-5'-P-CCNC: 21 U/L (ref 1–41)
ANION GAP SERPL CALCULATED.3IONS-SCNC: 10.8 MMOL/L (ref 5–15)
ANISOCYTOSIS BLD QL: NORMAL
AST SERPL-CCNC: 27 U/L (ref 1–40)
BASOPHILS # BLD AUTO: 0.04 10*3/MM3 (ref 0–0.2)
BASOPHILS NFR BLD AUTO: 0.8 % (ref 0–1.5)
BILIRUB SERPL-MCNC: 0.3 MG/DL (ref 0–1.2)
BUN SERPL-MCNC: 15 MG/DL (ref 8–23)
BUN/CREAT SERPL: 14.4 (ref 7–25)
CALCIUM SPEC-SCNC: 8.2 MG/DL (ref 8.6–10.5)
CHLORIDE SERPL-SCNC: 105 MMOL/L (ref 98–107)
CLUMPED PLATELETS: PRESENT
CO2 SERPL-SCNC: 22.2 MMOL/L (ref 22–29)
CREAT SERPL-MCNC: 1.04 MG/DL (ref 0.76–1.27)
DACRYOCYTES BLD QL SMEAR: NORMAL
DEPRECATED RDW RBC AUTO: 59.5 FL (ref 37–54)
EGFRCR SERPLBLD CKD-EPI 2021: 73 ML/MIN/1.73
EOSINOPHIL # BLD AUTO: 0.2 10*3/MM3 (ref 0–0.4)
EOSINOPHIL NFR BLD AUTO: 4 % (ref 0.3–6.2)
ERYTHROCYTE [DISTWIDTH] IN BLOOD BY AUTOMATED COUNT: 19.7 % (ref 12.3–15.4)
GLOBULIN UR ELPH-MCNC: 3.3 GM/DL
GLUCOSE SERPL-MCNC: 112 MG/DL (ref 65–99)
HBA1C MFR BLD: 5.8 % (ref 4.8–5.6)
HCT VFR BLD AUTO: 29.6 % (ref 37.5–51)
HGB BLD-MCNC: 9 G/DL (ref 13–17.7)
HYPOCHROMIA BLD QL: NORMAL
IMM GRANULOCYTES # BLD AUTO: 0.05 10*3/MM3 (ref 0–0.05)
IMM GRANULOCYTES NFR BLD AUTO: 1 % (ref 0–0.5)
LYMPHOCYTES # BLD AUTO: 1.04 10*3/MM3 (ref 0.7–3.1)
LYMPHOCYTES NFR BLD AUTO: 21.1 % (ref 19.6–45.3)
MCH RBC QN AUTO: 25.4 PG (ref 26.6–33)
MCHC RBC AUTO-ENTMCNC: 30.4 G/DL (ref 31.5–35.7)
MCV RBC AUTO: 83.6 FL (ref 79–97)
MONOCYTES # BLD AUTO: 0.35 10*3/MM3 (ref 0.1–0.9)
MONOCYTES NFR BLD AUTO: 7.1 % (ref 5–12)
NEUTROPHILS NFR BLD AUTO: 3.26 10*3/MM3 (ref 1.7–7)
NEUTROPHILS NFR BLD AUTO: 66 % (ref 42.7–76)
NRBC BLD AUTO-RTO: 0 /100 WBC (ref 0–0.2)
PLATELET # BLD AUTO: 160 10*3/MM3 (ref 140–450)
PMV BLD AUTO: 11.1 FL (ref 6–12)
POTASSIUM SERPL-SCNC: 4.4 MMOL/L (ref 3.5–5.2)
PROT SERPL-MCNC: 6 G/DL (ref 6–8.5)
RBC # BLD AUTO: 3.54 10*6/MM3 (ref 4.14–5.8)
SODIUM SERPL-SCNC: 138 MMOL/L (ref 136–145)
WBC NRBC COR # BLD: 4.94 10*3/MM3 (ref 3.4–10.8)

## 2023-01-04 PROCEDURE — 85025 COMPLETE CBC W/AUTO DIFF WBC: CPT | Performed by: INTERNAL MEDICINE

## 2023-01-04 PROCEDURE — 85007 BL SMEAR W/DIFF WBC COUNT: CPT | Performed by: INTERNAL MEDICINE

## 2023-01-04 PROCEDURE — 83036 HEMOGLOBIN GLYCOSYLATED A1C: CPT | Performed by: INTERNAL MEDICINE

## 2023-01-04 PROCEDURE — 80053 COMPREHEN METABOLIC PANEL: CPT | Performed by: INTERNAL MEDICINE

## 2023-01-13 ENCOUNTER — LAB REQUISITION (OUTPATIENT)
Dept: LAB | Facility: HOSPITAL | Age: 78
End: 2023-01-13
Payer: MEDICARE

## 2023-01-13 DIAGNOSIS — R30.0 DYSURIA: ICD-10-CM

## 2023-01-13 LAB
BACTERIA UR QL AUTO: ABNORMAL /HPF
BILIRUB UR QL STRIP: NEGATIVE
CLARITY UR: CLEAR
COLOR UR: YELLOW
GLUCOSE UR STRIP-MCNC: NEGATIVE MG/DL
HGB UR QL STRIP.AUTO: ABNORMAL
HYALINE CASTS UR QL AUTO: ABNORMAL /LPF
KETONES UR QL STRIP: NEGATIVE
LEUKOCYTE ESTERASE UR QL STRIP.AUTO: ABNORMAL
NITRITE UR QL STRIP: POSITIVE
PH UR STRIP.AUTO: 6.5 [PH] (ref 5–8)
PROT UR QL STRIP: NEGATIVE
RBC # UR STRIP: ABNORMAL /HPF
REF LAB TEST METHOD: ABNORMAL
SP GR UR STRIP: 1.01 (ref 1–1.03)
SQUAMOUS #/AREA URNS HPF: ABNORMAL /HPF
UROBILINOGEN UR QL STRIP: ABNORMAL
WBC # UR STRIP: ABNORMAL /HPF

## 2023-01-13 PROCEDURE — 81001 URINALYSIS AUTO W/SCOPE: CPT | Performed by: INTERNAL MEDICINE

## 2023-01-13 PROCEDURE — 87086 URINE CULTURE/COLONY COUNT: CPT | Performed by: INTERNAL MEDICINE

## 2023-01-15 LAB — BACTERIA SPEC AEROBE CULT: NORMAL

## 2023-01-16 NOTE — TELEPHONE ENCOUNTER
I called and spoke to pt about signing form for pt assistance for xarelto and he said he will come into office and sign this week    Detail Level: Simple Price (Do Not Change): 0.00 Instructions: This plan will send the code [email or web address] to the PM system.  DO NOT or CHANGE the price.

## 2023-01-18 ENCOUNTER — OFFICE VISIT (OUTPATIENT)
Dept: UROLOGY | Facility: CLINIC | Age: 78
End: 2023-01-18
Payer: MEDICARE

## 2023-01-18 VITALS — BODY MASS INDEX: 38.94 KG/M2 | WEIGHT: 272 LBS | HEIGHT: 70 IN

## 2023-01-18 DIAGNOSIS — Z46.6 ENCOUNTER FOR FOLEY CATHETER REPLACEMENT: Primary | ICD-10-CM

## 2023-01-18 DIAGNOSIS — B37.49 YEAST DERMATITIS OF PENIS: ICD-10-CM

## 2023-01-18 DIAGNOSIS — N48.0 BALANITIS XEROTICA OBLITERANS: ICD-10-CM

## 2023-01-18 DIAGNOSIS — Z91.89 AT RISK FOR INFECTION ASSOCIATED WITH FOLEY CATHETER: ICD-10-CM

## 2023-01-18 DIAGNOSIS — N47.1 PHIMOSIS: ICD-10-CM

## 2023-01-18 PROCEDURE — 96372 THER/PROPH/DIAG INJ SC/IM: CPT | Performed by: NURSE PRACTITIONER

## 2023-01-18 PROCEDURE — 51701 INSERT BLADDER CATHETER: CPT | Performed by: NURSE PRACTITIONER

## 2023-01-18 PROCEDURE — 99214 OFFICE O/P EST MOD 30 MIN: CPT | Performed by: NURSE PRACTITIONER

## 2023-01-18 RX ORDER — FLUCONAZOLE 100 MG/1
100 TABLET ORAL DAILY
Qty: 3 TABLET | Refills: 0 | Status: SHIPPED | OUTPATIENT
Start: 2023-01-18 | End: 2023-01-21

## 2023-01-18 RX ORDER — CEFTRIAXONE 1 G/1
1 INJECTION, POWDER, FOR SOLUTION INTRAMUSCULAR; INTRAVENOUS ONCE
Status: COMPLETED | OUTPATIENT
Start: 2023-01-18 | End: 2023-01-18

## 2023-01-18 RX ORDER — CLOTRIMAZOLE AND BETAMETHASONE DIPROPIONATE 10; .64 MG/G; MG/G
1 CREAM TOPICAL 2 TIMES DAILY
Qty: 45 G | Refills: 2 | Status: SHIPPED | OUTPATIENT
Start: 2023-01-18

## 2023-01-18 RX ORDER — DOXYCYCLINE HYCLATE 100 MG/1
100 CAPSULE ORAL 2 TIMES DAILY
Qty: 28 CAPSULE | Refills: 0 | Status: SHIPPED | OUTPATIENT
Start: 2023-01-18 | End: 2023-02-01

## 2023-01-18 RX ADMIN — CEFTRIAXONE 1 G: 1 INJECTION, POWDER, FOR SOLUTION INTRAMUSCULAR; INTRAVENOUS at 09:47

## 2023-01-18 NOTE — PROGRESS NOTES
"Chief Complaint  NEUROGENIC BLADDER/Encounter for Stroud catheter replacement (MONTHLY STROUD CATH CHANGE #2OFR)    Subjective          Larry Kehr presents to [facility] for STROUD CATH CHANGE #20  History of Present Illness  MR. LARRY KEHR IS a Pleasant 77-year-old male with significant history of BPH with urinary retention/incomplete bladder emptying with prior severe bilateral hydronephrosis, WHO returns to clinic today for evaluation.      He is here today for his monthly Stroud catheter change.  He is in NO apparent discomfort today and still reports a remarkable improvement in his bladder discomfort today; denies any issues with bladder spasms  He also reports improvement in his p.o. fluid intake, and denies any discomfort from his prior catheter.        He tolerated Stroud catheter change #20 Coudé tip catheter with significant discomfort.  Nevertheless, he has adequate urine output noted in drainage bag. Patient has developed severe balanitis, with phimosis.  He has severe penile yeast dermatitis, with an erosion on his glans penis consistent with a catheter related pressure ulcer.     Also significant improvement noted to perineal yeast dermatitis around groin folds. HE is utilizing nystatin cream as ordered. Discussed using the powder instead.Also encouraged adequate bowel movements due to severe constipation issues.    MR. KEHR is a current resident of the rehab facility.  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     The rest of his PMHx as noted in chart    Objective   Vital Signs:   Ht 177.8 cm (70\")   Wt 123 kg (272 lb)   BMI 39.03 kg/m²       ROS:   Review of Systems   Constitutional: Positive for activity change, fatigue and unexpected weight gain. Negative for appetite change, chills, diaphoresis, fever and unexpected weight loss.   HENT: Negative for congestion, ear discharge, ear pain, nosebleeds, rhinorrhea, " sinus pressure and sore throat.    Eyes: Negative for blurred vision, double vision, photophobia, pain, redness and visual disturbance.   Respiratory: Negative for apnea, cough, chest tightness, shortness of breath, wheezing and stridor.    Cardiovascular: Negative for chest pain and palpitations.   Gastrointestinal: Positive for abdominal distention and abdominal pain. Negative for constipation, diarrhea, nausea and vomiting.   Endocrine: Negative for polydipsia, polyphagia and polyuria.   Genitourinary: Positive for difficulty urinating, discharge, dysuria, flank pain, genital sores, penile pain and scrotal swelling. Negative for decreased urine volume, frequency, hematuria, penile swelling, testicular pain, urgency and urinary incontinence.   Musculoskeletal: Positive for gait problem. Negative for arthralgias, back pain and joint swelling.   Skin: Positive for color change and pallor. Negative for rash and wound.   Neurological: Positive for weakness. Negative for dizziness, tremors, syncope, light-headedness, memory problem and confusion.   Psychiatric/Behavioral: Positive for sleep disturbance and stress. Negative for agitation, behavioral problems and decreased concentration.        Physical Exam  Constitutional:       General: He is in acute distress.      Appearance: He is well-developed. He is obese. He is ill-appearing.   HENT:      Head: Normocephalic and atraumatic.      Right Ear: External ear normal.      Left Ear: External ear normal.   Eyes:      General:         Right eye: No discharge.         Left eye: No discharge.      Conjunctiva/sclera: Conjunctivae normal.      Pupils: Pupils are equal, round, and reactive to light.   Neck:      Thyroid: No thyromegaly.      Trachea: No tracheal deviation.   Cardiovascular:      Rate and Rhythm: Normal rate and regular rhythm.      Heart sounds: No murmur heard.    No friction rub.   Pulmonary:      Effort: Pulmonary effort is normal. No respiratory  distress.      Breath sounds: Normal breath sounds. No stridor.   Abdominal:      General: Bowel sounds are normal. There is distension.      Palpations: Abdomen is soft.      Tenderness: There is abdominal tenderness. There is no guarding.   Genitourinary:     Penis: Normal and uncircumcised. No tenderness or discharge.       Testes: Normal.      Rectum: Normal. Guaiac result negative.      Comments: Indwelling Malhotra catheter, penile yeast dermatitis, balanitis/phimosis/  Musculoskeletal:         General: Tenderness present. No deformity. Normal range of motion.      Cervical back: Normal range of motion and neck supple.   Skin:     General: Skin is warm and dry.      Capillary Refill: Capillary refill takes less than 2 seconds.      Coloration: Skin is pale.   Neurological:      Mental Status: He is alert and oriented to person, place, and time.      Cranial Nerves: No cranial nerve deficit.      Motor: Weakness present.      Coordination: Coordination abnormal.      Gait: Gait abnormal.   Psychiatric:         Behavior: Behavior normal.         Thought Content: Thought content normal.         Judgment: Judgment normal.        Result Review :     UA    Urinalysis 12/20/22 12/20/22 1/13/23 1/13/23    [id number] 1107   Squamous Epithelial Cells, UA  0-2  0-2   Specific Gravity, UA 1.012  1.007    Ketones, UA Negative  Negative    Blood, UA Moderate (2+) (A)  Trace (A)    Leukocytes, UA Large (3+) (A)  Small (1+) (A)    Nitrite, UA Positive (A)  Positive (A)    RBC, UA  6-12 (A)  0-2   WBC, UA  21-30 (A)  13-20 (A)   Bacteria, UA  3+ (A)  4+ (A)   (A) Abnormal value            Urine Culture    Urine Culture 2/8/22 12/20/22 1/13/23   Urine Culture >100,000 CFU/mL Enterococcus faecalis (A) >100,000 CFU/mL Escherichia coli (A) >100,000 CFU/mL Mixed Kari Isolated   (A) Abnormal value                              Assessment and Plan    Problem List Items Addressed This Visit        Genitourinary and Reproductive      Encounter for Stroud catheter replacement - Primary    Balanitis xerotica obliterans    Relevant Medications    fluconazole (Diflucan) 100 MG tablet    clotrimazole-betamethasone (LOTRISONE) 1-0.05 % cream    Phimosis    Relevant Medications    fluconazole (Diflucan) 100 MG tablet    clotrimazole-betamethasone (LOTRISONE) 1-0.05 % cream   Other Visit Diagnoses     Yeast dermatitis of penis        Relevant Medications    fluconazole (Diflucan) 100 MG tablet    clotrimazole-betamethasone (LOTRISONE) 1-0.05 % cream    cefTRIAXone (ROCEPHIN) injection 1 g (Completed)    At risk for infection associated with Stroud catheter        Relevant Medications    fluconazole (Diflucan) 100 MG tablet    clotrimazole-betamethasone (LOTRISONE) 1-0.05 % cream    doxycycline (VIBRAMYCIN) 100 MG capsule                                             ASSESSMENT    BPH WITH URINARY RETENTION/ INDWELLING STROUD CATHETER:  MR. LARRY KEHR is a pleasant 77-year-old male, with significant significant voiding dysfunction, history of urinary retention who returns to clinic today for monthly Stroud catheter change.  He is in no apparent discomfort today and reports feeling well.  He denies any fevers, chills or malaise, he denies any N/V/D.    He reports adequate p.o. fluid intake, and denies any foul odorous urine this past month.  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  Pending triple a repair IN 4 WEEKS.      Patient tolerated his Stroud cath change today WITH # 20 FR. COUDE TIP CATH. with SIGNIFICANT DISCOMFORT. Urine is concentrated dark yellow, without any hematuria, without any clots noted.  Patient denies any bladder spasms, denies any discomfort. Patient has developed severe balanitis, with phimosis.  He has severe penile yeast dermatitis, with an erosion on his glans penis consistent with a catheter related pressure  ulcer.                                                   PLAN  DISCUSSED SP TUBE CONSULTS WITH DR. MARKS , PATIENT ADAMANT AT THIS TIME. REQUESTS I CALL HER DTR.    ROCEPHIN 1 GM IM X 1 DOSE GIVEN IN CLINIC TODAY.    WILL START DIFLUCAN 100 MG PO TID X 3 DAYS    DOXYCYCLINE 100 MG PO BID X 14 DAYS      Discussed increasing PO fluid intake, Malhotra cath site care.  Continue perineal care daily as discussed.     We will follow-up in ONE Month for Cath Change.     HE MAY Return sooner if need be     Patient is agreeable with plan of care.    Patient reports that he is not currently experiencing any symptoms of urinary incontinence.    Class 2 Severe Obesity (BMI >=35 and <=39.9). Obesity-related health conditions include the following: obstructive sleep apnea, hypertension, coronary heart disease, dyslipidemias, GERD and osteoarthritis. Obesity is improving with lifestyle modifications. BMI is 39.03KG/M2. We discussed portion control and increasing exercise.      RADIOLOGY (CT AND/OR KUB):    CT Abdomen and Pelvis: No results found for this or any previous visit.     CT Stone Protocol: No results found for this or any previous visit.     KUB: No results found for this or any previous visit.       LABS (3 MONTHS):    Lab Requisition on 01/13/2023   Component Date Value Ref Range Status   • Urine Culture 01/13/2023 >100,000 CFU/mL Mixed Kari Isolated   Final   • Color, UA 01/13/2023 Yellow  Yellow, Straw Final   • Appearance, UA 01/13/2023 Clear  Clear Final   • pH, UA 01/13/2023 6.5  5.0 - 8.0 Final   • Specific Gravity, UA 01/13/2023 1.007  1.005 - 1.030 Final   • Glucose, UA 01/13/2023 Negative  Negative Final   • Ketones, UA 01/13/2023 Negative  Negative Final   • Bilirubin, UA 01/13/2023 Negative  Negative Final   • Blood, UA 01/13/2023 Trace (A)  Negative Final   • Protein, UA 01/13/2023 Negative  Negative Final   • Leuk Esterase, UA 01/13/2023 Small (1+) (A)  Negative Final   • Nitrite, UA 01/13/2023 Positive (A)   Negative Final   • Urobilinogen, UA 01/13/2023 0.2 E.U./dL  0.2 - 1.0 E.U./dL Final   • RBC, UA 01/13/2023 0-2  None Seen, 0-2 /HPF Final   • WBC, UA 01/13/2023 13-20 (A)  None Seen, 0-2 /HPF Final   • Bacteria, UA 01/13/2023 4+ (A)  None Seen /HPF Final   • Squamous Epithelial Cells, UA 01/13/2023 0-2  None Seen, 0-2 /HPF Final   • Hyaline Casts, UA 01/13/2023 None Seen  None Seen /LPF Final   • Methodology 01/13/2023 Automated Microscopy   Final   Lab Requisition on 01/04/2023   Component Date Value Ref Range Status   • Glucose 01/04/2023 112 (H)  65 - 99 mg/dL Final   • BUN 01/04/2023 15  8 - 23 mg/dL Final   • Creatinine 01/04/2023 1.04  0.76 - 1.27 mg/dL Final   • Sodium 01/04/2023 138  136 - 145 mmol/L Final   • Potassium 01/04/2023 4.4  3.5 - 5.2 mmol/L Final   • Chloride 01/04/2023 105  98 - 107 mmol/L Final   • CO2 01/04/2023 22.2  22.0 - 29.0 mmol/L Final   • Calcium 01/04/2023 8.2 (L)  8.6 - 10.5 mg/dL Final   • Total Protein 01/04/2023 6.0  6.0 - 8.5 g/dL Final   • Albumin 01/04/2023 2.7 (L)  3.5 - 5.2 g/dL Final   • ALT (SGPT) 01/04/2023 21  1 - 41 U/L Final   • AST (SGOT) 01/04/2023 27  1 - 40 U/L Final   • Alkaline Phosphatase 01/04/2023 950 (H)  39 - 117 U/L Final   • Total Bilirubin 01/04/2023 0.3  0.0 - 1.2 mg/dL Final   • Globulin 01/04/2023 3.3  gm/dL Final   • A/G Ratio 01/04/2023 0.8  g/dL Final   • BUN/Creatinine Ratio 01/04/2023 14.4  7.0 - 25.0 Final   • Anion Gap 01/04/2023 10.8  5.0 - 15.0 mmol/L Final   • eGFR 01/04/2023 73.0  >60.0 mL/min/1.73 Final    National Kidney Foundation and American Society of Nephrology (ASN) Task Force recommended calculation based on the Chronic Kidney Disease Epidemiology Collaboration (CKD-EPI) equation refit without adjustment for race.   • Hemoglobin A1C 01/04/2023 5.80 (H)  4.80 - 5.60 % Final   • WBC 01/04/2023 4.94  3.40 - 10.80 10*3/mm3 Final   • RBC 01/04/2023 3.54 (L)  4.14 - 5.80 10*6/mm3 Final   • Hemoglobin 01/04/2023 9.0 (L)  13.0 - 17.7 g/dL  Final   • Hematocrit 01/04/2023 29.6 (L)  37.5 - 51.0 % Final   • MCV 01/04/2023 83.6  79.0 - 97.0 fL Final   • MCH 01/04/2023 25.4 (L)  26.6 - 33.0 pg Final   • MCHC 01/04/2023 30.4 (L)  31.5 - 35.7 g/dL Final   • RDW 01/04/2023 19.7 (H)  12.3 - 15.4 % Final   • RDW-SD 01/04/2023 59.5 (H)  37.0 - 54.0 fl Final   • MPV 01/04/2023 11.1  6.0 - 12.0 fL Final   • Platelets 01/04/2023 160  140 - 450 10*3/mm3 Final    Fibrin strands present on peripheral smear, automated counts may be affected.      • Neutrophil % 01/04/2023 66.0  42.7 - 76.0 % Final   • Lymphocyte % 01/04/2023 21.1  19.6 - 45.3 % Final   • Monocyte % 01/04/2023 7.1  5.0 - 12.0 % Final   • Eosinophil % 01/04/2023 4.0  0.3 - 6.2 % Final   • Basophil % 01/04/2023 0.8  0.0 - 1.5 % Final   • Immature Grans % 01/04/2023 1.0 (H)  0.0 - 0.5 % Final   • Neutrophils, Absolute 01/04/2023 3.26  1.70 - 7.00 10*3/mm3 Final   • Lymphocytes, Absolute 01/04/2023 1.04  0.70 - 3.10 10*3/mm3 Final   • Monocytes, Absolute 01/04/2023 0.35  0.10 - 0.90 10*3/mm3 Final   • Eosinophils, Absolute 01/04/2023 0.20  0.00 - 0.40 10*3/mm3 Final   • Basophils, Absolute 01/04/2023 0.04  0.00 - 0.20 10*3/mm3 Final   • Immature Grans, Absolute 01/04/2023 0.05  0.00 - 0.05 10*3/mm3 Final   • nRBC 01/04/2023 0.0  0.0 - 0.2 /100 WBC Final   • Anisocytosis 01/04/2023 Mod/2+  None Seen Final   • Dacrocytes 01/04/2023 Slight/1+  None Seen Final   • Hypochromia 01/04/2023 Slight/1+  None Seen Final   • Clumped Platelets 01/04/2023 Present  None Seen Final    Automated count may be inaccurate due to platelet clumps.      Fibrin strands present on peripheral smear, automated counts may be affected.      Admission on 12/20/2022, Discharged on 12/21/2022   Component Date Value Ref Range Status   • Extra Tube 12/20/2022 Hold for add-ons.   Final    Auto resulted.   • Extra Tube 12/20/2022 hold for add-on   Final    Auto resulted   • Extra Tube 12/20/2022 Hold for add-ons.   Final    Auto resulted.   •  Extra Tube 12/20/2022 Hold for add-ons.   Final    Auto resulted   • Glucose 12/20/2022 135 (H)  65 - 99 mg/dL Final   • BUN 12/20/2022 15  8 - 23 mg/dL Final   • Creatinine 12/20/2022 1.15  0.76 - 1.27 mg/dL Final   • Sodium 12/20/2022 135 (L)  136 - 145 mmol/L Final   • Potassium 12/20/2022 4.3  3.5 - 5.2 mmol/L Final   • Chloride 12/20/2022 102  98 - 107 mmol/L Final   • CO2 12/20/2022 20.6 (L)  22.0 - 29.0 mmol/L Final   • Calcium 12/20/2022 8.9  8.6 - 10.5 mg/dL Final   • Total Protein 12/20/2022 7.2  6.0 - 8.5 g/dL Final   • Albumin 12/20/2022 3.59  3.50 - 5.20 g/dL Final   • ALT (SGPT) 12/20/2022 7  1 - 41 U/L Final   • AST (SGOT) 12/20/2022 10  1 - 40 U/L Final   • Alkaline Phosphatase 12/20/2022 1,159 (H)  39 - 117 U/L Final   • Total Bilirubin 12/20/2022 0.6  0.0 - 1.2 mg/dL Final   • Globulin 12/20/2022 3.6  gm/dL Final   • A/G Ratio 12/20/2022 1.0  g/dL Final   • BUN/Creatinine Ratio 12/20/2022 13.0  7.0 - 25.0 Final   • Anion Gap 12/20/2022 12.4  5.0 - 15.0 mmol/L Final   • eGFR 12/20/2022 64.7  >60.0 mL/min/1.73 Final    National Kidney Foundation and American Society of Nephrology (ASN) Task Force recommended calculation based on the Chronic Kidney Disease Epidemiology Collaboration (CKD-EPI) equation refit without adjustment for race.   • Protime 12/20/2022 37.2 (H)  12.1 - 14.7 Seconds Final   • INR 12/20/2022 3.63 (H)  0.90 - 1.10 Final   • PTT 12/20/2022 58.6 (H)  26.5 - 34.5 seconds Final   • Color, UA 12/20/2022 Yellow  Yellow, Straw Final   • Appearance, UA 12/20/2022 Cloudy (A)  Clear Final   • pH, UA 12/20/2022 6.0  5.0 - 8.0 Final   • Specific Gravity, UA 12/20/2022 1.012  1.005 - 1.030 Final   • Glucose, UA 12/20/2022 Negative  Negative Final   • Ketones, UA 12/20/2022 Negative  Negative Final   • Bilirubin, UA 12/20/2022 Negative  Negative Final   • Blood, UA 12/20/2022 Moderate (2+) (A)  Negative Final   • Protein, UA 12/20/2022 30 mg/dL (1+) (A)  Negative Final   • Leuk Esterase, UA  12/20/2022 Large (3+) (A)  Negative Final   • Nitrite, UA 12/20/2022 Positive (A)  Negative Final   • Urobilinogen, UA 12/20/2022 0.2 E.U./dL  0.2 - 1.0 E.U./dL Final   • Troponin T 12/20/2022 <0.010  0.000 - 0.030 ng/mL Final   • proBNP 12/20/2022 2,440.0 (H)  0.0 - 1,800.0 pg/mL Final   • Lactate 12/20/2022 1.9  0.5 - 2.0 mmol/L Final   • Blood Culture 12/20/2022 Prevotella bivia (C)   Final   • Isolated from 12/20/2022 Anaerobic Bottle   Final   • Gram Stain 12/20/2022 Anaerobic Bottle Gram negative bacilli (C)   Final   • Blood Culture 12/20/2022 Prevotella bivia (C)   Final   • Gram Stain 12/20/2022 Anaerobic Bottle Gram negative bacilli (C)   Final   • C-Reactive Protein 12/20/2022 6.26 (H)  0.00 - 0.50 mg/dL Final   • Sed Rate 12/20/2022 36 (H)  0 - 20 mm/hr Final   • TSH 12/20/2022 1.650  0.270 - 4.200 uIU/mL Final   • Magnesium 12/20/2022 2.2  1.6 - 2.4 mg/dL Final   • QT Interval 12/20/2022 296  ms Final   • QTC Interval 12/20/2022 432  ms Final   • WBC 12/20/2022 6.06  3.40 - 10.80 10*3/mm3 Final   • RBC 12/20/2022 4.02 (L)  4.14 - 5.80 10*6/mm3 Final   • Hemoglobin 12/20/2022 9.3 (L)  13.0 - 17.7 g/dL Final   • Hematocrit 12/20/2022 31.3 (L)  37.5 - 51.0 % Final   • MCV 12/20/2022 77.9 (L)  79.0 - 97.0 fL Final   • MCH 12/20/2022 23.1 (L)  26.6 - 33.0 pg Final   • MCHC 12/20/2022 29.7 (L)  31.5 - 35.7 g/dL Final   • RDW 12/20/2022 18.5 (H)  12.3 - 15.4 % Final   • RDW-SD 12/20/2022 51.3  37.0 - 54.0 fl Final   • MPV 12/20/2022 11.0  6.0 - 12.0 fL Final   • Platelets 12/20/2022 163  140 - 450 10*3/mm3 Final   • Neutrophil % 12/20/2022 83.6 (H)  42.7 - 76.0 % Final   • Lymphocyte % 12/20/2022 7.8 (L)  19.6 - 45.3 % Final   • Monocyte % 12/20/2022 5.4  5.0 - 12.0 % Final   • Eosinophil % 12/20/2022 0.8  0.3 - 6.2 % Final   • Basophil % 12/20/2022 0.7  0.0 - 1.5 % Final   • Immature Grans % 12/20/2022 1.7 (H)  0.0 - 0.5 % Final   • Neutrophils, Absolute 12/20/2022 5.07  1.70 - 7.00 10*3/mm3 Final   •  Lymphocytes, Absolute 12/20/2022 0.47 (L)  0.70 - 3.10 10*3/mm3 Final   • Monocytes, Absolute 12/20/2022 0.33  0.10 - 0.90 10*3/mm3 Final   • Eosinophils, Absolute 12/20/2022 0.05  0.00 - 0.40 10*3/mm3 Final   • Basophils, Absolute 12/20/2022 0.04  0.00 - 0.20 10*3/mm3 Final   • Immature Grans, Absolute 12/20/2022 0.10 (H)  0.00 - 0.05 10*3/mm3 Final   • nRBC 12/20/2022 0.7 (H)  0.0 - 0.2 /100 WBC Final   • COVID19 12/20/2022 Not Detected  Not Detected - Ref. Range Final   • Influenza A PCR 12/20/2022 Not Detected  Not Detected Final   • Influenza B PCR 12/20/2022 Not Detected  Not Detected Final   • RBC, UA 12/20/2022 6-12 (A)  None Seen, 0-2 /HPF Final   • WBC, UA 12/20/2022 21-30 (A)  None Seen, 0-2 /HPF Final   • Bacteria, UA 12/20/2022 3+ (A)  None Seen /HPF Final   • Squamous Epithelial Cells, UA 12/20/2022 0-2  None Seen, 0-2 /HPF Final   • Hyaline Casts, UA 12/20/2022 3-6  None Seen /LPF Final   • Methodology 12/20/2022 Manual Light Microscopy   Final   • Urine Culture 12/20/2022 >100,000 CFU/mL Escherichia coli (A)   Final   • PTT 12/20/2022 >100.0 (C)  26.5 - 34.5 seconds Final   • PTT 12/20/2022 51.9 (H)  26.5 - 34.5 seconds Final   • WBC 12/21/2022 5.42  3.40 - 10.80 10*3/mm3 Final   • RBC 12/21/2022 3.25 (L)  4.14 - 5.80 10*6/mm3 Final   • Hemoglobin 12/21/2022 7.5 (L)  13.0 - 17.7 g/dL Final   • Hematocrit 12/21/2022 25.1 (L)  37.5 - 51.0 % Final   • MCV 12/21/2022 77.2 (L)  79.0 - 97.0 fL Final   • MCH 12/21/2022 23.1 (L)  26.6 - 33.0 pg Final   • MCHC 12/21/2022 29.9 (L)  31.5 - 35.7 g/dL Final   • RDW 12/21/2022 18.6 (H)  12.3 - 15.4 % Final   • RDW-SD 12/21/2022 51.5  37.0 - 54.0 fl Final   • MPV 12/21/2022 10.7  6.0 - 12.0 fL Final   • Platelets 12/21/2022 129 (L)  140 - 450 10*3/mm3 Final   • Neutrophil % 12/21/2022 67.3  42.7 - 76.0 % Final   • Lymphocyte % 12/21/2022 19.2 (L)  19.6 - 45.3 % Final   • Monocyte % 12/21/2022 10.0  5.0 - 12.0 % Final   • Eosinophil % 12/21/2022 1.5  0.3 - 6.2 %  Final   • Basophil % 12/21/2022 0.7  0.0 - 1.5 % Final   • Immature Grans % 12/21/2022 1.3 (H)  0.0 - 0.5 % Final   • Neutrophils, Absolute 12/21/2022 3.65  1.70 - 7.00 10*3/mm3 Final   • Lymphocytes, Absolute 12/21/2022 1.04  0.70 - 3.10 10*3/mm3 Final   • Monocytes, Absolute 12/21/2022 0.54  0.10 - 0.90 10*3/mm3 Final   • Eosinophils, Absolute 12/21/2022 0.08  0.00 - 0.40 10*3/mm3 Final   • Basophils, Absolute 12/21/2022 0.04  0.00 - 0.20 10*3/mm3 Final   • Immature Grans, Absolute 12/21/2022 0.07 (H)  0.00 - 0.05 10*3/mm3 Final   • nRBC 12/21/2022 0.0  0.0 - 0.2 /100 WBC Final   • PTT 12/21/2022 57.7 (H)  26.5 - 34.5 seconds Final   • PTT 12/21/2022 40.7 (H)  26.5 - 34.5 seconds Final   • ABO Type 12/21/2022 O   Final   • RH type 12/21/2022 Positive   Final   • Antibody Screen 12/21/2022 Negative   Final   • T&S Expiration Date 12/21/2022 12/24/2022 11:59:59 PM   Final   • WBC 12/21/2022 5.31  3.40 - 10.80 10*3/mm3 Final   • RBC 12/21/2022 3.25 (L)  4.14 - 5.80 10*6/mm3 Final   • Hemoglobin 12/21/2022 7.6 (L)  13.0 - 17.7 g/dL Final   • Hematocrit 12/21/2022 25.1 (L)  37.5 - 51.0 % Final   • MCV 12/21/2022 77.2 (L)  79.0 - 97.0 fL Final   • MCH 12/21/2022 23.4 (L)  26.6 - 33.0 pg Final   • MCHC 12/21/2022 30.3 (L)  31.5 - 35.7 g/dL Final   • RDW 12/21/2022 18.8 (H)  12.3 - 15.4 % Final   • RDW-SD 12/21/2022 52.0  37.0 - 54.0 fl Final   • MPV 12/21/2022 10.4  6.0 - 12.0 fL Final   • Platelets 12/21/2022 128 (L)  140 - 450 10*3/mm3 Final   • Neutrophil % 12/21/2022 74.6  42.7 - 76.0 % Final   • Lymphocyte % 12/21/2022 14.9 (L)  19.6 - 45.3 % Final   • Monocyte % 12/21/2022 8.1  5.0 - 12.0 % Final   • Eosinophil % 12/21/2022 0.9  0.3 - 6.2 % Final   • Basophil % 12/21/2022 0.6  0.0 - 1.5 % Final   • Immature Grans % 12/21/2022 0.9 (H)  0.0 - 0.5 % Final   • Neutrophils, Absolute 12/21/2022 3.96  1.70 - 7.00 10*3/mm3 Final   • Lymphocytes, Absolute 12/21/2022 0.79  0.70 - 3.10 10*3/mm3 Final   • Monocytes, Absolute  12/21/2022 0.43  0.10 - 0.90 10*3/mm3 Final   • Eosinophils, Absolute 12/21/2022 0.05  0.00 - 0.40 10*3/mm3 Final   • Basophils, Absolute 12/21/2022 0.03  0.00 - 0.20 10*3/mm3 Final   • Immature Grans, Absolute 12/21/2022 0.05  0.00 - 0.05 10*3/mm3 Final   • nRBC 12/21/2022 0.0  0.0 - 0.2 /100 WBC Final   • ABO Type 12/21/2022 O   Final   • RH type 12/21/2022 Positive   Final   • BCID, PCR 12/20/2022 Negative by BCID PCR. Culture to Follow.  Negative by BCID PCR. Culture to Follow. Final   • WBC 12/21/2022 5.77  3.40 - 10.80 10*3/mm3 Final   • RBC 12/21/2022 3.35 (L)  4.14 - 5.80 10*6/mm3 Final   • Hemoglobin 12/21/2022 7.7 (L)  13.0 - 17.7 g/dL Final   • Hematocrit 12/21/2022 26.1 (L)  37.5 - 51.0 % Final   • MCV 12/21/2022 77.9 (L)  79.0 - 97.0 fL Final   • MCH 12/21/2022 23.0 (L)  26.6 - 33.0 pg Final   • MCHC 12/21/2022 29.5 (L)  31.5 - 35.7 g/dL Final   • RDW 12/21/2022 18.9 (H)  12.3 - 15.4 % Final   • RDW-SD 12/21/2022 52.6  37.0 - 54.0 fl Final   • MPV 12/21/2022 10.3  6.0 - 12.0 fL Final   • Platelets 12/21/2022 120 (L)  140 - 450 10*3/mm3 Final   • Neutrophil % 12/21/2022 71.7  42.7 - 76.0 % Final   • Lymphocyte % 12/21/2022 16.1 (L)  19.6 - 45.3 % Final   • Monocyte % 12/21/2022 9.4  5.0 - 12.0 % Final   • Eosinophil % 12/21/2022 1.2  0.3 - 6.2 % Final   • Basophil % 12/21/2022 0.7  0.0 - 1.5 % Final   • Immature Grans % 12/21/2022 0.9 (H)  0.0 - 0.5 % Final   • Neutrophils, Absolute 12/21/2022 4.14  1.70 - 7.00 10*3/mm3 Final   • Lymphocytes, Absolute 12/21/2022 0.93  0.70 - 3.10 10*3/mm3 Final   • Monocytes, Absolute 12/21/2022 0.54  0.10 - 0.90 10*3/mm3 Final   • Eosinophils, Absolute 12/21/2022 0.07  0.00 - 0.40 10*3/mm3 Final   • Basophils, Absolute 12/21/2022 0.04  0.00 - 0.20 10*3/mm3 Final   • Immature Grans, Absolute 12/21/2022 0.05  0.00 - 0.05 10*3/mm3 Final   • nRBC 12/21/2022 0.0  0.0 - 0.2 /100 WBC Final          Smoking Cessation Counseling:  Current every day smoker. less than 3 minutes  spent counseling. Has reduced tobacco use.  I advised patient to quit tobacco use and offered support.  I provided patient with tobacco cessation educational material printed in the patient's After Visit Summary.     Follow Up   Return in about 30 days (around 2/17/2023) for Next scheduled follow up, Malhotra Catheter Change/BPH WITH URINE RETENTION/BALANITIS.    Patient was given instructions and counseling regarding his condition or for health maintenance advice. Please see specific information pulled into the AVS if appropriate.          This document has been electronically signed by Griselda Cheng-Akwa, APRN   January 19, 2023 16:57 EST      Dictated Utilizing Dragon Dictation: Part of this note may be an electronic transcription/translation of spoken language to printed text using the Dragon Dictation System.

## 2023-01-19 ENCOUNTER — TELEPHONE (OUTPATIENT)
Dept: UROLOGY | Facility: CLINIC | Age: 78
End: 2023-01-19
Payer: MEDICARE

## 2023-01-19 NOTE — TELEPHONE ENCOUNTER
Caller: MELITON SCHWARZ    Relationship to patient: DAUGHTER    Best call back number: [phone]    Patient is needing: PTS DAUGHTER CALLED WANTING TO SPEAK WITH CHIO ABOUT HER FATHER AND HIS APPT ON 01/18/23. WANTING INFO ON CATH CHANGES AND ANY OTHER MEDICAL CONDITIONS THAT SHE SHOULD KNOW ABOUT. PTS DAUGHTER IS ON VERBAL AND WOULD LIKE TO UNDERSTAND WHAT IS GOING ON WITH HER FATHER. PLEASE GIVE PTS DAUGHTER MELITON A CALL BACK TO DISCUSS

## 2023-01-23 ENCOUNTER — HOSPITAL ENCOUNTER (EMERGENCY)
Facility: HOSPITAL | Age: 78
Discharge: HOME | End: 2023-01-23
Attending: STUDENT IN AN ORGANIZED HEALTH CARE EDUCATION/TRAINING PROGRAM | Admitting: STUDENT IN AN ORGANIZED HEALTH CARE EDUCATION/TRAINING PROGRAM
Payer: MEDICARE

## 2023-01-23 ENCOUNTER — APPOINTMENT (OUTPATIENT)
Dept: GENERAL RADIOLOGY | Facility: HOSPITAL | Age: 78
End: 2023-01-23
Payer: MEDICARE

## 2023-01-23 ENCOUNTER — APPOINTMENT (OUTPATIENT)
Dept: CT IMAGING | Facility: HOSPITAL | Age: 78
End: 2023-01-23
Payer: MEDICARE

## 2023-01-23 VITALS
TEMPERATURE: 97.9 F | RESPIRATION RATE: 18 BRPM | DIASTOLIC BLOOD PRESSURE: 67 MMHG | SYSTOLIC BLOOD PRESSURE: 99 MMHG | HEART RATE: 85 BPM | WEIGHT: 270 LBS | BODY MASS INDEX: 37.8 KG/M2 | HEIGHT: 71 IN | OXYGEN SATURATION: 99 %

## 2023-01-23 DIAGNOSIS — R53.1 WEAKNESS: Primary | ICD-10-CM

## 2023-01-23 LAB
ALBUMIN SERPL-MCNC: 2.7 G/DL (ref 3.5–5.2)
ALBUMIN/GLOB SERPL: 0.8 G/DL
ALP SERPL-CCNC: 1625 U/L (ref 39–117)
ALT SERPL W P-5'-P-CCNC: 9 U/L (ref 1–41)
ANION GAP SERPL CALCULATED.3IONS-SCNC: 10.4 MMOL/L (ref 5–15)
AST SERPL-CCNC: 15 U/L (ref 1–40)
BASOPHILS # BLD AUTO: 0.04 10*3/MM3 (ref 0–0.2)
BASOPHILS NFR BLD AUTO: 0.5 % (ref 0–1.5)
BILIRUB SERPL-MCNC: 0.6 MG/DL (ref 0–1.2)
BUN SERPL-MCNC: 19 MG/DL (ref 8–23)
BUN/CREAT SERPL: 20.2 (ref 7–25)
CALCIUM SPEC-SCNC: 8.5 MG/DL (ref 8.6–10.5)
CHLORIDE SERPL-SCNC: 103 MMOL/L (ref 98–107)
CK SERPL-CCNC: 73 U/L (ref 20–200)
CO2 SERPL-SCNC: 19.6 MMOL/L (ref 22–29)
CREAT SERPL-MCNC: 0.94 MG/DL (ref 0.76–1.27)
DEPRECATED RDW RBC AUTO: 62.6 FL (ref 37–54)
EGFRCR SERPLBLD CKD-EPI 2021: 82.5 ML/MIN/1.73
EOSINOPHIL # BLD AUTO: 0.3 10*3/MM3 (ref 0–0.4)
EOSINOPHIL NFR BLD AUTO: 3.5 % (ref 0.3–6.2)
ERYTHROCYTE [DISTWIDTH] IN BLOOD BY AUTOMATED COUNT: 20.8 % (ref 12.3–15.4)
GLOBULIN UR ELPH-MCNC: 3.5 GM/DL
GLUCOSE SERPL-MCNC: 110 MG/DL (ref 65–99)
HCT VFR BLD AUTO: 32.9 % (ref 37.5–51)
HGB BLD-MCNC: 10.1 G/DL (ref 13–17.7)
HOLD SPECIMEN: NORMAL
IMM GRANULOCYTES # BLD AUTO: 0.19 10*3/MM3 (ref 0–0.05)
IMM GRANULOCYTES NFR BLD AUTO: 2.2 % (ref 0–0.5)
LYMPHOCYTES # BLD AUTO: 1.26 10*3/MM3 (ref 0.7–3.1)
LYMPHOCYTES NFR BLD AUTO: 14.7 % (ref 19.6–45.3)
MCH RBC QN AUTO: 26 PG (ref 26.6–33)
MCHC RBC AUTO-ENTMCNC: 30.7 G/DL (ref 31.5–35.7)
MCV RBC AUTO: 84.8 FL (ref 79–97)
MONOCYTES # BLD AUTO: 0.58 10*3/MM3 (ref 0.1–0.9)
MONOCYTES NFR BLD AUTO: 6.8 % (ref 5–12)
NEUTROPHILS NFR BLD AUTO: 6.21 10*3/MM3 (ref 1.7–7)
NEUTROPHILS NFR BLD AUTO: 72.3 % (ref 42.7–76)
NRBC BLD AUTO-RTO: 0 /100 WBC (ref 0–0.2)
PLATELET # BLD AUTO: 125 10*3/MM3 (ref 140–450)
PMV BLD AUTO: 10.8 FL (ref 6–12)
POTASSIUM SERPL-SCNC: 4.4 MMOL/L (ref 3.5–5.2)
PROT SERPL-MCNC: 6.2 G/DL (ref 6–8.5)
QT INTERVAL: 334 MS
QTC INTERVAL: 428 MS
RBC # BLD AUTO: 3.88 10*6/MM3 (ref 4.14–5.8)
SODIUM SERPL-SCNC: 133 MMOL/L (ref 136–145)
WBC NRBC COR # BLD: 8.58 10*3/MM3 (ref 3.4–10.8)
WHOLE BLOOD HOLD SPECIMEN: NORMAL

## 2023-01-23 PROCEDURE — 71045 X-RAY EXAM CHEST 1 VIEW: CPT

## 2023-01-23 PROCEDURE — 73502 X-RAY EXAM HIP UNI 2-3 VIEWS: CPT

## 2023-01-23 PROCEDURE — 71045 X-RAY EXAM CHEST 1 VIEW: CPT | Performed by: RADIOLOGY

## 2023-01-23 PROCEDURE — 82550 ASSAY OF CK (CPK): CPT | Performed by: STUDENT IN AN ORGANIZED HEALTH CARE EDUCATION/TRAINING PROGRAM

## 2023-01-23 PROCEDURE — 99283 EMERGENCY DEPT VISIT LOW MDM: CPT

## 2023-01-23 PROCEDURE — 36415 COLL VENOUS BLD VENIPUNCTURE: CPT

## 2023-01-23 PROCEDURE — 93010 ELECTROCARDIOGRAM REPORT: CPT | Performed by: INTERNAL MEDICINE

## 2023-01-23 PROCEDURE — 85025 COMPLETE CBC W/AUTO DIFF WBC: CPT | Performed by: PHYSICIAN ASSISTANT

## 2023-01-23 PROCEDURE — 73502 X-RAY EXAM HIP UNI 2-3 VIEWS: CPT | Performed by: RADIOLOGY

## 2023-01-23 PROCEDURE — 93005 ELECTROCARDIOGRAM TRACING: CPT | Performed by: PHYSICIAN ASSISTANT

## 2023-01-23 PROCEDURE — 80053 COMPREHEN METABOLIC PANEL: CPT | Performed by: PHYSICIAN ASSISTANT

## 2023-01-23 NOTE — DISCHARGE INSTRUCTIONS
Please follow up with your primary care physician regarding your known metastasis and generalized weakness. You will be discharged into care of your son. Tomorrow you will be called by social work to help get you into a nursing facility and or rehabilitation center.

## 2023-01-23 NOTE — ED NOTES
[facility] declined transfer due to pt not meetin criteria for transfer house aware our truck to take pt back when they return from their run which will be within an hr

## 2023-01-23 NOTE — CASE MANAGEMENT/SOCIAL WORK
Case Management/Social Work    Patient Name:  Larry Kehr  YOB: 1945  MRN: [id number]  Admit Date:  1/23/2023      SS notified on call that pt signed himself out of [facility] on this date and realized that he needed further therapy.  Pt does not meet criteria for admit to [facility].  Pt resides at home with son.  SS will follow up in am with [facility] and pt to determine what community resources pt was sent home with at discharge from [facility].        Electronically signed by:  HAILEY Flores  01/23/23 18:25 EST

## 2023-01-23 NOTE — ED NOTES
Oic tristen with  [facility] not sure if they will be able to take the pt back to residence their going to check with supervisors to see if they are able to jada house super visor advised to call her if we cant get the pt home via [facility]a and our crew may be able to take it back after the truck returns

## 2023-01-24 ENCOUNTER — TELEPHONE (OUTPATIENT)
Dept: CARDIOLOGY | Facility: CLINIC | Age: 78
End: 2023-01-24
Payer: MEDICARE

## 2023-01-24 DIAGNOSIS — I42.8 NONISCHEMIC CARDIOMYOPATHY: ICD-10-CM

## 2023-01-24 RX ORDER — SACUBITRIL AND VALSARTAN 24; 26 MG/1; MG/1
1 TABLET, FILM COATED ORAL 2 TIMES DAILY
Qty: 180 TABLET | Refills: 3 | Status: SHIPPED | OUTPATIENT
Start: 2023-01-24 | End: 2023-01-24 | Stop reason: SDUPTHER

## 2023-01-24 RX ORDER — SACUBITRIL AND VALSARTAN 24; 26 MG/1; MG/1
1 TABLET, FILM COATED ORAL 2 TIMES DAILY
Qty: 180 TABLET | Refills: 3 | Status: SHIPPED | OUTPATIENT
Start: 2023-01-24

## 2023-01-24 NOTE — TELEPHONE ENCOUNTER
I called and spoke to Dee Dee and she is coming by to  samples and I advised her we would fax the patient assistant form as well. She understood and will come get the samples tomorrow.

## 2023-01-24 NOTE — TELEPHONE ENCOUNTER
Caller: RICKY SCHWARZ    Relationship: Emergency Contact    Best call back number: [phone]    What was the call regarding: PTS DAUGHTER REPORTS THAT HE IS SUPPOSED TO GET XARELTO FOR FREE FROM [employer] - RICKY REPORTS THAT HER BROTHER BROUGHT THE SIGNED [employer] PAPERWORK INTO THE OFFICE IN December - HER FATHER IS NOW OUT OF XARELTO, SHE IS NOT SURE WHAT TO DO NOW - WOULD LIKE A CALL BACK TO FIND OUT IF HE WILL BE GETTING MORE FROM THE MAIL  PTS DAUGHTER IS IN [place]OWN AND COULD COME GET SAMPLES IF THERE ARE ANY AVAILABLE    RICKY HAS ANOTHER MEDICAL QUESTION REGARDING PT HAVING A REFERRAL TO BE SCREENED FOR CANCER DUE TO HIS CATHETER, SHE IS UNSURE IF THIS IS SOMETHING THAT COULD BE DONE BY THE OFFICE OR NEEDED TO BE DONE BY PRIMARY CARE    Do you require a callback: YES

## 2023-01-24 NOTE — TELEPHONE ENCOUNTER
Patient needs entresto called in. They are unsure what mg because he has never filled it there.     Thanks!

## 2023-01-24 NOTE — CASE MANAGEMENT/SOCIAL WORK
Case Management/Social Work    Patient Name:  Larry Kehr  YOB: 1945  MRN: [id number]  Admit Date:  1/23/2023      SS spoke with Samanta at [facility] on this date who stated that pt had used days 1-20 of Medicare short term benefits.  Pt had went home from [facility] after denying need for Medicaid application and longer length of stay at that facility.  [facility] arranged [facility] for pt and attempted to arrange krupa lift and hospital bed.  Pt refused DME per Samanta.  SS attempted to contact pt and/or family on this date without success.        Electronically signed by:  HAILEY Flores  01/24/23 16:36 EST

## 2023-01-25 NOTE — CASE MANAGEMENT/SOCIAL WORK
Case Management/Social Work    Patient Name:  Larry Kehr  YOB: 1945  MRN: [id number]  Admit Date:  1/23/2023    SS received call from pt's daughter Kerline on this date at [phone].  Pt and family are agreeable to long term care placement and agreeable for Medicaid application.  Pt's daughter stated pt and family are aware that pt would have submit income to nursing home and are agreeable to doing so.  SS spoke with [facility] per Elle and notified [facility] of possibility of pt admit Medicaid Pending.  [facility] and [facility] agreeable to contacting pt's daughter on this date.  SS spoke with [facility] per Ivania Luke who is agreeable to admit pt until long term placement is secured.  SS will assist as needed.           Electronically signed by:  HAILEY Flores  01/25/23 11:41 EST

## 2023-01-25 NOTE — CASE MANAGEMENT/SOCIAL WORK
Case Management/Social Work    Patient Name:  Larry Kehr  YOB: 1945  MRN: [id number]  Admit Date:  1/23/2023      [facility] per daughter agreeable to accept pt for long term placement.        Electronically signed by:  HAILEY Flores  01/25/23 17:13 EST

## 2023-01-27 NOTE — ED PROVIDER NOTES
Subjective   History of Present Illness     Mr. Cesar is a 78 yo male w/ PMH for afib, cardiomyopathy, CHF, known bony metastasis and COPD presenting to the ED for fall. 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 While at home he got up to stand from wheelchair and reports he was weak all over. Patient lowered himself to the ground, he reports he did not fall. Patient was sitting on his (L)hip for 3 hours until he was able to stand up and call ambulance. Patient lives at home with his son who was not at home at that time. Patient reports the pain in his hip has since resolved but he came to emergency department because this is the only way he will be able to get back into the [facility]. He denies hitting head, -LOC.     Review of Systems   Constitutional: Negative.  Negative for fever.   HENT: Negative.    Respiratory: Negative.    Cardiovascular: Negative.  Negative for chest pain.   Gastrointestinal: Negative.  Negative for abdominal pain.   Endocrine: Negative.    Genitourinary: Negative.  Negative for dysuria.   Musculoskeletal:        (L) hip pain resolved.    Skin: Negative.    Neurological: Negative.    Psychiatric/Behavioral: Negative.    All other systems reviewed and are negative.      Past Medical History:   Diagnosis Date   • Atrial fibrillation (HCC)    • Cardiomyopathy (HCC)    • CHF (congestive heart failure) (HCC)    • COPD (chronic obstructive pulmonary disease) (HCC)    • Hypertension        No Known Allergies    No past surgical history on file.    Family History   Problem Relation Age of Onset   • No Known Problems Mother    • Heart disease Father    • No Known Problems Sister    • No Known Problems Brother    • No Known Problems Son    • No Known Problems Daughter    • No Known Problems Maternal Grandmother    • No Known Problems Maternal Grandfather     • No Known Problems Paternal Grandmother    • No Known Problems Paternal Grandfather    • No Known Problems Cousin    • Rheum arthritis Neg Hx    • Osteoarthritis Neg Hx    • Asthma Neg Hx    • Diabetes Neg Hx    • Heart failure Neg Hx    • Hyperlipidemia Neg Hx    • Hypertension Neg Hx    • Migraines Neg Hx    • Rashes / Skin problems Neg Hx    • Seizures Neg Hx    • Stroke Neg Hx    • Thyroid disease Neg Hx        Social History     Socioeconomic History   • Marital status:    Tobacco Use   • Smoking status: Every Day     Packs/day: 0.25     Years: 59.00     Pack years: 14.75     Types: Cigarettes   • Smokeless tobacco: Never   Vaping Use   • Vaping Use: Never used   Substance and Sexual Activity   • Alcohol use: No   • Drug use: No   • Sexual activity: Defer           Objective   Physical Exam  Constitutional:       General: He is not in acute distress.     Appearance: Normal appearance. He is not ill-appearing.   HENT:      Head: Normocephalic and atraumatic.      Nose: No congestion or rhinorrhea.   Eyes:      Extraocular Movements: Extraocular movements intact.      Pupils: Pupils are equal, round, and reactive to light.   Cardiovascular:      Rate and Rhythm: Normal rate and regular rhythm.   Pulmonary:      Effort: Pulmonary effort is normal.      Breath sounds: Normal breath sounds.   Abdominal:      General: Bowel sounds are normal.      Palpations: Abdomen is soft.   Musculoskeletal:         General: Normal range of motion.      Cervical back: Normal range of motion.   Skin:     General: Skin is warm.      Capillary Refill: Capillary refill takes less than 2 seconds.   Neurological:      General: No focal deficit present.      Mental Status: He is alert and oriented to person, place, and time.      Cranial Nerves: No cranial nerve deficit.      Sensory: No sensory deficit.      Motor: No weakness.      Deep Tendon Reflexes: Reflexes normal.         Procedures           ED Course  ED Course as of  01/27/23 0604   Mon Jan 23, 2023   1723 Patient reports that he did not fall but rather he lowered himself to the ground when he became weak. Patient was sitting on his right hip and extended period. He reports when he got up there was pain in the right hip. Pain has since resolved. Patient stump evaluated in his well appearing. Patient reports he is weak and may have signed himself out of rehabilitation to early. Patient reports he came to the emergency department because given that his only way that he can go back to the rehabilitation center.    Workup including basic labs and CK are non actionable. CXR and XR hip and pelvis are non actionable for acute fracture. Known progression of metastasis followed by Shane, seen on prior XR's.  [LS]   1824 SW called and will assist patient with rehabilitation\, nursing home and or home health tomorrow. Patient medically cleared and deemed appropriate for discharge. Patient discharged into care of his son with SW follow up tomorrow.  [LS]      ED Course User Index  [LS] Sherry Lugo MD                                           Medical Decision Making  Weakness: complicated acute illness or injury  Amount and/or Complexity of Data Reviewed  Radiology: ordered.          Final diagnoses:   Weakness       ED Disposition  ED Disposition     ED Disposition   Discharge    Condition   Stable    Comment   --             Buster Redd MD  [address] [place], [place] [id number] [phone]    Go in 2 days           Medication List      No changes were made to your prescriptions during this visit.          Sherry Lugo MD  01/27/23 0604

## 2023-01-31 ENCOUNTER — PRIOR AUTHORIZATION (OUTPATIENT)
Dept: CARDIOLOGY | Facility: CLINIC | Age: 78
End: 2023-01-31
Payer: MEDICARE

## 2023-02-08 ENCOUNTER — TELEPHONE (OUTPATIENT)
Dept: CARDIOLOGY | Facility: CLINIC | Age: 78
End: 2023-02-08
Payer: MEDICARE

## 2023-02-08 NOTE — TELEPHONE ENCOUNTER
Called to tell patient to inform him that he has been approved for the patient assistance program for his Entresto.      Number is out of service.

## 2023-03-15 ENCOUNTER — APPOINTMENT (OUTPATIENT)
Dept: CT IMAGING | Facility: HOSPITAL | Age: 78
End: 2023-03-15
Payer: MEDICARE

## 2023-03-15 ENCOUNTER — APPOINTMENT (OUTPATIENT)
Dept: GENERAL RADIOLOGY | Facility: HOSPITAL | Age: 78
End: 2023-03-15
Payer: MEDICARE

## 2023-03-15 ENCOUNTER — HOSPITAL ENCOUNTER (EMERGENCY)
Facility: HOSPITAL | Age: 78
Discharge: ANOTHER HOSPITAL | End: 2023-03-16
Attending: STUDENT IN AN ORGANIZED HEALTH CARE EDUCATION/TRAINING PROGRAM | Admitting: EMERGENCY MEDICINE
Payer: MEDICARE

## 2023-03-15 DIAGNOSIS — K92.2 GASTROINTESTINAL HEMORRHAGE, UNSPECIFIED GASTROINTESTINAL HEMORRHAGE TYPE: Primary | ICD-10-CM

## 2023-03-15 DIAGNOSIS — N39.0 SEPSIS SECONDARY TO UTI: ICD-10-CM

## 2023-03-15 DIAGNOSIS — A41.9 SEPSIS SECONDARY TO UTI: ICD-10-CM

## 2023-03-15 LAB
ABO GROUP BLD: NORMAL
ALBUMIN SERPL-MCNC: 2.7 G/DL (ref 3.5–5.2)
ALBUMIN/GLOB SERPL: 1.2 G/DL
ALP SERPL-CCNC: 1853 U/L (ref 39–117)
ALT SERPL W P-5'-P-CCNC: 21 U/L (ref 1–41)
AMORPH URATE CRY URNS QL MICRO: ABNORMAL /HPF
AMPHET+METHAMPHET UR QL: NEGATIVE
AMPHETAMINES UR QL: NEGATIVE
ANION GAP SERPL CALCULATED.3IONS-SCNC: 10.1 MMOL/L (ref 5–15)
ANISOCYTOSIS BLD QL: NORMAL
APTT PPP: 34.3 SECONDS (ref 26.5–34.5)
AST SERPL-CCNC: 24 U/L (ref 1–40)
BACTERIA UR QL AUTO: ABNORMAL /HPF
BARBITURATES UR QL SCN: NEGATIVE
BASOPHILS # BLD AUTO: 0.01 10*3/MM3 (ref 0–0.2)
BASOPHILS NFR BLD AUTO: 0.2 % (ref 0–1.5)
BENZODIAZ UR QL SCN: NEGATIVE
BILIRUB SERPL-MCNC: 0.9 MG/DL (ref 0–1.2)
BILIRUB UR QL STRIP: NEGATIVE
BLD GP AB SCN SERPL QL: NEGATIVE
BUN SERPL-MCNC: 33 MG/DL (ref 8–23)
BUN/CREAT SERPL: 34 (ref 7–25)
BUPRENORPHINE SERPL-MCNC: NEGATIVE NG/ML
CALCIUM SPEC-SCNC: 7.9 MG/DL (ref 8.6–10.5)
CANNABINOIDS SERPL QL: NEGATIVE
CHLORIDE SERPL-SCNC: 104 MMOL/L (ref 98–107)
CLARITY UR: ABNORMAL
CO2 SERPL-SCNC: 19.9 MMOL/L (ref 22–29)
COCAINE UR QL: NEGATIVE
COLOR UR: YELLOW
CREAT SERPL-MCNC: 0.97 MG/DL (ref 0.76–1.27)
D-LACTATE SERPL-SCNC: 1.5 MMOL/L (ref 0.5–2)
D-LACTATE SERPL-SCNC: 2.2 MMOL/L (ref 0.5–2)
DACRYOCYTES BLD QL SMEAR: NORMAL
DEPRECATED RDW RBC AUTO: 73 FL (ref 37–54)
EGFRCR SERPLBLD CKD-EPI 2021: 79.4 ML/MIN/1.73
EOSINOPHIL # BLD AUTO: 0.27 10*3/MM3 (ref 0–0.4)
EOSINOPHIL NFR BLD AUTO: 4.6 % (ref 0.3–6.2)
ERYTHROCYTE [DISTWIDTH] IN BLOOD BY AUTOMATED COUNT: 22.1 % (ref 12.3–15.4)
ETHANOL BLD-MCNC: <10 MG/DL (ref 0–10)
ETHANOL UR QL: <0.01 %
FLUAV RNA RESP QL NAA+PROBE: NOT DETECTED
FLUBV RNA RESP QL NAA+PROBE: NOT DETECTED
GEN 5 2HR TROPONIN T REFLEX: 47 NG/L
GLOBULIN UR ELPH-MCNC: 2.3 GM/DL
GLUCOSE SERPL-MCNC: 115 MG/DL (ref 65–99)
GLUCOSE UR STRIP-MCNC: NEGATIVE MG/DL
HCT VFR BLD AUTO: 11.1 % (ref 37.5–51)
HGB BLD-MCNC: 3.2 G/DL (ref 13–17.7)
HGB UR QL STRIP.AUTO: ABNORMAL
HOLD SPECIMEN: NORMAL
HOLD SPECIMEN: NORMAL
HYALINE CASTS UR QL AUTO: ABNORMAL /LPF
HYPOCHROMIA BLD QL: NORMAL
IMM GRANULOCYTES # BLD AUTO: 0.09 10*3/MM3 (ref 0–0.05)
IMM GRANULOCYTES NFR BLD AUTO: 1.5 % (ref 0–0.5)
INR PPP: 1.9 (ref 0.9–1.1)
KETONES UR QL STRIP: NEGATIVE
LEUKOCYTE ESTERASE UR QL STRIP.AUTO: ABNORMAL
LYMPHOCYTES # BLD AUTO: 1.12 10*3/MM3 (ref 0.7–3.1)
LYMPHOCYTES NFR BLD AUTO: 19.3 % (ref 19.6–45.3)
MAGNESIUM SERPL-MCNC: 2.3 MG/DL (ref 1.6–2.4)
MCH RBC QN AUTO: 26.4 PG (ref 26.6–33)
MCHC RBC AUTO-ENTMCNC: 28.8 G/DL (ref 31.5–35.7)
MCV RBC AUTO: 91.7 FL (ref 79–97)
METHADONE UR QL SCN: NEGATIVE
MONOCYTES # BLD AUTO: 0.42 10*3/MM3 (ref 0.1–0.9)
MONOCYTES NFR BLD AUTO: 7.2 % (ref 5–12)
NEUTROPHILS NFR BLD AUTO: 3.9 10*3/MM3 (ref 1.7–7)
NEUTROPHILS NFR BLD AUTO: 67.2 % (ref 42.7–76)
NITRITE UR QL STRIP: POSITIVE
NRBC BLD AUTO-RTO: 1.5 /100 WBC (ref 0–0.2)
NT-PROBNP SERPL-MCNC: 1194 PG/ML (ref 0–1800)
OPIATES UR QL: NEGATIVE
OXYCODONE UR QL SCN: NEGATIVE
PCP UR QL SCN: NEGATIVE
PH UR STRIP.AUTO: 8 [PH] (ref 5–8)
PLAT MORPH BLD: NORMAL
PLATELET # BLD AUTO: 100 10*3/MM3 (ref 140–450)
PMV BLD AUTO: 11.2 FL (ref 6–12)
POTASSIUM SERPL-SCNC: 5 MMOL/L (ref 3.5–5.2)
PROCALCITONIN SERPL-MCNC: 0.17 NG/ML (ref 0–0.25)
PROPOXYPH UR QL: NEGATIVE
PROT SERPL-MCNC: 5 G/DL (ref 6–8.5)
PROT UR QL STRIP: NEGATIVE
PROTHROMBIN TIME: 22.3 SECONDS (ref 12.1–14.7)
QT INTERVAL: 278 MS
QTC INTERVAL: 386 MS
RBC # BLD AUTO: 1.21 10*6/MM3 (ref 4.14–5.8)
RBC # UR STRIP: ABNORMAL /HPF
REF LAB TEST METHOD: ABNORMAL
RH BLD: POSITIVE
SARS-COV-2 RNA RESP QL NAA+PROBE: NOT DETECTED
SCHISTOCYTES BLD QL SMEAR: NORMAL
SODIUM SERPL-SCNC: 134 MMOL/L (ref 136–145)
SP GR UR STRIP: 1.01 (ref 1–1.03)
SQUAMOUS #/AREA URNS HPF: ABNORMAL /HPF
T&S EXPIRATION DATE: NORMAL
TRICYCLICS UR QL SCN: NEGATIVE
TROPONIN T DELTA: -3 NG/L
TROPONIN T SERPL HS-MCNC: 50 NG/L
UROBILINOGEN UR QL STRIP: ABNORMAL
WBC # UR STRIP: ABNORMAL /HPF
WBC NRBC COR # BLD: 5.81 10*3/MM3 (ref 3.4–10.8)
WHOLE BLOOD HOLD COAG: NORMAL

## 2023-03-15 PROCEDURE — 83735 ASSAY OF MAGNESIUM: CPT | Performed by: STUDENT IN AN ORGANIZED HEALTH CARE EDUCATION/TRAINING PROGRAM

## 2023-03-15 PROCEDURE — 81001 URINALYSIS AUTO W/SCOPE: CPT | Performed by: STUDENT IN AN ORGANIZED HEALTH CARE EDUCATION/TRAINING PROGRAM

## 2023-03-15 PROCEDURE — 71045 X-RAY EXAM CHEST 1 VIEW: CPT

## 2023-03-15 PROCEDURE — 83605 ASSAY OF LACTIC ACID: CPT | Performed by: STUDENT IN AN ORGANIZED HEALTH CARE EDUCATION/TRAINING PROGRAM

## 2023-03-15 PROCEDURE — 87077 CULTURE AEROBIC IDENTIFY: CPT | Performed by: STUDENT IN AN ORGANIZED HEALTH CARE EDUCATION/TRAINING PROGRAM

## 2023-03-15 PROCEDURE — 36430 TRANSFUSION BLD/BLD COMPNT: CPT

## 2023-03-15 PROCEDURE — 85025 COMPLETE CBC W/AUTO DIFF WBC: CPT | Performed by: STUDENT IN AN ORGANIZED HEALTH CARE EDUCATION/TRAINING PROGRAM

## 2023-03-15 PROCEDURE — 96375 TX/PRO/DX INJ NEW DRUG ADDON: CPT

## 2023-03-15 PROCEDURE — 71045 X-RAY EXAM CHEST 1 VIEW: CPT | Performed by: RADIOLOGY

## 2023-03-15 PROCEDURE — C1751 CATH, INF, PER/CENT/MIDLINE: HCPCS

## 2023-03-15 PROCEDURE — 85730 THROMBOPLASTIN TIME PARTIAL: CPT | Performed by: STUDENT IN AN ORGANIZED HEALTH CARE EDUCATION/TRAINING PROGRAM

## 2023-03-15 PROCEDURE — 25010000002 FUROSEMIDE PER 20 MG: Performed by: EMERGENCY MEDICINE

## 2023-03-15 PROCEDURE — 80306 DRUG TEST PRSMV INSTRMNT: CPT | Performed by: STUDENT IN AN ORGANIZED HEALTH CARE EDUCATION/TRAINING PROGRAM

## 2023-03-15 PROCEDURE — 71250 CT THORAX DX C-: CPT

## 2023-03-15 PROCEDURE — 36415 COLL VENOUS BLD VENIPUNCTURE: CPT

## 2023-03-15 PROCEDURE — 86900 BLOOD TYPING SEROLOGIC ABO: CPT | Performed by: STUDENT IN AN ORGANIZED HEALTH CARE EDUCATION/TRAINING PROGRAM

## 2023-03-15 PROCEDURE — 96366 THER/PROPH/DIAG IV INF ADDON: CPT

## 2023-03-15 PROCEDURE — 80053 COMPREHEN METABOLIC PANEL: CPT | Performed by: STUDENT IN AN ORGANIZED HEALTH CARE EDUCATION/TRAINING PROGRAM

## 2023-03-15 PROCEDURE — 86850 RBC ANTIBODY SCREEN: CPT | Performed by: STUDENT IN AN ORGANIZED HEALTH CARE EDUCATION/TRAINING PROGRAM

## 2023-03-15 PROCEDURE — P9016 RBC LEUKOCYTES REDUCED: HCPCS

## 2023-03-15 PROCEDURE — 87186 SC STD MICRODIL/AGAR DIL: CPT | Performed by: STUDENT IN AN ORGANIZED HEALTH CARE EDUCATION/TRAINING PROGRAM

## 2023-03-15 PROCEDURE — 74176 CT ABD & PELVIS W/O CONTRAST: CPT

## 2023-03-15 PROCEDURE — 85610 PROTHROMBIN TIME: CPT | Performed by: STUDENT IN AN ORGANIZED HEALTH CARE EDUCATION/TRAINING PROGRAM

## 2023-03-15 PROCEDURE — 83880 ASSAY OF NATRIURETIC PEPTIDE: CPT | Performed by: STUDENT IN AN ORGANIZED HEALTH CARE EDUCATION/TRAINING PROGRAM

## 2023-03-15 PROCEDURE — 87086 URINE CULTURE/COLONY COUNT: CPT | Performed by: STUDENT IN AN ORGANIZED HEALTH CARE EDUCATION/TRAINING PROGRAM

## 2023-03-15 PROCEDURE — 86901 BLOOD TYPING SEROLOGIC RH(D): CPT | Performed by: STUDENT IN AN ORGANIZED HEALTH CARE EDUCATION/TRAINING PROGRAM

## 2023-03-15 PROCEDURE — 84484 ASSAY OF TROPONIN QUANT: CPT | Performed by: STUDENT IN AN ORGANIZED HEALTH CARE EDUCATION/TRAINING PROGRAM

## 2023-03-15 PROCEDURE — 99285 EMERGENCY DEPT VISIT HI MDM: CPT

## 2023-03-15 PROCEDURE — 84145 PROCALCITONIN (PCT): CPT | Performed by: STUDENT IN AN ORGANIZED HEALTH CARE EDUCATION/TRAINING PROGRAM

## 2023-03-15 PROCEDURE — 85007 BL SMEAR W/DIFF WBC COUNT: CPT | Performed by: STUDENT IN AN ORGANIZED HEALTH CARE EDUCATION/TRAINING PROGRAM

## 2023-03-15 PROCEDURE — 86923 COMPATIBILITY TEST ELECTRIC: CPT

## 2023-03-15 PROCEDURE — 82077 ASSAY SPEC XCP UR&BREATH IA: CPT | Performed by: STUDENT IN AN ORGANIZED HEALTH CARE EDUCATION/TRAINING PROGRAM

## 2023-03-15 PROCEDURE — 86900 BLOOD TYPING SEROLOGIC ABO: CPT

## 2023-03-15 PROCEDURE — 93005 ELECTROCARDIOGRAM TRACING: CPT | Performed by: STUDENT IN AN ORGANIZED HEALTH CARE EDUCATION/TRAINING PROGRAM

## 2023-03-15 PROCEDURE — 70450 CT HEAD/BRAIN W/O DYE: CPT

## 2023-03-15 PROCEDURE — 96365 THER/PROPH/DIAG IV INF INIT: CPT

## 2023-03-15 PROCEDURE — 87636 SARSCOV2 & INF A&B AMP PRB: CPT | Performed by: STUDENT IN AN ORGANIZED HEALTH CARE EDUCATION/TRAINING PROGRAM

## 2023-03-15 PROCEDURE — 87040 BLOOD CULTURE FOR BACTERIA: CPT | Performed by: STUDENT IN AN ORGANIZED HEALTH CARE EDUCATION/TRAINING PROGRAM

## 2023-03-15 PROCEDURE — 25010000002 PIPERACILLIN SOD-TAZOBACTAM PER 1 G: Performed by: STUDENT IN AN ORGANIZED HEALTH CARE EDUCATION/TRAINING PROGRAM

## 2023-03-15 RX ORDER — FUROSEMIDE 10 MG/ML
20 INJECTION INTRAMUSCULAR; INTRAVENOUS ONCE
Status: COMPLETED | OUTPATIENT
Start: 2023-03-15 | End: 2023-03-15

## 2023-03-15 RX ORDER — NOREPINEPHRINE BIT/0.9 % NACL 8 MG/250ML
.02-.3 INFUSION BOTTLE (ML) INTRAVENOUS
Status: DISCONTINUED | OUTPATIENT
Start: 2023-03-15 | End: 2023-03-16 | Stop reason: HOSPADM

## 2023-03-15 RX ORDER — SODIUM CHLORIDE 0.9 % (FLUSH) 0.9 %
10 SYRINGE (ML) INJECTION AS NEEDED
Status: DISCONTINUED | OUTPATIENT
Start: 2023-03-15 | End: 2023-03-16 | Stop reason: HOSPADM

## 2023-03-15 RX ADMIN — SODIUM CHLORIDE 1000 ML: 9 INJECTION, SOLUTION INTRAVENOUS at 15:13

## 2023-03-15 RX ADMIN — SODIUM CHLORIDE 1000 ML: 9 INJECTION, SOLUTION INTRAVENOUS at 15:14

## 2023-03-15 RX ADMIN — FUROSEMIDE 20 MG: 10 INJECTION, SOLUTION INTRAMUSCULAR; INTRAVENOUS at 23:22

## 2023-03-15 RX ADMIN — PIPERACILLIN SODIUM AND TAZOBACTAM SODIUM 3.38 G: 3; .375 INJECTION, POWDER, LYOPHILIZED, FOR SOLUTION INTRAVENOUS at 15:25

## 2023-03-15 RX ADMIN — NOREPINEPHRINE BITARTRATE 0.02 MCG/KG/MIN: 1 INJECTION, SOLUTION, CONCENTRATE INTRAVENOUS at 16:20

## 2023-03-15 NOTE — ED PROVIDER NOTES
Subjective   History of Present Illness  77-year-old male with past medical history of atrial fibrillation on anticoagulation, CHF, and COPD presents to the ER due to concerns for possible seizure-like episode noted in the nursing home.  Patient's nursing staff noted concerns for his eyes rolled back into his head and developing diffuse shaking/tonic-clonic features.  However, on arrival to the ER, patient is awake, alert, oriented x3.  No obvious postictal symptoms.  Patient is hypotensive.  Patient is also very pale.  Patient denied any concerns for recent bleeding.  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  No obvious fever.  No cough or congestion.  Concerns for sepsis initially.  Sepsis protocol initiated.  Vital stable        Review of Systems   Constitutional: Positive for fatigue.   Neurological: Positive for weakness.   All other systems reviewed and are negative.      Past Medical History:   Diagnosis Date   • Atrial fibrillation (HCC)    • Cardiomyopathy (HCC)    • CHF (congestive heart failure) (HCC)    • COPD (chronic obstructive pulmonary disease) (HCC)    • Hypertension        No Known Allergies    History reviewed. No pertinent surgical history.    Family History   Problem Relation Age of Onset   • No Known Problems Mother    • Heart disease Father    • No Known Problems Sister    • No Known Problems Brother    • No Known Problems Son    • No Known Problems Daughter    • No Known Problems Maternal Grandmother    • No Known Problems Maternal Grandfather    • No Known Problems Paternal Grandmother    • No Known Problems Paternal Grandfather    • No Known Problems Cousin    • Rheum arthritis Neg Hx    • Osteoarthritis Neg Hx    • Asthma Neg Hx    • Diabetes Neg Hx    • Heart failure Neg Hx    • Hyperlipidemia Neg Hx    • Hypertension Neg Hx    • Migraines Neg Hx    • Rashes / Skin problems Neg Hx    • Seizures Neg Hx    •  Stroke Neg Hx    • Thyroid disease Neg Hx        Social History     Socioeconomic History   • Marital status:    Tobacco Use   • Smoking status: Every Day     Packs/day: 0.25     Years: 59.00     Pack years: 14.75     Types: Cigarettes   • Smokeless tobacco: Never   Vaping Use   • Vaping Use: Never used   Substance and Sexual Activity   • Alcohol use: No   • Drug use: No   • Sexual activity: Defer           Objective   Physical Exam  Constitutional:       General: He is not in acute distress.     Appearance: He is well-developed. He is ill-appearing.   HENT:      Head: Normocephalic and atraumatic.   Eyes:      Extraocular Movements: Extraocular movements intact.      Pupils: Pupils are equal, round, and reactive to light.   Neck:      Vascular: No JVD.   Cardiovascular:      Rate and Rhythm: Normal rate and regular rhythm.      Heart sounds: Normal heart sounds. No murmur heard.  Pulmonary:      Effort: No tachypnea, accessory muscle usage or respiratory distress.      Breath sounds: Normal breath sounds. No stridor. No decreased breath sounds, wheezing, rhonchi or rales.   Chest:      Chest wall: No deformity, tenderness or crepitus.   Abdominal:      General: Bowel sounds are normal.      Palpations: Abdomen is soft.      Tenderness: There is no abdominal tenderness. There is no guarding or rebound.   Musculoskeletal:         General: Normal range of motion.      Cervical back: Normal range of motion and neck supple.      Right lower leg: No tenderness. No edema.      Left lower leg: No tenderness. No edema.   Lymphadenopathy:      Cervical: No cervical adenopathy.   Skin:     General: Skin is warm and dry.      Coloration: Skin is pale. Skin is not cyanotic.      Findings: No ecchymosis or erythema.   Neurological:      General: No focal deficit present.      Mental Status: He is alert and oriented to person, place, and time.      Cranial Nerves: No cranial nerve deficit.      Motor: No weakness.    Psychiatric:         Mood and Affect: Mood normal. Mood is not anxious.         Behavior: Behavior normal. Behavior is not agitated.         Central Line At Bedside    Date/Time: 3/15/2023 6:07 PM  Performed by: Kulwinder Pastrana DO  Authorized by: Kulwinder Pastrana DO     Consent:     Consent obtained:  Written    Consent given by:  Patient    Risks, benefits, and alternatives were discussed: yes      Risks discussed:  Arterial puncture, bleeding, incorrect placement, infection, nerve damage and pneumothorax    Alternatives discussed:  No treatment, delayed treatment, alternative treatment, observation and referral  Universal protocol:     Imaging studies available: yes      Patient identity confirmed:  Verbally with patient, arm band and hospital-assigned identification number  Pre-procedure details:     Indication(s): central venous access and insufficient peripheral access      Hand hygiene: Hand hygiene performed prior to insertion      Sterile barrier technique: All elements of maximal sterile technique followed      Skin preparation:  Chlorhexidine  Sedation:     Sedation type:  None  Anesthesia:     Anesthesia method:  Local infiltration    Local anesthetic:  Lidocaine 1% w/o epi  Procedure details:     Location:  R internal jugular    Patient position:  Supine    Procedural supplies:  Triple lumen    Catheter size:  7 Fr    Landmarks identified: yes      Ultrasound guidance: yes      Ultrasound guidance timing: prior to insertion and real time      Sterile ultrasound techniques: Sterile gel and sterile probe covers were used      Number of attempts:  1    Successful placement: yes    Post-procedure details:     Post-procedure:  Dressing applied and line sutured    Assessment:  Blood return through all ports, no pneumothorax on x-ray, free fluid flow and placement verified by x-ray    Procedure completion:  Tolerated well, no immediate complications      CT Abdomen Pelvis Without Contrast   Final Result    Left basilar volume loss with left atelectasis. Small amount of right basilar atelectasis.      Cholelithiasis without evidence of acute cholecystitis.      Mild splenomegaly.      Moderate hydronephrosis and hydroureter but not appreciably changed.      Malhotra catheter within a decompressed bladder. Suspected bladder wall thickening with subjective perivesical edema could represent sequela of cystitis.      Widely disseminated sclerotic bone metastases.      Infrarenal abdominal aortic aneurysm without evidence of leakage and not significantly changed from recent CTA.               Signer Name: TIERRA Lugo MD    Signed: 3/15/2023 6:57 PM    Workstation Name: [facility]     Radiology [facility]      CT Chest Without Contrast Diagnostic   Final Result   Left-sided volume loss with mild elevation left hemidiaphragm and left basilar atelectasis. Small amount of right basilar atelectasis.      Pulmonary parenchymal changes likely reflecting early emphysema and chronic bronchitis. Minimal reticulonodular changes could also represent some degree of background interstitial disease or fibrosis but appears relatively mild.      Widely disseminated sclerotic bone lesions likely representing metastases.      Signer Name: TIERRA Lugo MD    Signed: 3/15/2023 6:49 PM    Workstation Name: [facility]     Radiology [facility]      CT Head Without Contrast   Final Result      No acute intracranial hemorrhage or mass effect.   Stable prominent pituitary gland. This may be further evaluated with outpatient MRI pituitary for possible underlying adenoma.      Signer Name: Avery Sales MD    Signed: 3/15/2023 6:55 PM    [email or web address]     Radiology [facility]      XR Chest AP   Final Result   1.  Right IJ deep line as detailed above. No pneumothorax.   2.  Otherwise stable chest.       This report was finalized on 3/15/2023 4:45 PM by Dr. Natalio Hoang MD.           XR Chest 1 View   Final Result   Diffuse sclerotic metastasis       This report was finalized on 3/15/2023 3:46 PM by Dr. Kwasi Cuevas MD.            Results for orders placed or performed during the hospital encounter of 03/15/23   Urine Culture - Urine, Urine, Clean Catch    Specimen: Urine, Clean Catch   Result Value Ref Range    Urine Culture >100,000 CFU/mL Proteus species (A)    COVID-19 and FLU A/B PCR - Swab, Nasopharynx    Specimen: Nasopharynx; Swab   Result Value Ref Range    COVID19 Not Detected Not Detected - Ref. Range    Influenza A PCR Not Detected Not Detected    Influenza B PCR Not Detected Not Detected   Comprehensive Metabolic Panel    Specimen: Arm, Right; Blood   Result Value Ref Range    Glucose 115 (H) 65 - 99 mg/dL    BUN 33 (H) 8 - 23 mg/dL    Creatinine 0.97 0.76 - 1.27 mg/dL    Sodium 134 (L) 136 - 145 mmol/L    Potassium 5.0 3.5 - 5.2 mmol/L    Chloride 104 98 - 107 mmol/L    CO2 19.9 (L) 22.0 - 29.0 mmol/L    Calcium 7.9 (L) 8.6 - 10.5 mg/dL    Total Protein 5.0 (L) 6.0 - 8.5 g/dL    Albumin 2.7 (L) 3.5 - 5.2 g/dL    ALT (SGPT) 21 1 - 41 U/L    AST (SGOT) 24 1 - 40 U/L    Alkaline Phosphatase 1,853 (H) 39 - 117 U/L    Total Bilirubin 0.9 0.0 - 1.2 mg/dL    Globulin 2.3 gm/dL    A/G Ratio 1.2 g/dL    BUN/Creatinine Ratio 34.0 (H) 7.0 - 25.0    Anion Gap 10.1 5.0 - 15.0 mmol/L    eGFR 79.4 >60.0 mL/min/1.73   Protime-INR    Specimen: Arm, Right; Blood   Result Value Ref Range    Protime 22.3 (H) 12.1 - 14.7 Seconds    INR 1.90 (H) 0.90 - 1.10   aPTT    Specimen: Arm, Right; Blood   Result Value Ref Range    PTT 34.3 26.5 - 34.5 seconds   Lactic Acid, Plasma    Specimen: Arm, Right; Blood   Result Value Ref Range    Lactate 2.2 (C) 0.5 - 2.0 mmol/L   Procalcitonin    Specimen: Arm, Right; Blood   Result Value Ref Range    Procalcitonin 0.17 0.00 - 0.25 ng/mL   CBC Auto Differential    Specimen: Arm, Right; Blood   Result Value Ref Range    WBC 5.81 3.40 - 10.80 10*3/mm3    RBC 1.21 (L)  4.14 - 5.80 10*6/mm3    Hemoglobin 3.2 (C) 13.0 - 17.7 g/dL    Hematocrit 11.1 (C) 37.5 - 51.0 %    MCV 91.7 79.0 - 97.0 fL    MCH 26.4 (L) 26.6 - 33.0 pg    MCHC 28.8 (L) 31.5 - 35.7 g/dL    RDW 22.1 (H) 12.3 - 15.4 %    RDW-SD 73.0 (H) 37.0 - 54.0 fl    MPV 11.2 6.0 - 12.0 fL    Platelets 100 (L) 140 - 450 10*3/mm3    Neutrophil % 67.2 42.7 - 76.0 %    Lymphocyte % 19.3 (L) 19.6 - 45.3 %    Monocyte % 7.2 5.0 - 12.0 %    Eosinophil % 4.6 0.3 - 6.2 %    Basophil % 0.2 0.0 - 1.5 %    Immature Grans % 1.5 (H) 0.0 - 0.5 %    Neutrophils, Absolute 3.90 1.70 - 7.00 10*3/mm3    Lymphocytes, Absolute 1.12 0.70 - 3.10 10*3/mm3    Monocytes, Absolute 0.42 0.10 - 0.90 10*3/mm3    Eosinophils, Absolute 0.27 0.00 - 0.40 10*3/mm3    Basophils, Absolute 0.01 0.00 - 0.20 10*3/mm3    Immature Grans, Absolute 0.09 (H) 0.00 - 0.05 10*3/mm3    nRBC 1.5 (H) 0.0 - 0.2 /100 WBC   High Sensitivity Troponin T    Specimen: Arm, Right; Blood   Result Value Ref Range    HS Troponin T 50 (H) <15 ng/L   BNP    Specimen: Arm, Right; Blood   Result Value Ref Range    proBNP 1,194.0 0.0 - 1,800.0 pg/mL   Urine Drug Screen - Urine, Clean Catch    Specimen: Urine, Clean Catch   Result Value Ref Range    THC, Screen, Urine Negative Negative    Phencyclidine (PCP), Urine Negative Negative    Cocaine Screen, Urine Negative Negative    Methamphetamine, Ur Negative Negative    Opiate Screen Negative Negative    Amphetamine Screen, Urine Negative Negative    Benzodiazepine Screen, Urine Negative Negative    Tricyclic Antidepressants Screen Negative Negative    Methadone Screen, Urine Negative Negative    Barbiturates Screen, Urine Negative Negative    Oxycodone Screen, Urine Negative Negative    Propoxyphene Screen Negative Negative    Buprenorphine, Screen, Urine Negative Negative   Urinalysis With Culture If Indicated - Urine, Clean Catch    Specimen: Urine, Clean Catch   Result Value Ref Range    Color, UA Yellow Yellow, Straw    Appearance, UA Turbid  (A) Clear    pH, UA 8.0 5.0 - 8.0    Specific Gravity, UA 1.013 1.005 - 1.030    Glucose, UA Negative Negative    Ketones, UA Negative Negative    Bilirubin, UA Negative Negative    Blood, UA Moderate (2+) (A) Negative    Protein, UA Negative Negative    Leuk Esterase, UA Large (3+) (A) Negative    Nitrite, UA Positive (A) Negative    Urobilinogen, UA 0.2 E.U./dL 0.2 - 1.0 E.U./dL   Ethanol    Specimen: Arm, Right; Blood   Result Value Ref Range    Ethanol <10 0 - 10 mg/dL    Ethanol % <0.010 %   Magnesium    Specimen: Arm, Right; Blood   Result Value Ref Range    Magnesium 2.3 1.6 - 2.4 mg/dL   High Sensitivity Troponin T 2Hr    Specimen: Blood, Central Line   Result Value Ref Range    HS Troponin T 47 (H) <15 ng/L    Troponin T Delta -3 >=-4 - <+4 ng/L   Scan Slide    Specimen: Arm, Right; Blood   Result Value Ref Range    Anisocytosis Large/3+ None Seen    Dacrocytes Slight/1+ None Seen    Hypochromia Mod/2+ None Seen    Schistocytes Slight/1+ None Seen    Platelet Morphology Normal Normal   STAT Lactic Acid, Reflex    Specimen: Blood, Central Line   Result Value Ref Range    Lactate 1.5 0.5 - 2.0 mmol/L   Urinalysis, Microscopic Only - Urine, Clean Catch    Specimen: Urine, Clean Catch   Result Value Ref Range    RBC, UA 21-30 (A) None Seen, 0-2 /HPF    WBC, UA Too Numerous to Count (A) None Seen, 0-2 /HPF    Bacteria, UA 4+ (A) None Seen /HPF    Squamous Epithelial Cells, UA 7-12 (A) None Seen, 0-2 /HPF    Hyaline Casts, UA 7-12 None Seen /LPF    Amorphous Crystals, UA Small/1+ None Seen /HPF    Methodology Manual Light Microscopy    CBC Auto Differential    Specimen: Blood   Result Value Ref Range    WBC 7.89 3.40 - 10.80 10*3/mm3    RBC 2.69 (L) 4.14 - 5.80 10*6/mm3    Hemoglobin 7.8 (L) 13.0 - 17.7 g/dL    Hematocrit 24.1 (L) 37.5 - 51.0 %    MCV 89.6 79.0 - 97.0 fL    MCH 29.0 26.6 - 33.0 pg    MCHC 32.4 31.5 - 35.7 g/dL    RDW 16.6 (H) 12.3 - 15.4 %    RDW-SD 52.5 37.0 - 54.0 fl    MPV 11.1 6.0 - 12.0 fL     Platelets 82 (L) 140 - 450 10*3/mm3    Neutrophil % 71.3 42.7 - 76.0 %    Lymphocyte % 15.1 (L) 19.6 - 45.3 %    Monocyte % 7.2 5.0 - 12.0 %    Eosinophil % 2.0 0.3 - 6.2 %    Basophil % 0.6 0.0 - 1.5 %    Immature Grans % 3.8 (H) 0.0 - 0.5 %    Neutrophils, Absolute 5.62 1.70 - 7.00 10*3/mm3    Lymphocytes, Absolute 1.19 0.70 - 3.10 10*3/mm3    Monocytes, Absolute 0.57 0.10 - 0.90 10*3/mm3    Eosinophils, Absolute 0.16 0.00 - 0.40 10*3/mm3    Basophils, Absolute 0.05 0.00 - 0.20 10*3/mm3    Immature Grans, Absolute 0.30 (H) 0.00 - 0.05 10*3/mm3    nRBC 1.5 (H) 0.0 - 0.2 /100 WBC   Comprehensive Metabolic Panel    Specimen: Blood   Result Value Ref Range    Glucose 153 (H) 65 - 99 mg/dL    BUN 33 (H) 8 - 23 mg/dL    Creatinine 1.03 0.76 - 1.27 mg/dL    Sodium 136 136 - 145 mmol/L    Potassium 4.5 3.5 - 5.2 mmol/L    Chloride 106 98 - 107 mmol/L    CO2 20.1 (L) 22.0 - 29.0 mmol/L    Calcium 7.9 (L) 8.6 - 10.5 mg/dL    Total Protein 5.3 (L) 6.0 - 8.5 g/dL    Albumin 2.8 (L) 3.5 - 5.2 g/dL    ALT (SGPT) 22 1 - 41 U/L    AST (SGOT) 26 1 - 40 U/L    Alkaline Phosphatase 1,975 (H) 39 - 117 U/L    Total Bilirubin 1.7 (H) 0.0 - 1.2 mg/dL    Globulin 2.5 gm/dL    A/G Ratio 1.1 g/dL    BUN/Creatinine Ratio 32.0 (H) 7.0 - 25.0    Anion Gap 9.9 5.0 - 15.0 mmol/L    eGFR 73.9 >60.0 mL/min/1.73   CBC Auto Differential    Specimen: Arm, Left; Blood   Result Value Ref Range    WBC 8.21 3.40 - 10.80 10*3/mm3    RBC 2.73 (L) 4.14 - 5.80 10*6/mm3    Hemoglobin 7.9 (L) 13.0 - 17.7 g/dL    Hematocrit 24.8 (L) 37.5 - 51.0 %    MCV 90.8 79.0 - 97.0 fL    MCH 28.9 26.6 - 33.0 pg    MCHC 31.9 31.5 - 35.7 g/dL    RDW 17.0 (H) 12.3 - 15.4 %    RDW-SD 54.2 (H) 37.0 - 54.0 fl    MPV 11.5 6.0 - 12.0 fL    Platelets 86 (L) 140 - 450 10*3/mm3    Neutrophil % 72.0 42.7 - 76.0 %    Lymphocyte % 15.7 (L) 19.6 - 45.3 %    Monocyte % 7.1 5.0 - 12.0 %    Eosinophil % 1.7 0.3 - 6.2 %    Basophil % 0.7 0.0 - 1.5 %    Immature Grans % 2.8 (H) 0.0 - 0.5  %    Neutrophils, Absolute 5.91 1.70 - 7.00 10*3/mm3    Lymphocytes, Absolute 1.29 0.70 - 3.10 10*3/mm3    Monocytes, Absolute 0.58 0.10 - 0.90 10*3/mm3    Eosinophils, Absolute 0.14 0.00 - 0.40 10*3/mm3    Basophils, Absolute 0.06 0.00 - 0.20 10*3/mm3    Immature Grans, Absolute 0.23 (H) 0.00 - 0.05 10*3/mm3    nRBC 1.5 (H) 0.0 - 0.2 /100 WBC   ECG 12 Lead Other; Sepsis   Result Value Ref Range    QT Interval 278 ms    QTC Interval 386 ms   Prepare RBC, 2 Units   Result Value Ref Range    Product Code S8505R15     Unit Number [id number]     UNIT  ABO O     UNIT  RH POS     Crossmatch Interpretation Compatible     Dispense Status PT     Blood Expiration Date 202304112359     Blood Type Barcode 5100     Product Code I0861Q85     Unit Number [id number]     UNIT  ABO O     UNIT  RH POS     Crossmatch Interpretation Compatible     Dispense Status IS     Blood Expiration Date 202304112359     Blood Type Barcode 5100    Type & Screen    Specimen: Arm, Right; Blood   Result Value Ref Range    ABO Type O     RH type Positive     Antibody Screen Negative     T&S Expiration Date 3/18/2023 11:59:59 PM    Prepare RBC, 2 Units   Result Value Ref Range    Product Code V3888U12     Unit Number [id number]     UNIT  ABO O     UNIT  RH POS     Crossmatch Interpretation Compatible     Dispense Status IS     Blood Expiration Date 202304122359     Blood Type Barcode 5100     Product Code W2897P30     Unit Number [id number]     UNIT  ABO O     UNIT  RH POS     Crossmatch Interpretation Compatible     Dispense Status IS     Blood Expiration Date 202304122359     Blood Type Barcode 5100    Green Top (Gel)   Result Value Ref Range    Extra Tube Hold for add-ons.    Gold Top - SST   Result Value Ref Range    Extra Tube Hold for add-ons.    Light Blue Top   Result Value Ref Range    Extra Tube Hold for add-ons.                 ED Course  ED Course as of 03/16/23 1246   Wed Mar 15, 2023   1625 EKG notes atrial fibrillation.   160 bpm.  No acute ST elevation.  QTc 3-6.    Electronically signed by Kulwinder Pastrana DO, 03/15/23, 4:25 PM EDT.   [SF]   1859 Patient is severely anemic.  Patient will be transfused with 2 units of packed red blood cells.  Repeat H&H pending.  Central line was placed for sufficient access and hypotension.  Patient was also started on Levophed.  Laboratory work-up is consistent with sepsis protocol.  Patient received prophylactic antibiotics and IV fluids.  However, patient does have a history of CHF.  Awaiting further imaging results. [SF]   Thu Mar 16, 2023   [id number] - care turned over to dr Rios [DS]   0816 I assumed patient's care from Dr. Salcido this morning at shift change.  Please see previous documentation above.  Patient remains awake, alert, oriented.  He is in no apparent acute distress.  Stool was very dark black in color, not tarry in consistency.  Hospital policy does not allow for stool Hemoccult testing.  Hemoglobin has improved with transfusion.  Currently on the monitor atrial fibrillation, rate 95, blood pressure 97/66, respirations 19.  We have no ICU beds available, no gastroenterology at this facility.  I discussed the case with Dr. Godinez, GI at [facility] in [place].  He is willing to take care of the patient, wanted me to speak with the hospitalist service.  I spoke with Dr. Phillip, hospitalist.  He accepts the patient in transfer.  Patient is agreeable with this plan.  As soon as [facility] gives us bed confirmation we will make transport arrangements. [CM]      ED Course User Index  [CM] Abhi Rios MD  [DS] Omari Hughes MD  [SF] Kulwinder Pastrana DO      SEPTIC SHOCK FOCUSED EXAM    *Must be completed by a licensed independent Practitioner within 6 hours of diagnosis for the following conditions -- Septic Shock or Severe Sepsis with Lactate >/= 4.    Fluid bolus must be completed prior to assessment.      Diagnosis: Septic Shock     Vital Signs  (Attestation) Reviewed Temp, HR, RR, BP, SpO2   Shock Index (HR/SBP) (Attestation) Reviewed    Urine Output  increased   General ill appearing   Respiratory decreased breath sounds   Cardiac/Chest tachycardia   Skin (documentation of skin color is required) pale and diaphoretic   Capillary Refill <3 seconds   Peripheral Pulses Checked                          radial  palpable     † Septic shock is defined by CMS as severe sepsis plus one of the following: persistent hypotension after fluid bolus OR tissue hypoperfusion (Lactic Acid ?4)  †† ONE OF THE FOLLOWING can be done in lieu of the Focused Exam: Measure CVP; Measure ScVO2; Echocardiogram (cardiac echo or cardiac ultrasound); Dynamic assessment of fluid responsiveness with passive leg raise or fluid challenge.    Abhi Rios MD  03/16/23  16:15 EDT                                       MDM    Final diagnoses:   Gastrointestinal hemorrhage, unspecified gastrointestinal hemorrhage type   Sepsis secondary to UTI (HCC)       ED Disposition  ED Disposition     ED Disposition   Transfer to Another Facility     Condition   --    Comment   --             Please note that portions of this note were completed with a voice recognition program.            Abhi Rios MD  03/16/23 8808

## 2023-03-16 VITALS
BODY MASS INDEX: 37.65 KG/M2 | WEIGHT: 268.96 LBS | HEIGHT: 71 IN | RESPIRATION RATE: 16 BRPM | OXYGEN SATURATION: 99 % | SYSTOLIC BLOOD PRESSURE: 111 MMHG | HEART RATE: 105 BPM | DIASTOLIC BLOOD PRESSURE: 80 MMHG | TEMPERATURE: 97.6 F

## 2023-03-16 LAB
ALBUMIN SERPL-MCNC: 2.8 G/DL (ref 3.5–5.2)
ALBUMIN/GLOB SERPL: 1.1 G/DL
ALP SERPL-CCNC: 1975 U/L (ref 39–117)
ALT SERPL W P-5'-P-CCNC: 22 U/L (ref 1–41)
ANION GAP SERPL CALCULATED.3IONS-SCNC: 9.9 MMOL/L (ref 5–15)
AST SERPL-CCNC: 26 U/L (ref 1–40)
BASOPHILS # BLD AUTO: 0.05 10*3/MM3 (ref 0–0.2)
BASOPHILS # BLD AUTO: 0.06 10*3/MM3 (ref 0–0.2)
BASOPHILS NFR BLD AUTO: 0.6 % (ref 0–1.5)
BASOPHILS NFR BLD AUTO: 0.7 % (ref 0–1.5)
BH BB BLOOD EXPIRATION DATE: NORMAL
BH BB BLOOD TYPE BARCODE: 5100
BH BB DISPENSE STATUS: NORMAL
BH BB PRODUCT CODE: NORMAL
BH BB UNIT NUMBER: NORMAL
BILIRUB SERPL-MCNC: 1.7 MG/DL (ref 0–1.2)
BUN SERPL-MCNC: 33 MG/DL (ref 8–23)
BUN/CREAT SERPL: 32 (ref 7–25)
CALCIUM SPEC-SCNC: 7.9 MG/DL (ref 8.6–10.5)
CHLORIDE SERPL-SCNC: 106 MMOL/L (ref 98–107)
CO2 SERPL-SCNC: 20.1 MMOL/L (ref 22–29)
CREAT SERPL-MCNC: 1.03 MG/DL (ref 0.76–1.27)
CROSSMATCH INTERPRETATION: NORMAL
DEPRECATED RDW RBC AUTO: 52.5 FL (ref 37–54)
DEPRECATED RDW RBC AUTO: 54.2 FL (ref 37–54)
EGFRCR SERPLBLD CKD-EPI 2021: 73.9 ML/MIN/1.73
EOSINOPHIL # BLD AUTO: 0.14 10*3/MM3 (ref 0–0.4)
EOSINOPHIL # BLD AUTO: 0.16 10*3/MM3 (ref 0–0.4)
EOSINOPHIL NFR BLD AUTO: 1.7 % (ref 0.3–6.2)
EOSINOPHIL NFR BLD AUTO: 2 % (ref 0.3–6.2)
ERYTHROCYTE [DISTWIDTH] IN BLOOD BY AUTOMATED COUNT: 16.6 % (ref 12.3–15.4)
ERYTHROCYTE [DISTWIDTH] IN BLOOD BY AUTOMATED COUNT: 17 % (ref 12.3–15.4)
GLOBULIN UR ELPH-MCNC: 2.5 GM/DL
GLUCOSE SERPL-MCNC: 153 MG/DL (ref 65–99)
HCT VFR BLD AUTO: 24.1 % (ref 37.5–51)
HCT VFR BLD AUTO: 24.8 % (ref 37.5–51)
HGB BLD-MCNC: 7.8 G/DL (ref 13–17.7)
HGB BLD-MCNC: 7.9 G/DL (ref 13–17.7)
IMM GRANULOCYTES # BLD AUTO: 0.23 10*3/MM3 (ref 0–0.05)
IMM GRANULOCYTES # BLD AUTO: 0.3 10*3/MM3 (ref 0–0.05)
IMM GRANULOCYTES NFR BLD AUTO: 2.8 % (ref 0–0.5)
IMM GRANULOCYTES NFR BLD AUTO: 3.8 % (ref 0–0.5)
LYMPHOCYTES # BLD AUTO: 1.19 10*3/MM3 (ref 0.7–3.1)
LYMPHOCYTES # BLD AUTO: 1.29 10*3/MM3 (ref 0.7–3.1)
LYMPHOCYTES NFR BLD AUTO: 15.1 % (ref 19.6–45.3)
LYMPHOCYTES NFR BLD AUTO: 15.7 % (ref 19.6–45.3)
MCH RBC QN AUTO: 28.9 PG (ref 26.6–33)
MCH RBC QN AUTO: 29 PG (ref 26.6–33)
MCHC RBC AUTO-ENTMCNC: 31.9 G/DL (ref 31.5–35.7)
MCHC RBC AUTO-ENTMCNC: 32.4 G/DL (ref 31.5–35.7)
MCV RBC AUTO: 89.6 FL (ref 79–97)
MCV RBC AUTO: 90.8 FL (ref 79–97)
MONOCYTES # BLD AUTO: 0.57 10*3/MM3 (ref 0.1–0.9)
MONOCYTES # BLD AUTO: 0.58 10*3/MM3 (ref 0.1–0.9)
MONOCYTES NFR BLD AUTO: 7.1 % (ref 5–12)
MONOCYTES NFR BLD AUTO: 7.2 % (ref 5–12)
NEUTROPHILS NFR BLD AUTO: 5.62 10*3/MM3 (ref 1.7–7)
NEUTROPHILS NFR BLD AUTO: 5.91 10*3/MM3 (ref 1.7–7)
NEUTROPHILS NFR BLD AUTO: 71.3 % (ref 42.7–76)
NEUTROPHILS NFR BLD AUTO: 72 % (ref 42.7–76)
NRBC BLD AUTO-RTO: 1.5 /100 WBC (ref 0–0.2)
NRBC BLD AUTO-RTO: 1.5 /100 WBC (ref 0–0.2)
PLATELET # BLD AUTO: 82 10*3/MM3 (ref 140–450)
PLATELET # BLD AUTO: 86 10*3/MM3 (ref 140–450)
PMV BLD AUTO: 11.1 FL (ref 6–12)
PMV BLD AUTO: 11.5 FL (ref 6–12)
POTASSIUM SERPL-SCNC: 4.5 MMOL/L (ref 3.5–5.2)
PROT SERPL-MCNC: 5.3 G/DL (ref 6–8.5)
RBC # BLD AUTO: 2.69 10*6/MM3 (ref 4.14–5.8)
RBC # BLD AUTO: 2.73 10*6/MM3 (ref 4.14–5.8)
SODIUM SERPL-SCNC: 136 MMOL/L (ref 136–145)
UNIT  ABO: NORMAL
UNIT  RH: NORMAL
WBC NRBC COR # BLD: 7.89 10*3/MM3 (ref 3.4–10.8)
WBC NRBC COR # BLD: 8.21 10*3/MM3 (ref 3.4–10.8)

## 2023-03-16 PROCEDURE — 96366 THER/PROPH/DIAG IV INF ADDON: CPT

## 2023-03-16 PROCEDURE — 25010000002 PIPERACILLIN SOD-TAZOBACTAM PER 1 G: Performed by: EMERGENCY MEDICINE

## 2023-03-16 PROCEDURE — P9016 RBC LEUKOCYTES REDUCED: HCPCS

## 2023-03-16 PROCEDURE — 96375 TX/PRO/DX INJ NEW DRUG ADDON: CPT

## 2023-03-16 PROCEDURE — 36430 TRANSFUSION BLD/BLD COMPNT: CPT

## 2023-03-16 PROCEDURE — 80053 COMPREHEN METABOLIC PANEL: CPT | Performed by: EMERGENCY MEDICINE

## 2023-03-16 PROCEDURE — 85025 COMPLETE CBC W/AUTO DIFF WBC: CPT | Performed by: EMERGENCY MEDICINE

## 2023-03-16 PROCEDURE — 25010000002 HYDROMORPHONE PER 4 MG: Performed by: EMERGENCY MEDICINE

## 2023-03-16 PROCEDURE — 86900 BLOOD TYPING SEROLOGIC ABO: CPT

## 2023-03-16 PROCEDURE — 96368 THER/DIAG CONCURRENT INF: CPT

## 2023-03-16 PROCEDURE — 96376 TX/PRO/DX INJ SAME DRUG ADON: CPT

## 2023-03-16 RX ORDER — PANTOPRAZOLE SODIUM 40 MG/10ML
80 INJECTION, POWDER, LYOPHILIZED, FOR SOLUTION INTRAVENOUS ONCE
Status: COMPLETED | OUTPATIENT
Start: 2023-03-16 | End: 2023-03-16

## 2023-03-16 RX ORDER — HYDROMORPHONE HYDROCHLORIDE 1 MG/ML
0.25 INJECTION, SOLUTION INTRAMUSCULAR; INTRAVENOUS; SUBCUTANEOUS ONCE
Status: COMPLETED | OUTPATIENT
Start: 2023-03-16 | End: 2023-03-16

## 2023-03-16 RX ADMIN — PIPERACILLIN SODIUM AND TAZOBACTAM SODIUM 3.38 G: 3; .375 INJECTION, POWDER, LYOPHILIZED, FOR SOLUTION INTRAVENOUS at 04:08

## 2023-03-16 RX ADMIN — NOREPINEPHRINE BITARTRATE 0.12 MCG/KG/MIN: 1 INJECTION, SOLUTION, CONCENTRATE INTRAVENOUS at 01:32

## 2023-03-16 RX ADMIN — PIPERACILLIN SODIUM AND TAZOBACTAM SODIUM 3.38 G: 3; .375 INJECTION, POWDER, LYOPHILIZED, FOR SOLUTION INTRAVENOUS at 09:55

## 2023-03-16 RX ADMIN — HYDROMORPHONE HYDROCHLORIDE 0.25 MG: 1 INJECTION, SOLUTION INTRAMUSCULAR; INTRAVENOUS; SUBCUTANEOUS at 10:41

## 2023-03-16 RX ADMIN — PANTOPRAZOLE SODIUM 80 MG: 40 INJECTION, POWDER, FOR SOLUTION INTRAVENOUS at 08:18

## 2023-03-16 RX ADMIN — SODIUM CHLORIDE 8 MG/HR: 9 INJECTION, SOLUTION INTRAVENOUS at 08:19

## 2023-03-16 NOTE — ED NOTES
Spoke with dr kenny about patients new cough and swelling to upper extremities.   Concern for overload, lungs clear at this time.

## 2023-03-16 NOTE — ED NOTES
Patient is wanting to be discharged once he has gotten his blood products, patient advised of need for admission, states he doesn't want to be admitted, and wants to go back to nursing home. Dr kenny advised.

## 2023-03-17 LAB — BACTERIA SPEC AEROBE CULT: ABNORMAL

## 2023-03-20 LAB
BACTERIA SPEC AEROBE CULT: NORMAL
BACTERIA SPEC AEROBE CULT: NORMAL

## 2023-04-18 ENCOUNTER — TELEPHONE (OUTPATIENT)
Dept: CARDIOLOGY | Facility: CLINIC | Age: 78
End: 2023-04-18
Payer: MEDICARE

## 2023-04-18 NOTE — TELEPHONE ENCOUNTER
“Please be informed that patient has passed. Patient has been marked  in the system. The date of death is: 23    Caller: RICKY SCHWARZ    Relationship: Emergency Contact    Best call back number:[phone]

## 2023-10-21 NOTE — TELEPHONE ENCOUNTER
Called pt to advise him of Mimi's note. No answer no VM.       2.  I have discussed the results of the arterial Doppler from earlier this year with him today.  Given concern for peripheral arterial disease and worsening cardiomyopathy concerning for underlying ischemia, will refer him to interventional cardiology for further evaluation.   No
